# Patient Record
Sex: MALE | Race: WHITE | Employment: OTHER | URBAN - METROPOLITAN AREA
[De-identification: names, ages, dates, MRNs, and addresses within clinical notes are randomized per-mention and may not be internally consistent; named-entity substitution may affect disease eponyms.]

---

## 2017-02-27 ENCOUNTER — HOSPITAL ENCOUNTER (OUTPATIENT)
Dept: LAB | Age: 77
Discharge: HOME | End: 2017-02-27
Payer: MEDICARE

## 2017-02-27 ENCOUNTER — APPOINTMENT (OUTPATIENT)
Dept: CT IMAGING | Age: 77
End: 2017-02-27
Attending: EMERGENCY MEDICINE
Payer: MEDICARE

## 2017-02-27 ENCOUNTER — HOSPITAL ENCOUNTER (EMERGENCY)
Age: 77
Discharge: HOME | End: 2017-02-27
Attending: EMERGENCY MEDICINE
Payer: MEDICARE

## 2017-02-27 VITALS
SYSTOLIC BLOOD PRESSURE: 154 MMHG | BODY MASS INDEX: 25.71 KG/M2 | HEART RATE: 62 BPM | DIASTOLIC BLOOD PRESSURE: 101 MMHG | TEMPERATURE: 98 F | HEIGHT: 66 IN | RESPIRATION RATE: 18 BRPM | OXYGEN SATURATION: 100 % | WEIGHT: 160 LBS

## 2017-02-27 DIAGNOSIS — R07.9 CHEST PAIN, UNSPECIFIED TYPE: Primary | ICD-10-CM

## 2017-02-27 LAB
ALBUMIN SERPL BCP-MCNC: 3.5 G/DL (ref 3.2–4.6)
ALBUMIN/GLOB SERPL: 0.8 {RATIO} (ref 1.2–3.5)
ALP SERPL-CCNC: 64 U/L (ref 50–136)
ALT SERPL-CCNC: 19 U/L (ref 12–65)
ANION GAP BLD CALC-SCNC: 9 MMOL/L (ref 7–16)
AST SERPL W P-5'-P-CCNC: 23 U/L (ref 15–37)
BASOPHILS # BLD AUTO: 0 K/UL (ref 0–0.2)
BASOPHILS # BLD: 1 % (ref 0–2)
BILIRUB SERPL-MCNC: 0.4 MG/DL (ref 0.2–1.1)
BUN SERPL-MCNC: 18 MG/DL (ref 8–23)
CALCIUM SERPL-MCNC: 9.7 MG/DL (ref 8.3–10.4)
CHLORIDE SERPL-SCNC: 101 MMOL/L (ref 98–107)
CO2 SERPL-SCNC: 30 MMOL/L (ref 21–32)
CREAT SERPL-MCNC: 1.02 MG/DL (ref 0.8–1.5)
D DIMER PPP FEU-MCNC: 1.42 UG/ML(FEU)
DIFFERENTIAL METHOD BLD: ABNORMAL
EOSINOPHIL # BLD: 0.1 K/UL (ref 0–0.8)
EOSINOPHIL NFR BLD: 1 % (ref 0.5–7.8)
ERYTHROCYTE [DISTWIDTH] IN BLOOD BY AUTOMATED COUNT: 13.9 % (ref 11.9–14.6)
GLOBULIN SER CALC-MCNC: 4.5 G/DL (ref 2.3–3.5)
GLUCOSE SERPL-MCNC: 97 MG/DL (ref 65–100)
HCT VFR BLD AUTO: 42.1 % (ref 41.1–50.3)
HGB BLD-MCNC: 13.7 G/DL (ref 13.6–17.2)
IMM GRANULOCYTES # BLD: 0 K/UL (ref 0–0.5)
IMM GRANULOCYTES NFR BLD AUTO: 0.1 % (ref 0–5)
LYMPHOCYTES # BLD AUTO: 16 % (ref 13–44)
LYMPHOCYTES # BLD: 1.1 K/UL (ref 0.5–4.6)
MCH RBC QN AUTO: 29.1 PG (ref 26.1–32.9)
MCHC RBC AUTO-ENTMCNC: 32.5 G/DL (ref 31.4–35)
MCV RBC AUTO: 89.4 FL (ref 79.6–97.8)
MONOCYTES # BLD: 0.6 K/UL (ref 0.1–1.3)
MONOCYTES NFR BLD AUTO: 8 % (ref 4–12)
NEUTS SEG # BLD: 5.1 K/UL (ref 1.7–8.2)
NEUTS SEG NFR BLD AUTO: 74 % (ref 43–78)
PLATELET # BLD AUTO: 348 K/UL (ref 150–450)
PMV BLD AUTO: 9.5 FL (ref 10.8–14.1)
POTASSIUM SERPL-SCNC: 4.9 MMOL/L (ref 3.5–5.1)
PROT SERPL-MCNC: 8 G/DL (ref 6.3–8.2)
RBC # BLD AUTO: 4.71 M/UL (ref 4.23–5.67)
SODIUM SERPL-SCNC: 140 MMOL/L (ref 136–145)
TROPONIN I BLD-MCNC: 0.01 NG/ML (ref 0–0.08)
TROPONIN I SERPL-MCNC: <0.04 NG/ML (ref 0.02–0.05)
WBC # BLD AUTO: 6.9 K/UL (ref 4.3–11.1)

## 2017-02-27 PROCEDURE — 71260 CT THORAX DX C+: CPT

## 2017-02-27 PROCEDURE — 74011000258 HC RX REV CODE- 258: Performed by: EMERGENCY MEDICINE

## 2017-02-27 PROCEDURE — 84484 ASSAY OF TROPONIN QUANT: CPT

## 2017-02-27 PROCEDURE — 80053 COMPREHEN METABOLIC PANEL: CPT | Performed by: EMERGENCY MEDICINE

## 2017-02-27 PROCEDURE — 85025 COMPLETE CBC W/AUTO DIFF WBC: CPT | Performed by: EMERGENCY MEDICINE

## 2017-02-27 PROCEDURE — 99284 EMERGENCY DEPT VISIT MOD MDM: CPT | Performed by: EMERGENCY MEDICINE

## 2017-02-27 PROCEDURE — 74011636320 HC RX REV CODE- 636/320: Performed by: EMERGENCY MEDICINE

## 2017-02-27 PROCEDURE — 93005 ELECTROCARDIOGRAM TRACING: CPT | Performed by: EMERGENCY MEDICINE

## 2017-02-27 RX ORDER — SODIUM CHLORIDE 0.9 % (FLUSH) 0.9 %
10 SYRINGE (ML) INJECTION
Status: COMPLETED | OUTPATIENT
Start: 2017-02-27 | End: 2017-02-27

## 2017-02-27 RX ADMIN — IOVERSOL 100 ML: 741 INJECTION INTRA-ARTERIAL; INTRAVENOUS at 18:32

## 2017-02-27 RX ADMIN — Medication 10 ML: at 18:31

## 2017-02-27 RX ADMIN — SODIUM CHLORIDE 100 ML: 900 INJECTION, SOLUTION INTRAVENOUS at 18:31

## 2017-02-27 NOTE — ED PROVIDER NOTES
HPI Comments: Drove back from [place] recently,  Has sarcoidosis, but no cad,  Has had some chest heaviness while in [place]. Once back here he had some fleeting sharp chest pain and some increasing dyspnea which he attributed to a URI. Went to pmd who ordered a d-dimer which ws positive, so he was sent here for ct chest.    Patient is a 68 y.o. male presenting with abnormal lab results. The history is provided by the patient. Abnormal Lab Results   This is a new problem. The problem occurs daily. The problem has not changed since onset. Associated symptoms include chest pain and shortness of breath. Pertinent negatives include no abdominal pain and no headaches. Nothing aggravates the symptoms. Nothing relieves the symptoms. He has tried nothing for the symptoms. Past Medical History:   Diagnosis Date    Agent orange exposure 1960s    Hyperlipidemia 3/13/2006    Sarcoidosis ([facility] [address] 2001    Lung Biopsy was done in 2001;  Liver Biopsy--Negative for sarcidosis 5/06       Past Surgical History:   Procedure Laterality Date    CHEST SURGERY PROCEDURE UNLISTED  2001    Lung bx for sarcoidosis    HX HERNIA REPAIR Left 2004    Left inguinal hernia repair    HX HERNIA REPAIR Right 1990's    Right inguinal hernia    HX KNEE REPLACEMENT Left 2016    HX LAP CHOLECYSTECTOMY  2006    HX ORTHOPAEDIC Right 2014    ankle replacement @ [facility]    HX OTHER SURGICAL  2006    Liver biopsy--negative for sarcoidosis    HX TONSILLECTOMY  as a child         Family History:   Problem Relation Age of Onset    Diabetes Mother     Hypertension Mother     Cancer Mother      Liver Cancer    Elevated Lipids Mother     Hypertension Father     Stroke Father     Cancer Sister     Headache Neg Hx        Social History     Social History    Marital status:      Spouse name: N/A    Number of children: N/A    Years of education: N/A     Occupational History    [employer]      Social History Main Topics    Smoking status: Former Smoker     Packs/day: 1.50     Years: 5.00     Types: Cigarettes     Quit date: 1/1/1968    Smokeless tobacco: Never Used    Alcohol use 0.0 oz/week     0 Standard drinks or equivalent per week      Comment: socially    Drug use: No    Sexual activity: Not on file     Other Topics Concern    Not on file     Social History Narrative    He is originally from [place], but retired to [place]. He is retired from [employer]. He has worked in [employer] and has travelled extensively to [facility], [place], [facility], [place] [facility], and [place]  and lives with wife. ALLERGIES: Review of patient's allergies indicates no known allergies. Review of Systems   Constitutional: Negative for chills and fever. HENT: Negative for rhinorrhea and sore throat. Eyes: Negative for discharge and redness. Respiratory: Positive for cough and shortness of breath. Cardiovascular: Positive for chest pain. Negative for palpitations and leg swelling. Gastrointestinal: Negative for abdominal pain, diarrhea, nausea and vomiting. Musculoskeletal: Negative for arthralgias and back pain. Skin: Negative for rash. Neurological: Negative for dizziness and headaches. All other systems reviewed and are negative. Vitals:    02/27/17 1658 02/27/17 1715   BP: (!) 184/106 (!) 170/91   Pulse: 68 (!) 59   Resp: 16 20   Temp: 98.2 °F (36.8 °C)    SpO2: 93% 98%   Weight: 72.6 kg (160 lb)    Height: 5' 6\" (1.676 m)             Physical Exam   Constitutional: He is oriented to person, place, and time. He appears well-developed and well-nourished. No distress. HENT:   Head: Normocephalic and atraumatic. Eyes: Conjunctivae and EOM are normal. Pupils are equal, round, and reactive to light. Right eye exhibits no discharge. Left eye exhibits no discharge. No scleral icterus. Neck: Normal range of motion. Neck supple. Cardiovascular: Normal rate, regular rhythm and normal heart sounds.   Exam reveals no gallop. No murmur heard. Pulmonary/Chest: Effort normal and breath sounds normal. No respiratory distress. He has no wheezes. He has no rales. He exhibits no tenderness. Abdominal: Soft. Bowel sounds are normal. There is no tenderness. There is no guarding. Musculoskeletal: Normal range of motion. He exhibits no edema. Neurological: He is alert and oriented to person, place, and time. He exhibits normal muscle tone. cni 2-12 grossly   Skin: Skin is warm and dry. He is not diaphoretic. Psychiatric: He has a normal mood and affect. His behavior is normal.   Nursing note and vitals reviewed. MDM  Number of Diagnoses or Management Options  Chest pain, unspecified type:   Diagnosis management comments: Medical decision making note:  chest pain r/o P.E. Pt has a cardiology appointment wednesday  This concludes the \"medical decision making note\" part of this emergency department visit note.          Amount and/or Complexity of Data Reviewed  Tests in the medicine section of CPT®: ordered and reviewed (Results Include:    Recent Results (from the past 24 hour(s))  -D DIMER  Collection Time: 02/27/17  3:12 PM       Result                                            Value                         Ref Range                       D DIMER                                           1.42 (HH)                     <0.55 ug/ml(FEU)           -TROPONIN I  Collection Time: 02/27/17  3:12 PM       Result                                            Value                         Ref Range                       Troponin-I, Qt.                                   <0.04                         0.02 - 0.05 NG/ML          -CBC WITH AUTOMATED DIFF  Collection Time: 02/27/17  5:00 PM       Result                                            Value                         Ref Range                       WBC                                               6.9                           4.3 - 11.1 K/uL                 RBC 4.71                          4.23 - 5.67 M/uL                HGB                                               13.7                          13.6 - 17.2 g/dL                HCT                                               42.1                          41.1 - 50.3 %                   MCV                                               89.4                          79.6 - 97.8 FL                  MCH                                               29.1                          26.1 - 32.9 PG                  MCHC                                              32.5                          31.4 - 35.0 g/dL                RDW                                               13.9                          11.9 - 14.6 %                   PLATELET                                          348                           150 - 450 K/uL                  MPV                                               9.5 (L)                       10.8 - 14.1 FL                  DF                                                AUTOMATED                                                     NEUTROPHILS                                       74                            43 - 78 %                       LYMPHOCYTES                                       16                            13 - 44 %                       MONOCYTES                                         8                             4.0 - 12.0 %                    EOSINOPHILS                                       1                             0.5 - 7.8 %                     BASOPHILS                                         1                             0.0 - 2.0 %                     IMMATURE GRANULOCYTES                             0.1                           0.0 - 5.0 %                     ABS. NEUTROPHILS                                  5.1                           1.7 - 8.2 K/UL                  ABS.  LYMPHOCYTES                                  1.1 0.5 - 4.6 K/UL                  ABS. MONOCYTES                                    0.6                           0.1 - 1.3 K/UL                  ABS. EOSINOPHILS                                  0.1                           0.0 - 0.8 K/UL                  ABS. BASOPHILS                                    0.0                           0.0 - 0.2 K/UL                  ABS. IMM.  GRANS.                                  0.0                           0.0 - 0.5 K/UL             -METABOLIC PANEL, COMPREHENSIVE  Collection Time: 02/27/17  5:00 PM       Result                                            Value                         Ref Range                       Sodium                                            140                           136 - 145 mmol/L                Potassium                                         4.9                           3.5 - 5.1 mmol/L                Chloride                                          101                           98 - 107 mmol/L                 CO2                                               30                            21 - 32 mmol/L                  Anion gap                                         9                             7 - 16 mmol/L                   Glucose                                           97                            65 - 100 mg/dL                  BUN                                               18                            8 - 23 MG/DL                    Creatinine                                        1.02                          0.8 - 1.5 MG/DL                 GFR est AA                                        >60                           >60 ml/min/1.73m2               GFR est non-AA                                    >60                           >60 ml/min/1.73m2               Calcium                                           9.7                           8.3 - 10.4 MG/DL                Bilirubin, total 0.4                           0.2 - 1.1 MG/DL                 ALT (SGPT)                                        19                            12 - 65 U/L                     AST (SGOT)                                        23                            15 - 37 U/L                     Alk. phosphatase                                  64                            50 - 136 U/L                    Protein, total                                    8.0                           6.3 - 8.2 g/dL                  Albumin                                           3.5                           3.2 - 4.6 g/dL                  Globulin                                          4.5 (H)                       2.3 - 3.5 g/dL                  A-G Ratio                                         0.8 (L)                       1.2 - 3.5                  )      ED Course       Procedures

## 2017-02-27 NOTE — PROGRESS NOTES
Patient notified of these results and advised to proceed straight to the ER for evaluation of possible PE.

## 2017-02-28 ENCOUNTER — PATIENT OUTREACH (OUTPATIENT)
Dept: CASE MANAGEMENT | Age: 77
End: 2017-02-28

## 2017-02-28 LAB
ATRIAL RATE: 58 BPM
CALCULATED P AXIS, ECG09: 51 DEGREES
CALCULATED R AXIS, ECG10: -17 DEGREES
CALCULATED T AXIS, ECG11: 24 DEGREES
DIAGNOSIS, 93000: NORMAL
DIASTOLIC BP, ECG02: NORMAL MMHG
P-R INTERVAL, ECG05: 168 MS
Q-T INTERVAL, ECG07: 430 MS
QRS DURATION, ECG06: 76 MS
QTC CALCULATION (BEZET), ECG08: 422 MS
SYSTOLIC BP, ECG01: NORMAL MMHG
VENTRICULAR RATE, ECG03: 58 BPM

## 2017-02-28 NOTE — PROGRESS NOTES
Date/Time of Call:   02/28/2017 10:17AM   What was the patient seen in the ED for? Patient was seen in ED for diagnosis of: chest pain   Does the patient understand his/her diagnosis and/or treatment and what happened during the ED visit? Spoke with patient who stated understanding of treatment and diagnosis. Did the patient receive discharge instructions from the ED? Yes discharge instructions received from the ED per patient. Review any discharge instructions (see notes in [facility]). Ask patient if they understand these. Do they have any questions? Patient and Care Coordinator reviewed DC instructions. Patient stated understanding and no questions asked. Were home services ordered (nursing, PT, OT, ST, etc.)? No HH services ordered at d/c. If so, has the first visit occurred? If not, why? (Assist with coordination of services if necessary.) N/A. Was any DME ordered? No DME ordered at d/c. If so, has it been received? If not, why?  (Assist with coordination of arranging DME orders if necessary.) N/A   Complete a review of all medications (new, continued and discontinued meds per the D/C instructions and medication tab in [address] [place] [address]). Review of medications was completed. Were all new prescriptions filled? If not, why?  (Assist with obtainment of medications if necessary.) N/A   Does the patient understand the purpose and dosing instructions for all medications? (If patient has questions, provide explanation and education.) Patient stated understanding of purpose and instruction for medications. Does the patient have any problems in performing ADLs? (If patient is unable to perform ADLs - what is the limiting factor(s)? Do they have a support system that can assist? If no support system is present, discuss possible assistance that they may be able to obtain.) No per patient he is independent with all ADLs.    Does the patient have all follow-up appointments scheduled? Has transportation been arranged? [facility] Pulmonary follow-up should be within 7 days of discharge; all others should have PCP follow-up within 7 days of discharge; follow-ups with other specialists as appropriate or ordered.) Patient states f/u appointments are scheduled. All appointments are confirmed in [address] [place] [address] as well. Patient states he has transportation. Any other questions or concerns expressed by the patient? No further questions asked or needs identified at this time. Patient stated gratitude for call. JOSE Call Completed By: Carrie Becerril LPN   Care Coordinator  [facility]          This note will not be viewable in [address]

## 2017-02-28 NOTE — DISCHARGE INSTRUCTIONS
Chest Pain: Care Instructions  Your Care Instructions  There are many things that can cause chest pain. Some are not serious and will get better on their own in a few days. But some kinds of chest pain need more testing and treatment. Your doctor may have recommended a follow-up visit in the next 8 to 12 hours. If you are not getting better, you may need more tests or treatment. Even though your doctor has released you, you still need to watch for any problems. The doctor carefully checked you, but sometimes problems can develop later. If you have new symptoms or if your symptoms do not get better, get medical care right away. If you have worse or different chest pain or pressure that lasts more than 5 minutes or you passed out (lost consciousness), call 911 or seek other emergency help right away. A medical visit is only one step in your treatment. Even if you feel better, you still need to do what your doctor recommends, such as going to all suggested follow-up appointments and taking medicines exactly as directed. This will help you recover and help prevent future problems. How can you care for yourself at home? · Rest until you feel better. · Take your medicine exactly as prescribed. Call your doctor if you think you are having a problem with your medicine. · Do not drive after taking a prescription pain medicine. When should you call for help? Call 911 if:  · You passed out (lost consciousness). · You have severe difficulty breathing. · You have symptoms of a heart attack. These may include:  ¨ Chest pain or pressure, or a strange feeling in your chest.  ¨ Sweating. ¨ Shortness of breath. ¨ Nausea or vomiting. ¨ Pain, pressure, or a strange feeling in your back, neck, jaw, or upper belly or in one or both shoulders or arms. ¨ Lightheadedness or sudden weakness. ¨ A fast or irregular heartbeat.   After you call 911, the  may tell you to chew 1 adult-strength or 2 to 4 low-dose aspirin. Wait for an ambulance. Do not try to drive yourself. Call your doctor today if:  · You have any trouble breathing. · Your chest pain gets worse. · You are dizzy or lightheaded, or you feel like you may faint. · You are not getting better as expected. · You are having new or different chest pain. Where can you learn more? Go to http://hector-gal.info/. Enter A120 in the search box to learn more about \"Chest Pain: Care Instructions. \"  Current as of: May 27, 2016  Content Version: 11.1  © 5054-2758 [employer]. Care instructions adapted under license by [employer] (which disclaims liability or warranty for this information). If you have questions about a medical condition or this instruction, always ask your healthcare professional. [address] any warranty or liability for your use of this information.

## 2017-02-28 NOTE — ED NOTES
I have reviewed discharge instructions with the patient. The patient verbalized understanding. Discharge medications reviewed with patient and appropriate educational materials and side effects teaching were provided. Pt denies any further needs, questions or concerns. Pt ambulatory to the the lobby.

## 2017-03-14 PROBLEM — F51.01 PRIMARY INSOMNIA: Status: ACTIVE | Noted: 2017-03-14

## 2017-03-14 PROBLEM — J45.909 LATE-ONSET ASTHMA: Chronic | Status: ACTIVE | Noted: 2017-03-14

## 2017-09-28 ENCOUNTER — HOSPITAL ENCOUNTER (OUTPATIENT)
Dept: NON INVASIVE DIAGNOSTICS | Age: 77
Discharge: HOME | End: 2017-09-28
Attending: NURSE PRACTITIONER
Payer: MEDICARE

## 2017-09-28 DIAGNOSIS — R06.09 DYSPNEA ON EXERTION: ICD-10-CM

## 2017-09-28 DIAGNOSIS — I27.23 PULMONARY HYPERTENSION DUE TO HYPOXIA (HCC): Chronic | ICD-10-CM

## 2017-09-28 PROCEDURE — 93306 TTE W/DOPPLER COMPLETE: CPT

## 2017-11-22 ENCOUNTER — HOSPITAL ENCOUNTER (OUTPATIENT)
Dept: LAB | Age: 77
Discharge: HOME | End: 2017-11-22
Attending: NURSE PRACTITIONER
Payer: MEDICARE

## 2017-11-22 ENCOUNTER — HOSPITAL ENCOUNTER (OUTPATIENT)
Dept: CT IMAGING | Age: 77
Discharge: HOME | End: 2017-11-22
Attending: NURSE PRACTITIONER
Payer: MEDICARE

## 2017-11-22 DIAGNOSIS — I27.23 PULMONARY HYPERTENSION DUE TO HYPOXIA (HCC): Chronic | ICD-10-CM

## 2017-11-22 DIAGNOSIS — J98.4 RESTRICTIVE LUNG DISEASE: Chronic | ICD-10-CM

## 2017-11-22 LAB
ALBUMIN SERPL-MCNC: 3.3 G/DL (ref 3.2–4.6)
ALBUMIN/GLOB SERPL: 0.8 {RATIO} (ref 1.2–3.5)
ALP SERPL-CCNC: 51 U/L (ref 50–136)
ALT SERPL-CCNC: 22 U/L (ref 12–65)
AST SERPL-CCNC: 22 U/L (ref 15–37)
BILIRUB DIRECT SERPL-MCNC: <0.1 MG/DL
BILIRUB SERPL-MCNC: 0.3 MG/DL (ref 0.2–1.1)
ERYTHROCYTE [SEDIMENTATION RATE] IN BLOOD: 29 MM/HR (ref 0–20)
GLOBULIN SER CALC-MCNC: 4.4 G/DL (ref 2.3–3.5)
PROT SERPL-MCNC: 7.7 G/DL (ref 6.3–8.2)

## 2017-11-22 PROCEDURE — 80076 HEPATIC FUNCTION PANEL: CPT | Performed by: NURSE PRACTITIONER

## 2017-11-22 PROCEDURE — 71250 CT THORAX DX C-: CPT

## 2017-11-22 PROCEDURE — 36415 COLL VENOUS BLD VENIPUNCTURE: CPT | Performed by: NURSE PRACTITIONER

## 2017-11-22 PROCEDURE — 85652 RBC SED RATE AUTOMATED: CPT | Performed by: NURSE PRACTITIONER

## 2017-11-28 PROBLEM — J45.909 LATE-ONSET ASTHMA: Chronic | Status: RESOLVED | Noted: 2017-03-14 | Resolved: 2017-11-28

## 2017-11-28 PROBLEM — J44.9 COPD WITH ASTHMA (HCC): Chronic | Status: ACTIVE | Noted: 2017-11-28

## 2018-02-26 ENCOUNTER — HOSPITAL ENCOUNTER (OUTPATIENT)
Dept: CARDIAC REHAB | Age: 78
Discharge: HOME | End: 2018-02-26

## 2018-02-26 RX ORDER — AMBRISENTAN 5 MG/1
5 TABLET, FILM COATED ORAL DAILY
COMMUNITY
End: 2019-02-01

## 2018-02-26 NOTE — CARDIO/PULMONARY
2018      Dear Dr. Jarrell Leigh: Thank you for referring your patient, Sukumar Garcia (: 1940), to the Pulmonary Rehabilitation Program at [facility]. Mr. Ros Hawkins is a good candidate for the program and should see improvements with regular participation. We will be working to increase your patient's endurance and self care over the next 12 weeks. We will contact you with any issues or concerns that may arise, or you can follow your patient's progress through [address] [place] [address] at any time. We will send you a final summary report when the program is completed. Again, thank you for your referral. If we can be of further assistance, please feel free to contact the Cardiopulmonary Rehab staff at [phone].     Sincerely,    Alivia Lao, RRT, RCP  Pulmonary Rehab Specialist   [facility]    CC: File

## 2018-03-01 ENCOUNTER — HOSPITAL ENCOUNTER (OUTPATIENT)
Dept: CARDIAC REHAB | Age: 78
Discharge: HOME | End: 2018-03-01

## 2018-03-06 ENCOUNTER — HOSPITAL ENCOUNTER (OUTPATIENT)
Dept: CARDIAC REHAB | Age: 78
Discharge: HOME | End: 2018-03-06
Payer: MEDICARE

## 2018-03-06 ENCOUNTER — APPOINTMENT (OUTPATIENT)
Dept: CARDIAC REHAB | Age: 78
End: 2018-03-06

## 2018-03-06 VITALS — HEIGHT: 65 IN | WEIGHT: 139.6 LBS | BODY MASS INDEX: 23.26 KG/M2

## 2018-03-06 PROCEDURE — G0424 PULMONARY REHAB W EXER: HCPCS

## 2018-03-08 ENCOUNTER — APPOINTMENT (OUTPATIENT)
Dept: CARDIAC REHAB | Age: 78
End: 2018-03-08

## 2018-03-08 ENCOUNTER — HOSPITAL ENCOUNTER (OUTPATIENT)
Dept: CARDIAC REHAB | Age: 78
Discharge: HOME | End: 2018-03-08
Payer: MEDICARE

## 2018-03-08 PROCEDURE — G0424 PULMONARY REHAB W EXER: HCPCS

## 2018-03-13 ENCOUNTER — HOSPITAL ENCOUNTER (OUTPATIENT)
Dept: CARDIAC REHAB | Age: 78
Discharge: HOME | End: 2018-03-13
Payer: MEDICARE

## 2018-03-13 ENCOUNTER — APPOINTMENT (OUTPATIENT)
Dept: CARDIAC REHAB | Age: 78
End: 2018-03-13

## 2018-03-13 VITALS — BODY MASS INDEX: 23.4 KG/M2 | WEIGHT: 140.6 LBS

## 2018-03-13 PROCEDURE — G0424 PULMONARY REHAB W EXER: HCPCS

## 2018-03-15 ENCOUNTER — APPOINTMENT (OUTPATIENT)
Dept: CARDIAC REHAB | Age: 78
End: 2018-03-15

## 2018-03-15 ENCOUNTER — HOSPITAL ENCOUNTER (OUTPATIENT)
Dept: CARDIAC REHAB | Age: 78
Discharge: HOME | End: 2018-03-15
Payer: MEDICARE

## 2018-03-15 PROCEDURE — G0424 PULMONARY REHAB W EXER: HCPCS

## 2018-03-20 ENCOUNTER — HOSPITAL ENCOUNTER (OUTPATIENT)
Dept: CARDIAC REHAB | Age: 78
Discharge: HOME | End: 2018-03-20
Payer: MEDICARE

## 2018-03-20 ENCOUNTER — APPOINTMENT (OUTPATIENT)
Dept: CARDIAC REHAB | Age: 78
End: 2018-03-20

## 2018-03-20 VITALS — BODY MASS INDEX: 23.36 KG/M2 | WEIGHT: 140.4 LBS

## 2018-03-20 PROCEDURE — G0424 PULMONARY REHAB W EXER: HCPCS

## 2018-03-22 ENCOUNTER — APPOINTMENT (OUTPATIENT)
Dept: GENERAL RADIOLOGY | Age: 78
End: 2018-03-22
Attending: EMERGENCY MEDICINE
Payer: MEDICARE

## 2018-03-22 ENCOUNTER — HOSPITAL ENCOUNTER (EMERGENCY)
Age: 78
Discharge: HOME | End: 2018-03-22
Attending: EMERGENCY MEDICINE
Payer: MEDICARE

## 2018-03-22 ENCOUNTER — APPOINTMENT (OUTPATIENT)
Dept: CARDIAC REHAB | Age: 78
End: 2018-03-22

## 2018-03-22 ENCOUNTER — APPOINTMENT (OUTPATIENT)
Dept: CARDIAC REHAB | Age: 78
End: 2018-03-22
Payer: MEDICARE

## 2018-03-22 ENCOUNTER — APPOINTMENT (OUTPATIENT)
Dept: CT IMAGING | Age: 78
End: 2018-03-22
Attending: EMERGENCY MEDICINE
Payer: MEDICARE

## 2018-03-22 VITALS
HEART RATE: 57 BPM | BODY MASS INDEX: 23.82 KG/M2 | HEIGHT: 65 IN | WEIGHT: 143 LBS | DIASTOLIC BLOOD PRESSURE: 72 MMHG | OXYGEN SATURATION: 98 % | SYSTOLIC BLOOD PRESSURE: 113 MMHG | RESPIRATION RATE: 20 BRPM

## 2018-03-22 DIAGNOSIS — R06.09 DYSPNEA ON EXERTION: ICD-10-CM

## 2018-03-22 DIAGNOSIS — J90 PLEURAL EFFUSION: Primary | ICD-10-CM

## 2018-03-22 LAB
ALBUMIN SERPL-MCNC: 3.3 G/DL (ref 3.2–4.6)
ALBUMIN/GLOB SERPL: 0.8 {RATIO} (ref 1.2–3.5)
ALP SERPL-CCNC: 47 U/L (ref 50–136)
ALT SERPL-CCNC: 24 U/L (ref 12–65)
ANION GAP SERPL CALC-SCNC: 8 MMOL/L (ref 7–16)
ARTERIAL PATENCY WRIST A: POSITIVE
AST SERPL-CCNC: 23 U/L (ref 15–37)
ATRIAL RATE: 58 BPM
BASE EXCESS BLDA CALC-SCNC: 3.1 MMOL/L (ref 0–3)
BASOPHILS # BLD: 0 K/UL (ref 0–0.2)
BASOPHILS NFR BLD: 1 % (ref 0–2)
BDY SITE: ABNORMAL
BILIRUB SERPL-MCNC: 0.3 MG/DL (ref 0.2–1.1)
BNP SERPL-MCNC: 147 PG/ML
BUN SERPL-MCNC: 17 MG/DL (ref 8–23)
CALCIUM SERPL-MCNC: 8.9 MG/DL (ref 8.3–10.4)
CALCULATED P AXIS, ECG09: 63 DEGREES
CALCULATED R AXIS, ECG10: 6 DEGREES
CALCULATED T AXIS, ECG11: 46 DEGREES
CHLORIDE SERPL-SCNC: 103 MMOL/L (ref 98–107)
CO2 SERPL-SCNC: 28 MMOL/L (ref 21–32)
COHGB MFR BLD: 0 % (ref 0.5–1.5)
CREAT SERPL-MCNC: 0.95 MG/DL (ref 0.8–1.5)
D DIMER PPP FEU-MCNC: 0.77 UG/ML(FEU)
DIAGNOSIS, 93000: NORMAL
DIFFERENTIAL METHOD BLD: ABNORMAL
DO-HGB BLD-MCNC: 3 % (ref 0–5)
EOSINOPHIL # BLD: 0.1 K/UL (ref 0–0.8)
EOSINOPHIL NFR BLD: 3 % (ref 0.5–7.8)
ERYTHROCYTE [DISTWIDTH] IN BLOOD BY AUTOMATED COUNT: 13.7 % (ref 11.9–14.6)
GAS FLOW.O2 O2 DELIVERY SYS: 3 L/MIN
GLOBULIN SER CALC-MCNC: 4 G/DL (ref 2.3–3.5)
GLUCOSE SERPL-MCNC: 142 MG/DL (ref 65–100)
HCO3 BLDA-SCNC: 29 MMOL/L (ref 22–26)
HCT VFR BLD AUTO: 40.3 % (ref 41.1–50.3)
HGB BLD-MCNC: 13.1 G/DL (ref 13.6–17.2)
HGB BLDMV-MCNC: 13.4 GM/DL (ref 11.7–15)
IMM GRANULOCYTES # BLD: 0 K/UL (ref 0–0.5)
IMM GRANULOCYTES NFR BLD AUTO: 0 % (ref 0–5)
LYMPHOCYTES # BLD: 0.4 K/UL (ref 0.5–4.6)
LYMPHOCYTES NFR BLD: 8 % (ref 13–44)
MAGNESIUM SERPL-MCNC: 2.1 MG/DL (ref 1.8–2.4)
MCH RBC QN AUTO: 29.4 PG (ref 26.1–32.9)
MCHC RBC AUTO-ENTMCNC: 32.5 G/DL (ref 31.4–35)
MCV RBC AUTO: 90.4 FL (ref 79.6–97.8)
METHGB MFR BLD: 0.2 % (ref 0–1.5)
MONOCYTES # BLD: 0.5 K/UL (ref 0.1–1.3)
MONOCYTES NFR BLD: 12 % (ref 4–12)
NEUTS SEG # BLD: 3.1 K/UL (ref 1.7–8.2)
NEUTS SEG NFR BLD: 76 % (ref 43–78)
OXYHGB MFR BLDA: 97 % (ref 94–97)
P-R INTERVAL, ECG05: 162 MS
PCO2 BLDA: 47 MMHG (ref 35–45)
PH BLDA: 7.4 [PH] (ref 7.35–7.45)
PLATELET # BLD AUTO: 267 K/UL (ref 150–450)
PMV BLD AUTO: 9.4 FL (ref 10.8–14.1)
PO2 BLDA: 100 MMHG (ref 80–105)
POTASSIUM SERPL-SCNC: 3.8 MMOL/L (ref 3.5–5.1)
PROT SERPL-MCNC: 7.3 G/DL (ref 6.3–8.2)
Q-T INTERVAL, ECG07: 420 MS
QRS DURATION, ECG06: 82 MS
QTC CALCULATION (BEZET), ECG08: 412 MS
RBC # BLD AUTO: 4.46 M/UL (ref 4.23–5.67)
SAO2 % BLD: 97 % (ref 92–98.5)
SODIUM SERPL-SCNC: 139 MMOL/L (ref 136–145)
TROPONIN I SERPL-MCNC: <0.02 NG/ML (ref 0.02–0.05)
VENTILATION MODE VENT: ABNORMAL
VENTRICULAR RATE, ECG03: 58 BPM
WBC # BLD AUTO: 4.2 K/UL (ref 4.3–11.1)

## 2018-03-22 PROCEDURE — 80053 COMPREHEN METABOLIC PANEL: CPT | Performed by: EMERGENCY MEDICINE

## 2018-03-22 PROCEDURE — 85025 COMPLETE CBC W/AUTO DIFF WBC: CPT | Performed by: EMERGENCY MEDICINE

## 2018-03-22 PROCEDURE — 83735 ASSAY OF MAGNESIUM: CPT | Performed by: EMERGENCY MEDICINE

## 2018-03-22 PROCEDURE — 71260 CT THORAX DX C+: CPT

## 2018-03-22 PROCEDURE — 83880 ASSAY OF NATRIURETIC PEPTIDE: CPT | Performed by: EMERGENCY MEDICINE

## 2018-03-22 PROCEDURE — 84484 ASSAY OF TROPONIN QUANT: CPT | Performed by: EMERGENCY MEDICINE

## 2018-03-22 PROCEDURE — 74011636320 HC RX REV CODE- 636/320: Performed by: EMERGENCY MEDICINE

## 2018-03-22 PROCEDURE — 74011000258 HC RX REV CODE- 258: Performed by: EMERGENCY MEDICINE

## 2018-03-22 PROCEDURE — 85379 FIBRIN DEGRADATION QUANT: CPT | Performed by: EMERGENCY MEDICINE

## 2018-03-22 PROCEDURE — 93005 ELECTROCARDIOGRAM TRACING: CPT | Performed by: EMERGENCY MEDICINE

## 2018-03-22 PROCEDURE — 82803 BLOOD GASES ANY COMBINATION: CPT

## 2018-03-22 PROCEDURE — 99284 EMERGENCY DEPT VISIT MOD MDM: CPT | Performed by: EMERGENCY MEDICINE

## 2018-03-22 PROCEDURE — 71046 X-RAY EXAM CHEST 2 VIEWS: CPT

## 2018-03-22 PROCEDURE — 36600 WITHDRAWAL OF ARTERIAL BLOOD: CPT

## 2018-03-22 RX ORDER — FUROSEMIDE 40 MG/1
20 TABLET ORAL DAILY
Qty: 3 TAB | Refills: 0 | Status: SHIPPED | OUTPATIENT
Start: 2018-03-22 | End: 2018-03-25

## 2018-03-22 RX ORDER — SODIUM CHLORIDE 0.9 % (FLUSH) 0.9 %
10 SYRINGE (ML) INJECTION
Status: COMPLETED | OUTPATIENT
Start: 2018-03-22 | End: 2018-03-22

## 2018-03-22 RX ADMIN — SODIUM CHLORIDE 100 ML: 900 INJECTION, SOLUTION INTRAVENOUS at 13:31

## 2018-03-22 RX ADMIN — Medication 10 ML: at 13:31

## 2018-03-22 RX ADMIN — IOPAMIDOL 100 ML: 755 INJECTION, SOLUTION INTRAVENOUS at 13:31

## 2018-03-22 NOTE — ED PROVIDER NOTES
HPI Comments: Patient presents in referral complaining of intermittent episodes of dyspnea on exertion that is been very severe. He states  It does not happen every time he exerts himself but on the occasions that it has is felt his heart pounding and has had  Feelings of near syncope associated with the dyspnea despite increasing his oxygen by nasal cannula. The patient has a history of pulmonary artery hypertension and is on oxygen at all times as well as CPAP at night he denies any recent illnesses or unusual pain or swelling of the lower extremities he denies any history of coronary artery disease but has never had a heart catheter  Other than for evaluation of his pulmonary artery hypertension. Patient did have a \"low risk\" myocardial perfusion scan performed by [facility] cardiology one year ago. Patient is a 68 y.o. male presenting with shortness of breath. The history is provided by the patient. Shortness of Breath   This is a new problem. The problem occurs intermittently. The current episode started more than 2 days ago. The problem has been gradually worsening. Associated symptoms include chest pain. Pertinent negatives include no fever, no headaches, no coryza, no rhinorrhea, no sore throat, no swollen glands, no ear pain, no neck pain, no cough, no sputum production, no hemoptysis, no wheezing, no PND, no orthopnea, no syncope, no vomiting, no abdominal pain, no rash, no leg pain and no leg swelling. It is unknown what precipitated the problem. He has tried nothing for the symptoms. The treatment provided no relief. He has had no prior hospitalizations. He has had no prior ED visits. He has had no prior ICU admissions. Associated medical issues include chronic lung disease.         Past Medical History:   Diagnosis Date    Agent orange exposure 1960s    Arthritis     Asthma     Chronic obstructive pulmonary disease (HCC)     Chronic pain     GERD (gastroesophageal reflux disease)     Hyperlipidemia 3/13/2006    Hypertension     Sarcoidosis 2001    Lung Biopsy was done in 2001; Liver Biopsy--Negative for sarcidosis 5/06    Sleep apnea        Past Surgical History:   Procedure Laterality Date    CHEST SURGERY PROCEDURE UNLISTED  2001    Lung bx for sarcoidosis    HX HERNIA REPAIR Left 2004    Left inguinal hernia repair    HX HERNIA REPAIR Right 1990's    Right inguinal hernia    HX KNEE REPLACEMENT Left 2016    HX LAP CHOLECYSTECTOMY  2006    HX ORTHOPAEDIC Right 2014    ankle replacement @ [address] HX OTHER SURGICAL  2006    Liver biopsy--negative for sarcoidosis    HX TONSILLECTOMY  as a child         Family History:   Problem Relation Age of Onset    Diabetes Mother     Hypertension Mother     Cancer Mother      Liver Cancer    Elevated Lipids Mother     Hypertension Father     Stroke Father     Cancer Sister     Headache Neg Hx        Social History     Social History    Marital status:      Spouse name: N/A    Number of children: N/A    Years of education: N/A     Occupational History    [employer]      Social History Main Topics    Smoking status: Former Smoker     Packs/day: 1.50     Years: 5.00     Types: Cigarettes     Quit date: 1/1/1968    Smokeless tobacco: Never Used    Alcohol use 0.0 oz/week     0 Standard drinks or equivalent per week      Comment: socially    Drug use: No    Sexual activity: Not on file     Other Topics Concern    Not on file     Social History Narrative    He is originally from [place], but retired to [place]. He is retired from [employer]. He has worked in [employer] and has travelled extensively to [place], [place], [facility], [place] [facility] and [place] ([facility]).  and lives with wife. ALLERGIES: Review of patient's allergies indicates no known allergies. Review of Systems   Constitutional: Negative for fever. HENT: Negative for ear pain, rhinorrhea and sore throat.     Respiratory: Positive for shortness of breath. Negative for cough, hemoptysis, sputum production and wheezing. Cardiovascular: Positive for chest pain. Negative for orthopnea, leg swelling, syncope and PND. Gastrointestinal: Negative for abdominal pain and vomiting. Musculoskeletal: Negative for neck pain. Skin: Negative for rash. Neurological: Negative for headaches. All other systems reviewed and are negative. Vitals:    03/22/18 1059   BP: 121/77   Pulse: (!) 57   Resp: 27   SpO2: 95%   Weight: 64.9 kg (143 lb)   Height: 5' 5\" (1.651 m)            Physical Exam   Constitutional: He is oriented to person, place, and time. He appears well-developed and well-nourished. No distress. HENT:   Head: Normocephalic and atraumatic. Eyes: Conjunctivae and EOM are normal. Pupils are equal, round, and reactive to light. Neck: Normal range of motion. Neck supple. Cardiovascular: Normal rate, regular rhythm, normal heart sounds and intact distal pulses. Pulmonary/Chest: Effort normal and breath sounds normal.   Abdominal: Soft. Bowel sounds are normal.   Musculoskeletal: Normal range of motion. He exhibits no edema or deformity. Neurological: He is alert and oriented to person, place, and time. Skin: Skin is warm and dry. Psychiatric: He has a normal mood and affect. His behavior is normal.   Nursing note and vitals reviewed. MDM  Number of Diagnoses or Management Options  Diagnosis management comments: Differential diagnosis includes worsening pulmonary artery hypertension, dyspnea on exertion may represent COPD exacerbation, anginal equivalent, pulmonary embolism.        Amount and/or Complexity of Data Reviewed  Clinical lab tests: ordered and reviewed  Tests in the radiology section of CPT®: ordered and reviewed  Independent visualization of images, tracings, or specimens: yes (EKG at 11:09 AM: Sinus bradycardia with a rate of 58 and an otherwise normal EKG)    Risk of Complications, Morbidity, and/or Mortality  Presenting problems: moderate  Diagnostic procedures: moderate  Management options: moderate    Patient Progress  Patient progress: stable        ED Course       Procedures      Case was discussed with Dr. Damaso donald, the patient's pulmonologist, who states that she knows the patient well. In regards to the pleural effusion she recommended treatments with 3 days of Lasix and then we'll reevaluate in the office. No evidence of pulmonary embolism is noted on the CAT scan which is otherwise stable regards to his sarcoidosis as well. Given the negative cardiac evaluation in the past as well as the normal EKG and troponin today patient will be discharged with 20 mg Lasix for 3 days to follow up with Dr. Aguila Hayward in the office as well as to follow up with cardiology.

## 2018-03-22 NOTE — ED NOTES
I have reviewed discharge instructions with the patient. The patient verbalized understanding. Patient left ED via Discharge Method: ambulatory to Home with family. Opportunity for questions and clarification provided. Patient given 1 scripts. To continue your aftercare when you leave the hospital, you may receive an automated call from our care team to check in on how you are doing. This is a free service and part of our promise to provide the best care and service to meet your aftercare needs.  If you have questions, or wish to unsubscribe from this service please call [phone]. Thank you for Choosing our [facility] Emergency Department.

## 2018-03-22 NOTE — DISCHARGE INSTRUCTIONS
Shortness of Breath: Care Instructions  Your Care Instructions  Shortness of breath has many causes. Sometimes conditions such as anxiety can lead to shortness of breath. Some people get mild shortness of breath when they exercise. Trouble breathing also can be a symptom of a serious problem, such as asthma, lung disease, emphysema, heart problems, and pneumonia. If your shortness of breath continues, you may need tests and treatment. Watch for any changes in your breathing and other symptoms. Follow-up care is a key part of your treatment and safety. Be sure to make and go to all appointments, and call your doctor if you are having problems. It's also a good idea to know your test results and keep a list of the medicines you take. How can you care for yourself at home? · Do not smoke or allow others to smoke around you. If you need help quitting, talk to your doctor about stop-smoking programs and medicines. These can increase your chances of quitting for good. · Get plenty of rest and sleep. · Take your medicines exactly as prescribed. Call your doctor if you think you are having a problem with your medicine. · Find healthy ways to deal with stress. ¨ Exercise daily. ¨ Get plenty of sleep. ¨ Eat regularly and well. When should you call for help? Call 911 anytime you think you may need emergency care. For example, call if:  ? · You have severe shortness of breath. ? · You have symptoms of a heart attack. These may include:  ¨ Chest pain or pressure, or a strange feeling in the chest.  ¨ Sweating. ¨ Shortness of breath. ¨ Nausea or vomiting. ¨ Pain, pressure, or a strange feeling in the back, neck, jaw, or upper belly or in one or both shoulders or arms. ¨ Lightheadedness or sudden weakness. ¨ A fast or irregular heartbeat. After you call 911, the  may tell you to chew 1 adult-strength or 2 to 4 low-dose aspirin. Wait for an ambulance. Do not try to drive yourself.    ?Call your doctor now or seek immediate medical care if:  ? · Your shortness of breath gets worse or you start to wheeze. Wheezing is a high-pitched sound when you breathe. ? · You wake up at night out of breath or have to prop your head up on several pillows to breathe. ? · You are short of breath after only light activity or while at rest.   ? Watch closely for changes in your health, and be sure to contact your doctor if:  ? · You do not get better over the next 1 to 2 days. Where can you learn more? Go to http://hector-gal.info/. Enter S780 in the search box to learn more about \"Shortness of Breath: Care Instructions. \"  Current as of: May 12, 2017  Content Version: 11.4  © 3418-7032 [employer]. Care instructions adapted under license by [employer] (which disclaims liability or warranty for this information). If you have questions about a medical condition or this instruction, always ask your healthcare professional. [address] any warranty or liability for your use of this information. Learning About Pleural Effusion  What is pleural effusion? Pleural effusion (say \"PLER-behzad [id number]\") is the buildup of fluid in the space between tissues lining the lungs and chest wall. Because of the fluid buildup, the lungs may not be able to expand completely, which can make it hard to breathe. The lung, or part of it, may collapse. Pleural effusion has many causes, such as pneumonia, cancer, inflammation of the tissues around the lungs, and heart failure. Pleural effusion is usually diagnosed with an X-ray and a physical exam. The doctor listens to the air flow in your lungs. What are the symptoms? Symptoms of pleural effusion may include:  · Trouble breathing. · Shortness of breath. · Chest pain. · Fever. · A cough. Minor pleural effusion may not cause any symptoms. How is pleural effusion treated?   Doctors may need to treat the condition that is causing pleural effusion. For example, you may get medicines to treat pneumonia or congestive heart failure. Minor pleural effusion often goes away on its own without treatment. Removing fluid  Pleural effusion can be treated by removing fluid from the space between the tissues around the lungs. This is done with a needle that's put into the chest (thoracentesis). A small amount of the fluid may be sent to a lab to find out what is causing the buildup of fluid. Removing the fluid may help to relieve symptoms, such as shortness of breath and chest pain. It can help the lungs to expand more fully. In some cases, if pleural effusion doesn't get better, a catheter may be placed in the chest. This is a flexible tube that allows fluid to drain from the lungs. The catheter stays in the chest until the doctor removes it. Some people may get a treatment that removes the fluid and then puts a medicine into the chest cavity. This helps to prevent too much fluid from building up again. Follow-up care is a key part of your treatment and safety. Be sure to make and go to all appointments, and call your doctor if you are having problems. It's also a good idea to know your test results and keep a list of the medicines you take. Where can you learn more? Go to http://hector-gal.info/. Enter A920 in the search box to learn more about \"Learning About Pleural Effusion. \"  Current as of: May 12, 2017  Content Version: 11.4  © 3595-8487 [employer]. Care instructions adapted under license by [employer] (which disclaims liability or warranty for this information). If you have questions about a medical condition or this instruction, always ask your healthcare professional. [address] any warranty or liability for your use of this information.

## 2018-03-22 NOTE — ED TRIAGE NOTES
Patient states for two days exertional shortness of breath with tightness in chest. History of pulmonary artery hypertension.

## 2018-03-26 NOTE — PROGRESS NOTES
Nutrition Assessment:   Patient stated nutrition related goals: Pt reprots losing about 10-15# last year unintentionally and feels with this he has lost muscle mass. He repots his goal weight is 150#s. His start weight for our program was 139.6# and he reports last week he was up to 144#. He is very interested in the MIND, [address], and DASH diet and has been following these types of diets somewhat with his wife. Pt reports he and his wife struggle with consistency. He reports previously following [employer] meal planning program, but stopped. Appetite: Client reports he has found that he fills up quickly on meals and that his appetite is mostly poor. He reports his wife reminds him to eat, otherwise he could go most of the day without eating. Current Medical Diagnosis: sarcoidosis, pulmonary HTN, COPD, CAITLIN, restrictive lung disease, GERD   Supplements/Vitamins/Herbs: ascorbic acid, calcium, D3, multivitamin, B complex, zinc   Food allergies: lactose   Social Hx: retired, , just moved in December     Dietary recall:  Breakfast: Ensure or cereal, pt reports he forces himself to eat  Snack: none   Lunch: will skip or grilled cheese with tomatoes on whole wheat bread, grilled chicken burgers. Snack: none  Dinner: wife cooks salmon, green beans, boiled potatoes, salad, whole grain pasta dishes, fish twice/week usually with salads. Veggie soup. Pt reports when he was sick he ate mostly soups.     Snack: none   Beverages: water   Dining out: not often   Support: wife is very supportive   Barriers: poor appetite   Expected Compliance: good     Anthropometric Data:  Ht:65\" Wt: 139.6# Age:77 BMI:23.13kg/m2 underweight for age >71 IBW:136#  %IBW:102% Waist measurement:36\" % body fat: 17%     Estimated Nutritional Needs:  Calories:2200 (MSJ*1.3+500)  Protein:  63-94 (1-1.5g/kg CBW)    Nutrition Diagnosis:  Underweight related to inadequate energy intake as evidenced by poor appetite, skipping meals, feeling full quickly, BMI 23, age >71. Nutrition Intervention:  - Nutrition education:   Discussed need to gain weight. Affirmed that feeling full quickly was common with pulmonary diagnosis and discussed the use of small frequent meals to avoid discomfort and increase intake. Reviewed need to eat at least three meals/day. Provided simple examples. Encouraged pt to think of meals at small snacks so as not to become intimidated. Reviewed MIND, Mediterranean, and DASH diet. Discussed foods to incorporate and foods to limit with regards to each. Reviewed recommendations for high intake of fruits and vegetables and discussed how meeting this goal will be difficult if pt is only eating 2 meals/day. Discussed meal planning and use of emeals. Affirmed this could be a good option for him to discuss with his wife to get back to meal planning consistently and having foods available for lunches. Handouts: copd nutrition basics, Mediterranean Diet 101, MIND diet, DASH diet guidelines. Goals:  1. Consume 3 meals/day      Monitoring/Evaluation:  Follow-up Appointment: RD to follow up with pt during pulmonary rehab sessions for questions and assessment of progression towards goals.      Mack Rodriguez, MS, RD, LD  C: [phone]

## 2018-03-27 ENCOUNTER — HOSPITAL ENCOUNTER (OUTPATIENT)
Dept: CARDIAC REHAB | Age: 78
Discharge: HOME | End: 2018-03-27
Payer: MEDICARE

## 2018-03-27 ENCOUNTER — APPOINTMENT (OUTPATIENT)
Dept: CARDIAC REHAB | Age: 78
End: 2018-03-27

## 2018-03-27 VITALS — WEIGHT: 137.8 LBS | BODY MASS INDEX: 22.93 KG/M2

## 2018-03-27 PROBLEM — J44.9 COPD WITH ASTHMA (HCC): Chronic | Status: RESOLVED | Noted: 2017-11-28 | Resolved: 2018-03-27

## 2018-03-27 PROCEDURE — G0424 PULMONARY REHAB W EXER: HCPCS

## 2018-03-29 ENCOUNTER — HOSPITAL ENCOUNTER (OUTPATIENT)
Dept: CARDIAC REHAB | Age: 78
Discharge: HOME | End: 2018-03-29
Payer: MEDICARE

## 2018-03-29 ENCOUNTER — APPOINTMENT (OUTPATIENT)
Dept: CARDIAC REHAB | Age: 78
End: 2018-03-29

## 2018-03-29 PROCEDURE — G0239 OTH RESP PROC, GROUP: HCPCS

## 2018-04-03 ENCOUNTER — APPOINTMENT (OUTPATIENT)
Dept: CARDIAC REHAB | Age: 78
End: 2018-04-03

## 2018-04-03 ENCOUNTER — HOSPITAL ENCOUNTER (OUTPATIENT)
Dept: CARDIAC REHAB | Age: 78
Discharge: HOME | End: 2018-04-03
Payer: MEDICARE

## 2018-04-03 VITALS — WEIGHT: 142 LBS | BODY MASS INDEX: 24.37 KG/M2

## 2018-04-03 PROCEDURE — G0424 PULMONARY REHAB W EXER: HCPCS

## 2018-04-05 ENCOUNTER — HOSPITAL ENCOUNTER (OUTPATIENT)
Dept: CARDIAC REHAB | Age: 78
Discharge: HOME | End: 2018-04-05
Payer: MEDICARE

## 2018-04-05 ENCOUNTER — APPOINTMENT (OUTPATIENT)
Dept: CARDIAC REHAB | Age: 78
End: 2018-04-05

## 2018-04-05 PROCEDURE — G0424 PULMONARY REHAB W EXER: HCPCS

## 2018-04-10 ENCOUNTER — HOSPITAL ENCOUNTER (OUTPATIENT)
Dept: CARDIAC REHAB | Age: 78
Discharge: HOME | End: 2018-04-10
Payer: MEDICARE

## 2018-04-10 ENCOUNTER — APPOINTMENT (OUTPATIENT)
Dept: CARDIAC REHAB | Age: 78
End: 2018-04-10

## 2018-04-10 VITALS — BODY MASS INDEX: 24.85 KG/M2 | WEIGHT: 144.8 LBS

## 2018-04-10 PROCEDURE — G0424 PULMONARY REHAB W EXER: HCPCS

## 2018-04-12 ENCOUNTER — HOSPITAL ENCOUNTER (OUTPATIENT)
Dept: CARDIAC REHAB | Age: 78
Discharge: HOME | End: 2018-04-12
Payer: MEDICARE

## 2018-04-12 ENCOUNTER — APPOINTMENT (OUTPATIENT)
Dept: CARDIAC REHAB | Age: 78
End: 2018-04-12

## 2018-04-12 PROCEDURE — G0424 PULMONARY REHAB W EXER: HCPCS

## 2018-04-17 ENCOUNTER — APPOINTMENT (OUTPATIENT)
Dept: CARDIAC REHAB | Age: 78
End: 2018-04-17

## 2018-04-17 ENCOUNTER — HOSPITAL ENCOUNTER (OUTPATIENT)
Dept: CARDIAC REHAB | Age: 78
Discharge: HOME | End: 2018-04-17
Payer: MEDICARE

## 2018-04-17 VITALS — WEIGHT: 146.6 LBS | BODY MASS INDEX: 25.16 KG/M2

## 2018-04-17 PROCEDURE — G0424 PULMONARY REHAB W EXER: HCPCS

## 2018-04-19 ENCOUNTER — APPOINTMENT (OUTPATIENT)
Dept: CARDIAC REHAB | Age: 78
End: 2018-04-19

## 2018-04-19 ENCOUNTER — APPOINTMENT (OUTPATIENT)
Dept: CARDIAC REHAB | Age: 78
End: 2018-04-19
Payer: MEDICARE

## 2018-04-24 ENCOUNTER — HOSPITAL ENCOUNTER (OUTPATIENT)
Dept: CARDIAC REHAB | Age: 78
Discharge: HOME | End: 2018-04-24
Payer: MEDICARE

## 2018-04-24 ENCOUNTER — APPOINTMENT (OUTPATIENT)
Dept: CARDIAC REHAB | Age: 78
End: 2018-04-24

## 2018-04-24 VITALS — BODY MASS INDEX: 24.42 KG/M2 | WEIGHT: 146.4 LBS

## 2018-04-24 PROCEDURE — G0424 PULMONARY REHAB W EXER: HCPCS

## 2018-04-26 ENCOUNTER — HOSPITAL ENCOUNTER (OUTPATIENT)
Dept: CARDIAC REHAB | Age: 78
Discharge: HOME | End: 2018-04-26
Payer: MEDICARE

## 2018-04-26 ENCOUNTER — APPOINTMENT (OUTPATIENT)
Dept: CARDIAC REHAB | Age: 78
End: 2018-04-26

## 2018-04-26 PROCEDURE — G0424 PULMONARY REHAB W EXER: HCPCS

## 2018-04-27 ENCOUNTER — HOSPITAL ENCOUNTER (OUTPATIENT)
Dept: LAB | Age: 78
Discharge: HOME | End: 2018-04-27
Payer: MEDICARE

## 2018-04-27 DIAGNOSIS — I27.23 PULMONARY HYPERTENSION DUE TO HYPOXIA (HCC): Chronic | ICD-10-CM

## 2018-04-27 LAB
ALBUMIN SERPL-MCNC: 3.2 G/DL (ref 3.2–4.6)
ALBUMIN/GLOB SERPL: 0.9 {RATIO} (ref 1.2–3.5)
ALP SERPL-CCNC: 45 U/L (ref 50–136)
ALT SERPL-CCNC: 51 U/L (ref 12–65)
AST SERPL-CCNC: 36 U/L (ref 15–37)
BILIRUB DIRECT SERPL-MCNC: 0.1 MG/DL
BILIRUB SERPL-MCNC: 0.6 MG/DL (ref 0.2–1.1)
GLOBULIN SER CALC-MCNC: 3.6 G/DL (ref 2.3–3.5)
PROT SERPL-MCNC: 6.8 G/DL (ref 6.3–8.2)

## 2018-04-27 PROCEDURE — 36415 COLL VENOUS BLD VENIPUNCTURE: CPT | Performed by: INTERNAL MEDICINE

## 2018-04-27 PROCEDURE — 80076 HEPATIC FUNCTION PANEL: CPT | Performed by: INTERNAL MEDICINE

## 2018-05-01 ENCOUNTER — HOSPITAL ENCOUNTER (OUTPATIENT)
Dept: CARDIAC REHAB | Age: 78
Discharge: HOME | End: 2018-05-01
Payer: MEDICARE

## 2018-05-01 ENCOUNTER — APPOINTMENT (OUTPATIENT)
Dept: CARDIAC REHAB | Age: 78
End: 2018-05-01

## 2018-05-01 VITALS — BODY MASS INDEX: 24.36 KG/M2 | WEIGHT: 146 LBS

## 2018-05-01 PROCEDURE — G0424 PULMONARY REHAB W EXER: HCPCS

## 2018-05-03 ENCOUNTER — APPOINTMENT (OUTPATIENT)
Dept: CARDIAC REHAB | Age: 78
End: 2018-05-03

## 2018-05-03 ENCOUNTER — APPOINTMENT (OUTPATIENT)
Dept: CARDIAC REHAB | Age: 78
End: 2018-05-03
Payer: MEDICARE

## 2018-05-07 ENCOUNTER — HOSPITAL ENCOUNTER (OUTPATIENT)
Dept: CARDIAC REHAB | Age: 78
Discharge: HOME | End: 2018-05-07
Payer: MEDICARE

## 2018-05-07 VITALS — BODY MASS INDEX: 24.89 KG/M2 | WEIGHT: 149.2 LBS

## 2018-05-07 PROCEDURE — G0424 PULMONARY REHAB W EXER: HCPCS

## 2018-05-08 ENCOUNTER — APPOINTMENT (OUTPATIENT)
Dept: CARDIAC REHAB | Age: 78
End: 2018-05-08
Payer: MEDICARE

## 2018-05-08 ENCOUNTER — APPOINTMENT (OUTPATIENT)
Dept: CARDIAC REHAB | Age: 78
End: 2018-05-08

## 2018-05-10 ENCOUNTER — HOSPITAL ENCOUNTER (OUTPATIENT)
Dept: CARDIAC REHAB | Age: 78
Discharge: HOME | End: 2018-05-10
Payer: MEDICARE

## 2018-05-10 ENCOUNTER — APPOINTMENT (OUTPATIENT)
Dept: CARDIAC REHAB | Age: 78
End: 2018-05-10

## 2018-05-10 PROCEDURE — G0424 PULMONARY REHAB W EXER: HCPCS

## 2018-05-15 ENCOUNTER — HOSPITAL ENCOUNTER (OUTPATIENT)
Dept: CARDIAC REHAB | Age: 78
Discharge: HOME | End: 2018-05-15
Payer: MEDICARE

## 2018-05-15 ENCOUNTER — APPOINTMENT (OUTPATIENT)
Dept: CARDIAC REHAB | Age: 78
End: 2018-05-15

## 2018-05-15 VITALS — BODY MASS INDEX: 25.02 KG/M2 | WEIGHT: 150 LBS

## 2018-05-15 PROCEDURE — G0424 PULMONARY REHAB W EXER: HCPCS

## 2018-05-17 ENCOUNTER — APPOINTMENT (OUTPATIENT)
Dept: CARDIAC REHAB | Age: 78
End: 2018-05-17

## 2018-05-17 ENCOUNTER — HOSPITAL ENCOUNTER (OUTPATIENT)
Dept: CARDIAC REHAB | Age: 78
Discharge: HOME | End: 2018-05-17
Payer: MEDICARE

## 2018-05-17 PROCEDURE — G0424 PULMONARY REHAB W EXER: HCPCS

## 2018-05-22 ENCOUNTER — HOSPITAL ENCOUNTER (OUTPATIENT)
Dept: CARDIAC REHAB | Age: 78
Discharge: HOME | End: 2018-05-22
Payer: MEDICARE

## 2018-05-22 ENCOUNTER — APPOINTMENT (OUTPATIENT)
Dept: CARDIAC REHAB | Age: 78
End: 2018-05-22

## 2018-05-22 VITALS — WEIGHT: 146.6 LBS | BODY MASS INDEX: 24.46 KG/M2

## 2018-05-22 PROCEDURE — G0424 PULMONARY REHAB W EXER: HCPCS

## 2018-05-24 ENCOUNTER — HOSPITAL ENCOUNTER (OUTPATIENT)
Dept: CARDIAC REHAB | Age: 78
Discharge: HOME | End: 2018-05-24
Payer: MEDICARE

## 2018-05-24 ENCOUNTER — APPOINTMENT (OUTPATIENT)
Dept: CARDIAC REHAB | Age: 78
End: 2018-05-24

## 2018-05-24 PROCEDURE — G0424 PULMONARY REHAB W EXER: HCPCS

## 2018-05-29 ENCOUNTER — HOSPITAL ENCOUNTER (OUTPATIENT)
Dept: CARDIAC REHAB | Age: 78
Discharge: HOME | End: 2018-05-29
Payer: MEDICARE

## 2018-05-29 ENCOUNTER — APPOINTMENT (OUTPATIENT)
Dept: CARDIAC REHAB | Age: 78
End: 2018-05-29

## 2018-05-29 VITALS — WEIGHT: 150.4 LBS | BODY MASS INDEX: 25.09 KG/M2

## 2018-05-29 PROCEDURE — G0424 PULMONARY REHAB W EXER: HCPCS

## 2018-05-31 ENCOUNTER — HOSPITAL ENCOUNTER (OUTPATIENT)
Dept: CARDIAC REHAB | Age: 78
Discharge: HOME | End: 2018-05-31
Payer: MEDICARE

## 2018-05-31 ENCOUNTER — APPOINTMENT (OUTPATIENT)
Dept: CARDIAC REHAB | Age: 78
End: 2018-05-31

## 2018-05-31 PROCEDURE — G0424 PULMONARY REHAB W EXER: HCPCS

## 2018-06-05 ENCOUNTER — HOSPITAL ENCOUNTER (OUTPATIENT)
Dept: CARDIAC REHAB | Age: 78
Discharge: HOME | End: 2018-06-05
Payer: MEDICARE

## 2018-06-05 VITALS — WEIGHT: 151 LBS | BODY MASS INDEX: 25.19 KG/M2

## 2018-06-05 PROCEDURE — G0424 PULMONARY REHAB W EXER: HCPCS

## 2018-09-17 ENCOUNTER — HOSPITAL ENCOUNTER (OUTPATIENT)
Dept: LAB | Age: 78
Discharge: HOME | End: 2018-09-17
Attending: INTERNAL MEDICINE
Payer: MEDICARE

## 2018-09-17 DIAGNOSIS — E78.5 DYSLIPIDEMIA: ICD-10-CM

## 2018-09-17 LAB
ALBUMIN SERPL-MCNC: 3.2 G/DL (ref 3.2–4.6)
ALBUMIN/GLOB SERPL: 1.1 {RATIO}
ALP SERPL-CCNC: 32 U/L (ref 50–136)
ALT SERPL-CCNC: 28 U/L (ref 12–65)
ANION GAP SERPL CALC-SCNC: 7 MMOL/L
AST SERPL-CCNC: 18 U/L (ref 15–37)
BASOPHILS # BLD: 0 K/UL (ref 0–0.2)
BASOPHILS NFR BLD: 0 % (ref 0–2)
BILIRUB SERPL-MCNC: 0.7 MG/DL (ref 0.2–1.1)
BUN SERPL-MCNC: 26 MG/DL (ref 8–23)
CALCIUM SERPL-MCNC: 8.4 MG/DL (ref 8.3–10.4)
CHLORIDE SERPL-SCNC: 100 MMOL/L (ref 98–107)
CHOLEST SERPL-MCNC: 142 MG/DL
CO2 SERPL-SCNC: 30 MMOL/L (ref 21–32)
CREAT SERPL-MCNC: 1 MG/DL (ref 0.8–1.5)
DIFFERENTIAL METHOD BLD: ABNORMAL
EOSINOPHIL # BLD: 0.1 K/UL (ref 0–0.8)
EOSINOPHIL NFR BLD: 1 % (ref 0.5–7.8)
ERYTHROCYTE [DISTWIDTH] IN BLOOD BY AUTOMATED COUNT: 13.7 %
GLOBULIN SER CALC-MCNC: 2.8 G/DL
GLUCOSE SERPL-MCNC: 109 MG/DL (ref 65–100)
HCT VFR BLD AUTO: 36.7 % (ref 41.1–50.3)
HDLC SERPL-MCNC: 74 MG/DL (ref 40–60)
HDLC SERPL: 1.9 {RATIO}
HGB BLD-MCNC: 12 G/DL (ref 13.6–17.2)
IMM GRANULOCYTES # BLD: 0 K/UL (ref 0–0.5)
IMM GRANULOCYTES NFR BLD AUTO: 1 % (ref 0–5)
LDLC SERPL CALC-MCNC: 33.8 MG/DL
LIPID PROFILE,FLP: ABNORMAL
LYMPHOCYTES # BLD: 0.6 K/UL (ref 0.5–4.6)
LYMPHOCYTES NFR BLD: 9 % (ref 13–44)
MCH RBC QN AUTO: 30.8 PG (ref 26.1–32.9)
MCHC RBC AUTO-ENTMCNC: 32.7 G/DL (ref 31.4–35)
MCV RBC AUTO: 94.1 FL (ref 79.6–97.8)
MONOCYTES # BLD: 0.6 K/UL (ref 0.1–1.3)
MONOCYTES NFR BLD: 9 % (ref 4–12)
NEUTS SEG # BLD: 5.9 K/UL (ref 1.7–8.2)
NEUTS SEG NFR BLD: 81 % (ref 43–78)
NRBC # BLD: 0 K/UL (ref 0–0.2)
PLATELET # BLD AUTO: 235 K/UL (ref 150–450)
PMV BLD AUTO: 8.8 FL (ref 9.4–12.3)
POTASSIUM SERPL-SCNC: 3.4 MMOL/L (ref 3.5–5.1)
PROT SERPL-MCNC: 6 G/DL (ref 6.3–8.2)
RBC # BLD AUTO: 3.9 M/UL (ref 4.23–5.6)
SODIUM SERPL-SCNC: 137 MMOL/L (ref 136–145)
TRIGL SERPL-MCNC: 171 MG/DL (ref 35–150)
VLDLC SERPL CALC-MCNC: 34.2 MG/DL (ref 6–23)
WBC # BLD AUTO: 7.3 K/UL (ref 4.3–11.1)

## 2018-09-17 PROCEDURE — 36415 COLL VENOUS BLD VENIPUNCTURE: CPT

## 2018-09-17 PROCEDURE — 80061 LIPID PANEL: CPT

## 2018-09-17 PROCEDURE — 80053 COMPREHEN METABOLIC PANEL: CPT

## 2018-09-17 PROCEDURE — 85025 COMPLETE CBC W/AUTO DIFF WBC: CPT

## 2018-09-24 PROBLEM — M47.9 OSTEOARTHRITIS OF SPINE: Status: ACTIVE | Noted: 2018-09-24

## 2020-02-21 ENCOUNTER — HOSPITAL ENCOUNTER (OUTPATIENT)
Dept: GENERAL RADIOLOGY | Age: 80
Discharge: HOME | End: 2020-02-21
Attending: NURSE PRACTITIONER
Payer: MEDICARE

## 2020-02-21 PROCEDURE — 71046 X-RAY EXAM CHEST 2 VIEWS: CPT

## 2020-02-28 PROBLEM — H40.023 OPEN ANGLE WITH BORDERLINE FINDINGS, HIGH RISK, BILATERAL: Status: ACTIVE | Noted: 2020-02-28

## 2020-02-28 PROBLEM — I27.20 PULMONARY HYPERTENSION (HCC): Status: ACTIVE | Noted: 2018-01-10

## 2020-02-28 PROBLEM — H04.123 DRY EYES: Status: ACTIVE | Noted: 2019-10-14

## 2020-02-28 PROBLEM — J44.9 COPD WITH ASTHMA (HCC): Status: ACTIVE | Noted: 2017-11-28

## 2020-02-28 PROBLEM — H04.123 DRY EYE SYNDROME OF BILATERAL LACRIMAL GLANDS: Status: ACTIVE | Noted: 2020-02-28

## 2020-02-28 PROBLEM — H40.019 OPEN ANGLE GLAUCOMA SUSPECT WITH BORDERLINE FINDINGS AT LOW RISK: Status: ACTIVE | Noted: 2017-10-03

## 2020-02-28 PROBLEM — Z96.1 LENS REPLACED BY OTHER MEANS: Status: ACTIVE | Noted: 2020-02-28

## 2020-02-29 ENCOUNTER — APPOINTMENT (OUTPATIENT)
Dept: GENERAL RADIOLOGY | Age: 80
End: 2020-02-29
Attending: EMERGENCY MEDICINE
Payer: MEDICARE

## 2020-02-29 ENCOUNTER — HOSPITAL ENCOUNTER (EMERGENCY)
Age: 80
Discharge: HOME | End: 2020-02-29
Attending: EMERGENCY MEDICINE
Payer: MEDICARE

## 2020-02-29 VITALS
DIASTOLIC BLOOD PRESSURE: 58 MMHG | WEIGHT: 155 LBS | SYSTOLIC BLOOD PRESSURE: 154 MMHG | HEIGHT: 64 IN | BODY MASS INDEX: 26.46 KG/M2 | OXYGEN SATURATION: 97 % | HEART RATE: 61 BPM | TEMPERATURE: 98.1 F | RESPIRATION RATE: 16 BRPM

## 2020-02-29 DIAGNOSIS — I27.20 PULMONARY HYPERTENSION (HCC): ICD-10-CM

## 2020-02-29 DIAGNOSIS — I10 ESSENTIAL HYPERTENSION: Primary | ICD-10-CM

## 2020-02-29 LAB
ANION GAP SERPL CALC-SCNC: 4 MMOL/L (ref 7–16)
BASOPHILS # BLD: 0 K/UL (ref 0–0.2)
BASOPHILS NFR BLD: 0 % (ref 0–2)
BUN SERPL-MCNC: 20 MG/DL (ref 8–23)
CALCIUM SERPL-MCNC: 10.3 MG/DL (ref 8.3–10.4)
CHLORIDE SERPL-SCNC: 100 MMOL/L (ref 98–107)
CO2 SERPL-SCNC: 35 MMOL/L (ref 21–32)
CREAT SERPL-MCNC: 0.94 MG/DL (ref 0.8–1.5)
DIFFERENTIAL METHOD BLD: ABNORMAL
EOSINOPHIL # BLD: 0 K/UL (ref 0–0.8)
EOSINOPHIL NFR BLD: 0 % (ref 0.5–7.8)
ERYTHROCYTE [DISTWIDTH] IN BLOOD BY AUTOMATED COUNT: 13.4 % (ref 11.9–14.6)
GLUCOSE SERPL-MCNC: 126 MG/DL (ref 65–100)
HCT VFR BLD AUTO: 47.9 % (ref 41.1–50.3)
HGB BLD-MCNC: 15.2 G/DL (ref 13.6–17.2)
IMM GRANULOCYTES # BLD AUTO: 0.1 K/UL (ref 0–0.5)
IMM GRANULOCYTES NFR BLD AUTO: 1 % (ref 0–5)
LYMPHOCYTES # BLD: 0.3 K/UL (ref 0.5–4.6)
LYMPHOCYTES NFR BLD: 3 % (ref 13–44)
MCH RBC QN AUTO: 30.3 PG (ref 26.1–32.9)
MCHC RBC AUTO-ENTMCNC: 31.7 G/DL (ref 31.4–35)
MCV RBC AUTO: 95.6 FL (ref 79.6–97.8)
MONOCYTES # BLD: 0.4 K/UL (ref 0.1–1.3)
MONOCYTES NFR BLD: 4 % (ref 4–12)
NEUTS SEG # BLD: 9.9 K/UL (ref 1.7–8.2)
NEUTS SEG NFR BLD: 92 % (ref 43–78)
NRBC # BLD: 0 K/UL (ref 0–0.2)
PLATELET # BLD AUTO: 281 K/UL (ref 150–450)
PMV BLD AUTO: 8.8 FL (ref 9.4–12.3)
POTASSIUM SERPL-SCNC: 5.5 MMOL/L (ref 3.5–5.1)
RBC # BLD AUTO: 5.01 M/UL (ref 4.23–5.6)
SODIUM SERPL-SCNC: 139 MMOL/L (ref 136–145)
WBC # BLD AUTO: 10.7 K/UL (ref 4.3–11.1)

## 2020-02-29 PROCEDURE — 85025 COMPLETE CBC W/AUTO DIFF WBC: CPT

## 2020-02-29 PROCEDURE — 80048 BASIC METABOLIC PNL TOTAL CA: CPT

## 2020-02-29 PROCEDURE — 71046 X-RAY EXAM CHEST 2 VIEWS: CPT

## 2020-02-29 PROCEDURE — 99284 EMERGENCY DEPT VISIT MOD MDM: CPT

## 2020-02-29 NOTE — ED TRIAGE NOTES
Patient advises that he just has not been feeling well for little over a week and small amount of productive cough. Patient advises that he has been having elevated blood pressure reading at home today. Patient finished Levaquin yesterday. Patient advises that he was placed on methylprednisolone pack yesterday.  Patient on pulsating oxygen at 2.5 lpm.

## 2020-03-01 NOTE — ED PROVIDER NOTES
59-year-old  male with a history of pulmonary hypertension presents the emergency department complaining of elevated blood pressures earlier today. Patient states he was treated for a URI with Levaquin and a steroid a week ago, finishing the Levaquin 2 days ago. He still complains of cough and shortness of breath with exertion and wheezing. He has been taking over-the-counter Robitussin-DM with minimal improvement. Earlier today he was started on a Medrol Dosepak for the same. He presents this evening for persistent blood pressures in the 180/125 range. Patient denies chest pain, diaphoresis, lower extremity edema, PND orthopnea. The history is provided by the patient. Hypertension    This is a new problem. The current episode started 3 to 5 hours ago. The problem has not changed since onset. Associated symptoms include anxiety and shortness of breath (Worse over the last week). Pertinent negatives include no chest pain, no orthopnea, no palpitations, no PND, no confusion, no malaise/fatigue, no blurred vision, no headaches, no tinnitus, no neck pain, no peripheral edema, no dizziness, no nausea and no vomiting. Agents associated with hypertension include steroids and decongestants. Risk factors include male gender and hypertension. Past Medical History:   Diagnosis Date    Agent orange exposure 1960s    Arthritis     Asthma     Chronic obstructive pulmonary disease (HCC)     Chronic pain     GERD (gastroesophageal reflux disease)     Hyperlipidemia 3/13/2006    Hypertension     Sarcoidosis 2001    Lung Biopsy was done in 2001;  Liver Biopsy--Negative for sarcidosis 5/06    Sleep apnea        Past Surgical History:   Procedure Laterality Date    CHEST SURGERY PROCEDURE UNLISTED  2001    Lung bx for sarcoidosis    HX HERNIA REPAIR Left 2004    Left inguinal hernia repair    HX HERNIA REPAIR Right 1990's    Right inguinal hernia    HX KNEE REPLACEMENT Left 2016    HX LAP CHOLECYSTECTOMY  2006    HX ORTHOPAEDIC Right 2014    ankle replacement @ [address] HX OTHER SURGICAL  2006    Liver biopsy--negative for sarcoidosis    HX TONSILLECTOMY  as a child         Family History:   Problem Relation Age of Onset    Diabetes Mother     Hypertension Mother     Cancer Mother         Liver Cancer    Elevated Lipids Mother     Hypertension Father     Stroke Father     Cancer Sister     Headache Neg Hx        Social History     Socioeconomic History    Marital status:      Spouse name: Not on file    Number of children: Not on file    Years of education: Not on file    Highest education level: Not on file   Occupational History    Occupation: [employer]   Social Needs    Financial resource strain: Not on file    Food insecurity:     Worry: Not on file     Inability: Not on file    Transportation needs:     Medical: Not on file     Non-medical: Not on file   Tobacco Use    Smoking status: Former Smoker     Packs/day: 1.50     Years: 5.00     Pack years: 7.50     Types: Cigarettes     Last attempt to quit: 1968     Years since quittin.1    Smokeless tobacco: Never Used   Substance and Sexual Activity    Alcohol use:  Yes     Alcohol/week: 0.0 standard drinks     Comment: socially    Drug use: No    Sexual activity: Not on file   Lifestyle    Physical activity:     Days per week: Not on file     Minutes per session: Not on file    Stress: Not on file   Relationships    Social connections:     Talks on phone: Not on file     Gets together: Not on file     Attends Alevism service: Not on file     Active member of club or organization: Not on file     Attends meetings of clubs or organizations: Not on file     Relationship status: Not on file    Intimate partner violence:     Fear of current or ex partner: Not on file     Emotionally abused: Not on file     Physically abused: Not on file     Forced sexual activity: Not on file   Other Topics Concern  Not on file   Social History Narrative    He is originally from [place], but retired to [place]. He is retired from [employer]. He has worked in [email or web address] and has travelled extensively to [facility], [place], [facility], [place] and [facility], and [place] ([facility]).  and lives with wife. ALLERGIES: Lactose    Review of Systems   Constitutional: Negative for chills, fever and malaise/fatigue. HENT: Negative for tinnitus. Eyes: Negative for blurred vision. Respiratory: Positive for shortness of breath (Worse over the last week). Cardiovascular: Negative for chest pain, palpitations, orthopnea and PND. Gastrointestinal: Negative for nausea and vomiting. Musculoskeletal: Negative for neck pain. Neurological: Negative for dizziness and headaches. Psychiatric/Behavioral: Negative for confusion. All other systems reviewed and are negative. Vitals:    02/29/20 1849 02/29/20 1908   BP: 174/65    Pulse: 61    Resp: 16    Temp: 98.1 °F (36.7 °C)    SpO2: 97% 97%   Weight: 70.3 kg (155 lb)    Height: 5' 4\" (1.626 m)             Physical Exam  Vitals signs and nursing note reviewed. Constitutional:       General: He is not in acute distress. Appearance: He is well-developed. HENT:      Head: Normocephalic and atraumatic. Right Ear: External ear normal.      Left Ear: External ear normal.      Nose: Nose normal.      Mouth/Throat:      Mouth: Mucous membranes are moist.   Eyes:      Extraocular Movements: Extraocular movements intact. Conjunctiva/sclera: Conjunctivae normal.      Pupils: Pupils are equal, round, and reactive to light. Neck:      Musculoskeletal: Normal range of motion and neck supple. Cardiovascular:      Rate and Rhythm: Normal rate and regular rhythm. Pulses: Normal pulses. Heart sounds: Murmur present. Pulmonary:      Effort: Pulmonary effort is normal. No respiratory distress. Breath sounds: Wheezing (Mild all fields) present. No rhonchi or rales. Chest:      Chest wall: No tenderness. Abdominal:      General: Bowel sounds are normal.      Palpations: Abdomen is soft. Tenderness: There is no abdominal tenderness. There is no right CVA tenderness or left CVA tenderness. Hernia: No hernia is present. Musculoskeletal: Normal range of motion. Skin:     General: Skin is warm and dry. Capillary Refill: Capillary refill takes less than 2 seconds. Neurological:      General: No focal deficit present. Mental Status: He is alert and oriented to person, place, and time. Psychiatric:         Mood and Affect: Mood normal.          MDM  Number of Diagnoses or Management Options  Essential hypertension: new and requires workup  Pulmonary hypertension ([facility] [address]): minor     Amount and/or Complexity of Data Reviewed  Clinical lab tests: ordered and reviewed  Tests in the radiology section of CPT®: ordered and reviewed  Review and summarize past medical records: yes  Independent visualization of images, tracings, or specimens: yes    Risk of Complications, Morbidity, and/or Mortality  Presenting problems: moderate  Diagnostic procedures: moderate  Management options: moderate    Patient Progress  Patient progress: improved         Procedures    The patient was observed in the ED. patient is recently started on increased steroids as well as taking Robitussin over-the-counter for his URI symptoms. I suspect these are the reasons for his elevated blood pressure. He was instructed that if his pressure continues to rise, would recommend stopping the Medrol Dosepak. As far as over-the-counter cold medications were concerned it was recommended he only take guaifenesin and dextromethorphan, and avoid decongestants such as phenylephrine.     Results Reviewed:      Recent Results (from the past 24 hour(s))   CBC WITH AUTOMATED DIFF    Collection Time: 02/29/20  8:07 PM   Result Value Ref Range    WBC 10.7 4.3 - 11.1 K/uL    RBC 5.01 4.23 - 5.6 M/uL    HGB 15.2 13.6 - 17.2 g/dL    HCT 47.9 41.1 - 50.3 %    MCV 95.6 79.6 - 97.8 FL    MCH 30.3 26.1 - 32.9 PG    MCHC 31.7 31.4 - 35.0 g/dL    RDW 13.4 11.9 - 14.6 %    PLATELET 392 083 - 693 K/uL    MPV 8.8 (L) 9.4 - 12.3 FL    ABSOLUTE NRBC 0.00 0.0 - 0.2 K/uL    DF AUTOMATED      NEUTROPHILS 92 (H) 43 - 78 %    LYMPHOCYTES 3 (L) 13 - 44 %    MONOCYTES 4 4.0 - 12.0 %    EOSINOPHILS 0 (L) 0.5 - 7.8 %    BASOPHILS 0 0.0 - 2.0 %    IMMATURE GRANULOCYTES 1 0.0 - 5.0 %    ABS. NEUTROPHILS 9.9 (H) 1.7 - 8.2 K/UL    ABS. LYMPHOCYTES 0.3 (L) 0.5 - 4.6 K/UL    ABS. MONOCYTES 0.4 0.1 - 1.3 K/UL    ABS. EOSINOPHILS 0.0 0.0 - 0.8 K/UL    ABS. BASOPHILS 0.0 0.0 - 0.2 K/UL    ABS. IMM. GRANS. 0.1 0.0 - 0.5 K/UL   METABOLIC PANEL, BASIC    Collection Time: 02/29/20  8:07 PM   Result Value Ref Range    Sodium 139 136 - 145 mmol/L    Potassium 5.5 (H) 3.5 - 5.1 mmol/L    Chloride 100 98 - 107 mmol/L    CO2 35 (H) 21 - 32 mmol/L    Anion gap 4 (L) 7 - 16 mmol/L    Glucose 126 (H) 65 - 100 mg/dL    BUN 20 8 - 23 MG/DL    Creatinine 0.94 0.8 - 1.5 MG/DL    GFR est AA >60 >60 ml/min/1.73m2    GFR est non-AA >60 >60 ml/min/1.73m2    Calcium 10.3 8.3 - 10.4 MG/DL     XR CHEST PA LAT   Final Result   IMPRESSION: Stable chronic lung disease             I discussed the results of all labs, procedures, radiographs, and treatments with the patient and available family. Treatment plan is agreed upon and the patient is ready for discharge. All voiced understanding of the discharge plan and medication instructions or changes as appropriate. Questions about treatment in the ED were answered. All were encouraged to return should symptoms worsen or new problems develop.

## 2020-03-01 NOTE — ED NOTES
I have reviewed discharge instructions with the patient. The patient verbalized understanding. Patient left ED via Discharge Method: ambulatory to Home with spouse. Opportunity for questions and clarification provided. Patient given 0 scripts. To continue your aftercare when you leave the hospital, you may receive an automated call from our care team to check in on how you are doing. This is a free service and part of our promise to provide the best care and service to meet your aftercare needs.  If you have questions, or wish to unsubscribe from this service please call [phone]. Thank you for Choosing our [facility] Emergency Department.

## 2020-03-01 NOTE — DISCHARGE INSTRUCTIONS
Patient Education        High Blood Pressure: Care Instructions  Overview    It's normal for blood pressure to go up and down throughout the day. But if it stays up, you have high blood pressure. Another name for high blood pressure is hypertension. Despite what a lot of people think, high blood pressure usually doesn't cause headaches or make you feel dizzy or lightheaded. It usually has no symptoms. But it does increase your risk of stroke, heart attack, and other problems. You and your doctor will talk about your risks of these problems based on your blood pressure. Your doctor will give you a goal for your blood pressure. Your goal will be based on your health and your age. Lifestyle changes, such as eating healthy and being active, are always important to help lower blood pressure. You might also take medicine to reach your blood pressure goal.  Follow-up care is a key part of your treatment and safety. Be sure to make and go to all appointments, and call your doctor if you are having problems. It's also a good idea to know your test results and keep a list of the medicines you take. How can you care for yourself at home? Medical treatment  · If you stop taking your medicine, your blood pressure will go back up. You may take one or more types of medicine to lower your blood pressure. Be safe with medicines. Take your medicine exactly as prescribed. Call your doctor if you think you are having a problem with your medicine. · Talk to your doctor before you start taking aspirin every day. Aspirin can help certain people lower their risk of a heart attack or stroke. But taking aspirin isn't right for everyone, because it can cause serious bleeding. · See your doctor regularly. You may need to see the doctor more often at first or until your blood pressure comes down.   · If you are taking blood pressure medicine, talk to your doctor before you take decongestants or anti-inflammatory medicine, such as ibuprofen. Some of these medicines can raise blood pressure. · Learn how to check your blood pressure at home. Lifestyle changes  · Stay at a healthy weight. This is especially important if you put on weight around the waist. Losing even 10 pounds can help you lower your blood pressure. · If your doctor recommends it, get more exercise. Walking is a good choice. Bit by bit, increase the amount you walk every day. Try for at least 30 minutes on most days of the week. You also may want to swim, bike, or do other activities. · Avoid or limit alcohol. Talk to your doctor about whether you can drink any alcohol. · Try to limit how much sodium you eat to less than 2,300 milligrams (mg) a day. Your doctor may ask you to try to eat less than 1,500 mg a day. · Eat plenty of fruits (such as bananas and oranges), vegetables, legumes, whole grains, and low-fat dairy products. · Lower the amount of saturated fat in your diet. Saturated fat is found in animal products such as milk, cheese, and meat. Limiting these foods may help you lose weight and also lower your risk for heart disease. · Do not smoke. Smoking increases your risk for heart attack and stroke. If you need help quitting, talk to your doctor about stop-smoking programs and medicines. These can increase your chances of quitting for good. When should you call for help? Call  911 anytime you think you may need emergency care. This may mean having symptoms that suggest that your blood pressure is causing a serious heart or blood vessel problem. Your blood pressure may be over 180/120.   For example, call  911 if:    · You have symptoms of a heart attack. These may include:  ? Chest pain or pressure, or a strange feeling in the chest.  ? Sweating. ? Shortness of breath. ? Nausea or vomiting. ? Pain, pressure, or a strange feeling in the back, neck, jaw, or upper belly or in one or both shoulders or arms. ? Lightheadedness or sudden weakness.   ? A fast or irregular heartbeat.     · You have symptoms of a stroke. These may include:  ? Sudden numbness, tingling, weakness, or loss of movement in your face, arm, or leg, especially on only one side of your body. ? Sudden vision changes. ? Sudden trouble speaking. ? Sudden confusion or trouble understanding simple statements. ? Sudden problems with walking or balance. ? A sudden, severe headache that is different from past headaches.     · You have severe back or belly pain.    Do not wait until your blood pressure comes down on its own. Get help right away.   Call your doctor now or seek immediate care if:    · Your blood pressure is much higher than normal (such as 180/120 or higher), but you don't have symptoms.     · You think high blood pressure is causing symptoms, such as:  ? Severe headache.  ? Blurry vision.    Watch closely for changes in your health, and be sure to contact your doctor if:    · Your blood pressure measures higher than your doctor recommends at least 2 times. That means the top number is higher or the bottom number is higher, or both.     · You think you may be having side effects from your blood pressure medicine. Where can you learn more? Go to http://hector-gal.info/. Enter Q419 in the search box to learn more about \"High Blood Pressure: Care Instructions. \"  Current as of: April 9, 2019  Content Version: 12.2  © 0983-7796 Healthwise[employer]. Care instructions adapted under license by [employer] (which disclaims liability or warranty for this information). If you have questions about a medical condition or this instruction, always ask your healthcare professional. [place] [address] any warranty or liability for your use of this information. Patient Education        Pulmonary Hypertension: Care Instructions  Your Care Instructions  Pulmonary hypertension is high blood pressure in the arteries of your lungs.  These blood vessels carry blood from the heart to the lungs, where the blood picks up oxygen. The walls of the arteries may get thick, and the arteries may get narrow. When this happens, blood does not flow as well as it should. Pressure builds up in the arteries. Then your heart has to work harder to pump blood through your lungs. There are different types of pulmonary hypertension. They are caused by different things. Causes include other health conditions such as heart or lung problems. Sometimes it can happen without a known cause. When you have this condition, your body gets less oxygen from your blood. This causes symptoms such as shortness of breath and feeling tired, faint, or dizzy. Over time, these symptoms may change or get worse if your heart gets weaker. You may get heart failure. Heart failure means your heart doesn't pump as much blood as your body needs. Treatment can help you feel better and live longer. Your treatment options will depend on the type of pulmonary hypertension you have. It can be hard to learn that you have a problem with your lungs and heart. But there are things you can do to feel better and stay as active as you can. Follow-up care is a key part of your treatment and safety. Be sure to make and go to all appointments, and call your doctor if you are having problems. It's also a good idea to know your test results and keep a list of the medicines you take. How can you care for yourself at home? Medicine    · Be safe with medicines. Take your medicines exactly as prescribed. Call your doctor if you think you are having a problem with your medicine. You will get more details on the specific medicines your doctor prescribes.     · Your doctor may prescribe oxygen therapy. You will get more details on how to use it.     · Talk to your doctor before you take any vitamins, over-the-counter medicine, or herbal products.  Don't take ibuprofen (Advil or Motrin) and naproxen (Aleve) without talking to your doctor first.     · If you take a blood thinner, be sure to get instructions about how to take your medicine safely. Blood thinners can cause serious bleeding problems. Activity    · Be as active as you can. Talk to your doctor about making a plan before you start a new activity.     · Ask your doctor if a pulmonary rehabilitation program is right for you.     · Learn how to save your energy. Making small changes in daily activities can make a big impact on how you feel. Staying healthy    · Eat healthy foods, and try to stay at a healthy weight.     · Do not smoke. Smoking can make this condition worse. If you need help quitting, talk to your doctor about stop-smoking programs and medicines. These can increase your chances of quitting for good.     · Talk to your doctor about preventing pregnancy. You may need to take steps to avoid becoming pregnant. Pregnancy and childbirth can cause changes in the body that could be life-threatening for women who have this condition.     · Avoid colds and flu. Get a pneumococcal vaccine shot. If you have had one before, ask your doctor if you need another dose. Get a flu vaccine every year. If you must be around people with colds or flu, wash your hands often. When should you call for help? Call 911 anytime you think you may need emergency care. For example, call if:    · You have symptoms of sudden heart failure. These may include:  ? Severe trouble breathing. ? A fast or irregular heartbeat. ? Coughing up pink, foamy mucus. ? Passing out.    Call your doctor now or seek immediate medical care if:    · You have new or changed symptoms of heart failure, such as:  ? New or increased shortness of breath. ? New or worse swelling in your legs, ankles, or feet. ? Sudden weight gain, such as more than 2 to 3 pounds in a day or 5 pounds in a week. (Your doctor may suggest a different range of weight gain.)  ?  Feeling dizzy or lightheaded or like you may faint.  ? Feeling so tired or weak that you cannot do your usual activities. ? Not sleeping well. Shortness of breath wakes you at night. You need extra pillows to prop yourself up to breathe easier.    Watch closely for changes in your health, and be sure to contact your doctor if:    · You have new or worse symptoms. Where can you learn more? Go to http://hector-gal.info/. Enter F354 in the search box to learn more about \"Pulmonary Hypertension: Care Instructions. \"  Current as of: April 9, 2019  Content Version: 12.2  © 8231-9575 [employer]. Care instructions adapted under license by [employer] (which disclaims liability or warranty for this information). If you have questions about a medical condition or this instruction, always ask your healthcare professional. [address] any warranty or liability for your use of this information.

## 2020-03-09 ENCOUNTER — HOSPITAL ENCOUNTER (OUTPATIENT)
Dept: CT IMAGING | Age: 80
Discharge: HOME | End: 2020-03-09
Attending: INTERNAL MEDICINE
Payer: MEDICARE

## 2020-03-09 DIAGNOSIS — R05.9 COUGH: ICD-10-CM

## 2020-03-09 DIAGNOSIS — I27.20 PULMONARY HYPERTENSION (HCC): ICD-10-CM

## 2020-03-09 DIAGNOSIS — R09.02 HYPOXEMIA: ICD-10-CM

## 2020-03-09 PROCEDURE — 71260 CT THORAX DX C+: CPT

## 2020-03-09 PROCEDURE — 74011636320 HC RX REV CODE- 636/320: Performed by: INTERNAL MEDICINE

## 2020-03-09 PROCEDURE — 74011000258 HC RX REV CODE- 258: Performed by: INTERNAL MEDICINE

## 2020-03-09 RX ORDER — SODIUM CHLORIDE 0.9 % (FLUSH) 0.9 %
10 SYRINGE (ML) INJECTION
Status: COMPLETED | OUTPATIENT
Start: 2020-03-09 | End: 2020-03-09

## 2020-03-09 RX ADMIN — IOPAMIDOL 100 ML: 755 INJECTION, SOLUTION INTRAVENOUS at 07:33

## 2020-03-09 RX ADMIN — SODIUM CHLORIDE 100 ML: 900 INJECTION, SOLUTION INTRAVENOUS at 07:34

## 2020-03-09 RX ADMIN — Medication 10 ML: at 07:33

## 2020-10-15 ENCOUNTER — HOSPITAL ENCOUNTER (OUTPATIENT)
Dept: LAB | Age: 80
Discharge: HOME | End: 2020-10-15
Payer: MEDICARE

## 2020-10-15 DIAGNOSIS — I27.20 PULMONARY HYPERTENSION (HCC): ICD-10-CM

## 2020-10-15 LAB — BNP SERPL-MCNC: 240 PG/ML

## 2020-10-15 PROCEDURE — 83880 ASSAY OF NATRIURETIC PEPTIDE: CPT

## 2020-10-15 PROCEDURE — 36415 COLL VENOUS BLD VENIPUNCTURE: CPT

## 2020-10-15 NOTE — PROGRESS NOTES
Please call patient.   Pro BNP normal.  Please send him a copy so he can take to pulmonologist at [facility]  Thank you

## 2021-01-01 ENCOUNTER — HOSPITAL ENCOUNTER (INPATIENT)
Age: 81
LOS: 15 days | DRG: 870 | End: 2021-11-24
Attending: STUDENT IN AN ORGANIZED HEALTH CARE EDUCATION/TRAINING PROGRAM | Admitting: INTERNAL MEDICINE
Payer: MEDICARE

## 2021-01-01 ENCOUNTER — APPOINTMENT (OUTPATIENT)
Dept: GENERAL RADIOLOGY | Age: 81
DRG: 870 | End: 2021-01-01
Attending: INTERNAL MEDICINE
Payer: MEDICARE

## 2021-01-01 ENCOUNTER — APPOINTMENT (OUTPATIENT)
Dept: MRI IMAGING | Age: 81
DRG: 870 | End: 2021-01-01
Attending: INTERNAL MEDICINE
Payer: MEDICARE

## 2021-01-01 ENCOUNTER — ANESTHESIA EVENT (OUTPATIENT)
Dept: ENDOSCOPY | Age: 81
DRG: 628 | End: 2021-01-01
Payer: MEDICARE

## 2021-01-01 ENCOUNTER — APPOINTMENT (OUTPATIENT)
Dept: GENERAL RADIOLOGY | Age: 81
DRG: 870 | End: 2021-01-01
Payer: MEDICARE

## 2021-01-01 ENCOUNTER — APPOINTMENT (OUTPATIENT)
Dept: CT IMAGING | Age: 81
DRG: 628 | End: 2021-01-01
Attending: EMERGENCY MEDICINE
Payer: MEDICARE

## 2021-01-01 ENCOUNTER — APPOINTMENT (OUTPATIENT)
Dept: GENERAL RADIOLOGY | Age: 81
DRG: 628 | End: 2021-01-01
Attending: INTERNAL MEDICINE
Payer: MEDICARE

## 2021-01-01 ENCOUNTER — HOSPITAL ENCOUNTER (INPATIENT)
Age: 81
LOS: 11 days | Discharge: NURSING, REHAB OR LONG-TERM CARE | DRG: 628 | End: 2021-11-08
Attending: EMERGENCY MEDICINE | Admitting: FAMILY MEDICINE
Payer: MEDICARE

## 2021-01-01 ENCOUNTER — APPOINTMENT (OUTPATIENT)
Dept: GENERAL RADIOLOGY | Age: 81
DRG: 628 | End: 2021-01-01
Attending: HOSPITALIST
Payer: MEDICARE

## 2021-01-01 ENCOUNTER — HOSPITAL ENCOUNTER (INPATIENT)
Dept: NON INVASIVE DIAGNOSTICS | Age: 81
Discharge: HOME | DRG: 628 | End: 2021-10-28
Attending: FAMILY MEDICINE
Payer: MEDICARE

## 2021-01-01 ENCOUNTER — APPOINTMENT (OUTPATIENT)
Dept: GENERAL RADIOLOGY | Age: 81
DRG: 870 | End: 2021-01-01
Attending: STUDENT IN AN ORGANIZED HEALTH CARE EDUCATION/TRAINING PROGRAM
Payer: MEDICARE

## 2021-01-01 ENCOUNTER — ANESTHESIA (OUTPATIENT)
Dept: ENDOSCOPY | Age: 81
DRG: 628 | End: 2021-01-01
Payer: MEDICARE

## 2021-01-01 ENCOUNTER — APPOINTMENT (OUTPATIENT)
Dept: NON INVASIVE DIAGNOSTICS | Age: 81
DRG: 628 | End: 2021-01-01
Attending: FAMILY MEDICINE
Payer: MEDICARE

## 2021-01-01 ENCOUNTER — APPOINTMENT (OUTPATIENT)
Dept: ULTRASOUND IMAGING | Age: 81
DRG: 628 | End: 2021-01-01
Attending: INTERNAL MEDICINE
Payer: MEDICARE

## 2021-01-01 ENCOUNTER — HOSPITAL ENCOUNTER (OUTPATIENT)
Dept: PET IMAGING | Age: 81
Discharge: HOME | DRG: 870 | End: 2021-11-08
Payer: MEDICARE

## 2021-01-01 ENCOUNTER — APPOINTMENT (OUTPATIENT)
Dept: CT IMAGING | Age: 81
DRG: 628 | End: 2021-01-01
Attending: INTERNAL MEDICINE
Payer: MEDICARE

## 2021-01-01 ENCOUNTER — APPOINTMENT (OUTPATIENT)
Dept: GENERAL RADIOLOGY | Age: 81
DRG: 628 | End: 2021-01-01
Attending: EMERGENCY MEDICINE
Payer: MEDICARE

## 2021-01-01 ENCOUNTER — APPOINTMENT (OUTPATIENT)
Dept: MRI IMAGING | Age: 81
DRG: 628 | End: 2021-01-01
Attending: FAMILY MEDICINE
Payer: MEDICARE

## 2021-01-01 VITALS
BODY MASS INDEX: 25.63 KG/M2 | DIASTOLIC BLOOD PRESSURE: 75 MMHG | WEIGHT: 150.13 LBS | RESPIRATION RATE: 18 BRPM | OXYGEN SATURATION: 92 % | SYSTOLIC BLOOD PRESSURE: 132 MMHG | HEIGHT: 64 IN | TEMPERATURE: 97.6 F | HEART RATE: 88 BPM

## 2021-01-01 VITALS
TEMPERATURE: 98.2 F | BODY MASS INDEX: 23.23 KG/M2 | WEIGHT: 136.1 LBS | HEIGHT: 64 IN | DIASTOLIC BLOOD PRESSURE: 55 MMHG | OXYGEN SATURATION: 53 % | SYSTOLIC BLOOD PRESSURE: 91 MMHG

## 2021-01-01 DIAGNOSIS — R51.9 INTRACTABLE HEADACHE, UNSPECIFIED CHRONICITY PATTERN, UNSPECIFIED HEADACHE TYPE: ICD-10-CM

## 2021-01-01 DIAGNOSIS — G47.34 NOCTURNAL HYPOXEMIA: Chronic | ICD-10-CM

## 2021-01-01 DIAGNOSIS — C76.1 CANCER OF AXILLA (HCC): ICD-10-CM

## 2021-01-01 DIAGNOSIS — J44.9 COPD, MODERATE (HCC): Chronic | ICD-10-CM

## 2021-01-01 DIAGNOSIS — Z99.89 OSA ON CPAP: Chronic | ICD-10-CM

## 2021-01-01 DIAGNOSIS — R00.1 SYMPTOMATIC BRADYCARDIA: ICD-10-CM

## 2021-01-01 DIAGNOSIS — R13.12 OROPHARYNGEAL DYSPHAGIA: ICD-10-CM

## 2021-01-01 DIAGNOSIS — G47.33 OSA ON CPAP: Chronic | ICD-10-CM

## 2021-01-01 DIAGNOSIS — J96.01 ACUTE RESPIRATORY FAILURE WITH HYPOXIA (HCC): Primary | ICD-10-CM

## 2021-01-01 DIAGNOSIS — G93.41 ACUTE METABOLIC ENCEPHALOPATHY: ICD-10-CM

## 2021-01-01 DIAGNOSIS — R51.9 ACUTE NONINTRACTABLE HEADACHE, UNSPECIFIED HEADACHE TYPE: Primary | ICD-10-CM

## 2021-01-01 DIAGNOSIS — J69.0 ASPIRATION PNEUMONIA OF BOTH LOWER LOBES, UNSPECIFIED ASPIRATION PNEUMONIA TYPE (HCC): ICD-10-CM

## 2021-01-01 DIAGNOSIS — R00.1 BRADYCARDIA WITH 31-40 BEATS PER MINUTE: ICD-10-CM

## 2021-01-01 DIAGNOSIS — C80.1 MALIGNANT SMALL CELL CANCER (HCC): ICD-10-CM

## 2021-01-01 DIAGNOSIS — J98.4 RESTRICTIVE LUNG DISEASE: Chronic | ICD-10-CM

## 2021-01-01 DIAGNOSIS — G91.9 HYDROCEPHALUS, UNSPECIFIED TYPE (HCC): ICD-10-CM

## 2021-01-01 DIAGNOSIS — I27.20 PULMONARY HYPERTENSION (HCC): ICD-10-CM

## 2021-01-01 DIAGNOSIS — K22.4 ESOPHAGEAL DYSMOTILITY: ICD-10-CM

## 2021-01-01 DIAGNOSIS — R22.2 CHEST WALL MASS: ICD-10-CM

## 2021-01-01 DIAGNOSIS — R06.00 DYSPNEA, UNSPECIFIED TYPE: ICD-10-CM

## 2021-01-01 DIAGNOSIS — E87.1 HYPONATREMIA: ICD-10-CM

## 2021-01-01 DIAGNOSIS — R22.32 AXILLARY MASS, LEFT: ICD-10-CM

## 2021-01-01 DIAGNOSIS — J44.9 COPD WITH ASTHMA (HCC): ICD-10-CM

## 2021-01-01 DIAGNOSIS — I27.23 PULMONARY HYPERTENSION DUE TO HYPOXIA (HCC): Chronic | ICD-10-CM

## 2021-01-01 DIAGNOSIS — E78.5 DYSLIPIDEMIA: Chronic | ICD-10-CM

## 2021-01-01 DIAGNOSIS — A41.9 SEPSIS, DUE TO UNSPECIFIED ORGANISM, UNSPECIFIED WHETHER ACUTE ORGAN DYSFUNCTION PRESENT (HCC): ICD-10-CM

## 2021-01-01 DIAGNOSIS — C80.1 SMALL CELL CARCINOMA (HCC): ICD-10-CM

## 2021-01-01 DIAGNOSIS — D86.9 SARCOIDOSIS: Chronic | ICD-10-CM

## 2021-01-01 DIAGNOSIS — Z51.5 ENCOUNTER FOR PALLIATIVE CARE: ICD-10-CM

## 2021-01-01 DIAGNOSIS — R54 FRAILTY: ICD-10-CM

## 2021-01-01 DIAGNOSIS — C34.90 SMALL CELL LUNG CANCER (HCC): ICD-10-CM

## 2021-01-01 DIAGNOSIS — R11.2 NAUSEA AND VOMITING, INTRACTABILITY OF VOMITING NOT SPECIFIED, UNSPECIFIED VOMITING TYPE: ICD-10-CM

## 2021-01-01 DIAGNOSIS — R00.1 BRADYCARDIA: ICD-10-CM

## 2021-01-01 DIAGNOSIS — J96.21 ACUTE ON CHRONIC RESPIRATORY FAILURE WITH HYPOXIA (HCC): ICD-10-CM

## 2021-01-01 LAB
1,3 BETA GLUCAN SER-MCNC: 102 PG/ML
ALBUMIN SERPL-MCNC: 1.5 G/DL (ref 3.2–4.6)
ALBUMIN SERPL-MCNC: 1.8 G/DL (ref 3.2–4.6)
ALBUMIN SERPL-MCNC: 2.4 G/DL (ref 3.2–4.6)
ALBUMIN SERPL-MCNC: 2.4 G/DL (ref 3.2–4.6)
ALBUMIN SERPL-MCNC: 2.7 G/DL (ref 3.2–4.6)
ALBUMIN/GLOB SERPL: 0.3 {RATIO} (ref 1.2–3.5)
ALBUMIN/GLOB SERPL: 0.4 {RATIO} (ref 1.2–3.5)
ALBUMIN/GLOB SERPL: 0.5 {RATIO} (ref 1.2–3.5)
ALBUMIN/GLOB SERPL: 0.5 {RATIO} (ref 1.2–3.5)
ALBUMIN/GLOB SERPL: 0.8 {RATIO} (ref 1.2–3.5)
ALP SERPL-CCNC: 36 U/L (ref 50–136)
ALP SERPL-CCNC: 55 U/L (ref 50–136)
ALP SERPL-CCNC: 69 U/L (ref 50–136)
ALP SERPL-CCNC: 74 U/L (ref 50–136)
ALP SERPL-CCNC: 79 U/L (ref 50–136)
ALT SERPL-CCNC: 14 U/L (ref 12–65)
ALT SERPL-CCNC: 22 U/L (ref 12–65)
ALT SERPL-CCNC: 29 U/L (ref 12–65)
ALT SERPL-CCNC: 29 U/L (ref 12–65)
ALT SERPL-CCNC: ABNORMAL U/L (ref 12–65)
AMMONIA PLAS-SCNC: <10 UMOL/L (ref 11–32)
ANION GAP SERPL CALC-SCNC: 10 MMOL/L (ref 7–16)
ANION GAP SERPL CALC-SCNC: 12 MMOL/L (ref 7–16)
ANION GAP SERPL CALC-SCNC: 2 MMOL/L (ref 7–16)
ANION GAP SERPL CALC-SCNC: 2 MMOL/L (ref 7–16)
ANION GAP SERPL CALC-SCNC: 3 MMOL/L (ref 7–16)
ANION GAP SERPL CALC-SCNC: 4 MMOL/L (ref 7–16)
ANION GAP SERPL CALC-SCNC: 5 MMOL/L (ref 7–16)
ANION GAP SERPL CALC-SCNC: 6 MMOL/L (ref 7–16)
ANION GAP SERPL CALC-SCNC: 7 MMOL/L (ref 7–16)
ANION GAP SERPL CALC-SCNC: 8 MMOL/L (ref 7–16)
ANION GAP SERPL CALC-SCNC: 9 MMOL/L (ref 7–16)
APPEARANCE UR: ABNORMAL
APPEARANCE UR: CLEAR
ARTERIAL PATENCY WRIST A: ABNORMAL
ARTERIAL PATENCY WRIST A: NEGATIVE
ARTERIAL PATENCY WRIST A: POSITIVE
AST SERPL-CCNC: 28 U/L (ref 15–37)
AST SERPL-CCNC: 32 U/L (ref 15–37)
AST SERPL-CCNC: 39 U/L (ref 15–37)
AST SERPL-CCNC: 39 U/L (ref 15–37)
AST SERPL-CCNC: ABNORMAL U/L (ref 15–37)
ATRIAL RATE: 130 BPM
ATRIAL RATE: 43 BPM
ATRIAL RATE: 53 BPM
ATRIAL RATE: 67 BPM
ATRIAL RATE: 78 BPM
B PERT DNA SPEC QL NAA+PROBE: NOT DETECTED
BACTERIA SPEC CULT: ABNORMAL
BACTERIA SPEC CULT: NORMAL
BACTERIA URNS QL MICRO: 0 /HPF
BACTERIA URNS QL MICRO: ABNORMAL /HPF
BASE DEFICIT BLD-SCNC: 0.4 MMOL/L
BASE DEFICIT BLD-SCNC: 2.2 MMOL/L
BASE DEFICIT BLD-SCNC: 3.7 MMOL/L
BASE EXCESS BLD CALC-SCNC: 11.1 MMOL/L
BASE EXCESS BLD CALC-SCNC: 12.2 MMOL/L
BASE EXCESS BLD CALC-SCNC: 2.3 MMOL/L
BASE EXCESS BLD CALC-SCNC: 2.8 MMOL/L
BASE EXCESS BLD CALC-SCNC: 4.3 MMOL/L
BASE EXCESS BLD CALC-SCNC: 6.3 MMOL/L
BASE EXCESS BLD CALC-SCNC: 6.5 MMOL/L
BASE EXCESS BLD CALC-SCNC: 7.2 MMOL/L
BASE EXCESS BLD CALC-SCNC: 8.4 MMOL/L
BASE EXCESS BLD CALC-SCNC: 9.8 MMOL/L
BASE EXCESS BLD CALC-SCNC: 9.8 MMOL/L
BASE EXCESS BLD CALC-SCNC: 9.9 MMOL/L
BASOPHILS # BLD: 0 K/UL (ref 0–0.2)
BASOPHILS # BLD: 0.1 K/UL (ref 0–0.2)
BASOPHILS NFR BLD: 0 % (ref 0–2)
BDY SITE: ABNORMAL
BILIRUB SERPL-MCNC: 0.3 MG/DL (ref 0.2–1.1)
BILIRUB SERPL-MCNC: 0.4 MG/DL (ref 0.2–1.1)
BILIRUB SERPL-MCNC: 0.5 MG/DL (ref 0.2–1.1)
BILIRUB SERPL-MCNC: 0.9 MG/DL (ref 0.2–1.1)
BILIRUB SERPL-MCNC: 1 MG/DL (ref 0.2–1.1)
BILIRUB UR QL: ABNORMAL
BILIRUB UR QL: NEGATIVE
BNP SERPL-MCNC: 1186 PG/ML
BNP SERPL-MCNC: 2558 PG/ML
BNP SERPL-MCNC: 476 PG/ML
BORDETELLA PARAPERTUSSIS PCR, BORPAR: NOT DETECTED
BUN SERPL-MCNC: 11 MG/DL (ref 8–23)
BUN SERPL-MCNC: 16 MG/DL (ref 8–23)
BUN SERPL-MCNC: 17 MG/DL (ref 8–23)
BUN SERPL-MCNC: 18 MG/DL (ref 8–23)
BUN SERPL-MCNC: 19 MG/DL (ref 8–23)
BUN SERPL-MCNC: 19 MG/DL (ref 8–23)
BUN SERPL-MCNC: 22 MG/DL (ref 8–23)
BUN SERPL-MCNC: 26 MG/DL (ref 8–23)
BUN SERPL-MCNC: 27 MG/DL (ref 8–23)
BUN SERPL-MCNC: 27 MG/DL (ref 8–23)
BUN SERPL-MCNC: 30 MG/DL (ref 8–23)
BUN SERPL-MCNC: 31 MG/DL (ref 8–23)
BUN SERPL-MCNC: 32 MG/DL (ref 8–23)
BUN SERPL-MCNC: 33 MG/DL (ref 8–23)
BUN SERPL-MCNC: 33 MG/DL (ref 8–23)
BUN SERPL-MCNC: 35 MG/DL (ref 8–23)
BUN SERPL-MCNC: 36 MG/DL (ref 8–23)
BUN SERPL-MCNC: 36 MG/DL (ref 8–23)
BUN SERPL-MCNC: 39 MG/DL (ref 8–23)
BUN SERPL-MCNC: 39 MG/DL (ref 8–23)
BUN SERPL-MCNC: 43 MG/DL (ref 8–23)
BUN SERPL-MCNC: 43 MG/DL (ref 8–23)
BUN SERPL-MCNC: 47 MG/DL (ref 8–23)
C PNEUM DNA SPEC QL NAA+PROBE: NOT DETECTED
CALCIUM SERPL-MCNC: 10 MG/DL (ref 8.3–10.4)
CALCIUM SERPL-MCNC: 10.1 MG/DL (ref 8.3–10.4)
CALCIUM SERPL-MCNC: 10.3 MG/DL (ref 8.3–10.4)
CALCIUM SERPL-MCNC: 10.3 MG/DL (ref 8.3–10.4)
CALCIUM SERPL-MCNC: 10.4 MG/DL (ref 8.3–10.4)
CALCIUM SERPL-MCNC: 10.6 MG/DL (ref 8.3–10.4)
CALCIUM SERPL-MCNC: 10.8 MG/DL (ref 8.3–10.4)
CALCIUM SERPL-MCNC: 7.1 MG/DL (ref 8.3–10.4)
CALCIUM SERPL-MCNC: 8.8 MG/DL (ref 8.3–10.4)
CALCIUM SERPL-MCNC: 8.9 MG/DL (ref 8.3–10.4)
CALCIUM SERPL-MCNC: 9 MG/DL (ref 8.3–10.4)
CALCIUM SERPL-MCNC: 9 MG/DL (ref 8.3–10.4)
CALCIUM SERPL-MCNC: 9.2 MG/DL (ref 8.3–10.4)
CALCIUM SERPL-MCNC: 9.3 MG/DL (ref 8.3–10.4)
CALCIUM SERPL-MCNC: 9.3 MG/DL (ref 8.3–10.4)
CALCIUM SERPL-MCNC: 9.4 MG/DL (ref 8.3–10.4)
CALCIUM SERPL-MCNC: 9.5 MG/DL (ref 8.3–10.4)
CALCIUM SERPL-MCNC: 9.6 MG/DL (ref 8.3–10.4)
CALCIUM SERPL-MCNC: 9.7 MG/DL (ref 8.3–10.4)
CALCIUM SERPL-MCNC: 9.8 MG/DL (ref 8.3–10.4)
CALCIUM SERPL-MCNC: 9.9 MG/DL (ref 8.3–10.4)
CALCULATED P AXIS, ECG09: 22 DEGREES
CALCULATED P AXIS, ECG09: 32 DEGREES
CALCULATED P AXIS, ECG09: 38 DEGREES
CALCULATED P AXIS, ECG09: 49 DEGREES
CALCULATED P AXIS, ECG09: 51 DEGREES
CALCULATED R AXIS, ECG10: -12 DEGREES
CALCULATED R AXIS, ECG10: -14 DEGREES
CALCULATED R AXIS, ECG10: -18 DEGREES
CALCULATED R AXIS, ECG10: -33 DEGREES
CALCULATED R AXIS, ECG10: -6 DEGREES
CALCULATED T AXIS, ECG11: -6 DEGREES
CALCULATED T AXIS, ECG11: 0 DEGREES
CALCULATED T AXIS, ECG11: 24 DEGREES
CALCULATED T AXIS, ECG11: 57 DEGREES
CALCULATED T AXIS, ECG11: 86 DEGREES
CASTS URNS QL MICRO: 0 /LPF
CASTS URNS QL MICRO: ABNORMAL /LPF
CHLORIDE SERPL-SCNC: 101 MMOL/L (ref 98–107)
CHLORIDE SERPL-SCNC: 103 MMOL/L (ref 98–107)
CHLORIDE SERPL-SCNC: 105 MMOL/L (ref 98–107)
CHLORIDE SERPL-SCNC: 105 MMOL/L (ref 98–107)
CHLORIDE SERPL-SCNC: 108 MMOL/L (ref 98–107)
CHLORIDE SERPL-SCNC: 110 MMOL/L (ref 98–107)
CHLORIDE SERPL-SCNC: 111 MMOL/L (ref 98–107)
CHLORIDE SERPL-SCNC: 113 MMOL/L (ref 98–107)
CHLORIDE SERPL-SCNC: 114 MMOL/L (ref 98–107)
CHLORIDE SERPL-SCNC: 114 MMOL/L (ref 98–107)
CHLORIDE SERPL-SCNC: 91 MMOL/L (ref 98–107)
CHLORIDE SERPL-SCNC: 91 MMOL/L (ref 98–107)
CHLORIDE SERPL-SCNC: 92 MMOL/L (ref 98–107)
CHLORIDE SERPL-SCNC: 93 MMOL/L (ref 98–107)
CHLORIDE SERPL-SCNC: 93 MMOL/L (ref 98–107)
CHLORIDE SERPL-SCNC: 94 MMOL/L (ref 98–107)
CHLORIDE SERPL-SCNC: 94 MMOL/L (ref 98–107)
CHLORIDE SERPL-SCNC: 96 MMOL/L (ref 98–107)
CHLORIDE SERPL-SCNC: 97 MMOL/L (ref 98–107)
CHLORIDE SERPL-SCNC: 98 MMOL/L (ref 98–107)
CHLORIDE SERPL-SCNC: 99 MMOL/L (ref 98–107)
CO2 SERPL-SCNC: 24 MMOL/L (ref 21–32)
CO2 SERPL-SCNC: 25 MMOL/L (ref 21–32)
CO2 SERPL-SCNC: 25 MMOL/L (ref 21–32)
CO2 SERPL-SCNC: 26 MMOL/L (ref 21–32)
CO2 SERPL-SCNC: 28 MMOL/L (ref 21–32)
CO2 SERPL-SCNC: 29 MMOL/L (ref 21–32)
CO2 SERPL-SCNC: 30 MMOL/L (ref 21–32)
CO2 SERPL-SCNC: 30 MMOL/L (ref 21–32)
CO2 SERPL-SCNC: 31 MMOL/L (ref 21–32)
CO2 SERPL-SCNC: 31 MMOL/L (ref 21–32)
CO2 SERPL-SCNC: 32 MMOL/L (ref 21–32)
CO2 SERPL-SCNC: 33 MMOL/L (ref 21–32)
CO2 SERPL-SCNC: 34 MMOL/L (ref 21–32)
CO2 SERPL-SCNC: 35 MMOL/L (ref 21–32)
CO2 SERPL-SCNC: 35 MMOL/L (ref 21–32)
CO2 SERPL-SCNC: 36 MMOL/L (ref 21–32)
CO2 SERPL-SCNC: 37 MMOL/L (ref 21–32)
CO2 SERPL-SCNC: 38 MMOL/L (ref 21–32)
CO2 SERPL-SCNC: 38 MMOL/L (ref 21–32)
COLOR UR: YELLOW
COLOR UR: YELLOW
CORTIS BS SERPL-MCNC: 11.8 UG/DL
COVID-19 RAPID TEST, COVR: NOT DETECTED
COVID-19 RAPID TEST, COVR: NOT DETECTED
CREAT SERPL-MCNC: 0.46 MG/DL (ref 0.8–1.5)
CREAT SERPL-MCNC: 0.59 MG/DL (ref 0.8–1.5)
CREAT SERPL-MCNC: 0.59 MG/DL (ref 0.8–1.5)
CREAT SERPL-MCNC: 0.61 MG/DL (ref 0.8–1.5)
CREAT SERPL-MCNC: 0.64 MG/DL (ref 0.8–1.5)
CREAT SERPL-MCNC: 0.67 MG/DL (ref 0.8–1.5)
CREAT SERPL-MCNC: 0.67 MG/DL (ref 0.8–1.5)
CREAT SERPL-MCNC: 0.71 MG/DL (ref 0.8–1.5)
CREAT SERPL-MCNC: 0.72 MG/DL (ref 0.8–1.5)
CREAT SERPL-MCNC: 0.73 MG/DL (ref 0.8–1.5)
CREAT SERPL-MCNC: 0.74 MG/DL (ref 0.8–1.5)
CREAT SERPL-MCNC: 0.75 MG/DL (ref 0.8–1.5)
CREAT SERPL-MCNC: 0.76 MG/DL (ref 0.8–1.5)
CREAT SERPL-MCNC: 0.78 MG/DL (ref 0.8–1.5)
CREAT SERPL-MCNC: 0.81 MG/DL (ref 0.8–1.5)
CREAT SERPL-MCNC: 0.81 MG/DL (ref 0.8–1.5)
CREAT SERPL-MCNC: 0.82 MG/DL (ref 0.8–1.5)
CREAT SERPL-MCNC: 0.85 MG/DL (ref 0.8–1.5)
CREAT SERPL-MCNC: 0.92 MG/DL (ref 0.8–1.5)
CREAT SERPL-MCNC: 1.01 MG/DL (ref 0.8–1.5)
CREAT SERPL-MCNC: 1.01 MG/DL (ref 0.8–1.5)
CREAT SERPL-MCNC: 1.03 MG/DL (ref 0.8–1.5)
CREAT SERPL-MCNC: 1.16 MG/DL (ref 0.8–1.5)
CREAT SERPL-MCNC: 1.24 MG/DL (ref 0.8–1.5)
CREAT SERPL-MCNC: 1.31 MG/DL (ref 0.8–1.5)
CREAT SERPL-MCNC: 1.36 MG/DL (ref 0.8–1.5)
CRP SERPL-MCNC: 3.3 MG/DL (ref 0–0.9)
CRYSTALS URNS QL MICRO: 0 /LPF
DIAGNOSIS, 93000: NORMAL
DIFFERENTIAL METHOD BLD: ABNORMAL
ECHO AO ASC DIAM: 3.6 CM
ECHO AV AREA PEAK VELOCITY: 1.79 CM2
ECHO AV AREA VTI: 2.06 CM2
ECHO AV AREA/BSA PEAK VELOCITY: 1 CM2/M2
ECHO AV AREA/BSA VTI: 1.2 CM2/M2
ECHO AV CUSP MM: 1.3 CM
ECHO AV MEAN GRADIENT: 12 MMHG
ECHO AV PEAK GRADIENT: 30 MMHG
ECHO AV PEAK VELOCITY: 276 CM/S
ECHO AV VTI: 54.6 CM
ECHO EST RA PRESSURE: 8 MMHG
ECHO LA AREA 2C: 19.2 CM2
ECHO LA AREA 4C: 21.6 CM2
ECHO LA MAJOR AXIS: 5.87 CM
ECHO LA MINOR AXIS: 3.39 CM
ECHO LV E' LATERAL VELOCITY: 9.38 CM/S
ECHO LV E' SEPTAL VELOCITY: 9.48 CM/S
ECHO LV EDV A4C: 33.5 CM3
ECHO LV ESV A4C: 14.7 CM3
ECHO LV INTERNAL DIMENSION DIASTOLIC: 3.73 CM (ref 4.2–5.9)
ECHO LV INTERNAL DIMENSION SYSTOLIC: 2.54 CM
ECHO LV IVSD: 1.05 CM (ref 0.6–1)
ECHO LV MASS 2D: 124 G (ref 88–224)
ECHO LV MASS INDEX 2D: 71.7 G/M2 (ref 49–115)
ECHO LV POSTERIOR WALL DIASTOLIC: 1.07 CM (ref 0.6–1)
ECHO LVOT DIAM: 2 CM
ECHO LVOT PEAK GRADIENT: 10 MMHG
ECHO LVOT VTI: 35.9 CM
ECHO MV A VELOCITY: 186 CM/S
ECHO MV E DECELERATION TIME (DT): 194 MS
ECHO MV E VELOCITY: 142 CM/S
ECHO MV E/A RATIO: 0.76
ECHO MV E/E' LATERAL: 15.14
ECHO MV E/E' RATIO (AVERAGED): 15.06
ECHO MV E/E' SEPTAL: 14.98
ECHO MV MAX VELOCITY: 208 CM/S
ECHO MV MEAN GRADIENT: 4 MMHG
ECHO MV PEAK GRADIENT: 17 MMHG
ECHO MV VTI: 56.3 CM
ECHO RIGHT VENTRICULAR SYSTOLIC PRESSURE (RVSP): 54 MMHG
ECHO RV INTERNAL DIMENSION: 2.8 CM
ECHO RV TAPSE: 2.38 CM (ref 1.5–2)
ECHO TV REGURGITANT MAX VELOCITY: 339 CM/S
ECHO TV REGURGITANT PEAK GRADIENT: 46 MMHG
EOSINOPHIL # BLD: 0 K/UL (ref 0–0.8)
EOSINOPHIL # BLD: 0 K/UL (ref 0–0.8)
EOSINOPHIL # BLD: 0.1 K/UL (ref 0–0.8)
EOSINOPHIL # BLD: 0.1 K/UL (ref 0–0.8)
EOSINOPHIL NFR BLD: 0 % (ref 0.5–7.8)
EOSINOPHIL NFR BLD: 0 % (ref 0.5–7.8)
EOSINOPHIL NFR BLD: 1 % (ref 0.5–7.8)
EOSINOPHIL NFR BLD: 1 % (ref 0.5–7.8)
EPI CELLS #/AREA URNS HPF: 0 /HPF
EPI CELLS #/AREA URNS HPF: ABNORMAL /HPF
ERYTHROCYTE [DISTWIDTH] IN BLOOD BY AUTOMATED COUNT: 13.4 % (ref 11.9–14.6)
ERYTHROCYTE [DISTWIDTH] IN BLOOD BY AUTOMATED COUNT: 13.4 % (ref 11.9–14.6)
ERYTHROCYTE [DISTWIDTH] IN BLOOD BY AUTOMATED COUNT: 13.5 % (ref 11.9–14.6)
ERYTHROCYTE [DISTWIDTH] IN BLOOD BY AUTOMATED COUNT: 13.6 % (ref 11.9–14.6)
ERYTHROCYTE [DISTWIDTH] IN BLOOD BY AUTOMATED COUNT: 13.7 % (ref 11.9–14.6)
ERYTHROCYTE [DISTWIDTH] IN BLOOD BY AUTOMATED COUNT: 13.8 % (ref 11.9–14.6)
ERYTHROCYTE [DISTWIDTH] IN BLOOD BY AUTOMATED COUNT: 13.8 % (ref 11.9–14.6)
ERYTHROCYTE [DISTWIDTH] IN BLOOD BY AUTOMATED COUNT: 13.9 % (ref 11.9–14.6)
ERYTHROCYTE [DISTWIDTH] IN BLOOD BY AUTOMATED COUNT: 14 % (ref 11.9–14.6)
ERYTHROCYTE [DISTWIDTH] IN BLOOD BY AUTOMATED COUNT: 14.1 % (ref 11.9–14.6)
ERYTHROCYTE [DISTWIDTH] IN BLOOD BY AUTOMATED COUNT: 14.2 % (ref 11.9–14.6)
ERYTHROCYTE [DISTWIDTH] IN BLOOD BY AUTOMATED COUNT: 14.2 % (ref 11.9–14.6)
ERYTHROCYTE [DISTWIDTH] IN BLOOD BY AUTOMATED COUNT: 14.3 % (ref 11.9–14.6)
ERYTHROCYTE [DISTWIDTH] IN BLOOD BY AUTOMATED COUNT: 14.4 % (ref 11.9–14.6)
ERYTHROCYTE [DISTWIDTH] IN BLOOD BY AUTOMATED COUNT: 14.5 % (ref 11.9–14.6)
ERYTHROCYTE [DISTWIDTH] IN BLOOD BY AUTOMATED COUNT: 14.5 % (ref 11.9–14.6)
ERYTHROCYTE [DISTWIDTH] IN BLOOD BY AUTOMATED COUNT: 14.6 % (ref 11.9–14.6)
ERYTHROCYTE [DISTWIDTH] IN BLOOD BY AUTOMATED COUNT: 14.6 % (ref 11.9–14.6)
ERYTHROCYTE [DISTWIDTH] IN BLOOD BY AUTOMATED COUNT: 15 % (ref 11.9–14.6)
ERYTHROCYTE [SEDIMENTATION RATE] IN BLOOD: 25 MM/HR (ref 0–20)
EST. AVERAGE GLUCOSE BLD GHB EST-MCNC: 131 MG/DL
FIO2, L/MIN - FIO2P: 6
FIO2, L/MIN - FIO2P: 6
FLOW CYTOMETRY, FBTC1: NORMAL
FLUAV SUBTYP SPEC NAA+PROBE: NOT DETECTED
FLUBV RNA SPEC QL NAA+PROBE: NOT DETECTED
FOLATE SERPL-MCNC: 34.2 NG/ML (ref 3.1–17.5)
GALACTOMANNAN AG SPEC IA-ACNC: 0.05 INDEX (ref 0–0.49)
GAS FLOW.O2 O2 DELIVERY SYS: ABNORMAL L/MIN
GASTROCULT GAST QL: NEGATIVE
GLOBULIN SER CALC-MCNC: 3.5 G/DL (ref 2.3–3.5)
GLOBULIN SER CALC-MCNC: 3.6 G/DL (ref 2.3–3.5)
GLOBULIN SER CALC-MCNC: 4.5 G/DL (ref 2.3–3.5)
GLOBULIN SER CALC-MCNC: 5 G/DL (ref 2.3–3.5)
GLOBULIN SER CALC-MCNC: 6.7 G/DL (ref 2.3–3.5)
GLUCOSE BLD STRIP.AUTO-MCNC: 101 MG/DL (ref 65–100)
GLUCOSE BLD STRIP.AUTO-MCNC: 104 MG/DL (ref 65–100)
GLUCOSE BLD STRIP.AUTO-MCNC: 104 MG/DL (ref 65–100)
GLUCOSE BLD STRIP.AUTO-MCNC: 109 MG/DL (ref 65–100)
GLUCOSE BLD STRIP.AUTO-MCNC: 109 MG/DL (ref 65–100)
GLUCOSE BLD STRIP.AUTO-MCNC: 113 MG/DL (ref 65–100)
GLUCOSE BLD STRIP.AUTO-MCNC: 113 MG/DL (ref 65–100)
GLUCOSE BLD STRIP.AUTO-MCNC: 114 MG/DL (ref 65–100)
GLUCOSE BLD STRIP.AUTO-MCNC: 119 MG/DL (ref 65–100)
GLUCOSE BLD STRIP.AUTO-MCNC: 120 MG/DL (ref 65–100)
GLUCOSE BLD STRIP.AUTO-MCNC: 121 MG/DL (ref 65–100)
GLUCOSE BLD STRIP.AUTO-MCNC: 122 MG/DL (ref 65–100)
GLUCOSE BLD STRIP.AUTO-MCNC: 122 MG/DL (ref 65–100)
GLUCOSE BLD STRIP.AUTO-MCNC: 123 MG/DL (ref 65–100)
GLUCOSE BLD STRIP.AUTO-MCNC: 124 MG/DL (ref 65–100)
GLUCOSE BLD STRIP.AUTO-MCNC: 125 MG/DL (ref 65–100)
GLUCOSE BLD STRIP.AUTO-MCNC: 125 MG/DL (ref 65–100)
GLUCOSE BLD STRIP.AUTO-MCNC: 127 MG/DL (ref 65–100)
GLUCOSE BLD STRIP.AUTO-MCNC: 127 MG/DL (ref 65–100)
GLUCOSE BLD STRIP.AUTO-MCNC: 128 MG/DL (ref 65–100)
GLUCOSE BLD STRIP.AUTO-MCNC: 130 MG/DL (ref 65–100)
GLUCOSE BLD STRIP.AUTO-MCNC: 132 MG/DL (ref 65–100)
GLUCOSE BLD STRIP.AUTO-MCNC: 132 MG/DL (ref 65–100)
GLUCOSE BLD STRIP.AUTO-MCNC: 133 MG/DL (ref 65–100)
GLUCOSE BLD STRIP.AUTO-MCNC: 136 MG/DL (ref 65–100)
GLUCOSE BLD STRIP.AUTO-MCNC: 137 MG/DL (ref 65–100)
GLUCOSE BLD STRIP.AUTO-MCNC: 138 MG/DL (ref 65–100)
GLUCOSE BLD STRIP.AUTO-MCNC: 140 MG/DL (ref 65–100)
GLUCOSE BLD STRIP.AUTO-MCNC: 140 MG/DL (ref 65–100)
GLUCOSE BLD STRIP.AUTO-MCNC: 141 MG/DL (ref 65–100)
GLUCOSE BLD STRIP.AUTO-MCNC: 142 MG/DL (ref 65–100)
GLUCOSE BLD STRIP.AUTO-MCNC: 143 MG/DL (ref 65–100)
GLUCOSE BLD STRIP.AUTO-MCNC: 145 MG/DL (ref 65–100)
GLUCOSE BLD STRIP.AUTO-MCNC: 148 MG/DL (ref 65–100)
GLUCOSE BLD STRIP.AUTO-MCNC: 158 MG/DL (ref 65–100)
GLUCOSE BLD STRIP.AUTO-MCNC: 161 MG/DL (ref 65–100)
GLUCOSE BLD STRIP.AUTO-MCNC: 162 MG/DL (ref 65–100)
GLUCOSE BLD STRIP.AUTO-MCNC: 163 MG/DL (ref 65–100)
GLUCOSE BLD STRIP.AUTO-MCNC: 169 MG/DL (ref 65–100)
GLUCOSE BLD STRIP.AUTO-MCNC: 187 MG/DL (ref 65–100)
GLUCOSE BLD STRIP.AUTO-MCNC: 216 MG/DL (ref 65–100)
GLUCOSE BLD STRIP.AUTO-MCNC: 75 MG/DL (ref 65–100)
GLUCOSE SERPL-MCNC: 107 MG/DL (ref 65–100)
GLUCOSE SERPL-MCNC: 110 MG/DL (ref 65–100)
GLUCOSE SERPL-MCNC: 114 MG/DL (ref 65–100)
GLUCOSE SERPL-MCNC: 115 MG/DL (ref 65–100)
GLUCOSE SERPL-MCNC: 115 MG/DL (ref 65–100)
GLUCOSE SERPL-MCNC: 119 MG/DL (ref 65–100)
GLUCOSE SERPL-MCNC: 121 MG/DL (ref 65–100)
GLUCOSE SERPL-MCNC: 121 MG/DL (ref 65–100)
GLUCOSE SERPL-MCNC: 122 MG/DL (ref 65–100)
GLUCOSE SERPL-MCNC: 123 MG/DL (ref 65–100)
GLUCOSE SERPL-MCNC: 126 MG/DL (ref 65–100)
GLUCOSE SERPL-MCNC: 128 MG/DL (ref 65–100)
GLUCOSE SERPL-MCNC: 130 MG/DL (ref 65–100)
GLUCOSE SERPL-MCNC: 134 MG/DL (ref 65–100)
GLUCOSE SERPL-MCNC: 138 MG/DL (ref 65–100)
GLUCOSE SERPL-MCNC: 145 MG/DL (ref 65–100)
GLUCOSE SERPL-MCNC: 149 MG/DL (ref 65–100)
GLUCOSE SERPL-MCNC: 156 MG/DL (ref 65–100)
GLUCOSE SERPL-MCNC: 157 MG/DL (ref 65–100)
GLUCOSE SERPL-MCNC: 173 MG/DL (ref 65–100)
GLUCOSE SERPL-MCNC: 211 MG/DL (ref 65–100)
GLUCOSE SERPL-MCNC: 74 MG/DL (ref 65–100)
GLUCOSE SERPL-MCNC: 82 MG/DL (ref 65–100)
GLUCOSE SERPL-MCNC: 83 MG/DL (ref 65–100)
GLUCOSE SERPL-MCNC: 87 MG/DL (ref 65–100)
GLUCOSE SERPL-MCNC: 89 MG/DL (ref 65–100)
GLUCOSE SERPL-MCNC: 91 MG/DL (ref 65–100)
GLUCOSE SERPL-MCNC: 96 MG/DL (ref 65–100)
GLUCOSE UR STRIP.AUTO-MCNC: NEGATIVE MG/DL
GLUCOSE UR STRIP.AUTO-MCNC: NEGATIVE MG/DL
GRAM STN SPEC: ABNORMAL
HADV DNA SPEC QL NAA+PROBE: NOT DETECTED
HBA1C MFR BLD: 6.2 % (ref 4.2–6.3)
HCO3 BLD-SCNC: 22.1 MMOL/L (ref 22–26)
HCO3 BLD-SCNC: 22.5 MMOL/L (ref 22–26)
HCO3 BLD-SCNC: 23.6 MMOL/L (ref 22–26)
HCO3 BLD-SCNC: 27.3 MMOL/L (ref 22–26)
HCO3 BLD-SCNC: 28.6 MMOL/L (ref 22–26)
HCO3 BLD-SCNC: 31.3 MMOL/L (ref 22–26)
HCO3 BLD-SCNC: 32 MMOL/L (ref 22–26)
HCO3 BLD-SCNC: 32.5 MMOL/L (ref 22–26)
HCO3 BLD-SCNC: 33.7 MMOL/L (ref 22–26)
HCO3 BLD-SCNC: 33.7 MMOL/L (ref 22–26)
HCO3 BLD-SCNC: 35 MMOL/L (ref 22–26)
HCO3 BLD-SCNC: 35.2 MMOL/L (ref 22–26)
HCO3 BLD-SCNC: 35.4 MMOL/L (ref 22–26)
HCO3 BLD-SCNC: 37.1 MMOL/L (ref 22–26)
HCO3 BLD-SCNC: 42.5 MMOL/L (ref 22–26)
HCOV 229E RNA SPEC QL NAA+PROBE: NOT DETECTED
HCOV HKU1 RNA SPEC QL NAA+PROBE: NOT DETECTED
HCOV NL63 RNA SPEC QL NAA+PROBE: NOT DETECTED
HCOV OC43 RNA SPEC QL NAA+PROBE: NOT DETECTED
HCT VFR BLD AUTO: 23.7 % (ref 41.1–50.3)
HCT VFR BLD AUTO: 24.1 % (ref 41.1–50.3)
HCT VFR BLD AUTO: 24.5 % (ref 41.1–50.3)
HCT VFR BLD AUTO: 24.7 % (ref 41.1–50.3)
HCT VFR BLD AUTO: 25.3 % (ref 41.1–50.3)
HCT VFR BLD AUTO: 28.4 % (ref 41.1–50.3)
HCT VFR BLD AUTO: 28.4 % (ref 41.1–50.3)
HCT VFR BLD AUTO: 30.3 % (ref 41.1–50.3)
HCT VFR BLD AUTO: 30.4 % (ref 41.1–50.3)
HCT VFR BLD AUTO: 31.6 % (ref 41.1–50.3)
HCT VFR BLD AUTO: 33.1 % (ref 41.1–50.3)
HCT VFR BLD AUTO: 35.5 % (ref 41.1–50.3)
HCT VFR BLD AUTO: 36.7 % (ref 41.1–50.3)
HCT VFR BLD AUTO: 36.7 % (ref 41.1–50.3)
HCT VFR BLD AUTO: 38 % (ref 41.1–50.3)
HCT VFR BLD AUTO: 38 % (ref 41.1–50.3)
HCT VFR BLD AUTO: 38.7 % (ref 41.1–50.3)
HCT VFR BLD AUTO: 40.4 % (ref 41.1–50.3)
HCT VFR BLD AUTO: 40.5 % (ref 41.1–50.3)
HCT VFR BLD AUTO: 40.6 % (ref 41.1–50.3)
HCT VFR BLD AUTO: 41.3 % (ref 41.1–50.3)
HCT VFR BLD AUTO: 41.6 % (ref 41.1–50.3)
HCT VFR BLD AUTO: 43.3 % (ref 41.1–50.3)
HCT VFR BLD AUTO: 43.8 % (ref 41.1–50.3)
HCT VFR BLD AUTO: 43.8 % (ref 41.1–50.3)
HCT VFR BLD AUTO: 45 % (ref 41.1–50.3)
HCT VFR BLD AUTO: 47 % (ref 41.1–50.3)
HCT VFR BLD AUTO: 50.1 % (ref 41.1–50.3)
HGB BLD-MCNC: 10.3 G/DL (ref 13.6–17.2)
HGB BLD-MCNC: 10.7 G/DL (ref 13.6–17.2)
HGB BLD-MCNC: 11.1 G/DL (ref 13.6–17.2)
HGB BLD-MCNC: 11.2 G/DL (ref 13.6–17.2)
HGB BLD-MCNC: 11.7 G/DL (ref 13.6–17.2)
HGB BLD-MCNC: 12.1 G/DL (ref 13.6–17.2)
HGB BLD-MCNC: 12.3 G/DL (ref 13.6–17.2)
HGB BLD-MCNC: 12.5 G/DL (ref 13.6–17.2)
HGB BLD-MCNC: 12.5 G/DL (ref 13.6–17.2)
HGB BLD-MCNC: 12.9 G/DL (ref 13.6–17.2)
HGB BLD-MCNC: 13.1 G/DL (ref 13.6–17.2)
HGB BLD-MCNC: 13.1 G/DL (ref 13.6–17.2)
HGB BLD-MCNC: 13.5 G/DL (ref 13.6–17.2)
HGB BLD-MCNC: 13.6 G/DL (ref 13.6–17.2)
HGB BLD-MCNC: 13.8 G/DL (ref 13.6–17.2)
HGB BLD-MCNC: 14.3 G/DL (ref 13.6–17.2)
HGB BLD-MCNC: 14.4 G/DL (ref 13.6–17.2)
HGB BLD-MCNC: 15.2 G/DL (ref 13.6–17.2)
HGB BLD-MCNC: 16.1 G/DL (ref 13.6–17.2)
HGB BLD-MCNC: 7.6 G/DL (ref 13.6–17.2)
HGB BLD-MCNC: 7.7 G/DL (ref 13.6–17.2)
HGB BLD-MCNC: 7.7 G/DL (ref 13.6–17.2)
HGB BLD-MCNC: 7.9 G/DL (ref 13.6–17.2)
HGB BLD-MCNC: 8 G/DL (ref 13.6–17.2)
HGB BLD-MCNC: 8.9 G/DL (ref 13.6–17.2)
HGB BLD-MCNC: 9.1 G/DL (ref 13.6–17.2)
HGB BLD-MCNC: 9.8 G/DL (ref 13.6–17.2)
HGB BLD-MCNC: 9.9 G/DL (ref 13.6–17.2)
HGB UR QL STRIP: NEGATIVE
HGB UR QL STRIP: NEGATIVE
HIV 1+2 AB+HIV1 P24 AG SERPL QL IA: NONREACTIVE
HIV12 RESULT COMMENT, HHIVC: ABNORMAL
HMPV RNA SPEC QL NAA+PROBE: NOT DETECTED
HPIV1 RNA SPEC QL NAA+PROBE: NOT DETECTED
HPIV2 RNA SPEC QL NAA+PROBE: NOT DETECTED
HPIV3 RNA SPEC QL NAA+PROBE: NOT DETECTED
HPIV4 RNA SPEC QL NAA+PROBE: NOT DETECTED
IMM GRANULOCYTES # BLD AUTO: 0.1 K/UL (ref 0–0.5)
IMM GRANULOCYTES NFR BLD AUTO: 0 % (ref 0–5)
IMM GRANULOCYTES NFR BLD AUTO: 1 % (ref 0–5)
INR PPP: 1.1
INR PPP: 1.3
INSPIRATION.DURATION SETTING TIME VENT: 1 SEC
KETONES UR QL STRIP.AUTO: 40 MG/DL
KETONES UR QL STRIP.AUTO: NEGATIVE MG/DL
LACTATE SERPL-SCNC: 0.8 MMOL/L (ref 0.4–2)
LACTATE SERPL-SCNC: 1 MMOL/L (ref 0.4–2)
LACTATE SERPL-SCNC: 1.8 MMOL/L (ref 0.4–2)
LACTATE SERPL-SCNC: 2.6 MMOL/L (ref 0.4–2)
LACTATE SERPL-SCNC: 3 MMOL/L (ref 0.4–2)
LEUKOCYTE ESTERASE UR QL STRIP.AUTO: NEGATIVE
LEUKOCYTE ESTERASE UR QL STRIP.AUTO: NEGATIVE
LYMPHOCYTES # BLD: 0.2 K/UL (ref 0.5–4.6)
LYMPHOCYTES # BLD: 0.3 K/UL (ref 0.5–4.6)
LYMPHOCYTES # BLD: 0.5 K/UL (ref 0.5–4.6)
LYMPHOCYTES # BLD: 0.5 K/UL (ref 0.5–4.6)
LYMPHOCYTES NFR BLD: 1 % (ref 13–44)
LYMPHOCYTES NFR BLD: 2 % (ref 13–44)
LYMPHOCYTES NFR BLD: 3 % (ref 13–44)
LYMPHOCYTES NFR BLD: 5 % (ref 13–44)
M PNEUMO DNA SPEC QL NAA+PROBE: NOT DETECTED
MAGNESIUM SERPL-MCNC: 2 MG/DL (ref 1.8–2.4)
MAGNESIUM SERPL-MCNC: 2.1 MG/DL (ref 1.8–2.4)
MAGNESIUM SERPL-MCNC: 2.2 MG/DL (ref 1.8–2.4)
MAGNESIUM SERPL-MCNC: 2.3 MG/DL (ref 1.8–2.4)
MAGNESIUM SERPL-MCNC: 2.4 MG/DL (ref 1.8–2.4)
MAGNESIUM SERPL-MCNC: 2.6 MG/DL (ref 1.8–2.4)
MAGNESIUM SERPL-MCNC: 2.6 MG/DL (ref 1.8–2.4)
MCH RBC QN AUTO: 28.5 PG (ref 26.1–32.9)
MCH RBC QN AUTO: 28.6 PG (ref 26.1–32.9)
MCH RBC QN AUTO: 28.9 PG (ref 26.1–32.9)
MCH RBC QN AUTO: 29 PG (ref 26.1–32.9)
MCH RBC QN AUTO: 29.1 PG (ref 26.1–32.9)
MCH RBC QN AUTO: 29.2 PG (ref 26.1–32.9)
MCH RBC QN AUTO: 29.3 PG (ref 26.1–32.9)
MCH RBC QN AUTO: 29.5 PG (ref 26.1–32.9)
MCH RBC QN AUTO: 29.7 PG (ref 26.1–32.9)
MCH RBC QN AUTO: 29.7 PG (ref 26.1–32.9)
MCH RBC QN AUTO: 29.8 PG (ref 26.1–32.9)
MCH RBC QN AUTO: 30 PG (ref 26.1–32.9)
MCH RBC QN AUTO: 30.1 PG (ref 26.1–32.9)
MCH RBC QN AUTO: 30.3 PG (ref 26.1–32.9)
MCH RBC QN AUTO: 30.4 PG (ref 26.1–32.9)
MCHC RBC AUTO-ENTMCNC: 30.5 G/DL (ref 31.4–35)
MCHC RBC AUTO-ENTMCNC: 30.9 G/DL (ref 31.4–35)
MCHC RBC AUTO-ENTMCNC: 31.3 G/DL (ref 31.4–35)
MCHC RBC AUTO-ENTMCNC: 31.4 G/DL (ref 31.4–35)
MCHC RBC AUTO-ENTMCNC: 31.4 G/DL (ref 31.4–35)
MCHC RBC AUTO-ENTMCNC: 31.5 G/DL (ref 31.4–35)
MCHC RBC AUTO-ENTMCNC: 31.5 G/DL (ref 31.4–35)
MCHC RBC AUTO-ENTMCNC: 31.6 G/DL (ref 31.4–35)
MCHC RBC AUTO-ENTMCNC: 31.8 G/DL (ref 31.4–35)
MCHC RBC AUTO-ENTMCNC: 31.8 G/DL (ref 31.4–35)
MCHC RBC AUTO-ENTMCNC: 31.9 G/DL (ref 31.4–35)
MCHC RBC AUTO-ENTMCNC: 32 G/DL (ref 31.4–35)
MCHC RBC AUTO-ENTMCNC: 32.1 G/DL (ref 31.4–35)
MCHC RBC AUTO-ENTMCNC: 32.1 G/DL (ref 31.4–35)
MCHC RBC AUTO-ENTMCNC: 32.3 G/DL (ref 31.4–35)
MCHC RBC AUTO-ENTMCNC: 32.4 G/DL (ref 31.4–35)
MCHC RBC AUTO-ENTMCNC: 32.6 G/DL (ref 31.4–35)
MCHC RBC AUTO-ENTMCNC: 32.7 G/DL (ref 31.4–35)
MCV RBC AUTO: 88.6 FL (ref 79.6–97.8)
MCV RBC AUTO: 89.6 FL (ref 79.6–97.8)
MCV RBC AUTO: 89.8 FL (ref 79.6–97.8)
MCV RBC AUTO: 90 FL (ref 79.6–97.8)
MCV RBC AUTO: 90.2 FL (ref 79.6–97.8)
MCV RBC AUTO: 91.2 FL (ref 79.6–97.8)
MCV RBC AUTO: 91.2 FL (ref 79.6–97.8)
MCV RBC AUTO: 91.3 FL (ref 79.6–97.8)
MCV RBC AUTO: 91.9 FL (ref 79.6–97.8)
MCV RBC AUTO: 92.1 FL (ref 79.6–97.8)
MCV RBC AUTO: 92.2 FL (ref 79.6–97.8)
MCV RBC AUTO: 92.6 FL (ref 79.6–97.8)
MCV RBC AUTO: 92.6 FL (ref 79.6–97.8)
MCV RBC AUTO: 92.9 FL (ref 79.6–97.8)
MCV RBC AUTO: 93.1 FL (ref 79.6–97.8)
MCV RBC AUTO: 93.1 FL (ref 79.6–97.8)
MCV RBC AUTO: 93.2 FL (ref 79.6–97.8)
MCV RBC AUTO: 93.6 FL (ref 79.6–97.8)
MCV RBC AUTO: 93.6 FL (ref 79.6–97.8)
MCV RBC AUTO: 93.7 FL (ref 79.6–97.8)
MCV RBC AUTO: 93.8 FL (ref 79.6–97.8)
MCV RBC AUTO: 94.2 FL (ref 79.6–97.8)
MCV RBC AUTO: 94.6 FL (ref 79.6–97.8)
MCV RBC AUTO: 95.5 FL (ref 79.6–97.8)
MCV RBC AUTO: 95.6 FL (ref 79.6–97.8)
MCV RBC AUTO: 95.8 FL (ref 79.6–97.8)
MM INDURATION POC: 0 MM (ref 0–5)
MONOCYTES # BLD: 0.7 K/UL (ref 0.1–1.3)
MONOCYTES # BLD: 1 K/UL (ref 0.1–1.3)
MONOCYTES # BLD: 1.5 K/UL (ref 0.1–1.3)
MONOCYTES # BLD: 1.6 K/UL (ref 0.1–1.3)
MONOCYTES NFR BLD: 10 % (ref 4–12)
MONOCYTES NFR BLD: 10 % (ref 4–12)
MONOCYTES NFR BLD: 5 % (ref 4–12)
MONOCYTES NFR BLD: 8 % (ref 4–12)
MUCOUS THREADS URNS QL MICRO: 0 /LPF
NEUTS SEG # BLD: 12.9 K/UL (ref 1.7–8.2)
NEUTS SEG # BLD: 13.5 K/UL (ref 1.7–8.2)
NEUTS SEG # BLD: 17.1 K/UL (ref 1.7–8.2)
NEUTS SEG # BLD: 8.3 K/UL (ref 1.7–8.2)
NEUTS SEG NFR BLD: 83 % (ref 43–78)
NEUTS SEG NFR BLD: 87 % (ref 43–78)
NEUTS SEG NFR BLD: 89 % (ref 43–78)
NEUTS SEG NFR BLD: 92 % (ref 43–78)
NITRITE UR QL STRIP.AUTO: NEGATIVE
NITRITE UR QL STRIP.AUTO: NEGATIVE
NRBC # BLD: 0 K/UL (ref 0–0.2)
NRBC # BLD: 0.02 K/UL (ref 0–0.2)
NRBC # BLD: 0.02 K/UL (ref 0–0.2)
NRBC # BLD: 0.03 K/UL (ref 0–0.2)
NRBC # BLD: 0.04 K/UL (ref 0–0.2)
NRBC # BLD: 0.06 K/UL (ref 0–0.2)
NRBC # BLD: 0.1 K/UL (ref 0–0.2)
O2/TOTAL GAS SETTING VFR VENT: 100 %
O2/TOTAL GAS SETTING VFR VENT: 35 %
O2/TOTAL GAS SETTING VFR VENT: 35 %
O2/TOTAL GAS SETTING VFR VENT: 40 %
O2/TOTAL GAS SETTING VFR VENT: 45 %
O2/TOTAL GAS SETTING VFR VENT: 50 %
O2/TOTAL GAS SETTING VFR VENT: 55 %
O2/TOTAL GAS SETTING VFR VENT: 65 %
O2/TOTAL GAS SETTING VFR VENT: 70 %
O2/TOTAL GAS SETTING VFR VENT: 70 %
O2/TOTAL GAS SETTING VFR VENT: 80 %
OTHER OBSERVATIONS,UCOM: ABNORMAL
OTHER OBSERVATIONS,UCOM: NORMAL
P JIROVECII DNA # BAL NAA+PROBE: NEGATIVE COPIES/ML
P-R INTERVAL, ECG05: 124 MS
P-R INTERVAL, ECG05: 148 MS
P-R INTERVAL, ECG05: 150 MS
P-R INTERVAL, ECG05: 152 MS
P-R INTERVAL, ECG05: 176 MS
PAW @ MEAN EXP FLOW ON VENT: 17 CMH2O
PAW @ MEAN EXP FLOW ON VENT: 18 CMH2O
PAW @ MEAN EXP FLOW ON VENT: 19 CMH2O
PCO2 BLD: 36.2 MMHG (ref 35–45)
PCO2 BLD: 37.6 MMHG (ref 35–45)
PCO2 BLD: 41.4 MMHG (ref 35–45)
PCO2 BLD: 42.3 MMHG (ref 35–45)
PCO2 BLD: 42.5 MMHG (ref 35–45)
PCO2 BLD: 42.9 MMHG (ref 35–45)
PCO2 BLD: 45.1 MMHG (ref 35–45)
PCO2 BLD: 45.8 MMHG (ref 35–45)
PCO2 BLD: 47.7 MMHG (ref 35–45)
PCO2 BLD: 48.8 MMHG (ref 35–45)
PCO2 BLD: 50.5 MMHG (ref 35–45)
PCO2 BLD: 62.2 MMHG (ref 35–45)
PCO2 BLD: 64.5 MMHG (ref 35–45)
PCO2 BLD: 84.6 MMHG (ref 35–45)
PCO2 BLD: 87.5 MMHG (ref 35–45)
PEEP RESPIRATORY: 10 CMH2O
PEEP RESPIRATORY: 5 CMH2O
PEEP RESPIRATORY: 5 CMH2O
PEEP RESPIRATORY: 8 CMH2O
PH BLD: 7.19 [PH] (ref 7.35–7.45)
PH BLD: 7.31 [PH] (ref 7.35–7.45)
PH BLD: 7.33 [PH] (ref 7.35–7.45)
PH BLD: 7.34 [PH] (ref 7.35–7.45)
PH BLD: 7.38 [PH] (ref 7.35–7.45)
PH BLD: 7.39 [PH] (ref 7.35–7.45)
PH BLD: 7.39 [PH] (ref 7.35–7.45)
PH BLD: 7.42 [PH] (ref 7.35–7.45)
PH BLD: 7.42 [PH] (ref 7.35–7.45)
PH BLD: 7.44 [PH] (ref 7.35–7.45)
PH BLD: 7.45 [PH] (ref 7.35–7.45)
PH BLD: 7.46 [PH] (ref 7.35–7.45)
PH BLD: 7.46 [PH] (ref 7.35–7.45)
PH BLD: 7.5 [PH] (ref 7.35–7.45)
PH BLD: 7.52 [PH] (ref 7.35–7.45)
PH GAST: 1 [PH] (ref 1.5–3.5)
PH UR STRIP: 5.5 [PH] (ref 5–9)
PH UR STRIP: 6 [PH] (ref 5–9)
PHOSPHATE SERPL-MCNC: 2.2 MG/DL (ref 2.3–3.7)
PHOSPHATE SERPL-MCNC: 2.8 MG/DL (ref 2.3–3.7)
PHOSPHATE SERPL-MCNC: 2.9 MG/DL (ref 2.3–3.7)
PHOSPHATE SERPL-MCNC: 2.9 MG/DL (ref 2.3–3.7)
PHOSPHATE SERPL-MCNC: 3.2 MG/DL (ref 2.3–3.7)
PHOSPHATE SERPL-MCNC: 3.2 MG/DL (ref 2.3–3.7)
PHOSPHATE SERPL-MCNC: 3.5 MG/DL (ref 2.3–3.7)
PHOSPHATE SERPL-MCNC: 3.9 MG/DL (ref 2.3–3.7)
PIP ISTAT,IPIP: 17
PIP ISTAT,IPIP: 24
PIP ISTAT,IPIP: 37
PIP ISTAT,IPIP: 37
PIP ISTAT,IPIP: 39
PIP ISTAT,IPIP: 41
PLATELET # BLD AUTO: 205 K/UL (ref 150–450)
PLATELET # BLD AUTO: 221 K/UL (ref 150–450)
PLATELET # BLD AUTO: 233 K/UL (ref 150–450)
PLATELET # BLD AUTO: 241 K/UL (ref 150–450)
PLATELET # BLD AUTO: 253 K/UL (ref 150–450)
PLATELET # BLD AUTO: 254 K/UL (ref 150–450)
PLATELET # BLD AUTO: 261 K/UL (ref 150–450)
PLATELET # BLD AUTO: 267 K/UL (ref 150–450)
PLATELET # BLD AUTO: 278 K/UL (ref 150–450)
PLATELET # BLD AUTO: 281 K/UL (ref 150–450)
PLATELET # BLD AUTO: 285 K/UL (ref 150–450)
PLATELET # BLD AUTO: 289 K/UL (ref 150–450)
PLATELET # BLD AUTO: 297 K/UL (ref 150–450)
PLATELET # BLD AUTO: 301 K/UL (ref 150–450)
PLATELET # BLD AUTO: 302 K/UL (ref 150–450)
PLATELET # BLD AUTO: 309 K/UL (ref 150–450)
PLATELET # BLD AUTO: 311 K/UL (ref 150–450)
PLATELET # BLD AUTO: 314 K/UL (ref 150–450)
PLATELET # BLD AUTO: 320 K/UL (ref 150–450)
PLATELET # BLD AUTO: 324 K/UL (ref 150–450)
PLATELET # BLD AUTO: 325 K/UL (ref 150–450)
PLATELET # BLD AUTO: 349 K/UL (ref 150–450)
PLATELET # BLD AUTO: 365 K/UL (ref 150–450)
PLATELET # BLD AUTO: 421 K/UL (ref 150–450)
PLATELET # BLD AUTO: 464 K/UL (ref 150–450)
PLATELET # BLD AUTO: 526 K/UL (ref 150–450)
PMV BLD AUTO: 10.1 FL (ref 9.4–12.3)
PMV BLD AUTO: 10.2 FL (ref 9.4–12.3)
PMV BLD AUTO: 10.3 FL (ref 9.4–12.3)
PMV BLD AUTO: 8.6 FL (ref 9.4–12.3)
PMV BLD AUTO: 8.7 FL (ref 9.4–12.3)
PMV BLD AUTO: 8.8 FL (ref 9.4–12.3)
PMV BLD AUTO: 8.9 FL (ref 9.4–12.3)
PMV BLD AUTO: 9 FL (ref 9.4–12.3)
PMV BLD AUTO: 9 FL (ref 9.4–12.3)
PMV BLD AUTO: 9.1 FL (ref 9.4–12.3)
PMV BLD AUTO: 9.4 FL (ref 9.4–12.3)
PMV BLD AUTO: 9.5 FL (ref 9.4–12.3)
PMV BLD AUTO: 9.5 FL (ref 9.4–12.3)
PMV BLD AUTO: 9.6 FL (ref 9.4–12.3)
PMV BLD AUTO: 9.7 FL (ref 9.4–12.3)
PMV BLD AUTO: 9.9 FL (ref 9.4–12.3)
PO2 BLD: 109 MMHG (ref 75–100)
PO2 BLD: 113 MMHG (ref 75–100)
PO2 BLD: 180 MMHG (ref 75–100)
PO2 BLD: 55 MMHG (ref 75–100)
PO2 BLD: 63 MMHG (ref 75–100)
PO2 BLD: 65 MMHG (ref 75–100)
PO2 BLD: 66 MMHG (ref 75–100)
PO2 BLD: 66 MMHG (ref 75–100)
PO2 BLD: 67 MMHG (ref 75–100)
PO2 BLD: 69 MMHG (ref 75–100)
PO2 BLD: 70 MMHG (ref 75–100)
PO2 BLD: 73 MMHG (ref 75–100)
PO2 BLD: 80 MMHG (ref 75–100)
PO2 BLD: 81 MMHG (ref 75–100)
PO2 BLD: 89 MMHG (ref 75–100)
POTASSIUM SERPL-SCNC: 3 MMOL/L (ref 3.5–5.1)
POTASSIUM SERPL-SCNC: 3.2 MMOL/L (ref 3.5–5.1)
POTASSIUM SERPL-SCNC: 3.3 MMOL/L (ref 3.5–5.1)
POTASSIUM SERPL-SCNC: 3.4 MMOL/L (ref 3.5–5.1)
POTASSIUM SERPL-SCNC: 3.5 MMOL/L (ref 3.5–5.1)
POTASSIUM SERPL-SCNC: 3.5 MMOL/L (ref 3.5–5.1)
POTASSIUM SERPL-SCNC: 3.6 MMOL/L (ref 3.5–5.1)
POTASSIUM SERPL-SCNC: 3.8 MMOL/L (ref 3.5–5.1)
POTASSIUM SERPL-SCNC: 3.9 MMOL/L (ref 3.5–5.1)
POTASSIUM SERPL-SCNC: 3.9 MMOL/L (ref 3.5–5.1)
POTASSIUM SERPL-SCNC: 4 MMOL/L (ref 3.5–5.1)
POTASSIUM SERPL-SCNC: 4.1 MMOL/L (ref 3.5–5.1)
POTASSIUM SERPL-SCNC: 4.1 MMOL/L (ref 3.5–5.1)
POTASSIUM SERPL-SCNC: 4.2 MMOL/L (ref 3.5–5.1)
POTASSIUM SERPL-SCNC: 4.4 MMOL/L (ref 3.5–5.1)
POTASSIUM SERPL-SCNC: 4.7 MMOL/L (ref 3.5–5.1)
POTASSIUM SERPL-SCNC: 5 MMOL/L (ref 3.5–5.1)
POTASSIUM SERPL-SCNC: 5.5 MMOL/L (ref 3.5–5.1)
POTASSIUM SERPL-SCNC: 5.6 MMOL/L (ref 3.5–5.1)
POTASSIUM SERPL-SCNC: 5.7 MMOL/L (ref 3.5–5.1)
POTASSIUM SERPL-SCNC: >10 MMOL/L (ref 3.5–5.1)
PPD POC: NEGATIVE NEGATIVE
PRESSURE SUPPORT SETTING VENT: 12 CMH2O
PRESSURE SUPPORT SETTING VENT: 20 CMH2O
PROCALCITONIN SERPL-MCNC: 2.55 NG/ML (ref 0–0.49)
PROCALCITONIN SERPL-MCNC: <0.05 NG/ML
PROT SERPL-MCNC: 5.4 G/DL (ref 6.3–8.2)
PROT SERPL-MCNC: 6 G/DL (ref 6.3–8.2)
PROT SERPL-MCNC: 6.2 G/DL (ref 6.3–8.2)
PROT SERPL-MCNC: 7.4 G/DL (ref 6.3–8.2)
PROT SERPL-MCNC: 9.1 G/DL (ref 6.3–8.2)
PROT UR STRIP-MCNC: ABNORMAL MG/DL
PROT UR STRIP-MCNC: NEGATIVE MG/DL
PROTHROMBIN TIME: 14.1 SEC (ref 12.6–14.5)
PROTHROMBIN TIME: 16.9 SEC (ref 12.6–14.5)
Q-T INTERVAL, ECG07: 302 MS
Q-T INTERVAL, ECG07: 370 MS
Q-T INTERVAL, ECG07: 396 MS
Q-T INTERVAL, ECG07: 448 MS
Q-T INTERVAL, ECG07: 486 MS
QRS DURATION, ECG06: 70 MS
QRS DURATION, ECG06: 74 MS
QRS DURATION, ECG06: 78 MS
QRS DURATION, ECG06: 80 MS
QRS DURATION, ECG06: 82 MS
QTC CALCULATION (BEZET), ECG08: 410 MS
QTC CALCULATION (BEZET), ECG08: 418 MS
QTC CALCULATION (BEZET), ECG08: 420 MS
QTC CALCULATION (BEZET), ECG08: 421 MS
QTC CALCULATION (BEZET), ECG08: 444 MS
RBC # BLD AUTO: 2.56 M/UL (ref 4.23–5.6)
RBC # BLD AUTO: 2.57 M/UL (ref 4.23–5.6)
RBC # BLD AUTO: 2.61 M/UL (ref 4.23–5.6)
RBC # BLD AUTO: 2.64 M/UL (ref 4.23–5.6)
RBC # BLD AUTO: 2.66 M/UL (ref 4.23–5.6)
RBC # BLD AUTO: 3.03 M/UL (ref 4.23–5.6)
RBC # BLD AUTO: 3.43 M/UL (ref 4.23–5.6)
RBC # BLD AUTO: 3.51 M/UL (ref 4.23–5.6)
RBC # BLD AUTO: 3.63 M/UL (ref 4.23–5.6)
RBC # BLD AUTO: 3.81 M/UL (ref 4.23–5.6)
RBC # BLD AUTO: 3.84 M/UL (ref 4.23–5.6)
RBC # BLD AUTO: 3.92 M/UL (ref 4.23–5.6)
RBC # BLD AUTO: 3.98 M/UL (ref 4.23–5.6)
RBC # BLD AUTO: 4.06 M/UL (ref 4.23–5.6)
RBC # BLD AUTO: 4.31 M/UL (ref 4.23–5.6)
RBC # BLD AUTO: 4.36 M/UL (ref 4.23–5.6)
RBC # BLD AUTO: 4.38 M/UL (ref 4.23–5.6)
RBC # BLD AUTO: 4.42 M/UL (ref 4.23–5.6)
RBC # BLD AUTO: 4.46 M/UL (ref 4.23–5.6)
RBC # BLD AUTO: 4.47 M/UL (ref 4.23–5.6)
RBC # BLD AUTO: 4.65 M/UL (ref 4.23–5.6)
RBC # BLD AUTO: 4.75 M/UL (ref 4.23–5.6)
RBC # BLD AUTO: 4.89 M/UL (ref 4.23–5.6)
RBC # BLD AUTO: 4.99 M/UL (ref 4.23–5.6)
RBC # BLD AUTO: 5.05 M/UL (ref 4.23–5.6)
RBC # BLD AUTO: 5.49 M/UL (ref 4.23–5.6)
RBC #/AREA URNS HPF: ABNORMAL /HPF
RBC #/AREA URNS HPF: NORMAL /HPF
RPR SER QL: NONREACTIVE
RSV RNA SPEC QL NAA+PROBE: NOT DETECTED
RV+EV RNA SPEC QL NAA+PROBE: DETECTED
SAO2 % BLD: 87.7 % (ref 95–98)
SAO2 % BLD: 89.1 % (ref 95–98)
SAO2 % BLD: 91.6 % (ref 95–98)
SAO2 % BLD: 92.4 % (ref 95–98)
SAO2 % BLD: 93.3 % (ref 95–98)
SAO2 % BLD: 93.8 % (ref 95–98)
SAO2 % BLD: 94 % (ref 95–98)
SAO2 % BLD: 94.6 % (ref 95–98)
SAO2 % BLD: 95.4 % (ref 95–98)
SAO2 % BLD: 96.1 % (ref 95–98)
SAO2 % BLD: 96.2 % (ref 95–98)
SAO2 % BLD: 96.7 % (ref 95–98)
SAO2 % BLD: 97.9 % (ref 95–98)
SAO2 % BLD: 98.1 % (ref 95–98)
SAO2 % BLD: 99.2 % (ref 95–98)
SARS-COV-2 PCR, COVPCR: NOT DETECTED
SARS-COV-2, COV2: NORMAL
SARS-COV-2, COV2: NOT DETECTED
SARS-COV-2, COV2: NOT DETECTED
SERVICE CMNT-IMP: 8
SERVICE CMNT-IMP: ABNORMAL
SERVICE CMNT-IMP: NORMAL
SODIUM SERPL-SCNC: 128 MMOL/L (ref 138–145)
SODIUM SERPL-SCNC: 129 MMOL/L (ref 136–145)
SODIUM SERPL-SCNC: 129 MMOL/L (ref 138–145)
SODIUM SERPL-SCNC: 130 MMOL/L (ref 138–145)
SODIUM SERPL-SCNC: 131 MMOL/L (ref 138–145)
SODIUM SERPL-SCNC: 131 MMOL/L (ref 138–145)
SODIUM SERPL-SCNC: 132 MMOL/L (ref 138–145)
SODIUM SERPL-SCNC: 132 MMOL/L (ref 138–145)
SODIUM SERPL-SCNC: 133 MMOL/L (ref 136–145)
SODIUM SERPL-SCNC: 133 MMOL/L (ref 138–145)
SODIUM SERPL-SCNC: 136 MMOL/L (ref 138–145)
SODIUM SERPL-SCNC: 136 MMOL/L (ref 138–145)
SODIUM SERPL-SCNC: 137 MMOL/L (ref 136–145)
SODIUM SERPL-SCNC: 138 MMOL/L (ref 138–145)
SODIUM SERPL-SCNC: 139 MMOL/L (ref 136–145)
SODIUM SERPL-SCNC: 139 MMOL/L (ref 138–145)
SODIUM SERPL-SCNC: 140 MMOL/L (ref 138–145)
SODIUM SERPL-SCNC: 142 MMOL/L (ref 136–145)
SODIUM SERPL-SCNC: 142 MMOL/L (ref 138–145)
SODIUM SERPL-SCNC: 143 MMOL/L (ref 136–145)
SODIUM SERPL-SCNC: 144 MMOL/L (ref 138–145)
SODIUM SERPL-SCNC: 149 MMOL/L (ref 136–145)
SODIUM SERPL-SCNC: 152 MMOL/L (ref 138–145)
SODIUM SERPL-SCNC: 153 MMOL/L (ref 136–145)
SODIUM SERPL-SCNC: 157 MMOL/L (ref 136–145)
SOURCE, COVRS: NORMAL
SOURCE, COVRS: NORMAL
SOURCE, PJPB1: NORMAL
SP GR UR REFRACTOMETRY: 1.02 (ref 1–1.02)
SP GR UR REFRACTOMETRY: 1.03 (ref 1–1.02)
SPECIMEN SOURCE, FCOV2M: NORMAL
SPECIMEN SOURCE, FCOV2M: NORMAL
SPECIMEN SOURCE: NORMAL
SPECIMEN SOURCE: NORMAL
SPECIMEN TYPE: ABNORMAL
T4 FREE SERPL-MCNC: 1.1 NG/DL (ref 0.78–1.46)
TEST ORDERED:: NORMAL
TROPONIN-HIGH SENSITIVITY: 29.4 PG/ML (ref 0–14)
TROPONIN-HIGH SENSITIVITY: 42.2 PG/ML (ref 0–14)
TROPONIN-HIGH SENSITIVITY: 52.9 PG/ML (ref 0–14)
TROPONIN-HIGH SENSITIVITY: 67.6 PG/ML (ref 0–14)
TROPONIN-HIGH SENSITIVITY: 9.5 PG/ML (ref 0–14)
TSH SERPL DL<=0.005 MIU/L-ACNC: 0.58 UIU/ML (ref 0.36–3.74)
UROBILINOGEN UR QL STRIP.AUTO: 0.2 EU/DL (ref 0.2–1)
UROBILINOGEN UR QL STRIP.AUTO: 1 EU/DL (ref 0.2–1)
VANCOMYCIN SERPL-MCNC: 5.8 UG/ML
VENTILATION MODE VENT: ABNORMAL
VENTRICULAR RATE, ECG03: 130 BPM
VENTRICULAR RATE, ECG03: 43 BPM
VENTRICULAR RATE, ECG03: 53 BPM
VENTRICULAR RATE, ECG03: 67 BPM
VENTRICULAR RATE, ECG03: 78 BPM
VIT B1 BLD-SCNC: 224.3 NMOL/L (ref 66.5–200)
VIT B12 SERPL-MCNC: 861 PG/ML (ref 193–986)
VT SETTING VENT: 350 ML
VT SETTING VENT: 366 ML
VT SETTING VENT: 400 ML
VT SETTING VENT: 425 ML
VT SETTING VENT: 450 ML
WBC # BLD AUTO: 10 K/UL (ref 4.3–11.1)
WBC # BLD AUTO: 10.4 K/UL (ref 4.3–11.1)
WBC # BLD AUTO: 11.6 K/UL (ref 4.3–11.1)
WBC # BLD AUTO: 11.7 K/UL (ref 4.3–11.1)
WBC # BLD AUTO: 12.7 K/UL (ref 4.3–11.1)
WBC # BLD AUTO: 12.9 K/UL (ref 4.3–11.1)
WBC # BLD AUTO: 14 K/UL (ref 4.3–11.1)
WBC # BLD AUTO: 14.3 K/UL (ref 4.3–11.1)
WBC # BLD AUTO: 15.3 K/UL (ref 4.3–11.1)
WBC # BLD AUTO: 15.3 K/UL (ref 4.3–11.1)
WBC # BLD AUTO: 15.5 K/UL (ref 4.3–11.1)
WBC # BLD AUTO: 15.5 K/UL (ref 4.3–11.1)
WBC # BLD AUTO: 15.9 K/UL (ref 4.3–11.1)
WBC # BLD AUTO: 18.6 K/UL (ref 4.3–11.1)
WBC # BLD AUTO: 18.7 K/UL (ref 4.3–11.1)
WBC # BLD AUTO: 19 K/UL (ref 4.3–11.1)
WBC # BLD AUTO: 19.2 K/UL (ref 4.3–11.1)
WBC # BLD AUTO: 19.5 K/UL (ref 4.3–11.1)
WBC # BLD AUTO: 21.3 K/UL (ref 4.3–11.1)
WBC # BLD AUTO: 21.7 K/UL (ref 4.3–11.1)
WBC # BLD AUTO: 23.1 K/UL (ref 4.3–11.1)
WBC # BLD AUTO: 23.1 K/UL (ref 4.3–11.1)
WBC # BLD AUTO: 23.4 K/UL (ref 4.3–11.1)
WBC # BLD AUTO: 24.7 K/UL (ref 4.3–11.1)
WBC # BLD AUTO: 8 K/UL (ref 4.3–11.1)
WBC # BLD AUTO: 8.1 K/UL (ref 4.3–11.1)
WBC URNS QL MICRO: ABNORMAL /HPF
WBC URNS QL MICRO: NORMAL /HPF

## 2021-01-01 PROCEDURE — 82803 BLOOD GASES ANY COMBINATION: CPT

## 2021-01-01 PROCEDURE — 80048 BASIC METABOLIC PNL TOTAL CA: CPT

## 2021-01-01 PROCEDURE — 88305 TISSUE EXAM BY PATHOLOGIST: CPT

## 2021-01-01 PROCEDURE — 77030018798 HC PMP KT ENTRL FED COVD -A

## 2021-01-01 PROCEDURE — 94660 CPAP INITIATION&MGMT: CPT

## 2021-01-01 PROCEDURE — 94640 AIRWAY INHALATION TREATMENT: CPT

## 2021-01-01 PROCEDURE — 97530 THERAPEUTIC ACTIVITIES: CPT

## 2021-01-01 PROCEDURE — 74011250636 HC RX REV CODE- 250/636

## 2021-01-01 PROCEDURE — 82962 GLUCOSE BLOOD TEST: CPT

## 2021-01-01 PROCEDURE — 74011250636 HC RX REV CODE- 250/636: Performed by: INTERNAL MEDICINE

## 2021-01-01 PROCEDURE — 94668 MNPJ CHEST WALL SBSQ: CPT

## 2021-01-01 PROCEDURE — 65660000000 HC RM CCU STEPDOWN

## 2021-01-01 PROCEDURE — 83735 ASSAY OF MAGNESIUM: CPT

## 2021-01-01 PROCEDURE — 74011250637 HC RX REV CODE- 250/637: Performed by: FAMILY MEDICINE

## 2021-01-01 PROCEDURE — 36415 COLL VENOUS BLD VENIPUNCTURE: CPT

## 2021-01-01 PROCEDURE — 74011250637 HC RX REV CODE- 250/637: Performed by: INTERNAL MEDICINE

## 2021-01-01 PROCEDURE — 36600 WITHDRAWAL OF ARTERIAL BLOOD: CPT

## 2021-01-01 PROCEDURE — 93005 ELECTROCARDIOGRAM TRACING: CPT | Performed by: INTERNAL MEDICINE

## 2021-01-01 PROCEDURE — 74011636637 HC RX REV CODE- 636/637: Performed by: NURSE PRACTITIONER

## 2021-01-01 PROCEDURE — 99291 CRITICAL CARE FIRST HOUR: CPT | Performed by: INTERNAL MEDICINE

## 2021-01-01 PROCEDURE — 74011636637 HC RX REV CODE- 636/637: Performed by: INTERNAL MEDICINE

## 2021-01-01 PROCEDURE — 99233 SBSQ HOSP IP/OBS HIGH 50: CPT | Performed by: INTERNAL MEDICINE

## 2021-01-01 PROCEDURE — 2709999900 HC NON-CHARGEABLE SUPPLY

## 2021-01-01 PROCEDURE — 85027 COMPLETE CBC AUTOMATED: CPT

## 2021-01-01 PROCEDURE — 74011250637 HC RX REV CODE- 250/637: Performed by: NURSE PRACTITIONER

## 2021-01-01 PROCEDURE — 82533 TOTAL CORTISOL: CPT

## 2021-01-01 PROCEDURE — 74011000250 HC RX REV CODE- 250: Performed by: INTERNAL MEDICINE

## 2021-01-01 PROCEDURE — U0005 INFEC AGEN DETEC AMPLI PROBE: HCPCS

## 2021-01-01 PROCEDURE — 84484 ASSAY OF TROPONIN QUANT: CPT

## 2021-01-01 PROCEDURE — 76060000031 HC ANESTHESIA FIRST 0.5 HR: Performed by: INTERNAL MEDICINE

## 2021-01-01 PROCEDURE — 65610000001 HC ROOM ICU GENERAL

## 2021-01-01 PROCEDURE — 99285 EMERGENCY DEPT VISIT HI MDM: CPT

## 2021-01-01 PROCEDURE — 74011250636 HC RX REV CODE- 250/636: Performed by: STUDENT IN AN ORGANIZED HEALTH CARE EDUCATION/TRAINING PROGRAM

## 2021-01-01 PROCEDURE — 80053 COMPREHEN METABOLIC PANEL: CPT

## 2021-01-01 PROCEDURE — 74011250636 HC RX REV CODE- 250/636: Performed by: HOSPITALIST

## 2021-01-01 PROCEDURE — 74011250636 HC RX REV CODE- 250/636: Performed by: NURSE PRACTITIONER

## 2021-01-01 PROCEDURE — 93306 TTE W/DOPPLER COMPLETE: CPT

## 2021-01-01 PROCEDURE — APPSS180 APP SPLIT SHARED TIME > 60 MINUTES: Performed by: NURSE PRACTITIONER

## 2021-01-01 PROCEDURE — 74011250637 HC RX REV CODE- 250/637: Performed by: HOSPITALIST

## 2021-01-01 PROCEDURE — 74011250636 HC RX REV CODE- 250/636: Performed by: ANESTHESIOLOGY

## 2021-01-01 PROCEDURE — 74011000250 HC RX REV CODE- 250

## 2021-01-01 PROCEDURE — 94760 N-INVAS EAR/PLS OXIMETRY 1: CPT

## 2021-01-01 PROCEDURE — 74018 RADEX ABDOMEN 1 VIEW: CPT

## 2021-01-01 PROCEDURE — 97166 OT EVAL MOD COMPLEX 45 MIN: CPT

## 2021-01-01 PROCEDURE — 77030041174 HC STOOL COL SYS DIGNSHLD BARD -C

## 2021-01-01 PROCEDURE — 99233 SBSQ HOSP IP/OBS HIGH 50: CPT | Performed by: NURSE PRACTITIONER

## 2021-01-01 PROCEDURE — 71045 X-RAY EXAM CHEST 1 VIEW: CPT

## 2021-01-01 PROCEDURE — 86140 C-REACTIVE PROTEIN: CPT

## 2021-01-01 PROCEDURE — 99232 SBSQ HOSP IP/OBS MODERATE 35: CPT | Performed by: INTERNAL MEDICINE

## 2021-01-01 PROCEDURE — C9113 INJ PANTOPRAZOLE SODIUM, VIA: HCPCS | Performed by: INTERNAL MEDICINE

## 2021-01-01 PROCEDURE — 74011000258 HC RX REV CODE- 258

## 2021-01-01 PROCEDURE — C9113 INJ PANTOPRAZOLE SODIUM, VIA: HCPCS | Performed by: FAMILY MEDICINE

## 2021-01-01 PROCEDURE — 74011000258 HC RX REV CODE- 258: Performed by: EMERGENCY MEDICINE

## 2021-01-01 PROCEDURE — 77030040393 HC DRSG OPTIFOAM GENT MDII -B

## 2021-01-01 PROCEDURE — 77010033678 HC OXYGEN DAILY

## 2021-01-01 PROCEDURE — 94664 DEMO&/EVAL PT USE INHALER: CPT

## 2021-01-01 PROCEDURE — 84100 ASSAY OF PHOSPHORUS: CPT

## 2021-01-01 PROCEDURE — 96375 TX/PRO/DX INJ NEW DRUG ADDON: CPT

## 2021-01-01 PROCEDURE — 84145 PROCALCITONIN (PCT): CPT

## 2021-01-01 PROCEDURE — 97162 PT EVAL MOD COMPLEX 30 MIN: CPT

## 2021-01-01 PROCEDURE — 81015 MICROSCOPIC EXAM OF URINE: CPT

## 2021-01-01 PROCEDURE — 70551 MRI BRAIN STEM W/O DYE: CPT

## 2021-01-01 PROCEDURE — 85025 COMPLETE CBC W/AUTO DIFF WBC: CPT

## 2021-01-01 PROCEDURE — 77030013140 HC MSK NEB VYRM -A

## 2021-01-01 PROCEDURE — 87305 ASPERGILLUS AG IA: CPT

## 2021-01-01 PROCEDURE — 74011000258 HC RX REV CODE- 258: Performed by: INTERNAL MEDICINE

## 2021-01-01 PROCEDURE — 74011250637 HC RX REV CODE- 250/637: Performed by: PSYCHIATRY & NEUROLOGY

## 2021-01-01 PROCEDURE — 86592 SYPHILIS TEST NON-TREP QUAL: CPT

## 2021-01-01 PROCEDURE — 78815 PET IMAGE W/CT SKULL-THIGH: CPT

## 2021-01-01 PROCEDURE — 96374 THER/PROPH/DIAG INJ IV PUSH: CPT

## 2021-01-01 PROCEDURE — 85652 RBC SED RATE AUTOMATED: CPT

## 2021-01-01 PROCEDURE — 87040 BLOOD CULTURE FOR BACTERIA: CPT

## 2021-01-01 PROCEDURE — 84439 ASSAY OF FREE THYROXINE: CPT

## 2021-01-01 PROCEDURE — 94002 VENT MGMT INPAT INIT DAY: CPT

## 2021-01-01 PROCEDURE — 83880 ASSAY OF NATRIURETIC PEPTIDE: CPT

## 2021-01-01 PROCEDURE — 77030013794 HC KT TRNSDUC BLD EDWD -B

## 2021-01-01 PROCEDURE — 74011250636 HC RX REV CODE- 250/636: Performed by: FAMILY MEDICINE

## 2021-01-01 PROCEDURE — 74011000636 HC RX REV CODE- 636: Performed by: EMERGENCY MEDICINE

## 2021-01-01 PROCEDURE — 87449 NOS EACH ORGANISM AG IA: CPT

## 2021-01-01 PROCEDURE — 74174 CTA ABD&PLVS W/CONTRAST: CPT

## 2021-01-01 PROCEDURE — 77030008771 HC TU NG SALEM SUMP -A

## 2021-01-01 PROCEDURE — 92610 EVALUATE SWALLOWING FUNCTION: CPT

## 2021-01-01 PROCEDURE — 77010033711 HC HIGH FLOW OXYGEN

## 2021-01-01 PROCEDURE — 94003 VENT MGMT INPAT SUBQ DAY: CPT

## 2021-01-01 PROCEDURE — 83036 HEMOGLOBIN GLYCOSYLATED A1C: CPT

## 2021-01-01 PROCEDURE — 88184 FLOWCYTOMETRY/ TC 1 MARKER: CPT

## 2021-01-01 PROCEDURE — 07B63ZX EXCISION OF LEFT AXILLARY LYMPHATIC, PERCUTANEOUS APPROACH, DIAGNOSTIC: ICD-10-PCS | Performed by: RADIOLOGY

## 2021-01-01 PROCEDURE — C1726 CATH, BAL DIL, NON-VASCULAR: HCPCS | Performed by: INTERNAL MEDICINE

## 2021-01-01 PROCEDURE — 5A1955Z RESPIRATORY VENTILATION, GREATER THAN 96 CONSECUTIVE HOURS: ICD-10-PCS

## 2021-01-01 PROCEDURE — 76040000025: Performed by: INTERNAL MEDICINE

## 2021-01-01 PROCEDURE — 99223 1ST HOSP IP/OBS HIGH 75: CPT | Performed by: INTERNAL MEDICINE

## 2021-01-01 PROCEDURE — 94762 N-INVAS EAR/PLS OXIMTRY CONT: CPT

## 2021-01-01 PROCEDURE — 74011250637 HC RX REV CODE- 250/637: Performed by: STUDENT IN AN ORGANIZED HEALTH CARE EDUCATION/TRAINING PROGRAM

## 2021-01-01 PROCEDURE — C1751 CATH, INF, PER/CENT/MIDLINE: HCPCS

## 2021-01-01 PROCEDURE — 97112 NEUROMUSCULAR REEDUCATION: CPT

## 2021-01-01 PROCEDURE — 85610 PROTHROMBIN TIME: CPT

## 2021-01-01 PROCEDURE — 82607 VITAMIN B-12: CPT

## 2021-01-01 PROCEDURE — P9047 ALBUMIN (HUMAN), 25%, 50ML: HCPCS | Performed by: INTERNAL MEDICINE

## 2021-01-01 PROCEDURE — 85018 HEMOGLOBIN: CPT

## 2021-01-01 PROCEDURE — 87070 CULTURE OTHR SPECIMN AEROBIC: CPT

## 2021-01-01 PROCEDURE — 93005 ELECTROCARDIOGRAM TRACING: CPT | Performed by: EMERGENCY MEDICINE

## 2021-01-01 PROCEDURE — 74011000258 HC RX REV CODE- 258: Performed by: NURSE PRACTITIONER

## 2021-01-01 PROCEDURE — 97165 OT EVAL LOW COMPLEX 30 MIN: CPT

## 2021-01-01 PROCEDURE — 74011250636 HC RX REV CODE- 250/636: Performed by: PHYSICIAN ASSISTANT

## 2021-01-01 PROCEDURE — 97535 SELF CARE MNGMENT TRAINING: CPT

## 2021-01-01 PROCEDURE — 36592 COLLECT BLOOD FROM PICC: CPT

## 2021-01-01 PROCEDURE — 74011636637 HC RX REV CODE- 636/637: Performed by: PHYSICIAN ASSISTANT

## 2021-01-01 PROCEDURE — 74011000258 HC RX REV CODE- 258: Performed by: STUDENT IN AN ORGANIZED HEALTH CARE EDUCATION/TRAINING PROGRAM

## 2021-01-01 PROCEDURE — 97164 PT RE-EVAL EST PLAN CARE: CPT

## 2021-01-01 PROCEDURE — 97168 OT RE-EVAL EST PLAN CARE: CPT

## 2021-01-01 PROCEDURE — 03HY32Z INSERTION OF MONITORING DEVICE INTO UPPER ARTERY, PERCUTANEOUS APPROACH: ICD-10-PCS

## 2021-01-01 PROCEDURE — 97163 PT EVAL HIGH COMPLEX 45 MIN: CPT

## 2021-01-01 PROCEDURE — 74011000250 HC RX REV CODE- 250: Performed by: STUDENT IN AN ORGANIZED HEALTH CARE EDUCATION/TRAINING PROGRAM

## 2021-01-01 PROCEDURE — 77030027138 HC INCENT SPIROMETER -A

## 2021-01-01 PROCEDURE — 2709999900 HC NON-CHARGEABLE SUPPLY: Performed by: INTERNAL MEDICINE

## 2021-01-01 PROCEDURE — 74011000250 HC RX REV CODE- 250: Performed by: FAMILY MEDICINE

## 2021-01-01 PROCEDURE — 92526 ORAL FUNCTION THERAPY: CPT

## 2021-01-01 PROCEDURE — 36620 INSERTION CATHETER ARTERY: CPT

## 2021-01-01 PROCEDURE — 74011000250 HC RX REV CODE- 250: Performed by: PHYSICIAN ASSISTANT

## 2021-01-01 PROCEDURE — APPSS45 APP SPLIT SHARED TIME 31-45 MINUTES: Performed by: NURSE PRACTITIONER

## 2021-01-01 PROCEDURE — 77030040361 HC SLV COMPR DVT MDII -B

## 2021-01-01 PROCEDURE — 74011000250 HC RX REV CODE- 250: Performed by: NURSE ANESTHETIST, CERTIFIED REGISTERED

## 2021-01-01 PROCEDURE — 81003 URINALYSIS AUTO W/O SCOPE: CPT

## 2021-01-01 PROCEDURE — 82746 ASSAY OF FOLIC ACID SERUM: CPT

## 2021-01-01 PROCEDURE — 87798 DETECT AGENT NOS DNA AMP: CPT

## 2021-01-01 PROCEDURE — 87106 FUNGI IDENTIFICATION YEAST: CPT

## 2021-01-01 PROCEDURE — 87186 SC STD MICRODIL/AGAR DIL: CPT

## 2021-01-01 PROCEDURE — 83605 ASSAY OF LACTIC ACID: CPT

## 2021-01-01 PROCEDURE — 36556 INSERT NON-TUNNEL CV CATH: CPT

## 2021-01-01 PROCEDURE — 70450 CT HEAD/BRAIN W/O DYE: CPT

## 2021-01-01 PROCEDURE — 74011000250 HC RX REV CODE- 250: Performed by: EMERGENCY MEDICINE

## 2021-01-01 PROCEDURE — 74011250636 HC RX REV CODE- 250/636: Performed by: NURSE ANESTHETIST, CERTIFIED REGISTERED

## 2021-01-01 PROCEDURE — 87389 HIV-1 AG W/HIV-1&-2 AB AG IA: CPT

## 2021-01-01 PROCEDURE — 84425 ASSAY OF VITAMIN B-1: CPT

## 2021-01-01 PROCEDURE — 88185 FLOWCYTOMETRY/TC ADD-ON: CPT

## 2021-01-01 PROCEDURE — 84443 ASSAY THYROID STIM HORMONE: CPT

## 2021-01-01 PROCEDURE — 77030041533 HC NSL TU RETAIN SYS BRDL PRO AMR -C

## 2021-01-01 PROCEDURE — 77030041175 HC BAG DIGNSHLD BARD -A

## 2021-01-01 PROCEDURE — 02HV33Z INSERTION OF INFUSION DEVICE INTO SUPERIOR VENA CAVA, PERCUTANEOUS APPROACH: ICD-10-PCS

## 2021-01-01 PROCEDURE — 70498 CT ANGIOGRAPHY NECK: CPT

## 2021-01-01 PROCEDURE — 0D758ZZ DILATION OF ESOPHAGUS, VIA NATURAL OR ARTIFICIAL OPENING ENDOSCOPIC: ICD-10-PCS | Performed by: INTERNAL MEDICINE

## 2021-01-01 PROCEDURE — 77030036720 HC NDL CORE BIOP MISSION DISP BARD -B

## 2021-01-01 PROCEDURE — 5A09557 ASSISTANCE WITH RESPIRATORY VENTILATION, GREATER THAN 96 CONSECUTIVE HOURS, CONTINUOUS POSITIVE AIRWAY PRESSURE: ICD-10-PCS

## 2021-01-01 PROCEDURE — 87641 MR-STAPH DNA AMP PROBE: CPT

## 2021-01-01 PROCEDURE — 86580 TB INTRADERMAL TEST: CPT | Performed by: FAMILY MEDICINE

## 2021-01-01 PROCEDURE — 82140 ASSAY OF AMMONIA: CPT

## 2021-01-01 PROCEDURE — 82271 OCCULT BLOOD OTHER SOURCES: CPT

## 2021-01-01 PROCEDURE — 88342 IMHCHEM/IMCYTCHM 1ST ANTB: CPT

## 2021-01-01 PROCEDURE — 77030040829 HC CATH EXT URINE MDII -B

## 2021-01-01 PROCEDURE — 87635 SARS-COV-2 COVID-19 AMP PRB: CPT

## 2021-01-01 PROCEDURE — 86580 TB INTRADERMAL TEST: CPT | Performed by: INTERNAL MEDICINE

## 2021-01-01 PROCEDURE — 84132 ASSAY OF SERUM POTASSIUM: CPT

## 2021-01-01 PROCEDURE — 74011250636 HC RX REV CODE- 250/636: Performed by: EMERGENCY MEDICINE

## 2021-01-01 PROCEDURE — 77030040704 HC NSL TU RETAIN SYS BRDL PRO AMR -B

## 2021-01-01 PROCEDURE — 87077 CULTURE AEROBIC IDENTIFY: CPT

## 2021-01-01 PROCEDURE — 94667 MNPJ CHEST WALL 1ST: CPT

## 2021-01-01 PROCEDURE — 93005 ELECTROCARDIOGRAM TRACING: CPT | Performed by: STUDENT IN AN ORGANIZED HEALTH CARE EDUCATION/TRAINING PROGRAM

## 2021-01-01 PROCEDURE — P9047 ALBUMIN (HUMAN), 25%, 50ML: HCPCS | Performed by: PHYSICIAN ASSISTANT

## 2021-01-01 PROCEDURE — 80202 ASSAY OF VANCOMYCIN: CPT

## 2021-01-01 PROCEDURE — 0202U NFCT DS 22 TRGT SARS-COV-2: CPT

## 2021-01-01 PROCEDURE — 0BH17EZ INSERTION OF ENDOTRACHEAL AIRWAY INTO TRACHEA, VIA NATURAL OR ARTIFICIAL OPENING: ICD-10-PCS

## 2021-01-01 PROCEDURE — 76450000000

## 2021-01-01 PROCEDURE — 96365 THER/PROPH/DIAG IV INF INIT: CPT

## 2021-01-01 PROCEDURE — 99232 SBSQ HOSP IP/OBS MODERATE 35: CPT | Performed by: NURSE PRACTITIONER

## 2021-01-01 PROCEDURE — 76942 ECHO GUIDE FOR BIOPSY: CPT

## 2021-01-01 PROCEDURE — 96372 THER/PROPH/DIAG INJ SC/IM: CPT

## 2021-01-01 PROCEDURE — 74011000250 HC RX REV CODE- 250: Performed by: HOSPITALIST

## 2021-01-01 PROCEDURE — 77030041247 HC PROTECTOR HEEL HEELMEDIX MDII -B

## 2021-01-01 PROCEDURE — 99222 1ST HOSP IP/OBS MODERATE 55: CPT | Performed by: INTERNAL MEDICINE

## 2021-01-01 PROCEDURE — 77030004950 HC CATH ENTRL NG COVD -A

## 2021-01-01 PROCEDURE — 74220 X-RAY XM ESOPHAGUS 1CNTRST: CPT

## 2021-01-01 PROCEDURE — 77030005402 HC CATH RAD ART LN KT TELE -B

## 2021-01-01 PROCEDURE — 74011636637 HC RX REV CODE- 636/637: Performed by: STUDENT IN AN ORGANIZED HEALTH CARE EDUCATION/TRAINING PROGRAM

## 2021-01-01 RX ORDER — LORAZEPAM 2 MG/ML
1 INJECTION INTRAMUSCULAR
Status: COMPLETED | OUTPATIENT
Start: 2021-01-01 | End: 2021-01-01

## 2021-01-01 RX ORDER — QUETIAPINE FUMARATE 25 MG/1
25 TABLET, FILM COATED ORAL
Status: DISCONTINUED | OUTPATIENT
Start: 2021-01-01 | End: 2021-01-01

## 2021-01-01 RX ORDER — SUCCINYLCHOLINE CHLORIDE 20 MG/ML
150 INJECTION INTRAMUSCULAR; INTRAVENOUS
Status: DISCONTINUED | OUTPATIENT
Start: 2021-01-01 | End: 2021-01-01 | Stop reason: SDUPTHER

## 2021-01-01 RX ORDER — HALOPERIDOL 5 MG/ML
2 INJECTION INTRAMUSCULAR ONCE
Status: COMPLETED | OUTPATIENT
Start: 2021-01-01 | End: 2021-01-01

## 2021-01-01 RX ORDER — FINASTERIDE 5 MG/1
5 TABLET, FILM COATED ORAL
Status: DISCONTINUED | OUTPATIENT
Start: 2021-01-01 | End: 2021-01-01 | Stop reason: HOSPADM

## 2021-01-01 RX ORDER — METOCLOPRAMIDE HYDROCHLORIDE 5 MG/ML
5 INJECTION INTRAMUSCULAR; INTRAVENOUS EVERY 8 HOURS
Status: DISCONTINUED | OUTPATIENT
Start: 2021-01-01 | End: 2021-01-01

## 2021-01-01 RX ORDER — DOPAMINE HYDROCHLORIDE 320 MG/100ML
INJECTION, SOLUTION INTRAVENOUS
Status: COMPLETED
Start: 2021-01-01 | End: 2021-01-01

## 2021-01-01 RX ORDER — TAMSULOSIN HYDROCHLORIDE 0.4 MG/1
0.4 CAPSULE ORAL DAILY
Status: DISCONTINUED | OUTPATIENT
Start: 2021-01-01 | End: 2021-01-01 | Stop reason: HOSPADM

## 2021-01-01 RX ORDER — ALBUTEROL SULFATE 90 UG/1
2 AEROSOL, METERED RESPIRATORY (INHALATION)
Status: DISCONTINUED | OUTPATIENT
Start: 2021-01-01 | End: 2021-01-01 | Stop reason: HOSPADM

## 2021-01-01 RX ORDER — PANTOPRAZOLE SODIUM 40 MG/1
40 TABLET, DELAYED RELEASE ORAL
Status: DISCONTINUED | OUTPATIENT
Start: 2021-01-01 | End: 2021-01-01

## 2021-01-01 RX ORDER — MORPHINE SULFATE 4 MG/ML
4 INJECTION INTRAVENOUS ONCE
Status: COMPLETED | OUTPATIENT
Start: 2021-01-01 | End: 2021-01-01

## 2021-01-01 RX ORDER — SUCCINYLCHOLINE CHLORIDE 20 MG/ML INJECTION SOLUTION
150 SOLUTION
Status: COMPLETED | OUTPATIENT
Start: 2021-01-01 | End: 2021-01-01

## 2021-01-01 RX ORDER — ONDANSETRON 2 MG/ML
INJECTION INTRAMUSCULAR; INTRAVENOUS
Status: ACTIVE
Start: 2021-01-01 | End: 2021-01-01

## 2021-01-01 RX ORDER — ACETAMINOPHEN 325 MG/1
650 TABLET ORAL
Status: DISCONTINUED | OUTPATIENT
Start: 2021-01-01 | End: 2021-01-01

## 2021-01-01 RX ORDER — CHOLECALCIFEROL (VITAMIN D3) 125 MCG
10 CAPSULE ORAL
COMMUNITY

## 2021-01-01 RX ORDER — ACETAMINOPHEN 650 MG/1
650 SUPPOSITORY RECTAL
Status: DISCONTINUED | OUTPATIENT
Start: 2021-01-01 | End: 2021-01-01

## 2021-01-01 RX ORDER — NOREPINEPHRINE BITARTRATE/D5W 4MG/250ML
.5-3 PLASTIC BAG, INJECTION (ML) INTRAVENOUS
Status: DISCONTINUED | OUTPATIENT
Start: 2021-01-01 | End: 2021-01-01

## 2021-01-01 RX ORDER — IPRATROPIUM BROMIDE AND ALBUTEROL SULFATE 2.5; .5 MG/3ML; MG/3ML
3 SOLUTION RESPIRATORY (INHALATION)
Status: COMPLETED | OUTPATIENT
Start: 2021-01-01 | End: 2021-01-01

## 2021-01-01 RX ORDER — DOCUSATE SODIUM 50 MG/5ML
100 LIQUID ORAL 2 TIMES DAILY
Status: DISCONTINUED | OUTPATIENT
Start: 2021-01-01 | End: 2021-01-01

## 2021-01-01 RX ORDER — SCOLOPAMINE TRANSDERMAL SYSTEM 1 MG/1
1 PATCH, EXTENDED RELEASE TRANSDERMAL
Status: DISCONTINUED | OUTPATIENT
Start: 2021-01-01 | End: 2021-01-01 | Stop reason: HOSPADM

## 2021-01-01 RX ORDER — PREDNISONE 10 MG/1
10 TABLET ORAL
Status: DISCONTINUED | OUTPATIENT
Start: 2021-01-01 | End: 2021-01-01

## 2021-01-01 RX ORDER — ONDANSETRON 2 MG/ML
4 INJECTION INTRAMUSCULAR; INTRAVENOUS ONCE
Status: COMPLETED | OUTPATIENT
Start: 2021-01-01 | End: 2021-01-01

## 2021-01-01 RX ORDER — POLYETHYLENE GLYCOL 3350 17 G/17G
17 POWDER, FOR SOLUTION ORAL DAILY
Status: DISCONTINUED | OUTPATIENT
Start: 2021-01-01 | End: 2021-01-01

## 2021-01-01 RX ORDER — DEXTROSE MONOHYDRATE 50 MG/ML
150 INJECTION, SOLUTION INTRAVENOUS CONTINUOUS
Status: DISCONTINUED | OUTPATIENT
Start: 2021-01-01 | End: 2021-01-01

## 2021-01-01 RX ORDER — NOREPINEPHRINE BITARTRATE/D5W 4MG/250ML
PLASTIC BAG, INJECTION (ML) INTRAVENOUS
Status: COMPLETED
Start: 2021-01-01 | End: 2021-01-01

## 2021-01-01 RX ORDER — ROSUVASTATIN CALCIUM 20 MG/1
20 TABLET, COATED ORAL
Status: DISCONTINUED | OUTPATIENT
Start: 2021-01-01 | End: 2021-01-01 | Stop reason: HOSPADM

## 2021-01-01 RX ORDER — INSULIN LISPRO 100 [IU]/ML
INJECTION, SOLUTION INTRAVENOUS; SUBCUTANEOUS EVERY 6 HOURS
Status: DISCONTINUED | OUTPATIENT
Start: 2021-01-01 | End: 2021-01-01

## 2021-01-01 RX ORDER — NOREPINEPHRINE BITARTRATE/D5W 4MG/250ML
.5-3 PLASTIC BAG, INJECTION (ML) INTRAVENOUS
Status: DISCONTINUED | OUTPATIENT
Start: 2021-01-01 | End: 2021-01-01 | Stop reason: HOSPADM

## 2021-01-01 RX ORDER — TEMAZEPAM 15 MG/1
15 CAPSULE ORAL
Status: DISCONTINUED | OUTPATIENT
Start: 2021-01-01 | End: 2021-01-01

## 2021-01-01 RX ORDER — QUETIAPINE FUMARATE 100 MG/1
100 TABLET, FILM COATED ORAL
Status: COMPLETED | OUTPATIENT
Start: 2021-01-01 | End: 2021-01-01

## 2021-01-01 RX ORDER — LORAZEPAM 2 MG/ML
INJECTION INTRAMUSCULAR
Status: ACTIVE
Start: 2021-01-01 | End: 2021-01-01

## 2021-01-01 RX ORDER — VANCOMYCIN/0.9 % SOD CHLORIDE 1.5G/250ML
1500 PLASTIC BAG, INJECTION (ML) INTRAVENOUS
Status: COMPLETED | OUTPATIENT
Start: 2021-01-01 | End: 2021-01-01

## 2021-01-01 RX ORDER — LORAZEPAM 2 MG/ML
2 INJECTION INTRAMUSCULAR ONCE
Status: COMPLETED | OUTPATIENT
Start: 2021-01-01 | End: 2021-01-01

## 2021-01-01 RX ORDER — FENTANYL CITRATE-0.9 % NACL/PF 25 MCG/ML
0-200 PLASTIC BAG, INJECTION (ML) INJECTION
Status: DISCONTINUED | OUTPATIENT
Start: 2021-01-01 | End: 2021-01-01

## 2021-01-01 RX ORDER — GUAIFENESIN 100 MG/5ML
81 LIQUID (ML) ORAL DAILY
Status: DISCONTINUED | OUTPATIENT
Start: 2021-01-01 | End: 2021-01-01 | Stop reason: HOSPADM

## 2021-01-01 RX ORDER — INSULIN LISPRO 100 [IU]/ML
INJECTION, SOLUTION INTRAVENOUS; SUBCUTANEOUS
Status: DISCONTINUED | OUTPATIENT
Start: 2021-01-01 | End: 2021-01-01

## 2021-01-01 RX ORDER — PANTOPRAZOLE SODIUM 40 MG/1
40 TABLET, DELAYED RELEASE ORAL
Status: DISCONTINUED | OUTPATIENT
Start: 2021-01-01 | End: 2021-01-01 | Stop reason: HOSPADM

## 2021-01-01 RX ORDER — FLUTICASONE PROPIONATE 110 UG/1
1 AEROSOL, METERED RESPIRATORY (INHALATION) DAILY
Status: DISCONTINUED | OUTPATIENT
Start: 2021-01-01 | End: 2021-01-01 | Stop reason: HOSPADM

## 2021-01-01 RX ORDER — SODIUM CHLORIDE, SODIUM LACTATE, POTASSIUM CHLORIDE, CALCIUM CHLORIDE 600; 310; 30; 20 MG/100ML; MG/100ML; MG/100ML; MG/100ML
125 INJECTION, SOLUTION INTRAVENOUS CONTINUOUS
Status: DISCONTINUED | OUTPATIENT
Start: 2021-01-01 | End: 2021-01-01

## 2021-01-01 RX ORDER — CHOLECALCIFEROL (VITAMIN D3) 125 MCG
5 CAPSULE ORAL
Status: DISCONTINUED | OUTPATIENT
Start: 2021-01-01 | End: 2021-01-01 | Stop reason: HOSPADM

## 2021-01-01 RX ORDER — ASPIRIN 81 MG/1
81 TABLET ORAL DAILY
Status: DISCONTINUED | OUTPATIENT
Start: 2021-01-01 | End: 2021-01-01

## 2021-01-01 RX ORDER — HYDROCODONE BITARTRATE AND ACETAMINOPHEN 7.5; 325 MG/1; MG/1
1 TABLET ORAL
Status: DISCONTINUED | OUTPATIENT
Start: 2021-01-01 | End: 2021-01-01 | Stop reason: HOSPADM

## 2021-01-01 RX ORDER — ETOMIDATE 2 MG/ML
INJECTION INTRAVENOUS
Status: COMPLETED
Start: 2021-01-01 | End: 2021-01-01

## 2021-01-01 RX ORDER — DOCUSATE SODIUM 100 MG/1
100 CAPSULE, LIQUID FILLED ORAL 2 TIMES DAILY
Status: DISCONTINUED | OUTPATIENT
Start: 2021-01-01 | End: 2021-01-01

## 2021-01-01 RX ORDER — HYDROMORPHONE HYDROCHLORIDE 1 MG/ML
0.5 INJECTION, SOLUTION INTRAMUSCULAR; INTRAVENOUS; SUBCUTANEOUS
Status: DISCONTINUED | OUTPATIENT
Start: 2021-01-01 | End: 2021-01-01 | Stop reason: SDUPTHER

## 2021-01-01 RX ORDER — GLYCOPYRROLATE 0.2 MG/ML
0.1 INJECTION INTRAMUSCULAR; INTRAVENOUS
Status: DISCONTINUED | OUTPATIENT
Start: 2021-01-01 | End: 2021-01-01 | Stop reason: HOSPADM

## 2021-01-01 RX ORDER — METOPROLOL SUCCINATE 25 MG/1
25 TABLET, EXTENDED RELEASE ORAL DAILY
Status: DISCONTINUED | OUTPATIENT
Start: 2021-01-01 | End: 2021-01-01

## 2021-01-01 RX ORDER — ONDANSETRON 2 MG/ML
4 INJECTION INTRAMUSCULAR; INTRAVENOUS
Status: COMPLETED | OUTPATIENT
Start: 2021-01-01 | End: 2021-01-01

## 2021-01-01 RX ORDER — CHLORHEXIDINE GLUCONATE 1.2 MG/ML
15 RINSE ORAL EVERY 12 HOURS
Status: DISCONTINUED | OUTPATIENT
Start: 2021-01-01 | End: 2021-01-01 | Stop reason: HOSPADM

## 2021-01-01 RX ORDER — NAPROXEN 250 MG/1
250 TABLET ORAL
Qty: 21 TABLET | Refills: 0 | Status: SHIPPED
Start: 2021-01-01 | End: 2021-01-01

## 2021-01-01 RX ORDER — AMLODIPINE BESYLATE 5 MG/1
5 TABLET ORAL DAILY
Status: DISCONTINUED | OUTPATIENT
Start: 2021-01-01 | End: 2021-01-01

## 2021-01-01 RX ORDER — CHOLECALCIFEROL (VITAMIN D3) 125 MCG
5 CAPSULE ORAL DAILY
Status: DISCONTINUED | OUTPATIENT
Start: 2021-01-01 | End: 2021-01-01

## 2021-01-01 RX ORDER — PREDNISONE 20 MG/1
40 TABLET ORAL
Status: DISCONTINUED | OUTPATIENT
Start: 2021-01-01 | End: 2021-01-01

## 2021-01-01 RX ORDER — SODIUM CHLORIDE, SODIUM LACTATE, POTASSIUM CHLORIDE, CALCIUM CHLORIDE 600; 310; 30; 20 MG/100ML; MG/100ML; MG/100ML; MG/100ML
100 INJECTION, SOLUTION INTRAVENOUS CONTINUOUS
Status: DISCONTINUED | OUTPATIENT
Start: 2021-01-01 | End: 2021-01-01 | Stop reason: HOSPADM

## 2021-01-01 RX ORDER — ROCURONIUM BROMIDE 10 MG/ML
INJECTION, SOLUTION INTRAVENOUS
Status: DISCONTINUED
Start: 2021-01-01 | End: 2021-01-01

## 2021-01-01 RX ORDER — ROSUVASTATIN CALCIUM 10 MG/1
20 TABLET, COATED ORAL
Status: DISCONTINUED | OUTPATIENT
Start: 2021-01-01 | End: 2021-01-01 | Stop reason: HOSPADM

## 2021-01-01 RX ORDER — PROPOFOL 10 MG/ML
0-50 VIAL (ML) INTRAVENOUS
Status: DISCONTINUED | OUTPATIENT
Start: 2021-01-01 | End: 2021-01-01

## 2021-01-01 RX ORDER — INSULIN LISPRO 100 [IU]/ML
INJECTION, SOLUTION INTRAVENOUS; SUBCUTANEOUS EVERY 6 HOURS
Status: DISCONTINUED | OUTPATIENT
Start: 2021-01-01 | End: 2021-01-01 | Stop reason: HOSPADM

## 2021-01-01 RX ORDER — PROPOFOL 10 MG/ML
20 INJECTION, EMULSION INTRAVENOUS
Status: COMPLETED | OUTPATIENT
Start: 2021-01-01 | End: 2021-01-01

## 2021-01-01 RX ORDER — NALOXONE HYDROCHLORIDE 0.4 MG/ML
0.4 INJECTION, SOLUTION INTRAMUSCULAR; INTRAVENOUS; SUBCUTANEOUS AS NEEDED
Status: DISCONTINUED | OUTPATIENT
Start: 2021-01-01 | End: 2021-01-01 | Stop reason: HOSPADM

## 2021-01-01 RX ORDER — POTASSIUM CHLORIDE 14.9 MG/ML
20 INJECTION INTRAVENOUS
Status: COMPLETED | OUTPATIENT
Start: 2021-01-01 | End: 2021-01-01

## 2021-01-01 RX ORDER — HYDRALAZINE HYDROCHLORIDE 20 MG/ML
20 INJECTION INTRAMUSCULAR; INTRAVENOUS
Status: DISCONTINUED | OUTPATIENT
Start: 2021-01-01 | End: 2021-01-01 | Stop reason: HOSPADM

## 2021-01-01 RX ORDER — AMLODIPINE BESYLATE 5 MG/1
5 TABLET ORAL DAILY
Status: DISCONTINUED | OUTPATIENT
Start: 2021-01-01 | End: 2021-01-01 | Stop reason: HOSPADM

## 2021-01-01 RX ORDER — DOPAMINE HYDROCHLORIDE 320 MG/100ML
0-20 INJECTION, SOLUTION INTRAVENOUS
Status: DISCONTINUED | OUTPATIENT
Start: 2021-01-01 | End: 2021-01-01

## 2021-01-01 RX ORDER — HALOPERIDOL 5 MG/ML
4 INJECTION INTRAMUSCULAR
Status: COMPLETED | OUTPATIENT
Start: 2021-01-01 | End: 2021-01-01

## 2021-01-01 RX ORDER — MORPHINE SULFATE 4 MG/ML
INJECTION INTRAVENOUS
Status: DISCONTINUED
Start: 2021-01-01 | End: 2021-01-01 | Stop reason: HOSPADM

## 2021-01-01 RX ORDER — SODIUM CHLORIDE 0.9 % (FLUSH) 0.9 %
10 SYRINGE (ML) INJECTION
Status: COMPLETED | OUTPATIENT
Start: 2021-01-01 | End: 2021-01-01

## 2021-01-01 RX ORDER — SODIUM CHLORIDE 0.9 % (FLUSH) 0.9 %
5-10 SYRINGE (ML) INJECTION EVERY 8 HOURS
Status: DISCONTINUED | OUTPATIENT
Start: 2021-01-01 | End: 2021-01-01 | Stop reason: HOSPADM

## 2021-01-01 RX ORDER — SODIUM CHLORIDE 0.9 % (FLUSH) 0.9 %
5-40 SYRINGE (ML) INJECTION AS NEEDED
Status: DISCONTINUED | OUTPATIENT
Start: 2021-01-01 | End: 2021-01-01 | Stop reason: HOSPADM

## 2021-01-01 RX ORDER — DEXTROSE 50 % IN WATER (D50W) INTRAVENOUS SYRINGE
25-50 AS NEEDED
Status: DISCONTINUED | OUTPATIENT
Start: 2021-01-01 | End: 2021-01-01 | Stop reason: HOSPADM

## 2021-01-01 RX ORDER — ROCURONIUM BROMIDE 10 MG/ML
INJECTION, SOLUTION INTRAVENOUS
Status: COMPLETED
Start: 2021-01-01 | End: 2021-01-01

## 2021-01-01 RX ORDER — CHOLECALCIFEROL (VITAMIN D3) 125 MCG
5 CAPSULE ORAL
Status: DISCONTINUED | OUTPATIENT
Start: 2021-01-01 | End: 2021-01-01

## 2021-01-01 RX ORDER — ZIPRASIDONE MESYLATE 20 MG/ML
INJECTION, POWDER, LYOPHILIZED, FOR SOLUTION INTRAMUSCULAR
Status: DISCONTINUED
Start: 2021-01-01 | End: 2021-01-01

## 2021-01-01 RX ORDER — ALBUMIN HUMAN 250 G/1000ML
12.5 SOLUTION INTRAVENOUS EVERY 6 HOURS
Status: COMPLETED | OUTPATIENT
Start: 2021-01-01 | End: 2021-01-01

## 2021-01-01 RX ORDER — POTASSIUM CHLORIDE 20 MEQ/1
20 TABLET, EXTENDED RELEASE ORAL DAILY
Status: DISCONTINUED | OUTPATIENT
Start: 2021-01-01 | End: 2021-01-01 | Stop reason: HOSPADM

## 2021-01-01 RX ORDER — ENOXAPARIN SODIUM 100 MG/ML
40 INJECTION SUBCUTANEOUS EVERY 24 HOURS
Status: DISCONTINUED | OUTPATIENT
Start: 2021-01-01 | End: 2021-01-01 | Stop reason: HOSPADM

## 2021-01-01 RX ORDER — TADALAFIL 20 MG/1
40 TABLET ORAL DAILY
Status: DISCONTINUED | OUTPATIENT
Start: 2021-01-01 | End: 2021-01-01 | Stop reason: HOSPADM

## 2021-01-01 RX ORDER — METOCLOPRAMIDE HYDROCHLORIDE 5 MG/ML
10 INJECTION INTRAMUSCULAR; INTRAVENOUS EVERY 8 HOURS
Status: DISCONTINUED | OUTPATIENT
Start: 2021-01-01 | End: 2021-01-01 | Stop reason: HOSPADM

## 2021-01-01 RX ORDER — ONDANSETRON 2 MG/ML
4 INJECTION INTRAMUSCULAR; INTRAVENOUS
Status: DISCONTINUED | OUTPATIENT
Start: 2021-01-01 | End: 2021-01-01 | Stop reason: HOSPADM

## 2021-01-01 RX ORDER — HEPARIN SODIUM 5000 [USP'U]/ML
5000 INJECTION, SOLUTION INTRAVENOUS; SUBCUTANEOUS EVERY 8 HOURS
Status: DISCONTINUED | OUTPATIENT
Start: 2021-01-01 | End: 2021-01-01

## 2021-01-01 RX ORDER — PREDNISONE 10 MG/1
10 TABLET ORAL
Status: DISCONTINUED | OUTPATIENT
Start: 2021-01-01 | End: 2021-01-01 | Stop reason: HOSPADM

## 2021-01-01 RX ORDER — AMLODIPINE BESYLATE 10 MG/1
10 TABLET ORAL DAILY
Status: DISCONTINUED | OUTPATIENT
Start: 2021-01-01 | End: 2021-01-01 | Stop reason: HOSPADM

## 2021-01-01 RX ORDER — HALOPERIDOL 5 MG/ML
2 INJECTION INTRAMUSCULAR ONCE
Status: DISCONTINUED | OUTPATIENT
Start: 2021-01-01 | End: 2021-01-01

## 2021-01-01 RX ORDER — FUROSEMIDE 20 MG/1
20 TABLET ORAL DAILY
Status: DISCONTINUED | OUTPATIENT
Start: 2021-01-01 | End: 2021-01-01

## 2021-01-01 RX ORDER — ATROPINE SULFATE 0.1 MG/ML
INJECTION INTRAVENOUS
Status: ACTIVE
Start: 2021-01-01 | End: 2021-01-01

## 2021-01-01 RX ORDER — SULFAMETHOXAZOLE AND TRIMETHOPRIM 200; 40 MG/5ML; MG/5ML
40 SUSPENSION ORAL EVERY 8 HOURS
Status: DISCONTINUED | OUTPATIENT
Start: 2021-01-01 | End: 2021-01-01 | Stop reason: HOSPADM

## 2021-01-01 RX ORDER — DEXTROSE 40 %
15 GEL (GRAM) ORAL AS NEEDED
Status: DISCONTINUED | OUTPATIENT
Start: 2021-01-01 | End: 2021-01-01 | Stop reason: HOSPADM

## 2021-01-01 RX ORDER — AMLODIPINE BESYLATE 5 MG/1
5 TABLET ORAL DAILY
Qty: 30 TABLET | Refills: 0 | Status: SHIPPED
Start: 2021-01-01 | End: 2021-12-08

## 2021-01-01 RX ORDER — ONDANSETRON 4 MG/1
4 TABLET, ORALLY DISINTEGRATING ORAL
Status: DISCONTINUED | OUTPATIENT
Start: 2021-01-01 | End: 2021-01-01 | Stop reason: HOSPADM

## 2021-01-01 RX ORDER — FLUDEOXYGLUCOSE F18 300 MCI/ML
10 INJECTION INTRAVENOUS ONCE
Status: COMPLETED | OUTPATIENT
Start: 2021-01-01 | End: 2021-01-01

## 2021-01-01 RX ORDER — MORPHINE SULFATE 2 MG/ML
2 INJECTION, SOLUTION INTRAMUSCULAR; INTRAVENOUS
Status: DISCONTINUED | OUTPATIENT
Start: 2021-01-01 | End: 2021-01-01 | Stop reason: HOSPADM

## 2021-01-01 RX ORDER — BUTALBITAL, ACETAMINOPHEN AND CAFFEINE 50; 325; 40 MG/1; MG/1; MG/1
1 TABLET ORAL
Status: DISCONTINUED | OUTPATIENT
Start: 2021-01-01 | End: 2021-01-01

## 2021-01-01 RX ORDER — LIDOCAINE HYDROCHLORIDE 20 MG/ML
INJECTION, SOLUTION EPIDURAL; INFILTRATION; INTRACAUDAL; PERINEURAL AS NEEDED
Status: DISCONTINUED | OUTPATIENT
Start: 2021-01-01 | End: 2021-01-01 | Stop reason: HOSPADM

## 2021-01-01 RX ORDER — POLYETHYLENE GLYCOL 3350 17 G/17G
17 POWDER, FOR SOLUTION ORAL DAILY PRN
Status: DISCONTINUED | OUTPATIENT
Start: 2021-01-01 | End: 2021-01-01 | Stop reason: HOSPADM

## 2021-01-01 RX ORDER — SODIUM CHLORIDE, SODIUM LACTATE, POTASSIUM CHLORIDE, CALCIUM CHLORIDE 600; 310; 30; 20 MG/100ML; MG/100ML; MG/100ML; MG/100ML
100 INJECTION, SOLUTION INTRAVENOUS CONTINUOUS
Status: CANCELLED | OUTPATIENT
Start: 2021-01-01

## 2021-01-01 RX ORDER — ETOMIDATE 2 MG/ML
20 INJECTION INTRAVENOUS ONCE
Status: COMPLETED | OUTPATIENT
Start: 2021-01-01 | End: 2021-01-01

## 2021-01-01 RX ORDER — LANOLIN ALCOHOL/MO/W.PET/CERES
3 CREAM (GRAM) TOPICAL
Status: DISCONTINUED | OUTPATIENT
Start: 2021-01-01 | End: 2021-01-01 | Stop reason: SDUPTHER

## 2021-01-01 RX ORDER — GUAIFENESIN 100 MG/5ML
200 SOLUTION ORAL 3 TIMES DAILY
Status: DISCONTINUED | OUTPATIENT
Start: 2021-01-01 | End: 2021-01-01 | Stop reason: HOSPADM

## 2021-01-01 RX ORDER — NAPROXEN 250 MG/1
250 TABLET ORAL
Status: DISCONTINUED | OUTPATIENT
Start: 2021-01-01 | End: 2021-01-01 | Stop reason: HOSPADM

## 2021-01-01 RX ORDER — LORAZEPAM 2 MG/ML
0.5 INJECTION INTRAMUSCULAR
Status: DISCONTINUED | OUTPATIENT
Start: 2021-01-01 | End: 2021-01-01 | Stop reason: HOSPADM

## 2021-01-01 RX ORDER — ROCURONIUM BROMIDE 10 MG/ML
80 INJECTION, SOLUTION INTRAVENOUS
Status: COMPLETED | OUTPATIENT
Start: 2021-01-01 | End: 2021-01-01

## 2021-01-01 RX ORDER — DOCUSATE SODIUM 50 MG/5ML
100 LIQUID ORAL 2 TIMES DAILY
Status: DISCONTINUED | OUTPATIENT
Start: 2021-01-01 | End: 2021-01-01 | Stop reason: HOSPADM

## 2021-01-01 RX ORDER — HYDROCODONE BITARTRATE AND ACETAMINOPHEN 5; 325 MG/1; MG/1
1 TABLET ORAL
Status: DISCONTINUED | OUTPATIENT
Start: 2021-01-01 | End: 2021-01-01

## 2021-01-01 RX ORDER — DIPHENHYDRAMINE HYDROCHLORIDE 50 MG/ML
25 INJECTION, SOLUTION INTRAMUSCULAR; INTRAVENOUS
Status: COMPLETED | OUTPATIENT
Start: 2021-01-01 | End: 2021-01-01

## 2021-01-01 RX ORDER — MORPHINE SULFATE 2 MG/ML
1 INJECTION, SOLUTION INTRAMUSCULAR; INTRAVENOUS ONCE
Status: COMPLETED | OUTPATIENT
Start: 2021-01-01 | End: 2021-01-01

## 2021-01-01 RX ORDER — KETOROLAC TROMETHAMINE 15 MG/ML
15 INJECTION, SOLUTION INTRAMUSCULAR; INTRAVENOUS
Status: DISPENSED | OUTPATIENT
Start: 2021-01-01 | End: 2021-01-01

## 2021-01-01 RX ORDER — LIDOCAINE HYDROCHLORIDE 20 MG/ML
20-300 INJECTION, SOLUTION INFILTRATION; PERINEURAL
Status: DISCONTINUED | OUTPATIENT
Start: 2021-01-01 | End: 2021-01-01

## 2021-01-01 RX ORDER — CEFAZOLIN SODIUM/WATER 2 G/20 ML
2 SYRINGE (ML) INTRAVENOUS EVERY 8 HOURS
Status: COMPLETED | OUTPATIENT
Start: 2021-01-01 | End: 2021-01-01

## 2021-01-01 RX ORDER — ALBUTEROL SULFATE 90 UG/1
2 AEROSOL, METERED RESPIRATORY (INHALATION)
Status: DISCONTINUED | OUTPATIENT
Start: 2021-01-01 | End: 2021-01-01 | Stop reason: ALTCHOICE

## 2021-01-01 RX ORDER — DEXTROSE 50 % IN WATER (D50W) INTRAVENOUS SYRINGE
25 ONCE
Status: COMPLETED | OUTPATIENT
Start: 2021-01-01 | End: 2021-01-01

## 2021-01-01 RX ORDER — FUROSEMIDE 10 MG/ML
40 INJECTION INTRAMUSCULAR; INTRAVENOUS 2 TIMES DAILY
Status: COMPLETED | OUTPATIENT
Start: 2021-01-01 | End: 2021-01-01

## 2021-01-01 RX ORDER — HYDROCHLOROTHIAZIDE 25 MG/1
12.5 TABLET ORAL DAILY
Status: DISCONTINUED | OUTPATIENT
Start: 2021-01-01 | End: 2021-01-01

## 2021-01-01 RX ORDER — PROPOFOL 10 MG/ML
INJECTION, EMULSION INTRAVENOUS AS NEEDED
Status: DISCONTINUED | OUTPATIENT
Start: 2021-01-01 | End: 2021-01-01 | Stop reason: HOSPADM

## 2021-01-01 RX ORDER — SODIUM CHLORIDE 0.9 % (FLUSH) 0.9 %
5-40 SYRINGE (ML) INJECTION EVERY 8 HOURS
Status: DISCONTINUED | OUTPATIENT
Start: 2021-01-01 | End: 2021-01-01 | Stop reason: HOSPADM

## 2021-01-01 RX ORDER — SODIUM CHLORIDE 0.9 % (FLUSH) 0.9 %
5-10 SYRINGE (ML) INJECTION AS NEEDED
Status: DISCONTINUED | OUTPATIENT
Start: 2021-01-01 | End: 2021-01-01

## 2021-01-01 RX ORDER — CALCIUM GLUCONATE 20 MG/ML
1 INJECTION, SOLUTION INTRAVENOUS ONCE
Status: COMPLETED | OUTPATIENT
Start: 2021-01-01 | End: 2021-01-01

## 2021-01-01 RX ORDER — LORAZEPAM 2 MG/ML
1 INJECTION INTRAMUSCULAR ONCE
Status: COMPLETED | OUTPATIENT
Start: 2021-01-01 | End: 2021-01-01

## 2021-01-01 RX ORDER — SUCCINYLCHOLINE CHLORIDE 20 MG/ML INJECTION SOLUTION
SOLUTION
Status: COMPLETED
Start: 2021-01-01 | End: 2021-01-01

## 2021-01-01 RX ORDER — KETOROLAC TROMETHAMINE 15 MG/ML
15 INJECTION, SOLUTION INTRAMUSCULAR; INTRAVENOUS
Status: COMPLETED | OUTPATIENT
Start: 2021-01-01 | End: 2021-01-01

## 2021-01-01 RX ORDER — DEXTROSE MONOHYDRATE AND SODIUM CHLORIDE 5; .9 G/100ML; G/100ML
125 INJECTION, SOLUTION INTRAVENOUS CONTINUOUS
Status: DISCONTINUED | OUTPATIENT
Start: 2021-01-01 | End: 2021-01-01

## 2021-01-01 RX ORDER — FENTANYL CITRATE 50 UG/ML
50 INJECTION, SOLUTION INTRAMUSCULAR; INTRAVENOUS
Status: DISCONTINUED | OUTPATIENT
Start: 2021-01-01 | End: 2021-01-01 | Stop reason: SDUPTHER

## 2021-01-01 RX ADMIN — Medication 10 ML: at 13:37

## 2021-01-01 RX ADMIN — FINASTERIDE 5 MG: 5 TABLET, FILM COATED ORAL at 22:55

## 2021-01-01 RX ADMIN — TADALAFIL 40 MG: 20 TABLET ORAL at 10:14

## 2021-01-01 RX ADMIN — INSULIN LISPRO 2 UNITS: 100 INJECTION, SOLUTION INTRAVENOUS; SUBCUTANEOUS at 23:31

## 2021-01-01 RX ADMIN — HYDROCODONE BITARTRATE AND ACETAMINOPHEN 1 TABLET: 5; 325 TABLET ORAL at 19:35

## 2021-01-01 RX ADMIN — SULFAMETHOXAZOLE AND TRIMETHOPRIM 40 ML: 200; 40 SUSPENSION ORAL at 22:38

## 2021-01-01 RX ADMIN — Medication 5 MG: at 22:09

## 2021-01-01 RX ADMIN — Medication 10 ML: at 06:06

## 2021-01-01 RX ADMIN — Medication 5 MG: at 21:02

## 2021-01-01 RX ADMIN — DOCUSATE SODIUM 100 MG: 50 LIQUID ORAL at 17:48

## 2021-01-01 RX ADMIN — Medication 10 ML: at 13:24

## 2021-01-01 RX ADMIN — CEFAZOLIN SODIUM 2 G: 100 INJECTION, POWDER, LYOPHILIZED, FOR SOLUTION INTRAVENOUS at 14:42

## 2021-01-01 RX ADMIN — Medication 10 ML: at 05:39

## 2021-01-01 RX ADMIN — MORPHINE SULFATE 1 MG: 2 INJECTION, SOLUTION INTRAMUSCULAR; INTRAVENOUS at 20:42

## 2021-01-01 RX ADMIN — GUAIFENESIN 200 MG: 200 SOLUTION ORAL at 22:22

## 2021-01-01 RX ADMIN — ONDANSETRON 4 MG: 2 INJECTION INTRAMUSCULAR; INTRAVENOUS at 21:03

## 2021-01-01 RX ADMIN — SULFAMETHOXAZOLE AND TRIMETHOPRIM 40 ML: 200; 40 SUSPENSION ORAL at 13:58

## 2021-01-01 RX ADMIN — PANTOPRAZOLE SODIUM 40 MG: 40 TABLET, DELAYED RELEASE ORAL at 05:43

## 2021-01-01 RX ADMIN — METHYLPREDNISOLONE SODIUM SUCCINATE 20 MG: 40 INJECTION, POWDER, FOR SOLUTION INTRAMUSCULAR; INTRAVENOUS at 21:12

## 2021-01-01 RX ADMIN — CHLORHEXIDINE GLUCONATE 15 ML: 1.2 RINSE ORAL at 09:48

## 2021-01-01 RX ADMIN — SODIUM CHLORIDE 10 MG: 9 INJECTION INTRAMUSCULAR; INTRAVENOUS; SUBCUTANEOUS at 13:02

## 2021-01-01 RX ADMIN — DOCUSATE SODIUM 100 MG: 50 LIQUID ORAL at 08:46

## 2021-01-01 RX ADMIN — ACETAMINOPHEN 650 MG: 325 TABLET ORAL at 19:13

## 2021-01-01 RX ADMIN — BUTALBITAL, ACETAMINOPHEN, AND CAFFEINE 1 TABLET: 50; 325; 40 TABLET ORAL at 17:11

## 2021-01-01 RX ADMIN — Medication 10 ML: at 06:11

## 2021-01-01 RX ADMIN — PREDNISONE 10 MG: 10 TABLET ORAL at 11:23

## 2021-01-01 RX ADMIN — SODIUM CHLORIDE 40 MG: 9 INJECTION INTRAMUSCULAR; INTRAVENOUS; SUBCUTANEOUS at 08:30

## 2021-01-01 RX ADMIN — BUTALBITAL, ACETAMINOPHEN, AND CAFFEINE 1 TABLET: 50; 325; 40 TABLET ORAL at 00:47

## 2021-01-01 RX ADMIN — SODIUM CHLORIDE 100 MG: 9 INJECTION, SOLUTION INTRAVENOUS at 16:09

## 2021-01-01 RX ADMIN — SODIUM CHLORIDE 40 MG: 9 INJECTION INTRAMUSCULAR; INTRAVENOUS; SUBCUTANEOUS at 08:49

## 2021-01-01 RX ADMIN — AMPICILLIN SODIUM AND SULBACTAM SODIUM 3 G: 2; 1 INJECTION, POWDER, FOR SOLUTION INTRAMUSCULAR; INTRAVENOUS at 23:27

## 2021-01-01 RX ADMIN — SODIUM CHLORIDE 40 MG: 9 INJECTION INTRAMUSCULAR; INTRAVENOUS; SUBCUTANEOUS at 08:21

## 2021-01-01 RX ADMIN — ALBUTEROL SULFATE 2 PUFF: 90 AEROSOL, METERED RESPIRATORY (INHALATION) at 02:00

## 2021-01-01 RX ADMIN — TAMSULOSIN HYDROCHLORIDE 0.4 MG: 0.4 CAPSULE ORAL at 08:00

## 2021-01-01 RX ADMIN — PROPOFOL 30 MCG/KG/MIN: 10 INJECTION, EMULSION INTRAVENOUS at 01:24

## 2021-01-01 RX ADMIN — CHLORHEXIDINE GLUCONATE 15 ML: 1.2 RINSE ORAL at 08:58

## 2021-01-01 RX ADMIN — Medication 10 ML: at 13:55

## 2021-01-01 RX ADMIN — ROSUVASTATIN 20 MG: 10 TABLET, FILM COATED ORAL at 21:52

## 2021-01-01 RX ADMIN — PREDNISONE 40 MG: 20 TABLET ORAL at 08:21

## 2021-01-01 RX ADMIN — CHLORHEXIDINE GLUCONATE 15 ML: 1.2 RINSE ORAL at 20:25

## 2021-01-01 RX ADMIN — FINASTERIDE 5 MG: 5 TABLET, FILM COATED ORAL at 20:58

## 2021-01-01 RX ADMIN — BUTALBITAL, ACETAMINOPHEN, AND CAFFEINE 1 TABLET: 50; 325; 40 TABLET ORAL at 14:04

## 2021-01-01 RX ADMIN — BUTALBITAL, ACETAMINOPHEN, AND CAFFEINE 1 TABLET: 50; 325; 40 TABLET ORAL at 20:37

## 2021-01-01 RX ADMIN — ASPIRIN 81 MG: 81 TABLET, CHEWABLE ORAL at 08:57

## 2021-01-01 RX ADMIN — TEMAZEPAM 15 MG: 15 CAPSULE ORAL at 02:20

## 2021-01-01 RX ADMIN — PREDNISONE 10 MG: 10 TABLET ORAL at 08:04

## 2021-01-01 RX ADMIN — AMLODIPINE BESYLATE 5 MG: 5 TABLET ORAL at 17:00

## 2021-01-01 RX ADMIN — VASOPRESSIN 0.1 UNITS/MIN: 20 INJECTION INTRAVENOUS at 23:40

## 2021-01-01 RX ADMIN — Medication 10 ML: at 22:49

## 2021-01-01 RX ADMIN — DEXTROSE MONOHYDRATE 150 ML/HR: 5 INJECTION, SOLUTION INTRAVENOUS at 06:16

## 2021-01-01 RX ADMIN — ASPIRIN 81 MG: 81 TABLET, CHEWABLE ORAL at 11:23

## 2021-01-01 RX ADMIN — LORAZEPAM 0.5 MG: 2 INJECTION INTRAMUSCULAR; INTRAVENOUS at 21:16

## 2021-01-01 RX ADMIN — HYDROMORPHONE HYDROCHLORIDE 0.5 MG: 1 INJECTION, SOLUTION INTRAMUSCULAR; INTRAVENOUS; SUBCUTANEOUS at 23:21

## 2021-01-01 RX ADMIN — METHYLPREDNISOLONE SODIUM SUCCINATE 20 MG: 40 INJECTION, POWDER, FOR SOLUTION INTRAMUSCULAR; INTRAVENOUS at 02:19

## 2021-01-01 RX ADMIN — Medication 10 ML: at 22:00

## 2021-01-01 RX ADMIN — METHYLPREDNISOLONE SODIUM SUCCINATE 20 MG: 40 INJECTION, POWDER, FOR SOLUTION INTRAMUSCULAR; INTRAVENOUS at 21:47

## 2021-01-01 RX ADMIN — DEXTROSE MONOHYDRATE AND SODIUM CHLORIDE 125 ML/HR: 5; .9 INJECTION, SOLUTION INTRAVENOUS at 08:49

## 2021-01-01 RX ADMIN — Medication 10 ML: at 05:59

## 2021-01-01 RX ADMIN — Medication 10 ML: at 11:50

## 2021-01-01 RX ADMIN — PANTOPRAZOLE SODIUM 40 MG: 40 TABLET, DELAYED RELEASE ORAL at 17:51

## 2021-01-01 RX ADMIN — ACETAMINOPHEN 650 MG: 325 TABLET ORAL at 08:32

## 2021-01-01 RX ADMIN — Medication 3 MG: at 21:45

## 2021-01-01 RX ADMIN — Medication 10 ML: at 21:26

## 2021-01-01 RX ADMIN — Medication 10 ML: at 15:42

## 2021-01-01 RX ADMIN — WATER 20 MG: 1 INJECTION INTRAMUSCULAR; INTRAVENOUS; SUBCUTANEOUS at 14:00

## 2021-01-01 RX ADMIN — CHLORHEXIDINE GLUCONATE 15 ML: 1.2 RINSE ORAL at 08:18

## 2021-01-01 RX ADMIN — DEXTROSE MONOHYDRATE 4 MCG/MIN: 50 INJECTION, SOLUTION INTRAVENOUS at 23:25

## 2021-01-01 RX ADMIN — PANTOPRAZOLE SODIUM 40 MG: 40 TABLET, DELAYED RELEASE ORAL at 06:50

## 2021-01-01 RX ADMIN — Medication 10 ML: at 21:46

## 2021-01-01 RX ADMIN — Medication 10 ML: at 05:19

## 2021-01-01 RX ADMIN — ALBUTEROL SULFATE 2 PUFF: 90 AEROSOL, METERED RESPIRATORY (INHALATION) at 13:59

## 2021-01-01 RX ADMIN — Medication 10 ML: at 16:52

## 2021-01-01 RX ADMIN — ALBUTEROL SULFATE 2 PUFF: 90 AEROSOL, METERED RESPIRATORY (INHALATION) at 20:55

## 2021-01-01 RX ADMIN — ENOXAPARIN SODIUM 40 MG: 100 INJECTION SUBCUTANEOUS at 14:04

## 2021-01-01 RX ADMIN — Medication 150 MG: at 22:42

## 2021-01-01 RX ADMIN — POLYETHYLENE GLYCOL 3350 17 G: 17 POWDER, FOR SOLUTION ORAL at 09:49

## 2021-01-01 RX ADMIN — ROSUVASTATIN CALCIUM 20 MG: 20 TABLET, COATED ORAL at 21:16

## 2021-01-01 RX ADMIN — PREDNISONE 40 MG: 20 TABLET ORAL at 08:31

## 2021-01-01 RX ADMIN — FINASTERIDE 5 MG: 5 TABLET, FILM COATED ORAL at 22:10

## 2021-01-01 RX ADMIN — TADALAFIL 40 MG: 20 TABLET ORAL at 15:30

## 2021-01-01 RX ADMIN — Medication 5 ML: at 22:00

## 2021-01-01 RX ADMIN — LIDOCAINE HYDROCHLORIDE 100 MG: 20 INJECTION, SOLUTION INFILTRATION; PERINEURAL at 10:32

## 2021-01-01 RX ADMIN — FINASTERIDE 5 MG: 5 TABLET, FILM COATED ORAL at 21:47

## 2021-01-01 RX ADMIN — TAMSULOSIN HYDROCHLORIDE 0.4 MG: 0.4 CAPSULE ORAL at 08:47

## 2021-01-01 RX ADMIN — DOCUSATE SODIUM 100 MG: 50 LIQUID ORAL at 17:33

## 2021-01-01 RX ADMIN — POLYETHYLENE GLYCOL 3350 17 G: 17 POWDER, FOR SOLUTION ORAL at 08:47

## 2021-01-01 RX ADMIN — Medication 10 ML: at 23:05

## 2021-01-01 RX ADMIN — PROPOFOL 30 MCG/KG/MIN: 10 INJECTION, EMULSION INTRAVENOUS at 12:29

## 2021-01-01 RX ADMIN — AMLODIPINE BESYLATE 10 MG: 10 TABLET ORAL at 08:01

## 2021-01-01 RX ADMIN — Medication 10 ML: at 21:17

## 2021-01-01 RX ADMIN — POLYETHYLENE GLYCOL 3350 17 G: 17 POWDER, FOR SOLUTION ORAL at 08:49

## 2021-01-01 RX ADMIN — FINASTERIDE 5 MG: 5 TABLET, FILM COATED ORAL at 21:00

## 2021-01-01 RX ADMIN — ALBUTEROL SULFATE 2 PUFF: 90 AEROSOL, METERED RESPIRATORY (INHALATION) at 16:00

## 2021-01-01 RX ADMIN — ALBUTEROL SULFATE 2 PUFF: 90 AEROSOL, METERED RESPIRATORY (INHALATION) at 09:39

## 2021-01-01 RX ADMIN — PANTOPRAZOLE SODIUM 40 MG: 40 TABLET, DELAYED RELEASE ORAL at 16:26

## 2021-01-01 RX ADMIN — ENOXAPARIN SODIUM 40 MG: 100 INJECTION SUBCUTANEOUS at 08:50

## 2021-01-01 RX ADMIN — METHYLPREDNISOLONE SODIUM SUCCINATE 20 MG: 40 INJECTION, POWDER, FOR SOLUTION INTRAMUSCULAR; INTRAVENOUS at 09:24

## 2021-01-01 RX ADMIN — GUAIFENESIN 200 MG: 200 SOLUTION ORAL at 16:08

## 2021-01-01 RX ADMIN — Medication 10 ML: at 23:46

## 2021-01-01 RX ADMIN — METOCLOPRAMIDE HYDROCHLORIDE 5 MG: 5 INJECTION INTRAMUSCULAR; INTRAVENOUS at 06:10

## 2021-01-01 RX ADMIN — SODIUM CHLORIDE, SODIUM LACTATE, POTASSIUM CHLORIDE, AND CALCIUM CHLORIDE 125 ML/HR: 600; 310; 30; 20 INJECTION, SOLUTION INTRAVENOUS at 15:21

## 2021-01-01 RX ADMIN — Medication 10 ML: at 21:47

## 2021-01-01 RX ADMIN — TAMSULOSIN HYDROCHLORIDE 0.4 MG: 0.4 CAPSULE ORAL at 08:04

## 2021-01-01 RX ADMIN — SODIUM CHLORIDE 40 MG: 9 INJECTION INTRAMUSCULAR; INTRAVENOUS; SUBCUTANEOUS at 09:03

## 2021-01-01 RX ADMIN — Medication 10 ML: at 12:43

## 2021-01-01 RX ADMIN — TADALAFIL 40 MG: 20 TABLET ORAL at 08:59

## 2021-01-01 RX ADMIN — FLUTICASONE PROPIONATE 1 PUFF: 110 AEROSOL, METERED RESPIRATORY (INHALATION) at 11:09

## 2021-01-01 RX ADMIN — HEPARIN SODIUM 5000 UNITS: 5000 INJECTION INTRAVENOUS; SUBCUTANEOUS at 05:50

## 2021-01-01 RX ADMIN — PREDNISONE 10 MG: 10 TABLET ORAL at 15:30

## 2021-01-01 RX ADMIN — CEFAZOLIN SODIUM 2 G: 100 INJECTION, POWDER, LYOPHILIZED, FOR SOLUTION INTRAVENOUS at 21:28

## 2021-01-01 RX ADMIN — AMLODIPINE BESYLATE 5 MG: 5 TABLET ORAL at 08:47

## 2021-01-01 RX ADMIN — HYDROMORPHONE HYDROCHLORIDE 0.5 MG: 1 INJECTION, SOLUTION INTRAMUSCULAR; INTRAVENOUS; SUBCUTANEOUS at 04:14

## 2021-01-01 RX ADMIN — ALBUTEROL SULFATE 2 PUFF: 90 AEROSOL, METERED RESPIRATORY (INHALATION) at 08:01

## 2021-01-01 RX ADMIN — DOCUSATE SODIUM 100 MG: 50 LIQUID ORAL at 09:49

## 2021-01-01 RX ADMIN — PROPOFOL 40 MG: 10 INJECTION, EMULSION INTRAVENOUS at 09:20

## 2021-01-01 RX ADMIN — Medication 5 MG: at 22:00

## 2021-01-01 RX ADMIN — PREDNISONE 40 MG: 20 TABLET ORAL at 09:04

## 2021-01-01 RX ADMIN — ENOXAPARIN SODIUM 40 MG: 100 INJECTION SUBCUTANEOUS at 09:49

## 2021-01-01 RX ADMIN — AMPICILLIN SODIUM AND SULBACTAM SODIUM 3 G: 2; 1 INJECTION, POWDER, FOR SOLUTION INTRAMUSCULAR; INTRAVENOUS at 05:07

## 2021-01-01 RX ADMIN — Medication 10 ML: at 21:29

## 2021-01-01 RX ADMIN — TAMSULOSIN HYDROCHLORIDE 0.4 MG: 0.4 CAPSULE ORAL at 09:28

## 2021-01-01 RX ADMIN — GUAIFENESIN 200 MG: 200 SOLUTION ORAL at 15:05

## 2021-01-01 RX ADMIN — AMPICILLIN SODIUM AND SULBACTAM SODIUM 3 G: 2; 1 INJECTION, POWDER, FOR SOLUTION INTRAMUSCULAR; INTRAVENOUS at 05:40

## 2021-01-01 RX ADMIN — SULFAMETHOXAZOLE AND TRIMETHOPRIM 332.48 MG: 80; 16 INJECTION, SOLUTION, CONCENTRATE INTRAVENOUS at 06:02

## 2021-01-01 RX ADMIN — Medication 10 ML: at 12:28

## 2021-01-01 RX ADMIN — AMPICILLIN SODIUM AND SULBACTAM SODIUM 3 G: 2; 1 INJECTION, POWDER, FOR SOLUTION INTRAMUSCULAR; INTRAVENOUS at 11:57

## 2021-01-01 RX ADMIN — Medication 10 ML: at 05:38

## 2021-01-01 RX ADMIN — Medication 10 ML: at 22:08

## 2021-01-01 RX ADMIN — SULFAMETHOXAZOLE AND TRIMETHOPRIM 40 ML: 200; 40 SUSPENSION ORAL at 06:45

## 2021-01-01 RX ADMIN — POTASSIUM CHLORIDE 20 MEQ: 20 TABLET, EXTENDED RELEASE ORAL at 09:04

## 2021-01-01 RX ADMIN — TADALAFIL 40 MG: 20 TABLET ORAL at 09:04

## 2021-01-01 RX ADMIN — POTASSIUM CHLORIDE 20 MEQ: 20 TABLET, EXTENDED RELEASE ORAL at 08:53

## 2021-01-01 RX ADMIN — DEXTROSE MONOHYDRATE AND SODIUM CHLORIDE 125 ML/HR: 5; .9 INJECTION, SOLUTION INTRAVENOUS at 01:05

## 2021-01-01 RX ADMIN — Medication 10 ML: at 06:51

## 2021-01-01 RX ADMIN — HYDROCODONE BITARTRATE AND ACETAMINOPHEN 1 TABLET: 5; 325 TABLET ORAL at 11:49

## 2021-01-01 RX ADMIN — SODIUM CHLORIDE 1000 ML: 900 INJECTION, SOLUTION INTRAVENOUS at 22:09

## 2021-01-01 RX ADMIN — ASPIRIN 81 MG: 81 TABLET, CHEWABLE ORAL at 08:49

## 2021-01-01 RX ADMIN — DEXTROSE MONOHYDRATE 100 ML/HR: 5 INJECTION, SOLUTION INTRAVENOUS at 07:13

## 2021-01-01 RX ADMIN — BUTALBITAL, ACETAMINOPHEN, AND CAFFEINE 1 TABLET: 50; 325; 40 TABLET ORAL at 17:01

## 2021-01-01 RX ADMIN — PROPOFOL 25 MCG/KG/MIN: 10 INJECTION, EMULSION INTRAVENOUS at 21:01

## 2021-01-01 RX ADMIN — FINASTERIDE 5 MG: 5 TABLET, FILM COATED ORAL at 22:39

## 2021-01-01 RX ADMIN — INSULIN LISPRO 2 UNITS: 100 INJECTION, SOLUTION INTRAVENOUS; SUBCUTANEOUS at 12:21

## 2021-01-01 RX ADMIN — Medication 10 ML: at 21:13

## 2021-01-01 RX ADMIN — POTASSIUM CHLORIDE 20 MEQ: 14.9 INJECTION, SOLUTION INTRAVENOUS at 08:53

## 2021-01-01 RX ADMIN — Medication 10 ML: at 22:39

## 2021-01-01 RX ADMIN — METHYLPREDNISOLONE SODIUM SUCCINATE 20 MG: 40 INJECTION, POWDER, FOR SOLUTION INTRAMUSCULAR; INTRAVENOUS at 21:25

## 2021-01-01 RX ADMIN — ACETAMINOPHEN 650 MG: 325 SUSPENSION ORAL at 20:36

## 2021-01-01 RX ADMIN — Medication 10 ML: at 13:46

## 2021-01-01 RX ADMIN — Medication 10 ML: at 22:28

## 2021-01-01 RX ADMIN — Medication 10 ML: at 13:18

## 2021-01-01 RX ADMIN — ENOXAPARIN SODIUM 40 MG: 100 INJECTION SUBCUTANEOUS at 08:49

## 2021-01-01 RX ADMIN — CHLORHEXIDINE GLUCONATE 15 ML: 1.2 RINSE ORAL at 21:05

## 2021-01-01 RX ADMIN — ALBUTEROL SULFATE 2 PUFF: 90 AEROSOL, METERED RESPIRATORY (INHALATION) at 00:31

## 2021-01-01 RX ADMIN — Medication 10 ML: at 15:33

## 2021-01-01 RX ADMIN — AMLODIPINE BESYLATE 5 MG: 5 TABLET ORAL at 09:28

## 2021-01-01 RX ADMIN — Medication 10 ML: at 06:21

## 2021-01-01 RX ADMIN — HYDROMORPHONE HYDROCHLORIDE 0.5 MG: 1 INJECTION, SOLUTION INTRAMUSCULAR; INTRAVENOUS; SUBCUTANEOUS at 23:27

## 2021-01-01 RX ADMIN — DEXTROSE MONOHYDRATE 5 MCG/MIN: 50 INJECTION, SOLUTION INTRAVENOUS at 21:58

## 2021-01-01 RX ADMIN — TADALAFIL 40 MG: 20 TABLET ORAL at 08:58

## 2021-01-01 RX ADMIN — METHYLPREDNISOLONE SODIUM SUCCINATE 20 MG: 40 INJECTION, POWDER, FOR SOLUTION INTRAMUSCULAR; INTRAVENOUS at 08:49

## 2021-01-01 RX ADMIN — POTASSIUM CHLORIDE 20 MEQ: 14.9 INJECTION, SOLUTION INTRAVENOUS at 10:54

## 2021-01-01 RX ADMIN — BUTALBITAL, ACETAMINOPHEN, AND CAFFEINE 1 TABLET: 50; 325; 40 TABLET ORAL at 04:42

## 2021-01-01 RX ADMIN — ALBUTEROL SULFATE 2 PUFF: 90 AEROSOL, METERED RESPIRATORY (INHALATION) at 21:18

## 2021-01-01 RX ADMIN — Medication 10 ML: at 05:36

## 2021-01-01 RX ADMIN — Medication 10 ML: at 14:28

## 2021-01-01 RX ADMIN — HEPARIN SODIUM 5000 UNITS: 5000 INJECTION INTRAVENOUS; SUBCUTANEOUS at 02:18

## 2021-01-01 RX ADMIN — Medication 10 ML: at 22:15

## 2021-01-01 RX ADMIN — METHYLPREDNISOLONE SODIUM SUCCINATE 40 MG: 40 INJECTION, POWDER, FOR SOLUTION INTRAMUSCULAR; INTRAVENOUS at 17:59

## 2021-01-01 RX ADMIN — SODIUM CHLORIDE 100 MG: 9 INJECTION, SOLUTION INTRAVENOUS at 14:58

## 2021-01-01 RX ADMIN — SODIUM ZIRCONIUM CYCLOSILICATE 10 G: 10 POWDER, FOR SUSPENSION ORAL at 08:58

## 2021-01-01 RX ADMIN — LORAZEPAM 0.5 MG: 2 INJECTION INTRAMUSCULAR; INTRAVENOUS at 23:39

## 2021-01-01 RX ADMIN — POLYETHYLENE GLYCOL 3350 17 G: 17 POWDER, FOR SOLUTION ORAL at 15:31

## 2021-01-01 RX ADMIN — CHLORHEXIDINE GLUCONATE 15 ML: 1.2 RINSE ORAL at 21:19

## 2021-01-01 RX ADMIN — ONDANSETRON 4 MG: 2 INJECTION INTRAMUSCULAR; INTRAVENOUS at 12:20

## 2021-01-01 RX ADMIN — Medication 10 ML: at 14:39

## 2021-01-01 RX ADMIN — ALBUTEROL SULFATE 2 PUFF: 90 AEROSOL, METERED RESPIRATORY (INHALATION) at 19:32

## 2021-01-01 RX ADMIN — DEXTROSE MONOHYDRATE 8 MCG/MIN: 50 INJECTION, SOLUTION INTRAVENOUS at 12:29

## 2021-01-01 RX ADMIN — DOCUSATE SODIUM 100 MG: 50 LIQUID ORAL at 18:26

## 2021-01-01 RX ADMIN — ENOXAPARIN SODIUM 40 MG: 100 INJECTION SUBCUTANEOUS at 08:18

## 2021-01-01 RX ADMIN — CHLORHEXIDINE GLUCONATE 15 ML: 1.2 RINSE ORAL at 09:03

## 2021-01-01 RX ADMIN — FENTANYL CITRATE 50 MCG/HR: 0.05 INJECTION, SOLUTION INTRAMUSCULAR; INTRAVENOUS at 23:15

## 2021-01-01 RX ADMIN — DOCUSATE SODIUM 100 MG: 50 LIQUID ORAL at 08:49

## 2021-01-01 RX ADMIN — METHYLPREDNISOLONE SODIUM SUCCINATE 20 MG: 40 INJECTION, POWDER, FOR SOLUTION INTRAMUSCULAR; INTRAVENOUS at 18:33

## 2021-01-01 RX ADMIN — SODIUM CHLORIDE 1000 ML: 900 INJECTION, SOLUTION INTRAVENOUS at 21:04

## 2021-01-01 RX ADMIN — Medication 10 ML: at 14:25

## 2021-01-01 RX ADMIN — AMPICILLIN SODIUM AND SULBACTAM SODIUM 3 G: 2; 1 INJECTION, POWDER, FOR SOLUTION INTRAMUSCULAR; INTRAVENOUS at 17:04

## 2021-01-01 RX ADMIN — LORAZEPAM 1 MG: 2 INJECTION INTRAMUSCULAR; INTRAVENOUS at 13:43

## 2021-01-01 RX ADMIN — ALBUMIN (HUMAN) 12.5 G: 0.25 INJECTION, SOLUTION INTRAVENOUS at 23:31

## 2021-01-01 RX ADMIN — AMPICILLIN SODIUM AND SULBACTAM SODIUM 3 G: 2; 1 INJECTION, POWDER, FOR SOLUTION INTRAMUSCULAR; INTRAVENOUS at 11:26

## 2021-01-01 RX ADMIN — POTASSIUM BICARBONATE 40 MEQ: 391 TABLET, EFFERVESCENT ORAL at 17:09

## 2021-01-01 RX ADMIN — Medication 10 ML: at 21:03

## 2021-01-01 RX ADMIN — TADALAFIL 40 MG: 20 TABLET ORAL at 08:21

## 2021-01-01 RX ADMIN — LORAZEPAM 1 MG: 2 INJECTION INTRAMUSCULAR; INTRAVENOUS at 13:54

## 2021-01-01 RX ADMIN — ALBUTEROL SULFATE 2 PUFF: 90 AEROSOL, METERED RESPIRATORY (INHALATION) at 02:35

## 2021-01-01 RX ADMIN — AMLODIPINE BESYLATE 5 MG: 5 TABLET ORAL at 09:04

## 2021-01-01 RX ADMIN — HYDROCODONE BITARTRATE AND ACETAMINOPHEN 1 TABLET: 7.5; 325 TABLET ORAL at 08:23

## 2021-01-01 RX ADMIN — SULFAMETHOXAZOLE AND TRIMETHOPRIM 332.48 MG: 80; 16 INJECTION, SOLUTION, CONCENTRATE INTRAVENOUS at 22:11

## 2021-01-01 RX ADMIN — DOCUSATE SODIUM 100 MG: 50 LIQUID ORAL at 11:23

## 2021-01-01 RX ADMIN — TADALAFIL 40 MG: 20 TABLET ORAL at 08:47

## 2021-01-01 RX ADMIN — METHYLPREDNISOLONE SODIUM SUCCINATE 20 MG: 40 INJECTION, POWDER, FOR SOLUTION INTRAMUSCULAR; INTRAVENOUS at 20:34

## 2021-01-01 RX ADMIN — HEPARIN SODIUM 5000 UNITS: 5000 INJECTION INTRAVENOUS; SUBCUTANEOUS at 14:36

## 2021-01-01 RX ADMIN — ACETAMINOPHEN 650 MG: 325 SUSPENSION ORAL at 16:09

## 2021-01-01 RX ADMIN — LORAZEPAM 2 MG: 2 INJECTION INTRAMUSCULAR; INTRAVENOUS at 14:00

## 2021-01-01 RX ADMIN — PANTOPRAZOLE SODIUM 40 MG: 40 TABLET, DELAYED RELEASE ORAL at 08:00

## 2021-01-01 RX ADMIN — Medication 10 ML: at 15:31

## 2021-01-01 RX ADMIN — AMLODIPINE BESYLATE 5 MG: 5 TABLET ORAL at 08:23

## 2021-01-01 RX ADMIN — SODIUM CHLORIDE 500 ML: 900 INJECTION, SOLUTION INTRAVENOUS at 15:00

## 2021-01-01 RX ADMIN — SODIUM CHLORIDE 40 MG: 9 INJECTION INTRAMUSCULAR; INTRAVENOUS; SUBCUTANEOUS at 08:55

## 2021-01-01 RX ADMIN — Medication 10 ML: at 05:40

## 2021-01-01 RX ADMIN — ACETAMINOPHEN 650 MG: 325 SUSPENSION ORAL at 21:12

## 2021-01-01 RX ADMIN — ROSUVASTATIN CALCIUM 20 MG: 20 TABLET, COATED ORAL at 21:05

## 2021-01-01 RX ADMIN — FINASTERIDE 5 MG: 5 TABLET, FILM COATED ORAL at 21:46

## 2021-01-01 RX ADMIN — ALBUTEROL SULFATE 2 PUFF: 90 AEROSOL, METERED RESPIRATORY (INHALATION) at 14:05

## 2021-01-01 RX ADMIN — ONDANSETRON 4 MG: 2 INJECTION INTRAMUSCULAR; INTRAVENOUS at 09:44

## 2021-01-01 RX ADMIN — METOCLOPRAMIDE HYDROCHLORIDE 5 MG: 5 INJECTION INTRAMUSCULAR; INTRAVENOUS at 13:57

## 2021-01-01 RX ADMIN — AMPICILLIN SODIUM AND SULBACTAM SODIUM 3 G: 2; 1 INJECTION, POWDER, FOR SOLUTION INTRAMUSCULAR; INTRAVENOUS at 19:55

## 2021-01-01 RX ADMIN — ENOXAPARIN SODIUM 40 MG: 100 INJECTION SUBCUTANEOUS at 09:47

## 2021-01-01 RX ADMIN — POLYETHYLENE GLYCOL 3350 17 G: 17 POWDER, FOR SOLUTION ORAL at 08:58

## 2021-01-01 RX ADMIN — Medication 10 ML: at 14:45

## 2021-01-01 RX ADMIN — BUTALBITAL, ACETAMINOPHEN, AND CAFFEINE 1 TABLET: 50; 325; 40 TABLET ORAL at 08:23

## 2021-01-01 RX ADMIN — AMLODIPINE BESYLATE 5 MG: 5 TABLET ORAL at 11:23

## 2021-01-01 RX ADMIN — CEFAZOLIN SODIUM 2 G: 100 INJECTION, POWDER, LYOPHILIZED, FOR SOLUTION INTRAVENOUS at 06:06

## 2021-01-01 RX ADMIN — Medication 40 ML: at 05:21

## 2021-01-01 RX ADMIN — DOCUSATE SODIUM 100 MG: 50 LIQUID ORAL at 17:04

## 2021-01-01 RX ADMIN — DEXTROSE MONOHYDRATE 150 ML/HR: 5 INJECTION, SOLUTION INTRAVENOUS at 14:55

## 2021-01-01 RX ADMIN — SODIUM CHLORIDE, SODIUM LACTATE, POTASSIUM CHLORIDE, AND CALCIUM CHLORIDE 100 ML/HR: 600; 310; 30; 20 INJECTION, SOLUTION INTRAVENOUS at 09:04

## 2021-01-01 RX ADMIN — Medication 10 ML: at 05:50

## 2021-01-01 RX ADMIN — MORPHINE SULFATE 2 MG: 2 INJECTION, SOLUTION INTRAMUSCULAR; INTRAVENOUS at 12:19

## 2021-01-01 RX ADMIN — Medication 10 ML: at 21:57

## 2021-01-01 RX ADMIN — SULFAMETHOXAZOLE AND TRIMETHOPRIM 40 ML: 200; 40 SUSPENSION ORAL at 14:06

## 2021-01-01 RX ADMIN — METHYLPREDNISOLONE SODIUM SUCCINATE 20 MG: 40 INJECTION, POWDER, FOR SOLUTION INTRAMUSCULAR; INTRAVENOUS at 08:55

## 2021-01-01 RX ADMIN — PREDNISONE 10 MG: 10 TABLET ORAL at 08:53

## 2021-01-01 RX ADMIN — TIOTROPIUM BROMIDE AND OLODATEROL 2 PUFF: 3.124; 2.736 SPRAY, METERED RESPIRATORY (INHALATION) at 08:19

## 2021-01-01 RX ADMIN — TIOTROPIUM BROMIDE AND OLODATEROL 2 PUFF: 3.124; 2.736 SPRAY, METERED RESPIRATORY (INHALATION) at 07:33

## 2021-01-01 RX ADMIN — AMLODIPINE BESYLATE 5 MG: 5 TABLET ORAL at 09:47

## 2021-01-01 RX ADMIN — ETOMIDATE 20 MG: 2 INJECTION, SOLUTION INTRAVENOUS at 22:42

## 2021-01-01 RX ADMIN — TAMSULOSIN HYDROCHLORIDE 0.4 MG: 0.4 CAPSULE ORAL at 08:18

## 2021-01-01 RX ADMIN — METHYLPREDNISOLONE SODIUM SUCCINATE 20 MG: 40 INJECTION, POWDER, FOR SOLUTION INTRAMUSCULAR; INTRAVENOUS at 08:44

## 2021-01-01 RX ADMIN — AMPICILLIN SODIUM AND SULBACTAM SODIUM 3 G: 2; 1 INJECTION, POWDER, FOR SOLUTION INTRAMUSCULAR; INTRAVENOUS at 05:36

## 2021-01-01 RX ADMIN — VANCOMYCIN HYDROCHLORIDE 1500 MG: 10 INJECTION, POWDER, LYOPHILIZED, FOR SOLUTION INTRAVENOUS at 22:40

## 2021-01-01 RX ADMIN — ASPIRIN 81 MG: 81 TABLET, CHEWABLE ORAL at 08:41

## 2021-01-01 RX ADMIN — AMLODIPINE BESYLATE 5 MG: 5 TABLET ORAL at 09:49

## 2021-01-01 RX ADMIN — SODIUM CHLORIDE 500 ML: 900 INJECTION, SOLUTION INTRAVENOUS at 16:26

## 2021-01-01 RX ADMIN — HYDROCODONE BITARTRATE AND ACETAMINOPHEN 1 TABLET: 5; 325 TABLET ORAL at 22:10

## 2021-01-01 RX ADMIN — POLYETHYLENE GLYCOL 3350 17 G: 17 POWDER, FOR SOLUTION ORAL at 11:23

## 2021-01-01 RX ADMIN — AMLODIPINE BESYLATE 5 MG: 5 TABLET ORAL at 08:04

## 2021-01-01 RX ADMIN — FENTANYL CITRATE 50 MCG/HR: 50 INJECTION, SOLUTION INTRAMUSCULAR; INTRAVENOUS at 22:52

## 2021-01-01 RX ADMIN — DIPHENHYDRAMINE HYDROCHLORIDE 25 MG: 50 INJECTION, SOLUTION INTRAMUSCULAR; INTRAVENOUS at 13:01

## 2021-01-01 RX ADMIN — Medication 5 MG: at 22:22

## 2021-01-01 RX ADMIN — METHYLPREDNISOLONE SODIUM SUCCINATE 20 MG: 40 INJECTION, POWDER, FOR SOLUTION INTRAMUSCULAR; INTRAVENOUS at 21:19

## 2021-01-01 RX ADMIN — Medication 5 ML: at 05:33

## 2021-01-01 RX ADMIN — KETOROLAC TROMETHAMINE 15 MG: 15 INJECTION, SOLUTION INTRAMUSCULAR; INTRAVENOUS at 19:45

## 2021-01-01 RX ADMIN — METOCLOPRAMIDE HYDROCHLORIDE 5 MG: 5 INJECTION INTRAMUSCULAR; INTRAVENOUS at 08:41

## 2021-01-01 RX ADMIN — Medication 3 MG: at 22:39

## 2021-01-01 RX ADMIN — FENTANYL CITRATE 50 MCG: 50 INJECTION, SOLUTION INTRAMUSCULAR; INTRAVENOUS at 22:11

## 2021-01-01 RX ADMIN — DOCUSATE SODIUM 100 MG: 50 LIQUID ORAL at 08:47

## 2021-01-01 RX ADMIN — INSULIN LISPRO 2 UNITS: 100 INJECTION, SOLUTION INTRAVENOUS; SUBCUTANEOUS at 23:30

## 2021-01-01 RX ADMIN — ASPIRIN 81 MG: 81 TABLET, CHEWABLE ORAL at 08:17

## 2021-01-01 RX ADMIN — TADALAFIL 40 MG: 20 TABLET ORAL at 08:16

## 2021-01-01 RX ADMIN — SULFAMETHOXAZOLE AND TRIMETHOPRIM 40 ML: 200; 40 SUSPENSION ORAL at 13:54

## 2021-01-01 RX ADMIN — Medication 10 ML: at 21:19

## 2021-01-01 RX ADMIN — Medication 5 MG: at 20:37

## 2021-01-01 RX ADMIN — Medication 10 ML: at 05:46

## 2021-01-01 RX ADMIN — GUAIFENESIN 200 MG: 200 SOLUTION ORAL at 08:58

## 2021-01-01 RX ADMIN — ALBUTEROL SULFATE 2 PUFF: 90 AEROSOL, METERED RESPIRATORY (INHALATION) at 14:41

## 2021-01-01 RX ADMIN — Medication 10 ML: at 05:24

## 2021-01-01 RX ADMIN — SULFAMETHOXAZOLE AND TRIMETHOPRIM 332.48 MG: 80; 16 INJECTION, SOLUTION, CONCENTRATE INTRAVENOUS at 15:32

## 2021-01-01 RX ADMIN — AMPICILLIN SODIUM AND SULBACTAM SODIUM 3 G: 2; 1 INJECTION, POWDER, FOR SOLUTION INTRAMUSCULAR; INTRAVENOUS at 00:34

## 2021-01-01 RX ADMIN — ENOXAPARIN SODIUM 40 MG: 100 INJECTION SUBCUTANEOUS at 08:53

## 2021-01-01 RX ADMIN — ONDANSETRON 4 MG: 2 INJECTION INTRAMUSCULAR; INTRAVENOUS at 12:34

## 2021-01-01 RX ADMIN — HYDROCODONE BITARTRATE AND ACETAMINOPHEN 1 TABLET: 5; 325 TABLET ORAL at 23:56

## 2021-01-01 RX ADMIN — GUAIFENESIN 200 MG: 200 SOLUTION ORAL at 08:17

## 2021-01-01 RX ADMIN — ENOXAPARIN SODIUM 40 MG: 100 INJECTION SUBCUTANEOUS at 08:41

## 2021-01-01 RX ADMIN — FLUTICASONE PROPIONATE 1 PUFF: 110 AEROSOL, METERED RESPIRATORY (INHALATION) at 08:30

## 2021-01-01 RX ADMIN — HYDROMORPHONE HYDROCHLORIDE 0.5 MG: 1 INJECTION, SOLUTION INTRAMUSCULAR; INTRAVENOUS; SUBCUTANEOUS at 19:48

## 2021-01-01 RX ADMIN — DEXTROSE MONOHYDRATE 10 MCG/MIN: 50 INJECTION, SOLUTION INTRAVENOUS at 04:37

## 2021-01-01 RX ADMIN — HYDROCODONE BITARTRATE AND ACETAMINOPHEN 1 TABLET: 7.5; 325 TABLET ORAL at 01:56

## 2021-01-01 RX ADMIN — ROSUVASTATIN CALCIUM 20 MG: 20 TABLET, COATED ORAL at 23:05

## 2021-01-01 RX ADMIN — Medication 5 ML: at 21:21

## 2021-01-01 RX ADMIN — ROCURONIUM BROMIDE 80 MG: 50 INJECTION, SOLUTION INTRAVENOUS at 19:37

## 2021-01-01 RX ADMIN — TIOTROPIUM BROMIDE AND OLODATEROL 2 PUFF: 3.124; 2.736 SPRAY, METERED RESPIRATORY (INHALATION) at 09:32

## 2021-01-01 RX ADMIN — LIDOCAINE HYDROCHLORIDE 40 MG: 20 INJECTION, SOLUTION EPIDURAL; INFILTRATION; INTRACAUDAL; PERINEURAL at 09:16

## 2021-01-01 RX ADMIN — ALBUTEROL SULFATE 2 PUFF: 90 AEROSOL, METERED RESPIRATORY (INHALATION) at 08:18

## 2021-01-01 RX ADMIN — Medication 10 ML: at 05:58

## 2021-01-01 RX ADMIN — TADALAFIL 40 MG: 20 TABLET ORAL at 09:48

## 2021-01-01 RX ADMIN — NAPROXEN 250 MG: 250 TABLET ORAL at 01:18

## 2021-01-01 RX ADMIN — ASPIRIN 81 MG: 81 TABLET, CHEWABLE ORAL at 09:03

## 2021-01-01 RX ADMIN — ACETAMINOPHEN 650 MG: 325 TABLET ORAL at 23:22

## 2021-01-01 RX ADMIN — AMPICILLIN SODIUM AND SULBACTAM SODIUM 3 G: 2; 1 INJECTION, POWDER, FOR SOLUTION INTRAMUSCULAR; INTRAVENOUS at 17:33

## 2021-01-01 RX ADMIN — ALBUTEROL SULFATE 2 PUFF: 90 AEROSOL, METERED RESPIRATORY (INHALATION) at 20:00

## 2021-01-01 RX ADMIN — SODIUM CHLORIDE 100 ML: 900 INJECTION, SOLUTION INTRAVENOUS at 12:43

## 2021-01-01 RX ADMIN — Medication 10 ML: at 16:04

## 2021-01-01 RX ADMIN — AMPICILLIN SODIUM AND SULBACTAM SODIUM 3 G: 2; 1 INJECTION, POWDER, FOR SOLUTION INTRAMUSCULAR; INTRAVENOUS at 12:42

## 2021-01-01 RX ADMIN — ENOXAPARIN SODIUM 40 MG: 100 INJECTION SUBCUTANEOUS at 11:23

## 2021-01-01 RX ADMIN — HYDROMORPHONE HYDROCHLORIDE 0.5 MG: 1 INJECTION, SOLUTION INTRAMUSCULAR; INTRAVENOUS; SUBCUTANEOUS at 11:37

## 2021-01-01 RX ADMIN — KETOROLAC TROMETHAMINE 15 MG: 15 INJECTION, SOLUTION INTRAMUSCULAR; INTRAVENOUS at 14:59

## 2021-01-01 RX ADMIN — METHYLPREDNISOLONE SODIUM SUCCINATE 20 MG: 40 INJECTION, POWDER, FOR SOLUTION INTRAMUSCULAR; INTRAVENOUS at 08:22

## 2021-01-01 RX ADMIN — METHYLPREDNISOLONE SODIUM SUCCINATE 20 MG: 40 INJECTION, POWDER, FOR SOLUTION INTRAMUSCULAR; INTRAVENOUS at 08:16

## 2021-01-01 RX ADMIN — AMPICILLIN SODIUM AND SULBACTAM SODIUM 3 G: 2; 1 INJECTION, POWDER, FOR SOLUTION INTRAMUSCULAR; INTRAVENOUS at 23:52

## 2021-01-01 RX ADMIN — ALBUMIN (HUMAN) 12.5 G: 0.25 INJECTION, SOLUTION INTRAVENOUS at 18:26

## 2021-01-01 RX ADMIN — TADALAFIL 40 MG: 20 TABLET ORAL at 09:08

## 2021-01-01 RX ADMIN — METHYLPREDNISOLONE SODIUM SUCCINATE 20 MG: 40 INJECTION, POWDER, FOR SOLUTION INTRAMUSCULAR; INTRAVENOUS at 08:50

## 2021-01-01 RX ADMIN — IPRATROPIUM BROMIDE AND ALBUTEROL SULFATE 3 ML: .5; 3 SOLUTION RESPIRATORY (INHALATION) at 18:41

## 2021-01-01 RX ADMIN — QUETIAPINE FUMARATE 100 MG: 100 TABLET ORAL at 02:05

## 2021-01-01 RX ADMIN — PANTOPRAZOLE SODIUM 40 MG: 40 TABLET, DELAYED RELEASE ORAL at 09:07

## 2021-01-01 RX ADMIN — ASPIRIN 81 MG: 81 TABLET, CHEWABLE ORAL at 08:44

## 2021-01-01 RX ADMIN — ENOXAPARIN SODIUM 40 MG: 100 INJECTION SUBCUTANEOUS at 14:20

## 2021-01-01 RX ADMIN — BUTALBITAL, ACETAMINOPHEN, AND CAFFEINE 1 TABLET: 50; 325; 40 TABLET ORAL at 12:13

## 2021-01-01 RX ADMIN — ALBUTEROL SULFATE 2 PUFF: 90 AEROSOL, METERED RESPIRATORY (INHALATION) at 01:23

## 2021-01-01 RX ADMIN — ENOXAPARIN SODIUM 40 MG: 100 INJECTION SUBCUTANEOUS at 08:56

## 2021-01-01 RX ADMIN — TADALAFIL 40 MG: 20 TABLET ORAL at 08:45

## 2021-01-01 RX ADMIN — Medication 10 ML: at 13:45

## 2021-01-01 RX ADMIN — PROPOFOL 25 MCG/KG/MIN: 10 INJECTION, EMULSION INTRAVENOUS at 22:52

## 2021-01-01 RX ADMIN — PROPOFOL 30 MCG/KG/MIN: 10 INJECTION, EMULSION INTRAVENOUS at 21:35

## 2021-01-01 RX ADMIN — MORPHINE SULFATE 2 MG: 2 INJECTION, SOLUTION INTRAMUSCULAR; INTRAVENOUS at 12:54

## 2021-01-01 RX ADMIN — SULFAMETHOXAZOLE AND TRIMETHOPRIM 40 ML: 200; 40 SUSPENSION ORAL at 05:38

## 2021-01-01 RX ADMIN — FINASTERIDE 5 MG: 5 TABLET, FILM COATED ORAL at 21:52

## 2021-01-01 RX ADMIN — Medication 5 MG: at 21:53

## 2021-01-01 RX ADMIN — AMPICILLIN SODIUM AND SULBACTAM SODIUM 3 G: 2; 1 INJECTION, POWDER, FOR SOLUTION INTRAMUSCULAR; INTRAVENOUS at 11:24

## 2021-01-01 RX ADMIN — CHLORHEXIDINE GLUCONATE 15 ML: 1.2 RINSE ORAL at 08:44

## 2021-01-01 RX ADMIN — POTASSIUM CHLORIDE 20 MEQ: 20 TABLET, EXTENDED RELEASE ORAL at 08:04

## 2021-01-01 RX ADMIN — DOCUSATE SODIUM 100 MG: 50 LIQUID ORAL at 08:57

## 2021-01-01 RX ADMIN — HYDROCODONE BITARTRATE AND ACETAMINOPHEN 1 TABLET: 5; 325 TABLET ORAL at 14:50

## 2021-01-01 RX ADMIN — ROSUVASTATIN 20 MG: 10 TABLET, FILM COATED ORAL at 22:09

## 2021-01-01 RX ADMIN — PANTOPRAZOLE SODIUM 40 MG: 40 TABLET, DELAYED RELEASE ORAL at 17:14

## 2021-01-01 RX ADMIN — AMPICILLIN SODIUM AND SULBACTAM SODIUM 3 G: 2; 1 INJECTION, POWDER, FOR SOLUTION INTRAMUSCULAR; INTRAVENOUS at 12:54

## 2021-01-01 RX ADMIN — Medication 4 MCG/MIN: at 22:49

## 2021-01-01 RX ADMIN — ETOMIDATE: 2 INJECTION INTRAVENOUS at 19:37

## 2021-01-01 RX ADMIN — TAMSULOSIN HYDROCHLORIDE 0.4 MG: 0.4 CAPSULE ORAL at 08:23

## 2021-01-01 RX ADMIN — POLYETHYLENE GLYCOL 3350 17 G: 17 POWDER, FOR SOLUTION ORAL at 05:46

## 2021-01-01 RX ADMIN — Medication 10 ML: at 13:19

## 2021-01-01 RX ADMIN — ROSUVASTATIN 20 MG: 10 TABLET, FILM COATED ORAL at 22:00

## 2021-01-01 RX ADMIN — GUAIFENESIN 200 MG: 200 SOLUTION ORAL at 22:38

## 2021-01-01 RX ADMIN — CHLORHEXIDINE GLUCONATE 15 ML: 1.2 RINSE ORAL at 21:46

## 2021-01-01 RX ADMIN — FLUTICASONE PROPIONATE 1 PUFF: 110 AEROSOL, METERED RESPIRATORY (INHALATION) at 07:33

## 2021-01-01 RX ADMIN — CHLORHEXIDINE GLUCONATE 15 ML: 1.2 RINSE ORAL at 21:26

## 2021-01-01 RX ADMIN — POTASSIUM CHLORIDE 20 MEQ: 20 TABLET, EXTENDED RELEASE ORAL at 08:23

## 2021-01-01 RX ADMIN — LORAZEPAM 0.5 MG: 2 INJECTION INTRAMUSCULAR; INTRAVENOUS at 19:55

## 2021-01-01 RX ADMIN — PREDNISONE 10 MG: 10 TABLET ORAL at 08:47

## 2021-01-01 RX ADMIN — POTASSIUM CHLORIDE 20 MEQ: 20 TABLET, EXTENDED RELEASE ORAL at 09:28

## 2021-01-01 RX ADMIN — HEPARIN SODIUM 5000 UNITS: 5000 INJECTION INTRAVENOUS; SUBCUTANEOUS at 05:46

## 2021-01-01 RX ADMIN — MORPHINE SULFATE 2 MG: 2 INJECTION, SOLUTION INTRAMUSCULAR; INTRAVENOUS at 08:41

## 2021-01-01 RX ADMIN — Medication 10 ML: at 21:20

## 2021-01-01 RX ADMIN — TAMSULOSIN HYDROCHLORIDE 0.4 MG: 0.4 CAPSULE ORAL at 08:21

## 2021-01-01 RX ADMIN — ASPIRIN 81 MG: 81 TABLET, CHEWABLE ORAL at 09:49

## 2021-01-01 RX ADMIN — Medication 10 ML: at 13:20

## 2021-01-01 RX ADMIN — BARIUM SULFATE 50 ML: 0.6 SUSPENSION ORAL at 08:40

## 2021-01-01 RX ADMIN — TAMSULOSIN HYDROCHLORIDE 0.4 MG: 0.4 CAPSULE ORAL at 09:04

## 2021-01-01 RX ADMIN — SODIUM CHLORIDE 40 MG: 9 INJECTION INTRAMUSCULAR; INTRAVENOUS; SUBCUTANEOUS at 08:57

## 2021-01-01 RX ADMIN — POTASSIUM CHLORIDE 20 MEQ: 20 TABLET, EXTENDED RELEASE ORAL at 08:00

## 2021-01-01 RX ADMIN — PREDNISONE 40 MG: 20 TABLET ORAL at 08:18

## 2021-01-01 RX ADMIN — SULFAMETHOXAZOLE AND TRIMETHOPRIM 40 ML: 200; 40 SUSPENSION ORAL at 05:24

## 2021-01-01 RX ADMIN — NOREPINEPHRINE BITARTRATE 4 MCG/MIN: 1 INJECTION INTRAVENOUS at 17:41

## 2021-01-01 RX ADMIN — SUCCINYLCHOLINE CHLORIDE 20 MG/ML INJECTION SOLUTION 150 MG: SOLUTION at 22:42

## 2021-01-01 RX ADMIN — Medication 5 MG: at 22:14

## 2021-01-01 RX ADMIN — Medication 10 ML: at 04:45

## 2021-01-01 RX ADMIN — SODIUM CHLORIDE 40 MG: 9 INJECTION INTRAMUSCULAR; INTRAVENOUS; SUBCUTANEOUS at 09:24

## 2021-01-01 RX ADMIN — PREDNISONE 10 MG: 10 TABLET ORAL at 09:28

## 2021-01-01 RX ADMIN — METHYLPREDNISOLONE SODIUM SUCCINATE 40 MG: 40 INJECTION, POWDER, FOR SOLUTION INTRAMUSCULAR; INTRAVENOUS at 09:48

## 2021-01-01 RX ADMIN — AMPICILLIN SODIUM AND SULBACTAM SODIUM 3 G: 2; 1 INJECTION, POWDER, FOR SOLUTION INTRAMUSCULAR; INTRAVENOUS at 06:11

## 2021-01-01 RX ADMIN — ETOMIDATE: 2 INJECTION, SOLUTION INTRAVENOUS at 19:37

## 2021-01-01 RX ADMIN — CHLORHEXIDINE GLUCONATE 15 ML: 1.2 RINSE ORAL at 21:12

## 2021-01-01 RX ADMIN — AMLODIPINE BESYLATE 5 MG: 5 TABLET ORAL at 08:53

## 2021-01-01 RX ADMIN — FLUTICASONE PROPIONATE 1 PUFF: 110 AEROSOL, METERED RESPIRATORY (INHALATION) at 08:19

## 2021-01-01 RX ADMIN — ALBUTEROL SULFATE 2 PUFF: 90 AEROSOL, METERED RESPIRATORY (INHALATION) at 08:48

## 2021-01-01 RX ADMIN — ASPIRIN 81 MG: 81 TABLET, CHEWABLE ORAL at 08:18

## 2021-01-01 RX ADMIN — METHYLPREDNISOLONE SODIUM SUCCINATE 40 MG: 40 INJECTION, POWDER, FOR SOLUTION INTRAMUSCULAR; INTRAVENOUS at 03:05

## 2021-01-01 RX ADMIN — CHLORHEXIDINE GLUCONATE 15 ML: 1.2 RINSE ORAL at 00:37

## 2021-01-01 RX ADMIN — ALBUMIN (HUMAN) 12.5 G: 0.25 INJECTION, SOLUTION INTRAVENOUS at 05:39

## 2021-01-01 RX ADMIN — SULFAMETHOXAZOLE AND TRIMETHOPRIM 40 ML: 200; 40 SUSPENSION ORAL at 21:20

## 2021-01-01 RX ADMIN — METHYLPREDNISOLONE SODIUM SUCCINATE 20 MG: 40 INJECTION, POWDER, FOR SOLUTION INTRAMUSCULAR; INTRAVENOUS at 09:02

## 2021-01-01 RX ADMIN — FLUTICASONE PROPIONATE 1 PUFF: 110 AEROSOL, METERED RESPIRATORY (INHALATION) at 08:00

## 2021-01-01 RX ADMIN — Medication 10 ML: at 14:07

## 2021-01-01 RX ADMIN — ROSUVASTATIN 20 MG: 10 TABLET, FILM COATED ORAL at 20:37

## 2021-01-01 RX ADMIN — TADALAFIL 40 MG: 20 TABLET ORAL at 08:49

## 2021-01-01 RX ADMIN — FLUTICASONE PROPIONATE 1 PUFF: 110 AEROSOL, METERED RESPIRATORY (INHALATION) at 08:01

## 2021-01-01 RX ADMIN — HALOPERIDOL LACTATE 4 MG: 5 INJECTION, SOLUTION INTRAMUSCULAR at 04:52

## 2021-01-01 RX ADMIN — ENOXAPARIN SODIUM 40 MG: 100 INJECTION SUBCUTANEOUS at 08:43

## 2021-01-01 RX ADMIN — ROSUVASTATIN 20 MG: 10 TABLET, FILM COATED ORAL at 22:39

## 2021-01-01 RX ADMIN — ALBUTEROL SULFATE 2 PUFF: 90 AEROSOL, METERED RESPIRATORY (INHALATION) at 21:40

## 2021-01-01 RX ADMIN — SODIUM CHLORIDE 100 MG: 9 INJECTION, SOLUTION INTRAVENOUS at 14:01

## 2021-01-01 RX ADMIN — TIOTROPIUM BROMIDE AND OLODATEROL 2 PUFF: 3.124; 2.736 SPRAY, METERED RESPIRATORY (INHALATION) at 08:30

## 2021-01-01 RX ADMIN — CEFAZOLIN SODIUM 2 G: 100 INJECTION, POWDER, LYOPHILIZED, FOR SOLUTION INTRAVENOUS at 21:58

## 2021-01-01 RX ADMIN — FUROSEMIDE 20 MG: 20 TABLET ORAL at 09:04

## 2021-01-01 RX ADMIN — METHYLPREDNISOLONE SODIUM SUCCINATE 20 MG: 40 INJECTION, POWDER, FOR SOLUTION INTRAMUSCULAR; INTRAVENOUS at 02:18

## 2021-01-01 RX ADMIN — ROSUVASTATIN 20 MG: 10 TABLET, FILM COATED ORAL at 22:14

## 2021-01-01 RX ADMIN — Medication 40 ML: at 23:00

## 2021-01-01 RX ADMIN — POLYETHYLENE GLYCOL 3350 17 G: 17 POWDER, FOR SOLUTION ORAL at 08:56

## 2021-01-01 RX ADMIN — Medication 10 ML: at 14:44

## 2021-01-01 RX ADMIN — ALBUTEROL SULFATE 2 PUFF: 90 AEROSOL, METERED RESPIRATORY (INHALATION) at 08:02

## 2021-01-01 RX ADMIN — INSULIN HUMAN 10 UNITS: 100 INJECTION, SOLUTION PARENTERAL at 21:21

## 2021-01-01 RX ADMIN — ASPIRIN 81 MG: 81 TABLET, CHEWABLE ORAL at 08:46

## 2021-01-01 RX ADMIN — TIOTROPIUM BROMIDE AND OLODATEROL 2 PUFF: 3.124; 2.736 SPRAY, METERED RESPIRATORY (INHALATION) at 07:27

## 2021-01-01 RX ADMIN — DEXTROSE MONOHYDRATE 150 ML/HR: 5 INJECTION, SOLUTION INTRAVENOUS at 21:59

## 2021-01-01 RX ADMIN — Medication 10 ML: at 21:04

## 2021-01-01 RX ADMIN — HYDROMORPHONE HYDROCHLORIDE 0.5 MG: 1 INJECTION, SOLUTION INTRAMUSCULAR; INTRAVENOUS; SUBCUTANEOUS at 17:33

## 2021-01-01 RX ADMIN — DEXTROSE MONOHYDRATE 100 ML/HR: 5 INJECTION, SOLUTION INTRAVENOUS at 10:13

## 2021-01-01 RX ADMIN — FUROSEMIDE 20 MG: 20 TABLET ORAL at 17:33

## 2021-01-01 RX ADMIN — ONDANSETRON 4 MG: 4 TABLET, ORALLY DISINTEGRATING ORAL at 17:51

## 2021-01-01 RX ADMIN — CHLORHEXIDINE GLUCONATE 15 ML: 1.2 RINSE ORAL at 20:34

## 2021-01-01 RX ADMIN — Medication 10 ML: at 14:47

## 2021-01-01 RX ADMIN — HEPARIN SODIUM 5000 UNITS: 5000 INJECTION INTRAVENOUS; SUBCUTANEOUS at 18:38

## 2021-01-01 RX ADMIN — Medication 10 ML: at 06:24

## 2021-01-01 RX ADMIN — FINASTERIDE 5 MG: 5 TABLET, FILM COATED ORAL at 20:37

## 2021-01-01 RX ADMIN — HALOPERIDOL LACTATE 2 MG: 5 INJECTION, SOLUTION INTRAMUSCULAR at 23:04

## 2021-01-01 RX ADMIN — FLUTICASONE PROPIONATE 1 PUFF: 110 AEROSOL, METERED RESPIRATORY (INHALATION) at 08:48

## 2021-01-01 RX ADMIN — PREDNISONE 10 MG: 10 TABLET ORAL at 08:23

## 2021-01-01 RX ADMIN — DIPHENHYDRAMINE HYDROCHLORIDE 25 MG: 50 INJECTION, SOLUTION INTRAMUSCULAR; INTRAVENOUS at 13:31

## 2021-01-01 RX ADMIN — Medication 10 ML: at 14:27

## 2021-01-01 RX ADMIN — FUROSEMIDE 40 MG: 10 INJECTION, SOLUTION INTRAMUSCULAR; INTRAVENOUS at 17:39

## 2021-01-01 RX ADMIN — PANTOPRAZOLE SODIUM 40 MG: 40 TABLET, DELAYED RELEASE ORAL at 05:50

## 2021-01-01 RX ADMIN — PROPOFOL 25 MCG/KG/MIN: 10 INJECTION, EMULSION INTRAVENOUS at 09:08

## 2021-01-01 RX ADMIN — SODIUM CHLORIDE 40 MG: 9 INJECTION INTRAMUSCULAR; INTRAVENOUS; SUBCUTANEOUS at 08:16

## 2021-01-01 RX ADMIN — SODIUM ZIRCONIUM CYCLOSILICATE 10 G: 10 POWDER, FOR SUSPENSION ORAL at 08:40

## 2021-01-01 RX ADMIN — Medication 10 ML: at 22:10

## 2021-01-01 RX ADMIN — Medication 4 MCG/MIN: at 17:41

## 2021-01-01 RX ADMIN — CEFAZOLIN SODIUM 2 G: 100 INJECTION, POWDER, LYOPHILIZED, FOR SOLUTION INTRAVENOUS at 13:41

## 2021-01-01 RX ADMIN — ALBUTEROL SULFATE 2 PUFF: 90 AEROSOL, METERED RESPIRATORY (INHALATION) at 07:24

## 2021-01-01 RX ADMIN — TUBERCULIN PURIFIED PROTEIN DERIVATIVE 5 UNITS: 5 INJECTION, SOLUTION INTRADERMAL at 11:26

## 2021-01-01 RX ADMIN — AMPICILLIN SODIUM AND SULBACTAM SODIUM 3 G: 2; 1 INJECTION, POWDER, FOR SOLUTION INTRAMUSCULAR; INTRAVENOUS at 23:12

## 2021-01-01 RX ADMIN — Medication 10 ML: at 20:47

## 2021-01-01 RX ADMIN — ENOXAPARIN SODIUM 40 MG: 100 INJECTION SUBCUTANEOUS at 09:04

## 2021-01-01 RX ADMIN — POTASSIUM BICARBONATE 40 MEQ: 391 TABLET, EFFERVESCENT ORAL at 09:49

## 2021-01-01 RX ADMIN — PANTOPRAZOLE SODIUM 40 MG: 40 TABLET, DELAYED RELEASE ORAL at 06:39

## 2021-01-01 RX ADMIN — Medication 10 ML: at 22:44

## 2021-01-01 RX ADMIN — ALBUTEROL SULFATE 2 PUFF: 90 AEROSOL, METERED RESPIRATORY (INHALATION) at 08:28

## 2021-01-01 RX ADMIN — SODIUM CHLORIDE 40 MG: 9 INJECTION INTRAMUSCULAR; INTRAVENOUS; SUBCUTANEOUS at 08:43

## 2021-01-01 RX ADMIN — ASPIRIN 81 MG: 81 TABLET, CHEWABLE ORAL at 15:31

## 2021-01-01 RX ADMIN — METHYLPREDNISOLONE SODIUM SUCCINATE 40 MG: 40 INJECTION, POWDER, FOR SOLUTION INTRAMUSCULAR; INTRAVENOUS at 09:47

## 2021-01-01 RX ADMIN — MORPHINE SULFATE 2 MG: 2 INJECTION, SOLUTION INTRAMUSCULAR; INTRAVENOUS at 19:33

## 2021-01-01 RX ADMIN — BUTALBITAL, ACETAMINOPHEN, AND CAFFEINE 1 TABLET: 50; 325; 40 TABLET ORAL at 02:28

## 2021-01-01 RX ADMIN — ENOXAPARIN SODIUM 40 MG: 100 INJECTION SUBCUTANEOUS at 08:47

## 2021-01-01 RX ADMIN — DEXTROSE MONOHYDRATE 25 G: 25 INJECTION, SOLUTION INTRAVENOUS at 21:19

## 2021-01-01 RX ADMIN — FENTANYL CITRATE 50 MCG/HR: 0.05 INJECTION, SOLUTION INTRAMUSCULAR; INTRAVENOUS at 02:57

## 2021-01-01 RX ADMIN — MORPHINE SULFATE 4 MG: 4 INJECTION INTRAVENOUS at 00:12

## 2021-01-01 RX ADMIN — Medication 10 ML: at 05:37

## 2021-01-01 RX ADMIN — ACETAMINOPHEN 650 MG: 325 TABLET ORAL at 15:30

## 2021-01-01 RX ADMIN — DOPAMINE HYDROCHLORIDE 5 MCG/KG/MIN: 320 INJECTION, SOLUTION INTRAVENOUS at 12:18

## 2021-01-01 RX ADMIN — Medication 10 ML: at 13:58

## 2021-01-01 RX ADMIN — Medication 5 MG: at 21:25

## 2021-01-01 RX ADMIN — POTASSIUM CHLORIDE 20 MEQ: 14.9 INJECTION, SOLUTION INTRAVENOUS at 08:42

## 2021-01-01 RX ADMIN — TUBERCULIN PURIFIED PROTEIN DERIVATIVE 5 UNITS: 5 INJECTION, SOLUTION INTRADERMAL at 22:31

## 2021-01-01 RX ADMIN — TAMSULOSIN HYDROCHLORIDE 0.4 MG: 0.4 CAPSULE ORAL at 08:53

## 2021-01-01 RX ADMIN — DOPAMINE HYDROCHLORIDE IN DEXTROSE 5 MCG/KG/MIN: 3.2 INJECTION, SOLUTION INTRAVENOUS at 12:18

## 2021-01-01 RX ADMIN — HYDROMORPHONE HYDROCHLORIDE 0.5 MG: 1 INJECTION, SOLUTION INTRAMUSCULAR; INTRAVENOUS; SUBCUTANEOUS at 15:42

## 2021-01-01 RX ADMIN — AMPICILLIN SODIUM AND SULBACTAM SODIUM 3 G: 2; 1 INJECTION, POWDER, FOR SOLUTION INTRAMUSCULAR; INTRAVENOUS at 17:39

## 2021-01-01 RX ADMIN — ASPIRIN 81 MG: 81 TABLET, CHEWABLE ORAL at 09:47

## 2021-01-01 RX ADMIN — ALBUTEROL SULFATE 2 PUFF: 90 AEROSOL, METERED RESPIRATORY (INHALATION) at 13:27

## 2021-01-01 RX ADMIN — ONDANSETRON 4 MG: 4 TABLET, ORALLY DISINTEGRATING ORAL at 09:06

## 2021-01-01 RX ADMIN — METHYLPREDNISOLONE SODIUM SUCCINATE 20 MG: 40 INJECTION, POWDER, FOR SOLUTION INTRAMUSCULAR; INTRAVENOUS at 21:23

## 2021-01-01 RX ADMIN — FINASTERIDE 5 MG: 5 TABLET, FILM COATED ORAL at 22:14

## 2021-01-01 RX ADMIN — ROSUVASTATIN 20 MG: 10 TABLET, FILM COATED ORAL at 22:55

## 2021-01-01 RX ADMIN — LORAZEPAM 1 MG: 2 INJECTION INTRAMUSCULAR; INTRAVENOUS at 16:21

## 2021-01-01 RX ADMIN — FLUDEOXYGLUCOSE F18 10.8 MILLICURIE: 300 INJECTION INTRAVENOUS at 14:00

## 2021-01-01 RX ADMIN — AMLODIPINE BESYLATE 5 MG: 5 TABLET ORAL at 08:18

## 2021-01-01 RX ADMIN — AMPICILLIN SODIUM AND SULBACTAM SODIUM 3 G: 2; 1 INJECTION, POWDER, FOR SOLUTION INTRAMUSCULAR; INTRAVENOUS at 17:09

## 2021-01-01 RX ADMIN — ALBUMIN (HUMAN) 12.5 G: 0.25 INJECTION, SOLUTION INTRAVENOUS at 17:59

## 2021-01-01 RX ADMIN — SODIUM CHLORIDE 40 MG: 9 INJECTION INTRAMUSCULAR; INTRAVENOUS; SUBCUTANEOUS at 08:18

## 2021-01-01 RX ADMIN — HYDROCODONE BITARTRATE AND ACETAMINOPHEN 1 TABLET: 7.5; 325 TABLET ORAL at 20:58

## 2021-01-01 RX ADMIN — METHYLPREDNISOLONE SODIUM SUCCINATE 40 MG: 40 INJECTION, POWDER, FOR SOLUTION INTRAMUSCULAR; INTRAVENOUS at 17:09

## 2021-01-01 RX ADMIN — ALBUMIN (HUMAN) 12.5 G: 0.25 INJECTION, SOLUTION INTRAVENOUS at 11:48

## 2021-01-01 RX ADMIN — ASPIRIN 81 MG: 81 TABLET, CHEWABLE ORAL at 08:47

## 2021-01-01 RX ADMIN — PROPOFOL 20 MG: 10 INJECTION, EMULSION INTRAVENOUS at 21:39

## 2021-01-01 RX ADMIN — SODIUM CHLORIDE 200 MG: 9 INJECTION, SOLUTION INTRAVENOUS at 16:12

## 2021-01-01 RX ADMIN — AMPICILLIN SODIUM AND SULBACTAM SODIUM 3 G: 2; 1 INJECTION, POWDER, FOR SOLUTION INTRAMUSCULAR; INTRAVENOUS at 23:02

## 2021-01-01 RX ADMIN — HALOPERIDOL LACTATE 2 MG: 5 INJECTION, SOLUTION INTRAMUSCULAR at 20:47

## 2021-01-01 RX ADMIN — METOCLOPRAMIDE HYDROCHLORIDE 5 MG: 5 INJECTION INTRAMUSCULAR; INTRAVENOUS at 21:46

## 2021-01-01 RX ADMIN — METOCLOPRAMIDE HYDROCHLORIDE 5 MG: 5 INJECTION INTRAMUSCULAR; INTRAVENOUS at 21:28

## 2021-01-01 RX ADMIN — METHYLPREDNISOLONE SODIUM SUCCINATE 40 MG: 40 INJECTION, POWDER, FOR SOLUTION INTRAMUSCULAR; INTRAVENOUS at 02:22

## 2021-01-01 RX ADMIN — TIOTROPIUM BROMIDE AND OLODATEROL 2 PUFF: 3.124; 2.736 SPRAY, METERED RESPIRATORY (INHALATION) at 08:01

## 2021-01-01 RX ADMIN — Medication 10 ML: at 14:46

## 2021-01-01 RX ADMIN — BUTALBITAL, ACETAMINOPHEN, AND CAFFEINE 1 TABLET: 50; 325; 40 TABLET ORAL at 01:44

## 2021-01-01 RX ADMIN — PROPOFOL 30 MCG/KG/MIN: 10 INJECTION, EMULSION INTRAVENOUS at 04:37

## 2021-01-01 RX ADMIN — DOCUSATE SODIUM 100 MG: 50 LIQUID ORAL at 17:09

## 2021-01-01 RX ADMIN — ENOXAPARIN SODIUM 40 MG: 100 INJECTION SUBCUTANEOUS at 08:57

## 2021-01-01 RX ADMIN — AMLODIPINE BESYLATE 5 MG: 5 TABLET ORAL at 08:00

## 2021-01-01 RX ADMIN — TADALAFIL 40 MG: 20 TABLET ORAL at 11:24

## 2021-01-01 RX ADMIN — METOCLOPRAMIDE HYDROCHLORIDE 5 MG: 5 INJECTION INTRAMUSCULAR; INTRAVENOUS at 14:27

## 2021-01-01 RX ADMIN — Medication 10 ML: at 14:06

## 2021-01-01 RX ADMIN — ENOXAPARIN SODIUM 40 MG: 100 INJECTION SUBCUTANEOUS at 13:50

## 2021-01-01 RX ADMIN — DEXTROSE MONOHYDRATE 100 ML/HR: 5 INJECTION, SOLUTION INTRAVENOUS at 21:31

## 2021-01-01 RX ADMIN — CHLORHEXIDINE GLUCONATE 15 ML: 1.2 RINSE ORAL at 08:41

## 2021-01-01 RX ADMIN — BUTALBITAL, ACETAMINOPHEN, AND CAFFEINE 1 TABLET: 50; 325; 40 TABLET ORAL at 06:03

## 2021-01-01 RX ADMIN — Medication 10 ML: at 13:56

## 2021-01-01 RX ADMIN — CHLORHEXIDINE GLUCONATE 15 ML: 1.2 RINSE ORAL at 08:19

## 2021-01-01 RX ADMIN — AMLODIPINE BESYLATE 5 MG: 5 TABLET ORAL at 08:46

## 2021-01-01 RX ADMIN — SULFAMETHOXAZOLE AND TRIMETHOPRIM 40 ML: 200; 40 SUSPENSION ORAL at 22:07

## 2021-01-01 RX ADMIN — IOPAMIDOL 100 ML: 755 INJECTION, SOLUTION INTRAVENOUS at 12:43

## 2021-01-01 RX ADMIN — ROCURONIUM BROMIDE 80 MG: 10 INJECTION, SOLUTION INTRAVENOUS at 19:37

## 2021-01-01 RX ADMIN — SODIUM PHOSPHATE, MONOBASIC, MONOHYDRATE: 276; 142 INJECTION, SOLUTION INTRAVENOUS at 11:52

## 2021-01-01 RX ADMIN — METOCLOPRAMIDE HYDROCHLORIDE 10 MG: 5 INJECTION INTRAMUSCULAR; INTRAVENOUS at 15:43

## 2021-01-01 RX ADMIN — Medication 1 EACH: at 16:24

## 2021-01-01 RX ADMIN — ALBUTEROL SULFATE 2 PUFF: 90 AEROSOL, METERED RESPIRATORY (INHALATION) at 04:00

## 2021-01-01 RX ADMIN — SODIUM CHLORIDE 100 MG: 9 INJECTION, SOLUTION INTRAVENOUS at 15:24

## 2021-01-01 RX ADMIN — ENOXAPARIN SODIUM 40 MG: 100 INJECTION SUBCUTANEOUS at 08:16

## 2021-01-01 RX ADMIN — PROPOFOL 50 MG: 10 INJECTION, EMULSION INTRAVENOUS at 09:16

## 2021-01-01 RX ADMIN — Medication 10 ML: at 14:32

## 2021-01-01 RX ADMIN — Medication 10 ML: at 13:54

## 2021-01-01 RX ADMIN — NAPROXEN 250 MG: 250 TABLET ORAL at 16:37

## 2021-01-01 RX ADMIN — METHYLPREDNISOLONE SODIUM SUCCINATE 20 MG: 40 INJECTION, POWDER, FOR SOLUTION INTRAMUSCULAR; INTRAVENOUS at 02:44

## 2021-01-01 RX ADMIN — HYDROCODONE BITARTRATE AND ACETAMINOPHEN 1 TABLET: 7.5; 325 TABLET ORAL at 12:18

## 2021-01-01 RX ADMIN — METHYLPREDNISOLONE SODIUM SUCCINATE 20 MG: 40 INJECTION, POWDER, FOR SOLUTION INTRAMUSCULAR; INTRAVENOUS at 09:03

## 2021-01-01 RX ADMIN — ALBUMIN (HUMAN) 12.5 G: 0.25 INJECTION, SOLUTION INTRAVENOUS at 12:11

## 2021-01-01 RX ADMIN — POTASSIUM CHLORIDE 20 MEQ: 14.9 INJECTION, SOLUTION INTRAVENOUS at 06:06

## 2021-01-01 RX ADMIN — Medication 10 ML: at 15:32

## 2021-01-01 RX ADMIN — PREDNISONE 10 MG: 10 TABLET ORAL at 08:00

## 2021-01-01 RX ADMIN — KETOROLAC TROMETHAMINE 15 MG: 15 INJECTION, SOLUTION INTRAMUSCULAR; INTRAVENOUS at 20:29

## 2021-01-01 RX ADMIN — METHYLPREDNISOLONE SODIUM SUCCINATE 20 MG: 40 INJECTION, POWDER, FOR SOLUTION INTRAMUSCULAR; INTRAVENOUS at 18:02

## 2021-01-01 RX ADMIN — HYDROCODONE BITARTRATE AND ACETAMINOPHEN 1 TABLET: 7.5; 325 TABLET ORAL at 21:53

## 2021-01-01 RX ADMIN — FUROSEMIDE 40 MG: 10 INJECTION, SOLUTION INTRAMUSCULAR; INTRAVENOUS at 12:54

## 2021-01-01 RX ADMIN — PANTOPRAZOLE SODIUM 40 MG: 40 TABLET, DELAYED RELEASE ORAL at 17:01

## 2021-01-01 RX ADMIN — SODIUM CHLORIDE 12.5 MG: 9 INJECTION INTRAMUSCULAR; INTRAVENOUS; SUBCUTANEOUS at 13:02

## 2021-01-01 RX ADMIN — CHLORHEXIDINE GLUCONATE 15 ML: 1.2 RINSE ORAL at 21:28

## 2021-01-01 RX ADMIN — ASPIRIN 81 MG: 81 TABLET, CHEWABLE ORAL at 08:58

## 2021-01-01 RX ADMIN — Medication 10 ML: at 06:43

## 2021-01-01 RX ADMIN — HEPARIN SODIUM 5000 UNITS: 5000 INJECTION INTRAVENOUS; SUBCUTANEOUS at 14:28

## 2021-01-01 RX ADMIN — SULFAMETHOXAZOLE AND TRIMETHOPRIM 40 ML: 200; 40 SUSPENSION ORAL at 13:56

## 2021-01-01 RX ADMIN — CHLORHEXIDINE GLUCONATE 15 ML: 1.2 RINSE ORAL at 08:50

## 2021-01-01 RX ADMIN — GLYCOPYRROLATE 0.1 MG: 0.2 INJECTION, SOLUTION INTRAMUSCULAR; INTRAVENOUS at 09:03

## 2021-01-01 RX ADMIN — FLUTICASONE PROPIONATE 1 PUFF: 110 AEROSOL, METERED RESPIRATORY (INHALATION) at 07:26

## 2021-01-01 RX ADMIN — CHLORHEXIDINE GLUCONATE 15 ML: 1.2 RINSE ORAL at 21:23

## 2021-01-01 RX ADMIN — Medication 10 ML: at 21:31

## 2021-01-01 RX ADMIN — SODIUM CHLORIDE 12.5 MG: 9 INJECTION INTRAMUSCULAR; INTRAVENOUS; SUBCUTANEOUS at 23:24

## 2021-01-01 RX ADMIN — Medication 10 ML: at 06:25

## 2021-01-01 RX ADMIN — DEXTROSE MONOHYDRATE 4 MCG/MIN: 50 INJECTION, SOLUTION INTRAVENOUS at 22:49

## 2021-01-01 RX ADMIN — MORPHINE SULFATE 2 MG: 2 INJECTION, SOLUTION INTRAMUSCULAR; INTRAVENOUS at 08:49

## 2021-01-01 RX ADMIN — AMPICILLIN SODIUM AND SULBACTAM SODIUM 3 G: 2; 1 INJECTION, POWDER, FOR SOLUTION INTRAMUSCULAR; INTRAVENOUS at 23:26

## 2021-01-01 RX ADMIN — INSULIN LISPRO 2 UNITS: 100 INJECTION, SOLUTION INTRAVENOUS; SUBCUTANEOUS at 06:52

## 2021-01-01 RX ADMIN — TADALAFIL 40 MG: 20 TABLET ORAL at 08:57

## 2021-01-01 RX ADMIN — METHYLPREDNISOLONE SODIUM SUCCINATE 20 MG: 40 INJECTION, POWDER, FOR SOLUTION INTRAMUSCULAR; INTRAVENOUS at 21:05

## 2021-01-01 RX ADMIN — SULFAMETHOXAZOLE AND TRIMETHOPRIM 40 ML: 200; 40 SUSPENSION ORAL at 22:23

## 2021-01-01 RX ADMIN — CALCIUM GLUCONATE 1000 MG: 20 INJECTION, SOLUTION INTRAVENOUS at 21:15

## 2021-01-01 RX ADMIN — AMPICILLIN SODIUM AND SULBACTAM SODIUM 3 G: 2; 1 INJECTION, POWDER, FOR SOLUTION INTRAMUSCULAR; INTRAVENOUS at 05:24

## 2021-01-01 RX ADMIN — METOCLOPRAMIDE HYDROCHLORIDE 5 MG: 5 INJECTION INTRAMUSCULAR; INTRAVENOUS at 05:40

## 2021-01-01 RX ADMIN — HEPARIN SODIUM 5000 UNITS: 5000 INJECTION INTRAVENOUS; SUBCUTANEOUS at 21:47

## 2021-01-01 RX ADMIN — AMPICILLIN SODIUM AND SULBACTAM SODIUM 3 G: 2; 1 INJECTION, POWDER, FOR SOLUTION INTRAMUSCULAR; INTRAVENOUS at 05:59

## 2021-01-01 RX ADMIN — PANTOPRAZOLE SODIUM 40 MG: 40 TABLET, DELAYED RELEASE ORAL at 04:45

## 2021-01-01 RX ADMIN — FENTANYL CITRATE 50 MCG/HR: 50 INJECTION, SOLUTION INTRAMUSCULAR; INTRAVENOUS at 16:35

## 2021-01-01 RX ADMIN — ALBUTEROL SULFATE 2 PUFF: 90 AEROSOL, METERED RESPIRATORY (INHALATION) at 01:24

## 2021-01-01 RX ADMIN — PREDNISONE 10 MG: 10 TABLET ORAL at 08:46

## 2021-01-01 RX ADMIN — LORAZEPAM 0.5 MG: 2 INJECTION INTRAMUSCULAR; INTRAVENOUS at 12:28

## 2021-01-01 RX ADMIN — Medication 5 MG: at 22:39

## 2021-01-01 RX ADMIN — METOCLOPRAMIDE HYDROCHLORIDE 10 MG: 5 INJECTION INTRAMUSCULAR; INTRAVENOUS at 21:12

## 2021-01-01 RX ADMIN — Medication 10 ML: at 04:52

## 2021-01-01 RX ADMIN — GLYCOPYRROLATE 0.1 MG: 0.2 INJECTION, SOLUTION INTRAMUSCULAR; INTRAVENOUS at 08:47

## 2021-01-01 RX ADMIN — HEPARIN SODIUM 5000 UNITS: 5000 INJECTION INTRAVENOUS; SUBCUTANEOUS at 21:45

## 2021-01-01 RX ADMIN — ALBUTEROL SULFATE 2 PUFF: 90 AEROSOL, METERED RESPIRATORY (INHALATION) at 14:30

## 2021-01-01 RX ADMIN — DEXTROSE MONOHYDRATE AND SODIUM CHLORIDE 125 ML/HR: 5; .9 INJECTION, SOLUTION INTRAVENOUS at 01:31

## 2021-01-01 RX ADMIN — HYDROCODONE BITARTRATE AND ACETAMINOPHEN 1 TABLET: 7.5; 325 TABLET ORAL at 18:23

## 2021-01-01 RX ADMIN — CEFAZOLIN SODIUM 2 G: 100 INJECTION, POWDER, LYOPHILIZED, FOR SOLUTION INTRAVENOUS at 06:20

## 2021-01-01 RX ADMIN — POTASSIUM CHLORIDE 20 MEQ: 20 TABLET, EXTENDED RELEASE ORAL at 08:47

## 2021-01-01 RX ADMIN — ROSUVASTATIN 20 MG: 10 TABLET, FILM COATED ORAL at 21:02

## 2021-01-01 RX ADMIN — Medication 10 ML: at 05:25

## 2021-01-01 RX ADMIN — NAPROXEN 250 MG: 250 TABLET ORAL at 12:35

## 2021-07-26 PROBLEM — R13.10 DYSPHAGIA: Status: ACTIVE | Noted: 2021-01-01

## 2021-10-28 PROBLEM — R73.9 HYPERGLYCEMIA, DRUG-INDUCED: Status: ACTIVE | Noted: 2021-01-01

## 2021-10-28 PROBLEM — R22.32 AXILLARY MASS, LEFT: Status: ACTIVE | Noted: 2021-01-01

## 2021-10-28 PROBLEM — R11.2 NAUSEA & VOMITING: Status: ACTIVE | Noted: 2021-01-01

## 2021-10-28 PROBLEM — E87.8 ELECTROLYTE OR FLUID DISORDER: Status: ACTIVE | Noted: 2021-01-01

## 2021-10-28 PROBLEM — G93.41 ACUTE METABOLIC ENCEPHALOPATHY: Status: ACTIVE | Noted: 2021-01-01

## 2021-10-28 PROBLEM — R00.1 BRADYCARDIA WITH 31-40 BEATS PER MINUTE: Status: ACTIVE | Noted: 2021-01-01

## 2021-10-28 PROBLEM — T50.905A HYPERGLYCEMIA, DRUG-INDUCED: Status: ACTIVE | Noted: 2021-01-01

## 2021-10-28 PROBLEM — E87.1 HYPONATREMIA: Status: ACTIVE | Noted: 2021-01-01

## 2021-10-28 NOTE — ED TRIAGE NOTES
Pt arrives per EMS from home. Family called for complaints of a fall at home. Complains of \"extreme headache\", nausea and vomiting x 2 days. EMS foun HR 30 and bp 66/30. EMS started pt on pacing. Given 1mg IV versed. Pacer stopped on arrival. HR remians 40's and bp 130's at this time.  at bedside and ordered the pacer stopped.

## 2021-10-28 NOTE — H&P
Hospitalist History and Physical   Admit Date:  10/28/2021 12:07 PM   Name:  Ramona Schwartz   Age:  80 y.o. Sex:  male  :  1940   MRN:  [id number]   Room:  [place] [address]    Presenting Complaint: Slow Heart Rate    Reason(s) for Admission: Acute metabolic encephalopathy [O27.33]     History of Present Illness:   Ramona Schwartz is a 80 y.o. male with medical history of sarcoid, restrictive lung disease, hypertension who presented with headache, altered mental status. On initial presentation to the emergency department he was complaining of a headache and able to speak in short sentences. At the time of my interview he was sedated and not following command. History provided by wife. For approximately last 2 weeks been feeling ill with progressive nausea and vomiting. He reported intermittent nonsevere abdominal pain. His wife reports no fevers, chills or rigors. She said that he did not complain of vision changes, chest pain, changes in urinary output, changes in bowel movements (some loose stools), peripheral edema. In the emergency department CT head chest abdomen pelvis did not demonstrate acute process, however did show chronic cerebral atrophy left-sided maxillary mass and chronic parenchymal lung disease. He was admitted 10/28 for further evaluation and management. Review of Systems:  10 systems reviewed and negative except as noted in HPI. Assessment & Plan:   * Acute metabolic encephalopathy  Initially speaking in short sentences, sedated in ED for agitation. Admission CTA head neck with no acute process, atrophy; CT CAP with axillary masses, parenchymal lung disease, CAD. Agitated, refractory to benzodiazepines, ziprasidone;  -MRI head  -ziprasidone prn agitation  -blood cultures  -PT, OT, SLP, PPD  -low threshold for neurology consult    Axillary mass, left  Admission CT CAP with multiple axillary masses.   -consult IR for biopsy    Bradycardia with 31-40 beats per minute  Brief episode of bradycardia responded to dopamine, fluids > resolved. -echocardiogram  -cardiac monitoring  -consider Cardiology consult with recurrence    Nausea & vomiting  -promethazine prn  -monitor electrolytes closely    Electrolyte or fluid disorder  10/28: subacute hyponatremia, LR, recheck    Hyperglycemia, drug-induced  Chronic steroid use  -sliding scale insulin  -fingerstick blood glucose    Body mass index (BMI) of 25.0 to 25.9 in adult  Represents 5 kg weight loss in last 12 months. Current weight 67 kg, BMI 25.5; From Cardiology annual visit 10/2020:  Wt 72 kg (158 lb 12.8 oz) BMI 27.26 kg/m² BSA 1.8 m²  -nutrition consult    Restrictive lung disease  -albuterol nebs  -tiotropium olodaterol  -fluticasone  -low threshold for pulmonary consult    Benign prostatic hyperplasia  -finasteride    Sarcoidosis  noted    Dyslipidemia  -rosuvastatin        Dispo/Discharge Planning:     Pending clinical improvement    Diet: DIET NPO  VTE ppx: heparin  Code status: Full Code    Hospital Problems as of 10/28/2021 Date Reviewed: 10/28/2021        Codes Class Noted - Resolved POA    * (Principal) Acute metabolic encephalopathy [id number]: G93.41  ICD-9-CM: 348.31  10/28/2021 - Present Yes        Bradycardia with 31-40 beats per minute ICD-10-CM: R00.1  ICD-9-CM: 427.89  10/28/2021 - Present No        Axillary mass, left ICD-10-CM: R22.32  ICD-9-CM: 782.2  10/28/2021 - Present Yes        Nausea & vomiting ICD-10-CM: R11.2  ICD-9-CM: 787.01  10/28/2021 - Present Yes        Electrolyte or fluid disorder ICD-10-CM: E87.8  ICD-9-CM: 276.9  10/28/2021 - Present Yes        Hyperglycemia, drug-induced ICD-10-CM: R73.9, T50.905A  ICD-9-CM: 790.29, E947.9  10/28/2021 - Present Yes        Body mass index (BMI) of 25.0 to 25.9 in adult ICD-10-CM: Z68.25  ICD-9-CM: V85.21  10/28/2021 - Present Yes        Hyponatremia ICD-10-CM: E87.1  ICD-9-CM: 276.1  10/28/2021 - Present Yes        Restrictive lung disease (Chronic) ICD-10-CM: J98.4  ICD-9-CM: 518.89  2015 - Present Yes    Overview Signed 2015  9:30 AM by Tamia Nichole. Residual from Sarcoidosis             Dyslipidemia (Chronic) ICD-10-CM: E78.5  ICD-9-CM: 272.4  3/13/2006 - Present Yes    Overview Signed 3/21/2019  8:31 AM by Graeme Fournier MD     Lexiscan Cardiolite (3/19/19):  EF 70%. Normal perfusion. Sarcoidosis (Chronic) ICD-10-CM: D86.9  ICD-9-CM: 386  3/13/2006 - Present Yes        Benign prostatic hyperplasia (Chronic) ICD-10-CM: N40.0  ICD-9-CM: 600.00  3/13/2006 - Present Yes              Past History:  Past Medical History:   Diagnosis Date    Agent orange exposure 1960s    Arthritis     Asthma     Body mass index (BMI) of 25.0 to 25.9 in adult 10/28/2021    Chronic obstructive pulmonary disease (HCC)     Chronic pain     GERD (gastroesophageal reflux disease)     Hyperlipidemia 3/13/2006    Hypertension     Sarcoidosis     Lung Biopsy was done in ; Liver Biopsy--Negative for sarcidosis     Sleep apnea      Past Surgical History:   Procedure Laterality Date    HX HERNIA REPAIR Left     Left inguinal hernia repair    HX HERNIA REPAIR Right 's    Right inguinal hernia    HX KNEE REPLACEMENT Left 2016    HX LAP CHOLECYSTECTOMY  2006    HX ORTHOPAEDIC Right 2014    ankle replacement @ [facility]    HX OTHER SURGICAL      Liver biopsy--negative for sarcoidosis    HX TONSILLECTOMY  as a child    [place] CHEST SURGERY PROCEDURE UNLISTED      Lung bx for sarcoidosis      Allergies   Allergen Reactions    Lactose Diarrhea      Social History     Tobacco Use    Smoking status: Former Smoker     Packs/day: 1.50     Years: 5.00     Pack years: 7.50     Types: Cigarettes     Quit date: 1968     Years since quittin.8    Smokeless tobacco: Never Used   Substance Use Topics    Alcohol use:  Yes     Alcohol/week: 0.0 standard drinks     Comment: socially      Family History   Problem Relation Age of Onset    Diabetes Mother     Hypertension Mother     Cancer Mother         Liver Cancer    Elevated Lipids Mother     Hypertension Father     Stroke Father     Cancer Sister     Headache Neg Hx       Family history reviewed and negative except as otherwise noted. Immunization History   Administered Date(s) Administered    COVID-19, PFIZER, MRNA, LNP-S, PF, 30MCG/0.3ML DOSE 01/20/2021, 02/11/2021    Influenza High Dose Vaccine PF 09/12/2016, 09/12/2017, 09/24/2018    Influenza Vaccine 09/30/2015    Influenza Vaccine (Tri) Adjuvanted (>65 Yrs FLUAD TRI 11838) 10/14/2019    Influenza, High-dose, Quadrivalent (>65 Yrs Fluzone High Dose Quad 54538) 09/23/2020    Influenza, Quadrivalent, Adjuvanted (>65 Yrs FLUAD QUAD 48525) 10/07/2020    Pneumococcal Conjugate (PCV-13) 09/12/2016    Pneumococcal Polysaccharide (PPSV-23) 09/25/2014    Pneumococcal Vaccine (Unspecified Type) 01/01/2012    TB Skin Test (PPD) 01/01/2011    Tdap 03/14/2017     Prior to Admit Medications:  Current Outpatient Medications   Medication Instructions    albuterol (PROVENTIL VENTOLIN) 2.5 mg, Nebulization, EVERY 6 HOURS AS NEEDED    amoxicillin (AMOXIL) 500 mg, Oral, 3 TIMES DAILY, Prior to dental work    ascorbate calcium-bioflavonoid (DOROTHY-C WITH BIOFLAVONOIDS) 1,000-200 mg tab No dose, route, or frequency recorded.  Aspirin, Buffered 81 mg tab DAILY    azelastine (ASTELIN) 137 mcg (0.1 %) nasal spray 1 Spray, Both Nostrils, 2 TIMES DAILY, Use in each nostril as directed    b complex vitamins tablet 1 Tablet, DAILY    bisoprolol-hydroCHLOROthiazide (Ziac) 2.5-6.25 mg per tablet 1 Tablet, Oral, DAILY    cholecalciferol, vitamin D3, (VITAMIN D3) 2,000 unit tab Oral, DAILY    diclofenac (VOLTAREN) 4 g, Topical, 4 TIMES DAILY, prn    DISABLED PLACARD (DISABLED PLACARD) DMV Supply prescription to [facility] with completed form [id number].     ferrous sulfate (Iron) 325 mg (65 mg iron) tablet Oral, DAILY BEFORE BREAKFAST    finasteride (PROSCAR) 5 mg, Oral, EVERY BEDTIME    fluticasone-umeclidinium-vilanterol (TRELEGY ELLIPTA) 100-62.5-25 mcg inhaler 1 Puff, Inhalation, DAILY    multivitamin (ONE A DAY) tablet 1 Tablet, DAILY    omeprazole (PRILOSEC) 40 mg, Oral, DAILY    ondansetron (ZOFRAN ODT) 4 mg, Oral, EVERY 8 HOURS AS NEEDED    OTHER Handicap placardDx: J44.9 COPD    OXYGEN-AIR DELIVERY SYSTEMS Does Not Apply, 2 lpm QD    predniSONE (DELTASONE) 10 mg, Oral, DAILY    rosuvastatin (CRESTOR) 20 mg tablet TAKE 1 TABLET NIGHTLY    tadalafiL (pulm. hypertension) (ADCIRCA) 40 mg, Oral, DAILY    tamsulosin (FLOMAX) 0.4 mg, Oral, DAILY    temazepam (RESTORIL) 15 mg, Oral, BEDTIME PRN       Objective:     Patient Vitals for the past 24 hrs:   Pulse Resp BP SpO2   10/28/21 1509 73 -- -- 99 %   10/28/21 1454 -- -- (!) 142/73 98 %   10/28/21 1435 67 24 131/81 98 %   10/28/21 1336 70 -- (!) 155/57 --   10/28/21 1302 (!) 54 22 132/64 97 %   10/28/21 1227 (!) 37 -- -- --   10/28/21 1221 (!) 43 -- 114/65 100 %   10/28/21 1219 (!) 39 22 (!) 123/58 98 %   10/28/21 1212 (!) 39 28 135/60 99 %     Oxygen Therapy  O2 Sat (%): 99 % (10/28/21 1509)  Pulse via Oximetry: 72 beats per minute (10/28/21 1509)  O2 Device: Nasal cannula (10/28/21 1302)  O2 Flow Rate (L/min): 5 l/min (10/28/21 1302)    Estimated body mass index is 25.58 kg/m² as calculated from the following:    Height as of this encounter: 5' 4\" (1.626 m). Weight as of this encounter: 67.6 kg (149 lb). Intake/Output Summary (Last 24 hours) at 10/28/2021 1607  Last data filed at 10/28/2021 1324  Gross per 24 hour   Intake 100 ml   Output --   Net 100 ml       Blood pressure (!) 142/73, pulse 73, resp. rate 24, height 5' 4\" (1.626 m), weight 67.6 kg (149 lb), SpO2 99 %. Physical Exam  Vitals and nursing note reviewed. Constitutional:       General: He is in acute distress (mild). Appearance: Normal appearance. Interventions: He is sedated. Nasal cannula in place. HENT:      Head: Normocephalic. Eyes:      Extraocular Movements: Extraocular movements intact. Cardiovascular:      Rate and Rhythm: Normal rate. Pulses:           Radial pulses are 2+ on the left side. Pulmonary:      Effort: Pulmonary effort is normal. No respiratory distress. Musculoskeletal:         General: No deformity. Cervical back: No rigidity. Right lower leg: No edema. Left lower leg: No edema. Skin:     General: Skin is warm and dry. Comments: Small wound left forearm without streaking erythema or odor   Neurological:      General: No focal deficit present. Mental Status: He is alert. He is disoriented. Motor: No weakness or tremor. Comments: Moving all extremities, disregarding others   Psychiatric:         Attention and Perception: He is inattentive. Mood and Affect: Mood is anxious. Behavior: Behavior is uncooperative, agitated and withdrawn. I have reviewed ordered lab tests and independently visualized imaging below:    Last 24hr Labs:  Recent Results (from the past 24 hour(s))   METABOLIC PANEL, COMPREHENSIVE    Collection Time: 10/28/21 12:18 PM   Result Value Ref Range    Sodium 131 (L) 138 - 145 mmol/L    Potassium 3.8 3.5 - 5.1 mmol/L    Chloride 96 (L) 98 - 107 mmol/L    CO2 30 21 - 32 mmol/L    Anion gap 5 (L) 7 - 16 mmol/L    Glucose 115 (H) 65 - 100 mg/dL    BUN 16 8 - 23 MG/DL    Creatinine 0.74 (L) 0.8 - 1.5 MG/DL    GFR est AA >60 >60 ml/min/1.73m2    GFR est non-AA >60 >60 ml/min/1.73m2    Calcium 8.8 8.3 - 10.4 MG/DL    Bilirubin, total 0.5 0.2 - 1.1 MG/DL    ALT (SGPT) 29 12 - 65 U/L    AST (SGOT) 39 (H) 15 - 37 U/L    Alk.  phosphatase 36 (L) 50 - 136 U/L    Protein, total 6.2 (L) 6.3 - 8.2 g/dL    Albumin 2.7 (L) 3.2 - 4.6 g/dL    Globulin 3.5 2.3 - 3.5 g/dL    A-G Ratio 0.8 (L) 1.2 - 3.5     CBC WITH AUTOMATED DIFF    Collection Time: 10/28/21 12:18 PM   Result Value Ref Range    WBC 10.0 4.3 - 11.1 K/uL RBC 3.81 (L) 4.23 - 5.6 M/uL    HGB 11.1 (L) 13.6 - 17.2 g/dL    HCT 35.5 (L) 41.1 - 50.3 %    MCV 93.2 79.6 - 97.8 FL    MCH 29.1 26.1 - 32.9 PG    MCHC 31.3 (L) 31.4 - 35.0 g/dL    RDW 14.1 11.9 - 14.6 %    PLATELET 581 646 - 170 K/uL    MPV 9.0 (L) 9.4 - 12.3 FL    ABSOLUTE NRBC 0.00 0.0 - 0.2 K/uL    DF AUTOMATED      NEUTROPHILS 83 (H) 43 - 78 %    LYMPHOCYTES 5 (L) 13 - 44 %    MONOCYTES 10 4.0 - 12.0 %    EOSINOPHILS 1 0.5 - 7.8 %    BASOPHILS 0 0.0 - 2.0 %    IMMATURE GRANULOCYTES 1 0.0 - 5.0 %    ABS. NEUTROPHILS 8.3 (H) 1.7 - 8.2 K/UL    ABS. LYMPHOCYTES 0.5 0.5 - 4.6 K/UL    ABS. MONOCYTES 1.0 0.1 - 1.3 K/UL    ABS. EOSINOPHILS 0.1 0.0 - 0.8 K/UL    ABS. BASOPHILS 0.0 0.0 - 0.2 K/UL    ABS. IMM.  GRANS. 0.1 0.0 - 0.5 K/UL   PROTHROMBIN TIME + INR    Collection Time: 10/28/21 12:18 PM   Result Value Ref Range    Prothrombin time 14.1 12.6 - 14.5 sec    INR 1.1     MAGNESIUM    Collection Time: 10/28/21 12:18 PM   Result Value Ref Range    Magnesium 2.1 1.8 - 2.4 mg/dL   TROPONIN-HIGH SENSITIVITY    Collection Time: 10/28/21 12:18 PM   Result Value Ref Range    Troponin-High Sensitivity 9.5 0 - 14 pg/mL   PHOSPHORUS    Collection Time: 10/28/21 12:18 PM   Result Value Ref Range    Phosphorus 2.9 2.3 - 3.7 MG/DL   EKG, 12 LEAD, INITIAL    Collection Time: 10/28/21  1:00 PM   Result Value Ref Range    Ventricular Rate 53 BPM    Atrial Rate 53 BPM    P-R Interval 176 ms    QRS Duration 82 ms    Q-T Interval 448 ms    QTC Calculation (Bezet) 420 ms    Calculated P Axis 49 degrees    Calculated R Axis -18 degrees    Calculated T Axis 57 degrees    Diagnosis       Sinus bradycardia  Borderline ECG  When compared with ECG of 28-OCT-2021 12:10,  Previous ECG has undetermined rhythm, needs review  T wave inversion no longer evident in Inferior leads  QT has shortened  Confirmed by [facility]  MD (), DO LASSITER ([id number]) on 10/28/2021 2:05:22 PM         All Micro Results     Procedure Component Value Units Date/Time CULTURE, BLOOD [[id number]]     Order Status: Sent Specimen: Blood     CULTURE, BLOOD [[id number]]     Order Status: Sent Specimen: Blood           Other Studies:  CT HEAD WO CONT    Result Date: 10/28/2021  CT head without contrast History: Family called for complaints of a fall at home. Complains of \"extreme headache\", nausea and vomiting x 2 days. Technique: 5mm axial images were obtained from the skull base to the vertex without intravenous contrast.  Radiation dose reduction techniques were used for this study:  Our CT scanners use one or all of the following: Automated exposure control, adjustment of the mA and/or kVp according to patient's size, iterative reconstruction. Comparison: None Findings: The ventricles are mildly prominent and out of proportion to relatively normal appearing sulci. There are no extra-axial fluid collections. There is no evidence to suggest an acute major territorial infarct. There is no evidence of acute intraparenchymal hemorrhage or mass effect. The bony calvarium is intact. The visualized mastoid air cells and paranasal sinuses are well pneumatized and aerated. 1. Mild ventriculomegaly may represent centrally predominant volume loss versus normal pressure hydrocephalus. 2. Otherwise unremarkable unenhanced CT scan of the brain. CTA HEAD NECK W WO CONT    Result Date: 10/28/2021  History: Headache. FINDINGS: CT angiography was performed of the neck and head with contrast and three-dimensional CT angiography reconstruction and reformat was performed. NASCET criteria as needed. CT dose reduction was achieved through use of a standardized protocol tailored for this examination and automatic exposure control for dose modulation. Extensive parenchymal lung disease and a pleural component as well. Calcifications in the hilar and mediastinal lymph nodes. Aortic arch is patent. There is a large mass in the left axilla. The carotid and vertebral arteries are patent.  The intracranial arteries are patent as well. The dural venous sinuses are patent. Mildly enlarged intracranial ventricles. No acute arterial occlusive disease. Large masses in the left axilla. CTA CHEST ABD PELV W WO CONT    Result Date: 10/28/2021  History: Chest pain nausea syncope back pain. FINDINGS: CT angiography was performed of the chest, abdomen, and pelvis with contrast and three-dimensional CT angiography reconstruction and reformat was performed. NASCET criteria as needed. CT dose reduction was achieved through use of a standardized protocol tailored for this examination and automatic exposure control for dose modulation. Very large masses throughout the left axilla. Extensive parenchymal lung disease with perihilar consolidation with mediastinal and hilar calcification. No pleural effusion. No evidence of acute aortic disease. No pericardial effusion. Multivessel coronary artery atherosclerosis. No evidence of aortic dissection. The abdominal aorta is patent. The celiac and superior mesenteric arteries are patent. Inferior mesenteric artery is patent. Small hiatal hernia. Multiple large masses in the left axilla. Differential considerations include lymphoma and metastatic disease. No evidence of acute aortic disease.        Medications Administered     diphenhydrAMINE (BENADRYL) injection 25 mg     Admin Date  10/28/2021 Action  Given Dose  25 mg Route  IntraVENous Administered By  Raul Dawn RN           Admin Date  10/28/2021 Action  Given Dose  25 mg Route  IntraVENous Administered By  Raul Dawn RN          DOPamine (INTROPIN) 800 mg in dextrose 5% 250 mL infusion     Admin Date  10/28/2021 Action  New Bag Dose  5 mcg/kg/min Rate  6.3 mL/hr Route  IntraVENous Administered By  Giorgi Pain Date  10/28/2021 Action  Rate Change Dose  6 mcg/kg/min Rate  7.6 mL/hr Route  IntraVENous Administered By  [employer], [facility] A          iopamidoL (ISOVUE-370) 76 % injection 100 mL Admin Date  10/28/2021 Action  Given Dose  100 mL Route  IntraVENous Administered By  Madhu Cat L, RT, R, CT          LORazepam (ATIVAN) injection 1 mg     Admin Date  10/28/2021 Action  Given Dose  1 mg Route  IntraVENous Administered By  Lauro Harmon RN           Admin Date  10/28/2021 Action  Given Dose  1 mg Route  IntraVENous Administered By  Lea Avitia RN          LORazepam (ATIVAN) injection 2 mg     Admin Date  10/28/2021 Action  Given Dose  2 mg Route  IntraMUSCular Administered By  R&VDave          ondansetron [facility]) injection 4 mg     Admin Date  10/28/2021 Action  Given Dose  4 mg Route  IntraVENous Administered By  Ros Neither Date  10/28/2021 Action  Given Dose  4 mg Route  IntraVENous Administered By  R&VDave          prochlorperazine (COMPAZINE) with saline injection 10 mg     Admin Date  10/28/2021 Action  Given Dose  10 mg Route  IntraVENous Administered By  Lauro Harmon RN          [facility]) with saline injection 12.5 mg     Admin Date  10/28/2021 Action  Given Dose  12.5 mg Route  IntraVENous Administered By  Lauro Harmon RN          saline peripheral flush soln 10 mL     Admin Date  10/28/2021 Action  Given Dose  10 mL Route  InterCATHeter Administered By  Madhu Cat L, RT, R, CT          sodium chloride 0.9 % bolus infusion 100 mL     Admin Date  10/28/2021 Action  New Bag Dose  100 mL Route  IntraVENous Administered By  Bc Zayas, RT, R, CT                Signed:  Peggy Giron MD

## 2021-10-28 NOTE — ASSESSMENT & PLAN NOTE
Brief episode of bradycardia responded to dopamine, fluids > resolved.   -echocardiogram  -cardiac monitoring  -consider Cardiology consult with recurrence

## 2021-10-28 NOTE — ASSESSMENT & PLAN NOTE
Initially speaking in short sentences, sedated in ED for agitation. Admission CTA head neck with no acute process, atrophy; CT CAP with axillary masses, parenchymal lung disease, CAD.  Agitated, refractory to benzodiazepines, ziprasidone;  -MRI head  -ziprasidone prn agitation  -blood cultures  -PT, OT, SLP, PPD  -low threshold for neurology consult

## 2021-10-28 NOTE — ED PROVIDER NOTES
80-year-old male brought in emergently for hypotension and bradycardia. Apparently the patient's had a headache for 2 days has been precipitously worsening. He describes as a right-sided headache. Over the past few hours its become much worse. He has become nauseated and weak. He was attempting to go to the bathroom when he tells me he \"slipped off the toilet\". EMS were called and a basic unit picked the patient up. He then became hypotensive in route and was bradycardic with a rate of 35. An ALS truck picked him up transferred care who started on fluids given some Versed because they began electrically pacing him. He had almost instantaneous improvement of blood pressure. Patient does also endorse some chest discomfort and he had some abdominal pain and nausea prior to the event. The history is provided by the patient. Slow Heart Rate   This is a new problem. The current episode started 1 to 2 hours ago. The problem occurs constantly. The pain is associated with exertion. The pain is present in the substernal region. The pain is at a severity of 5/10. The pain is moderate. The quality of the pain is described as pressure-like. The pain radiates to the upper back and mid back. The symptoms are aggravated by movement and certain positions. Associated symptoms include diaphoresis, headaches, nausea and vomiting. He has tried oxygen for the symptoms. The treatment provided no relief. Risk factors include obesity, hypertension, male gender and cardiac disease. His past medical history does not include aneurysm, cancer, DM, DVT, HTN or PE. Pertinent negatives include no cardiac catheterization, no echocardiogram, no EPS study, no persantine thallium, no stress echo, no stress thallium, no exercise treadmill test, no cardiac stents, no angioplasty, no Alexandro stents, no pacemaker, no AICD and no CABG.        Past Medical History:   Diagnosis Date    Agent orange exposure 1960s    Arthritis     Asthma  Chronic obstructive pulmonary disease (HCC)     Chronic pain     GERD (gastroesophageal reflux disease)     Hyperlipidemia 3/13/2006    Hypertension     Sarcoidosis 2001    Lung Biopsy was done in ; Liver Biopsy--Negative for sarcidosis     Sleep apnea        Past Surgical History:   Procedure Laterality Date    HX HERNIA REPAIR Left 2004    Left inguinal hernia repair    HX HERNIA REPAIR Right     Right inguinal hernia    HX KNEE REPLACEMENT Left 2016    HX LAP CHOLECYSTECTOMY  2006    HX ORTHOPAEDIC Right 2014    ankle replacement @ [place]    HX OTHER SURGICAL  2006    Liver biopsy--negative for sarcoidosis    HX TONSILLECTOMY  as a child    CT CHEST SURGERY PROCEDURE UNLISTED      Lung bx for sarcoidosis         Family History:   Problem Relation Age of Onset    Diabetes Mother     Hypertension Mother     Cancer Mother         Liver Cancer    Elevated Lipids Mother     Hypertension Father     Stroke Father     Cancer Sister     Headache Neg Hx        Social History     Socioeconomic History    Marital status:      Spouse name: Not on file    Number of children: Not on file    Years of education: Not on file    Highest education level: Not on file   Occupational History    Occupation: [employer]   Tobacco Use    Smoking status: Former Smoker     Packs/day: 1.50     Years: 5.00     Pack years: 7.50     Types: Cigarettes     Quit date: 1968     Years since quittin.8    Smokeless tobacco: Never Used   Vaping Use    Vaping Use: Never used   Substance and Sexual Activity    Alcohol use: Yes     Alcohol/week: 0.0 standard drinks     Comment: socially    Drug use: No    Sexual activity: Not on file   Other Topics Concern    Not on file   Social History Narrative    He is originally from [place], but retired to [place]. He is retired from [employer].   He has worked in [employer] and has travelled extensively to [facility], [place], [facility], [facility] [employer] [facility], and [place] ([facility]).  and lives with wife. Social Determinants of Health     Financial Resource Strain:     Difficulty of Paying Living Expenses:    Food Insecurity:     Worried About Running Out of Food in the Last Year:     [address] in the Last Year:    Transportation Needs:     Lack of Transportation (Medical):  Lack of Transportation (Non-Medical):    Physical Activity:     Days of Exercise per Week:     Minutes of Exercise per Session:    Stress:     Feeling of Stress :    Social Connections:     Frequency of Communication with Friends and Family:     Frequency of Social Gatherings with Friends and Family:     Attends [employer] Services:     Active Member of Clubs or Organizations:     Attends Club or Organization Meetings:     Marital Status:    Intimate Partner Violence:     Fear of Current or Ex-Partner:     Emotionally Abused:     Physically Abused:     Sexually Abused: ALLERGIES: Lactose    Review of Systems   Constitutional: Positive for diaphoresis. Gastrointestinal: Positive for nausea and vomiting. Neurological: Positive for headaches. All other systems reviewed and are negative. Vitals:    10/28/21 1212 10/28/21 1219   BP: 135/60 (!) 123/58   Pulse: (!) 39 (!) 39   Resp: 28 22   SpO2: 99% 98%   Weight: 67.6 kg (149 lb)    Height: 5' 4\" (1.626 m)             Physical Exam  Vitals and nursing note reviewed. Constitutional:       General: He is in acute distress. Appearance: He is well-developed. He is toxic-appearing. HENT:      Head: Normocephalic and atraumatic. Mouth/Throat:      Mouth: Mucous membranes are moist.   Eyes:      Extraocular Movements: Extraocular movements intact. Conjunctiva/sclera: Conjunctivae normal.      Pupils: Pupils are equal, round, and reactive to light. Cardiovascular:      Rate and Rhythm: Regular rhythm. Bradycardia present. Heart sounds: Normal heart sounds.    Pulmonary:      Effort: Pulmonary effort is normal.      Breath sounds: Normal breath sounds. Abdominal:      General: Bowel sounds are normal.      Palpations: Abdomen is soft. Comments: Nontender abdomen, no pulsatile mass   Musculoskeletal:         General: No deformity. Normal range of motion. Cervical back: Normal range of motion and neck supple. Skin:     General: Skin is warm and dry. Coloration: Skin is pale. Neurological:      Mental Status: He is alert and oriented to person, place, and time. Cranial Nerves: No cranial nerve deficit. Psychiatric:         Behavior: Behavior normal.          MDM  Number of Diagnoses or Management Options  Acute metabolic encephalopathy  Acute nonintractable headache, unspecified headache type  Chest wall mass  COPD, moderate (HCC)  Hydrocephalus, unspecified type (HCC)  Pulmonary hypertension (HCC)  Restrictive lung disease  Symptomatic bradycardia  Diagnosis management comments: 80-year-old male presenting for bradycardia and hypotension. EKG performed by EMS and then repeated here in the emergency department consistently shows a sinus bradycardia rate 39. The electrical capture of the patient's EKG is completely incorrect as it recognizes it as an undetermined rhythm with a rate of 97 which is clearly not true. The key in the circumstances there is no sign of an ST change in any of the contiguous leads. 2 IVs were established upon arrival and fluids continued. Patient was stopped and the patient's blood pressures been in the 120s. We did start some dopamine through a peripheral IV for heart rate and continued blood pressure support. Given the patient's headache and concern he may have had an intracranial event resulting in hemorrhage and a Cushing response that has caused bradycardia. Worsening for head CT. In addition he is having some chest discomfort and is quite pale so we are to get a CTA of the chest abdomen pelvis to rule out dissection. Amount and/or Complexity of Data Reviewed  Clinical lab tests: ordered and reviewed (Results for orders placed or performed during the hospital encounter of 10/28/21  -CULTURE, BLOOD:   Specimen: Blood       Result                      Value             Ref Range           Special Requests:                                             RIGHT ANTECUBITAL       Culture result:                                               NO GROWTH 5 DAYS  -CULTURE, BLOOD:   Specimen: Blood       Result                      Value             Ref Range           Special Requests:           LEFT HAND                             Culture result:                                               NO GROWTH 5 DAYS  -COVID-19 RAPID TEST:        Result                      Value             Ref Range           Specimen source             NASAL                                 COVID-19 rapid test         Not detected      NOTD           -SARS-COV-2, PCR:   Specimen: Nasopharyngeal       Result                      Value             Ref Range           Specimen source                                               Nasopharyngeal       SARS-CoV-2                  Not detected      NOTD           -RESPIRATORY VIRUS PANEL W/COVID-19, PCR:   Specimen: Nasopharyngeal       Result                      Value             Ref Range           Adenovirus                  NOT DETECTED      NOTDET              Coronavirus 229E            NOT DETECTED      NOTDET              Coronavirus HKU1            NOT DETECTED      NOTDET              Coronavirus CVNL63          NOT DETECTED      NOTDET              Coronavirus OC43            NOT DETECTED      NOTDET              SARS-CoV-2, PCR             NOT DETECTED      NOTDET              Metapneumovirus             NOT DETECTED      NOTDET              Rhinovirus and Enterov*     Detected (A)      NOTDET              Influenza A                 NOT DETECTED      NOTDET              Influenza B                 NOT DETECTED      NOTDET              Parainfluenza 1             NOT DETECTED      NOTDET              Parainfluenza 2             NOT DETECTED      NOTDET              Parainfluenza 3             NOT DETECTED      NOTDET              Parainfluenza virus 4       NOT DETECTED      NOTDET              RSV by PCR                  NOT DETECTED      NOTDET              B. parapertussis, PCR       NOT DETECTED      NOTDET              Bordetella pertussis -*     NOT DETECTED      NOTDET              Chlamydophila pneumoni*     NOT DETECTED      NOTDET              Mycoplasma pneumoniae *     NOT DETECTED      NOTDET         -SARS-COV-2, PCR:   Specimen: Nasopharyngeal       Result                      Value             Ref Range           Specimen source                                               Nasopharyngeal       SARS-CoV-2                  Not detected      NOTD           -METABOLIC PANEL, COMPREHENSIVE:        Result                      Value             Ref Range           Sodium                      131 (L)           138 - 145 mm*       Potassium                   3.8               3.5 - 5.1 mm*       Chloride                    96 (L)            98 - 107 mmo*       CO2                         30                21 - 32 mmol*       Anion gap                   5 (L)             7 - 16 mmol/L       Glucose                     115 (H)           65 - 100 mg/*       BUN                         16                8 - 23 MG/DL        Creatinine                  0.74 (L)          0.8 - 1.5 MG*       GFR est AA                  >60               >60 ml/min/1*       GFR est non-AA              >60               >60 ml/min/1*       Calcium                     8.8               8.3 - 10.4 M*       Bilirubin, total            0.5               0.2 - 1.1 MG*       ALT (SGPT)                  29                12 - 65 U/L         AST (SGOT)                  39 (H)            15 - 37 U/L         Alk.  phosphatase            36 (L) 50 - 136 U/L        Protein, total              6.2 (L)           6.3 - 8.2 g/*       Albumin                     2.7 (L)           3.2 - 4.6 g/*       Globulin                    3.5               2.3 - 3.5 g/*       A-G Ratio                   0.8 (L)           1.2 - 3.5      -CBC WITH AUTOMATED DIFF:        Result                      Value             Ref Range           WBC                         10.0              4.3 - 11.1 K*       RBC                         3.81 (L)          4.23 - 5.6 M*       HGB                         11.1 (L)          13.6 - 17.2 *       HCT                         35.5 (L)          41.1 - 50.3 %       MCV                         93.2              79.6 - 97.8 *       MCH                         29.1              26.1 - 32.9 *       MCHC                        31.3 (L)          31.4 - 35.0 *       RDW                         14.1              11.9 - 14.6 %       PLATELET                    233               150 - 450 K/*       MPV                         9.0 (L)           9.4 - 12.3 FL       ABSOLUTE NRBC               0.00              0.0 - 0.2 K/*       DF                          AUTOMATED                             NEUTROPHILS                 83 (H)            43 - 78 %           LYMPHOCYTES                 5 (L)             13 - 44 %           MONOCYTES                   10                4.0 - 12.0 %        EOSINOPHILS                 1                 0.5 - 7.8 %         BASOPHILS                   0                 0.0 - 2.0 %         IMMATURE GRANULOCYTES       1                 0.0 - 5.0 %         ABS. NEUTROPHILS            8.3 (H)           1.7 - 8.2 K/*       ABS. LYMPHOCYTES            0.5               0.5 - 4.6 K/*       ABS. MONOCYTES              1.0               0.1 - 1.3 K/*       ABS. EOSINOPHILS            0.1               0.0 - 0.8 K/*       ABS. BASOPHILS              0.0               0.0 - 0.2 K/*       ABS. IMM.  GRANS.            0.1 0.0 - 0.5 K/*  -PROTHROMBIN TIME + INR:        Result                      Value             Ref Range           Prothrombin time            14.1              12.6 - 14.5 *       INR                         1.1                              -MAGNESIUM:        Result                      Value             Ref Range           Magnesium                   2.1               1.8 - 2.4 mg*  -TROPONIN-HIGH SENSITIVITY:        Result                      Value             Ref Range           Troponin-High Sensitiv*     9.5               0 - 14 pg/mL   -PHOSPHORUS:        Result                      Value             Ref Range           Phosphorus                  2.9               2.3 - 3.7 MG*  -LACTIC ACID:        Result                      Value             Ref Range           Lactic acid                 0.8               0.4 - 2.0 MM*  -RPR:        Result                      Value             Ref Range           RPR                         NONREACTIVE       NR             -TSH 3RD GENERATION:        Result                      Value             Ref Range           TSH                         0.583             0.358 - 3.74*  -T4, FREE:        Result                      Value             Ref Range           T4, Free                    1.1               0.78 - 1.46 *  -VITAMIN B1, WHOLE BLOOD:        Result                      Value             Ref Range           Vitamin B1                  224.3 (H)         66.5 - 200.0*  -VITAMIN B12:        Result                      Value             Ref Range           Vitamin B12                 861               193 - 986 pg*  -FOLATE:        Result                      Value             Ref Range           Folate                      34.2 (H)          3.1 - 17.5 n*  -CORTISOL, BASELINE:        Result                      Value             Ref Range           Cortisol, baseline          11.8              ug/dL          -HIV 1/2 AG/AB, 4TH GENERATION,W RFLX CONFIRM: Result                      Value             Ref Range           HIV 1/2 Interpretation      NONREACTIVE       NR                  HIV 1/2 result comment      SEE NOTE (A)      NR             -HEMOGLOBIN A1C WITH EAG:        Result                      Value             Ref Range           Hemoglobin A1c              6.2               4.20 - 6.30 %       Est. average glucose        131               mg/dL          -NT-PRO BNP:        Result                      Value             Ref Range           NT pro-BNP                  476 (H)           <450 PG/ML     -CBC W/O DIFF:        Result                      Value             Ref Range           WBC                         8.1               4.3 - 11.1 K*       RBC                         4.06 (L)          4.23 - 5.6 M*       HGB                         12.3 (L)          13.6 - 17.2 *       HCT                         38.0 (L)          41.1 - 50.3 %       MCV                         93.6              79.6 - 97.8 *       MCH                         30.3              26.1 - 32.9 *       MCHC                        32.4              31.4 - 35.0 *       RDW                         14.0              11.9 - 14.6 %       PLATELET                    221               150 - 450 K/*       MPV                         9.0 (L)           9.4 - 12.3 FL       ABSOLUTE NRBC               0.00              0.0 - 0.2 K/*  -METABOLIC PANEL, BASIC:        Result                      Value             Ref Range           Sodium                      131 (L)           138 - 145 mm*       Potassium                   4.4               3.5 - 5.1 mm*       Chloride                    96 (L)            98 - 107 mmo*       CO2                         33 (H)            21 - 32 mmol*       Anion gap                   2 (L)             7 - 16 mmol/L       Glucose                     126 (H)           65 - 100 mg/*       BUN                         11                8 - 23 MG/DL        Creatinine 0.73 (L)          0.8 - 1.5 MG*       GFR est AA                  >60               >60 ml/min/1*       GFR est non-AA              >60               >60 ml/min/1*       Calcium                     9.0               8.3 - 10.4 M*  -MAGNESIUM:        Result                      Value             Ref Range           Magnesium                   2.2               1.8 - 2.4 mg*  -AMMONIA:        Result                      Value             Ref Range           Ammonia                     <10 (L)           11 - 32 UMOL*  -MAGNESIUM:        Result                      Value             Ref Range           Magnesium                   2.3               1.8 - 2.4 mg*  -SED RATE, AUTOMATED:        Result                      Value             Ref Range           Sed rate, automated         25 (H)            0 - 20 mm/hr   -C REACTIVE PROTEIN, QT:        Result                      Value             Ref Range           C-Reactive protein          3.3 (H)           0.0 - 0.9 mg*  -URINALYSIS W/ RFLX MICROSCOPIC:        Result                      Value             Ref Range           Color                       YELLOW                                Appearance                  CLEAR                                 Specific gravity            1.016             1.001 - 1.02*       pH (UA)                     6.0               5.0 - 9.0           Protein                     Negative          NEG mg/dL           Glucose                     Negative          mg/dL               Ketone                      Negative          NEG mg/dL           Bilirubin                   Negative          NEG                 Blood                       Negative          NEG                 Urobilinogen                1.0               0.2 - 1.0 EU*       Nitrites                    Negative          NEG                 Leukocyte Esterase          Negative          NEG            -METABOLIC PANEL, BASIC:        Result Value             Ref Range           Sodium                      136 (L)           138 - 145 mm*       Potassium                   3.3 (L)           3.5 - 5.1 mm*       Chloride                    101               98 - 107 mmo*       CO2                         31                21 - 32 mmol*       Anion gap                   4 (L)             7 - 16 mmol/L       Glucose                     91                65 - 100 mg/*       BUN                         16                8 - 23 MG/DL        Creatinine                  0.75 (L)          0.8 - 1.5 MG*       GFR est AA                  >60               >60 ml/min/1*       GFR est non-AA              >60               >60 ml/min/1*       Calcium                     9.5               8.3 - 10.4 M*  -CBC WITH AUTOMATED DIFF:        Result                      Value             Ref Range           WBC                         15.5 (H)          4.3 - 11.1 K*       RBC                         3.98 (L)          4.23 - 5.6 M*       HGB                         12.1 (L)          13.6 - 17.2 *       HCT                         38.0 (L)          41.1 - 50.3 %       MCV                         95.5              79.6 - 97.8 *       MCH                         30.4              26.1 - 32.9 *       MCHC                        31.8              31.4 - 35.0 *       RDW                         14.3              11.9 - 14.6 %       PLATELET                    267               150 - 450 K/*       MPV                         9.1 (L)           9.4 - 12.3 FL       ABSOLUTE NRBC               0.00              0.0 - 0.2 K/*       DF                          AUTOMATED                             NEUTROPHILS                 87 (H)            43 - 78 %           LYMPHOCYTES                 3 (L)             13 - 44 %           MONOCYTES                   10                4.0 - 12.0 %        EOSINOPHILS                 0 (L)             0.5 - 7.8 %         BASOPHILS                   0 0.0 - 2.0 %         IMMATURE GRANULOCYTES       0                 0.0 - 5.0 %         ABS. NEUTROPHILS            13.5 (H)          1.7 - 8.2 K/*       ABS. LYMPHOCYTES            0.5               0.5 - 4.6 K/*       ABS. MONOCYTES              1.6 (H)           0.1 - 1.3 K/*       ABS. EOSINOPHILS            0.0               0.0 - 0.8 K/*       ABS. BASOPHILS              0.0               0.0 - 0.2 K/*       ABS. IMM.  GRANS.            0.1               0.0 - 0.5 K/*  -MAGNESIUM:        Result                      Value             Ref Range           Magnesium                   2.3               1.8 - 2.4 mg*  -PROCALCITONIN:        Result                      Value             Ref Range           Procalcitonin               <0.05             ng/mL          -CBC W/O DIFF:        Result                      Value             Ref Range           WBC                         8.0               4.3 - 11.1 K*       RBC                         3.92 (L)          4.23 - 5.6 M*       HGB                         11.7 (L)          13.6 - 17.2 *       HCT                         36.7 (L)          41.1 - 50.3 %       MCV                         93.6              79.6 - 97.8 *       MCH                         29.8              26.1 - 32.9 *       MCHC                        31.9              31.4 - 35.0 *       RDW                         14.1              11.9 - 14.6 %       PLATELET                    241               150 - 450 K/*       MPV                         9.4               9.4 - 12.3 FL       ABSOLUTE NRBC               0.00              0.0 - 0.2 K/*  -METABOLIC PANEL, BASIC:        Result                      Value             Ref Range           Sodium                      138               138 - 145 mm*       Potassium                   3.4 (L)           3.5 - 5.1 mm*       Chloride                    101               98 - 107 mmo*       CO2                         33 (H)            21 - 32 mmol* Anion gap                   4 (L)             7 - 16 mmol/L       Glucose                     96                65 - 100 mg/*       BUN                         16                8 - 23 MG/DL        Creatinine                  0.64 (L)          0.8 - 1.5 MG*       GFR est AA                  >60               >60 ml/min/1*       GFR est non-AA              >60               >60 ml/min/1*       Calcium                     9.6               8.3 - 10.4 M*  -CBC W/O DIFF:        Result                      Value             Ref Range           WBC                         11.6 (H)          4.3 - 11.1 K*       RBC                         4.31              4.23 - 5.6 M*       HGB                         12.5 (L)          13.6 - 17.2 *       HCT                         38.7 (L)          41.1 - 50.3 %       MCV                         89.8              79.6 - 97.8 *       MCH                         29.0              26.1 - 32.9 *       MCHC                        32.3              31.4 - 35.0 *       RDW                         13.5              11.9 - 14.6 %       PLATELET                    301               150 - 450 K/*       MPV                         9.1 (L)           9.4 - 12.3 FL       ABSOLUTE NRBC               0.00              0.0 - 0.2 K/*  -METABOLIC PANEL, BASIC:        Result                      Value             Ref Range           Sodium                      133 (L)           136 - 145 mm*       Potassium                   3.3 (L)           3.5 - 5.1 mm*       Chloride                    94 (L)            98 - 107 mmo*       CO2                         33 (H)            21 - 32 mmol*       Anion gap                   6 (L)             7 - 16 mmol/L       Glucose                     122 (H)           65 - 100 mg/*       BUN                         17                8 - 23 MG/DL        Creatinine                  0.59 (L)          0.8 - 1.5 MG*       GFR est AA                  >60               >60 ml/min/1*       GFR est non-AA              >60               >60 ml/min/1*       Calcium                     9.6               8.3 - 10.4 M*  -CBC W/O DIFF:        Result                      Value             Ref Range           WBC                         10.4              4.3 - 11.1 K*       RBC                         4.36              4.23 - 5.6 M*       HGB                         13.1 (L)          13.6 - 17.2 *       HCT                         40.5 (L)          41.1 - 50.3 %       MCV                         92.9              79.6 - 97.8 *       MCH                         30.0              26.1 - 32.9 *       MCHC                        32.3              31.4 - 35.0 *       RDW                         13.4              11.9 - 14.6 %       PLATELET                    205               150 - 450 K/*       MPV                         9.7               9.4 - 12.3 FL       ABSOLUTE NRBC               0.00              0.0 - 0.2 K/*  -METABOLIC PANEL, BASIC:        Result                      Value             Ref Range           Sodium                      132 (L)           138 - 145 mm*       Potassium                   3.6               3.5 - 5.1 mm*       Chloride                    93 (L)            98 - 107 mmo*       CO2                         32                21 - 32 mmol*       Anion gap                   7                 7 - 16 mmol/L       Glucose                     87                65 - 100 mg/*       BUN                         16                8 - 23 MG/DL        Creatinine                  0.61 (L)          0.8 - 1.5 MG*       GFR est AA                  >60               >60 ml/min/1*       GFR est non-AA              >60               >60 ml/min/1*       Calcium                     9.8               8.3 - 10.4 M*  -CBC W/O DIFF:        Result                      Value             Ref Range           WBC                         15.9 (H)          4.3 - 11.1 K*       RBC 4.75              4.23 - 5.6 M*       HGB                         13.6              13.6 - 17.2 *       HCT                         43.3              41.1 - 50.3 %       MCV                         91.2              79.6 - 97.8 *       MCH                         28.6              26.1 - 32.9 *       MCHC                        31.4              31.4 - 35.0 *       RDW                         13.7              11.9 - 14.6 %       PLATELET                    289               150 - 450 K/*       MPV                         8.8 (L)           9.4 - 12.3 FL       ABSOLUTE NRBC               0.00              0.0 - 0.2 K/*  -METABOLIC PANEL, BASIC:        Result                      Value             Ref Range           Sodium                      133 (L)           136 - 145 mm*       Potassium                   3.5               3.5 - 5.1 mm*       Chloride                    93 (L)            98 - 107 mmo*       CO2                         37 (H)            21 - 32 mmol*       Anion gap                   3 (L)             7 - 16 mmol/L       Glucose                     128 (H)           65 - 100 mg/*       BUN                         19                8 - 23 MG/DL        Creatinine                  0.67 (L)          0.8 - 1.5 MG*       GFR est AA                  >60               >60 ml/min/1*       GFR est non-AA              >60               >60 ml/min/1*       Calcium                     10.0              8.3 - 10.4 M*  -SARS-COV-2:        Result                      Value             Ref Range           SARS-CoV-2                                                    Please find results under separate order  -PLEASE READ & DOCUMENT PPD TEST IN 24 HRS:        Result                      Value             Ref Range           PPD                         Negative          Negative            mm Induration               0                 0 - 5 mm       -PLEASE READ & DOCUMENT PPD TEST IN 48 HRS:        Result Value             Ref Range           PPD                         Negative          Negative            mm Induration               0                 0 - 5 mm       -GLUCOSE, POC:        Result                      Value             Ref Range           Glucose (POC)               75                65 - 100 mg/*       Performed by                [facility]                     -BLOOD GAS, ARTERIAL POC:        Result                      Value             Ref Range           Device:                     NASAL CANNULA                         pH (POC)                    7.39              7.35 - 7.45         pCO2 (POC)                  47.7 (H)          35 - 45 MMHG        pO2 (POC)                   109 (H)           75 - 100 MMHG       HCO3 (POC)                  28.6 (H)          22 - 26 MMOL*       sO2 (POC)                   98.1 (H)          95 - 98 %           Base excess (POC)           2.8               mmol/L              Allens test (POC)           Positive                              Site                        RIGHT RADIAL                          Specimen type (POC)         ARTERIAL                              Performed by                                                  [facility]       FIO2, L/min                 6                                -GLUCOSE, POC:        Result                      Value             Ref Range           Glucose (POC)               113 (H)           65 - 100 mg/*       Performed by                [facility]                     -GLUCOSE, POC:        Result                      Value             Ref Range           Glucose (POC)               122 (H)           65 - 100 mg/*       Performed by                                                  [facility]  -GLUCOSE, POC:        Result                      Value             Ref Range           Glucose (POC)               113 (H)           65 - 100 mg/*       Performed by [facility]  -GLUCOSE, POC:        Result                      Value             Ref Range           Glucose (POC)               136 (H)           65 - 100 mg/*       Performed by                                                  [facility]  -GLUCOSE, POC:        Result                      Value             Ref Range           Glucose (POC)               140 (H)           65 - 100 mg/*       Performed by                                                  [facility]  -GLUCOSE, POC:        Result                      Value             Ref Range           Glucose (POC)               148 (H)           65 - 100 mg/*       Performed by                                                  Mary Tolentino  -GLUCOSE, POC:        Result                      Value             Ref Range           Glucose (POC)               162 (H)           65 - 100 mg/*       Performed by                                                  [facility]  -GLUCOSE, POC:        Result                      Value             Ref Range           Glucose (POC)               187 (H)           65 - 100 mg/*       Performed by                                                  [facility]  -EKG, 12 LEAD, INITIAL:        Result                      Value             Ref Range           Ventricular Rate            53                BPM                 Atrial Rate                 53                BPM                 P-R Interval                176               ms                  QRS Duration                82                ms                  Q-T Interval                448               ms                  QTC Calculation (Bezet)     420               ms                  Calculated P Axis           49                degrees             Calculated R Axis           -18               degrees             Calculated T Axis           57                degrees             Diagnosis Sinus bradycardia   Borderline ECG   When compared with ECG of 28-OCT-2021 12:10,   Previous ECG has undetermined rhythm, needs review   T wave inversion no longer evident in Inferior leads   QT has shortened   Confirmed by Daria Narvaez MD (), DO LASSITER ([id number]) on 10/28/2021 2:05:22 PM     -EKG, 12 LEAD, INITIAL:        Result                      Value             Ref Range           Ventricular Rate            43                BPM                 Atrial Rate                 43                BPM                 P-R Interval                152               ms                  QRS Duration                78                ms                  Q-T Interval                486               ms                  QTC Calculation (Bezet)     410               ms                  Calculated P Axis           22                degrees             Calculated R Axis           -12               degrees             Calculated T Axis           24                degrees             Diagnosis                                                     Marked sinus bradycardia   Abnormal ECG   No previous ECGs available   Confirmed by Daria Narvaez MD (), DO LASSITER ([id number]) on 10/29/2021 1:41:23 PM     -ECHO ADULT COMPLETE:        Result                      Value             Ref Range           Left Atrium Minor Axis      3.39              cm                  Left Atrium Major Axis      5.87              cm                  LA Area 2C                  19.20             cm2                 LA Area 4C                  21.60             cm2                 LV ED Vol A4C               33.50             cm3                 LV ES Vol A4C               14.70             cm3                 IVSd                        1. 05 (A)          0.60 - 1.00 *       LVIDd                       3.73 (A)          4.20 - 5.90 *       LVIDs                       2.54              cm                  LVOT d                      2.00              cm LVOT VTI                    35.90             cm                  LVOT Peak Gradient          10.00             mmHg                LVPWd                       1. 07 (A)          0.60 - 1.00 *       LV E' Lateral Velocity      9.38              cm/s                LV E' Septal Velocity       9.48              cm/s                AV Cusp                     1.30              cm                  Aortic Valve Systolic *     76.31             mmHg                AoV VTI                     54.60             cm                  Aortic Valve Systolic *     842.50            cm/s                AoV PG                      30.00             mmHg                Aortic Valve Area by C*     2.06              cm2                 Aortic Valve Area by C*     1.79              cm2                 AO ASC D                    3.60              cm                  Mitral Valve E Wave De*     194.00            ms                  MV A Royer                    186.00            cm/s                MV E Royer                    142.00            cm/s                MV Mean Gradient            4.00              mmHg                Mitral Valve Annulus V*     56.30             cm                  Mitral Valve Max Veloc*     208.00            cm/s                MV Peak Gradient            17.00             mmHg                RVIDd                       2.80              cm                  Tapse                       2.38 (A)          1.50 - 2.00 *       TR Max Velocity             339.00            cm/s                Triscuspid Valve Regur*     46.00             mmHg                MV E/A                      0.76                                  LV Mass AL                  124.0             88 - 224 g          LV Mass AL Index            71.7              49 - 115 g/m2       E/E' lateral                15.14                                 E/E' septal                 14.98                                 RVSP 54.0              mmHg                E/E' ratio (averaged)       15.06                                 Est. RA Pressure            8.0               mmHg                DONN/BSA Pk Royer              1.0               cm2/m2              DONN/BSA VTI                 1.2               cm2/m2         )  Tests in the radiology section of CPT®: ordered and reviewed (XR CHEST SNGL V    Result Date: 10/29/2021   Portable view of the chest COMPARISON: Yesterday. Clinical history respiratory distress. FINDINGS: Bilateral ill-defined perihilar densities. There are bilateral interstitial densities. There is small left pleural effusion. There is no pneumothorax. Heart is enlarged. Surrounding bones are intact. 1. Bilateral ill-defined perihilar densities and interstitial densities, appearing similar dating back to March 2020 CT. Findings most likely pulmonary manifestation of sarcoidosis, given the provided history of sarcoidosis. 2. New focal density noted in the lower segment of the left upper lobe, not seen on the March 9, 2020 CT. This may be secondary to underlying sarcoid or possibly representing a focus of pneumonia. XR BA SWALLOW ESOPHOGRAM    Result Date: 11/4/2021  Barium swallow INDICATION:  Food gets stuck in throat. Vomiting while lying flat. Fluoro time: 1.1 minutes Spot Films:  8 Study is limited due to patient's difficulty lying flat and standing. Therefore study was performed with thin barium liquid through a straw with patient in semirecumbent position on the fluoroscopic table. FINDINGS: Esophagus is mildly dilated without obvious stricture or obstruction. Severe esophageal dysmotility noted throughout the intrathoracic esophagus with poor primary and secondary peristaltic waves and tertiary contractions noted throughout the mid to distal intrathoracic esophagus. There is a small sliding-type hiatal hernia. Gastroesophageal reflux difficult to evaluate as patient had difficulty lying flat.  With patient on right lateral decubitus view contrast was noted in the distal esophagus suggesting gastroesophageal reflux. No constricting or obstructing lesions are evident. Limited imaging upper abdomen demonstrates no evidence of gastric outlet obstruction. Mildly dilated esophagus without constricting or obstructing mass. Severe esophageal dysmotility throughout the intrathoracic esophagus with probable gastroesophageal reflux. Small sliding-type hiatal hernia is present. MRI BRAIN WO CONT    Result Date: 10/28/2021  MRI BRAIN WITHOUT CONTRAST. INDICATION:  Altered mental status. Extreme headache, nausea and vomiting. COMPARISON:  None. Study performed within 24 hours of admission. TECHNIQUE:  Sagittal T1, axial T1, T2, FLAIR, gradient echo, diffusion with ADC map and coronal FLAIR. FINDINGS: -Diffusion images: No any areas of restricted diffusion to suggest acute infarction.  -No midline shift, mass or mass effect. -Gradient echo images: are unremarkable. -FLAIR sequence images: Leukoaraiosis. -No evidence of acute hemorrhage.  -The lateral ventricles are: normal size.  -The pituitary and parasellar structures: unremarkable on the sagittal T1 images.  -Posterior fossa structures are unremarkable. -The basal ganglia: appear symmetric.  -Orbits: are grossly normal. -Paranasal sinuses: are clear. -Other: None. No acute abnormality. Mild degenerative change. CT HEAD WO CONT    Result Date: 11/3/2021  HEAD CT WITHOUT CONTRAST  11/3/2021 HISTORY:   Fall with head trauma  Fall with head trauma TECHNIQUE: Noncontrast axial images were obtained through the brain. All CT scans at this facility used dose modulation, interactive reconstruction and/or weight based dosing when appropriate to reduce radiation dose to as low as reasonably achievable.  COMPARISON: Brain MRI October 28, 2021 FINDINGS: There is no acute intracranial hemorrhage, significant mass effect or CT evidence of acute large artery territorial infarction. Please note that a hyperacute infarct or small vessel infarct may not be apparent on initial CT imaging. Cerebral volume loss is present with ventriculomegaly and bilateral hippocampal atrophy. There is no hydrocephalus , intra-axial mass or abnormal extra-axial fluid collection. There are no displaced skull fractures. The mastoid air cells and paranasal sinuses are clear where imaged. Cerebral volume loss. Hippocampal atrophy. No acute intracranial hemorrhage. CT HEAD WO CONT    Result Date: 10/28/2021  CT head without contrast History: Family called for complaints of a fall at home. Complains of \"extreme headache\", nausea and vomiting x 2 days. Technique: 5mm axial images were obtained from the skull base to the vertex without intravenous contrast.  Radiation dose reduction techniques were used for this study:  Our CT scanners use one or all of the following: Automated exposure control, adjustment of the mA and/or kVp according to patient's size, iterative reconstruction. Comparison: None Findings: The ventricles are mildly prominent and out of proportion to relatively normal appearing sulci. There are no extra-axial fluid collections. There is no evidence to suggest an acute major territorial infarct. There is no evidence of acute intraparenchymal hemorrhage or mass effect. The bony calvarium is intact. The visualized mastoid air cells and paranasal sinuses are well pneumatized and aerated. 1. Mild ventriculomegaly may represent centrally predominant volume loss versus normal pressure hydrocephalus. 2. Otherwise unremarkable unenhanced CT scan of the brain. CTA HEAD NECK W WO CONT    Result Date: 10/28/2021  History: Headache. FINDINGS: CT angiography was performed of the neck and head with contrast and three-dimensional CT angiography reconstruction and reformat was performed. NASCET criteria as needed.  CT dose reduction was achieved through use of a standardized protocol tailored for this examination and automatic exposure control for dose modulation. Extensive parenchymal lung disease and a pleural component as well. Calcifications in the hilar and mediastinal lymph nodes. Aortic arch is patent. There is a large mass in the left axilla. The carotid and vertebral arteries are patent. The intracranial arteries are patent as well. The dural venous sinuses are patent. Mildly enlarged intracranial ventricles. No acute arterial occlusive disease. Large masses in the left axilla. CTA CHEST ABD PELV W WO CONT    Result Date: 10/28/2021  History: Chest pain nausea syncope back pain. FINDINGS: CT angiography was performed of the chest, abdomen, and pelvis with contrast and three-dimensional CT angiography reconstruction and reformat was performed. NASCET criteria as needed. CT dose reduction was achieved through use of a standardized protocol tailored for this examination and automatic exposure control for dose modulation. Very large masses throughout the left axilla. Extensive parenchymal lung disease with perihilar consolidation with mediastinal and hilar calcification. No pleural effusion. No evidence of acute aortic disease. No pericardial effusion. Multivessel coronary artery atherosclerosis. No evidence of aortic dissection. The abdominal aorta is patent. The celiac and superior mesenteric arteries are patent. Inferior mesenteric artery is patent. Small hiatal hernia. Multiple large masses in the left axilla. Differential considerations include lymphoma and metastatic disease. No evidence of acute aortic disease. US GUIDE BX LYMPH NOD SUPER    Result Date: 11/1/2021  Title: Ultrasound guided axillary S biopsy. History: 51-year-old male with multiple large masses in the left axilla. Biopsy requested. :  Lissy Horner PA-C Supervising Physician: Katelin Hamm M.D.  Consent: Informed written and oral consent was obtained from the patient after explanation of benefits and risks (including, but not limited to: Infection, Hemorrhage, Nerve injury) of procedure. Procedure: Maximal sterile barrier technique was used. Following routine prep and drape of the left axilla, a local field block with 1% lidocaine was achieved. Ultrasound evaluation of the left axilla was performed. Using real-time ultrasound guidance, 5 18-gauge, 2 cm core biopsies were obtained and placed in RPMI and formalin. Following the biopsy, ultrasound examination demonstrates no evidence of hematoma. A dressing was applied. Complications: None. Medications: None. Findings: 5.4 cm left axillary mass. Technically successful core biopsy of a left axillary mass. Specimen: Sent to pathology. Plan: The patient was returned to his hospital room in stable condition. ECHO ADULT COMPLETE    Result Date: 10/29/2021  · TV: Mild tricuspid valve regurgitation is present. Right Ventricular Arterial Pressure (RVSP) is 54 mmHg. · Image quality for this study was technically difficult. · Echo study was technically difficulty. · LV: Estimated LVEF is 55 - 60%. Normal cavity size, systolic function (ejection fraction normal) and diastolic function. Mild concentric hypertrophy. Wall motion: normal. · LA: Moderately dilated left atrium. · RA: Mildly dilated right atrium. · AV: Mild aortic valve regurgitation is present. · MV: Mild mitral valve regurgitation is present. · PV: Mild pulmonic valve regurgitation is present.      )  Tests in the medicine section of CPT®: ordered and reviewed  Discuss the patient with other providers: yes  Independent visualization of images, tracings, or specimens: yes    Risk of Complications, Morbidity, and/or Mortality  Presenting problems: high  Diagnostic procedures: high  Management options: high  General comments: I personally reviewed the patient's vital signs, laboratory tests, and/or radiological findings.   I discussed these findings with the patient and their significance. I answered all questions and explained that given these findings there is significant concern for increased morbidity and/or mortality without immediate intervention. As a result, I recommended admission to the hospital, consulted the appropriate service, and transitioned care to that service in improved condition      Patient Progress  Patient progress: improved    ED Course as of 11/04/21 2124   Thu Oct 28, 2021   1223 Established 2 IV's, started dopamine, sending patient for head ct and cta chest, abd, pelvis  [JS]   1256 Traveled with patient to radiology to watch his imaging was performed. I saw no large hemorrhage or subarachnoid bleed. Patient had no dissection or aneurysmal hemorrhage. [JS]   6618 Repeat EKG shows a sinus bradycardia rate 53, left axis deviation, OH is 176, QRS is 82, QTc is 420 with no acute ischemic change [JS]   1334 CTs mentioned a large mass in the left axilla. Went back to bedside and was able to palpate this. There is a large mobile nonfluctuant, nontender firm mass in the left axilla [JS]   1343 Patient's dopamine has now been stopped and he remains in a normal sinus rhythm in the 70s. His blood pressures are solid. At this point I have no clear source of the patient's symptoms. Head CT shows mildly enlarged ventricles so there could be some increased intracranial pressure. This left-sided chest mass may be causing some sort of vagus stimulation. Patient is now quite agitated likely secondary to the Compazine.  [JS]      ED Course User Index  [JS] Malik Yeung MD       Critical Care  Performed by: Malik Yeung MD  Authorized by: Malik Yeung MD     Critical care provider statement:     Critical care time (minutes):  65    Critical care was necessary to treat or prevent imminent or life-threatening deterioration of the following conditions:  CNS failure or compromise    Critical care was time spent personally by me on the following activities: Blood draw for specimens, ordering and performing treatments and interventions, development of treatment plan with patient or surrogate, ordering and review of laboratory studies, discussions with consultants, ordering and review of radiographic studies, discussions with primary provider, pulse oximetry, evaluation of patient's response to treatment, re-evaluation of patient's condition, examination of patient, review of old charts, transcutaneous pacing, interpretation of cardiac output measurements and obtaining history from patient or surrogate    I assumed direction of critical care for this patient from another provider in my specialty: no

## 2021-10-28 NOTE — ASSESSMENT & PLAN NOTE
Represents 5 kg weight loss in last 12 months. Current weight 67 kg, BMI 25.5; From Cardiology annual visit 10/2020:  Wt 72 kg (158 lb 12.8 oz) BMI 27.26 kg/m² BSA 1.8 m²  -nutrition consult

## 2021-10-28 NOTE — ACP (ADVANCE CARE PLANNING)
[facility]  At the heart of better care     Advance Care Planning   Admit Date:  10/28/2021 12:07 PM   Name:  Ninfa Vaz   Age:  80 y.o. Sex:  male  :  1940   MRN:  [id number]   Room:  [place] [address]    Ninfa Vaz is able to make his own decisions: No    POA/surrogate decision maker if patient is altered:  Morris Light, wife    Other members present in the meeting:   wife    Patient / surrogate decision-maker directed:  Code Status: FULL CODE -full aggressive medical and surgical interventions, including intubations, resuscitations, pressors, artificial tube feeding    Other ACP topics discussed, if applicable: We have an extensive discussion of the meeting a full code. We discussed other CODE STATUS and circumstances in which treatment plan. We discussed that is early in the clinical course and I cannot feel any of these decisions are imminent. She expresses understanding and appreciates taking the time to discuss potential prognosis. She understands that at this point the picture is very unclear. All questions were answered. Time spent: 24 minutes in direct discussion (face to face and/or over phone).     Signed:  Aishwarya Giordano MD

## 2021-10-28 NOTE — PROGRESS NOTES
TRANSFER - IN REPORT:    Verbal report received from [id number] [facility] on [address] Tenorio  being received from ER for routine progression of care      Report consisted of patients Situation, Background, Assessment and   Recommendations(SBAR). Information from the following report(s) SBAR, MAR and Recent Results was reviewed with the receiving nurse. Overdue medication on [facility] discussed with ER nurse. Opportunity for questions and clarification was provided.

## 2021-10-29 NOTE — CONSULTS
Consult    Patient: Jagruti Sanchez MRN: [id number]  SSN: [id number]    YOB: 1940  Age: 80 y.o. Sex: male        Assessment:     1. Encephalopathy, mild, no evidence of ischemic changes on MRI    2 mild delirium with \"sundowning\"  Hospital Problems  Date Reviewed: 10/28/2021        Codes Class Noted POA    Axillary mass, left ICD-10-CM: R22.32  ICD-9-CM: 782.2  10/28/2021 Yes        Nausea & vomiting ICD-10-CM: R11.2  ICD-9-CM: 787.01  10/28/2021 Yes        Body mass index (BMI) of 25.0 to 25.9 in adult ICD-10-CM: Z68.25  ICD-9-CM: V85.21  10/28/2021 Yes        * (Principal) Acute metabolic encephalopathy [facility]94-PF: G93.41  ICD-9-CM: 348.31  10/28/2021 Yes        Bradycardia with 31-40 beats per minute ICD-10-CM: R00.1  ICD-9-CM: 427.89  10/28/2021 No        Hyponatremia ICD-10-CM: E87.1  ICD-9-CM: 276.1  10/28/2021 Yes        Electrolyte or fluid disorder ICD-10-CM: E87.8  ICD-9-CM: 276.9  10/28/2021 Yes        Hyperglycemia, drug-induced ICD-10-CM: R73.9, T50.905A  ICD-9-CM: 790.29, E947.9  10/28/2021 Yes        Restrictive lung disease (Chronic) ICD-10-CM: J98.4  ICD-9-CM: 518.89  4/1/2015 Yes    Overview Signed 4/1/2015  9:30 AM by Yayo Weiss. Residual from Sarcoidosis             Dyslipidemia (Chronic) ICD-10-CM: E78.5  ICD-9-CM: 272.4  3/13/2006 Yes    Overview Signed 3/21/2019  8:31 AM by MD Isaías Amayaiscan Cardiolite (3/19/19):  EF 70%. Normal perfusion. Sarcoidosis (Chronic) ICD-10-CM: D86.9  ICD-9-CM: 189  3/13/2006 Yes        Benign prostatic hyperplasia (Chronic) ICD-10-CM: N40.0  ICD-9-CM: 600.00  3/13/2006 Yes              Plan:     Management of Delirium     Non-pharmacological intervention  · Reorient the patient throughout the day  · Window open and lights on during the day. Lights off, television off, noises down during the night.  If able, decrease nursing checks at night  · Therapies as often as possible  · Avoid restraints to the best of your ability   · Avoid sensory deprivation by using glasses and hearing aids, if applicable       Pharmacological intervention  · Replete electrolyte abnormalities and correct metabolic abnormalities  · Limit benzodiazepines, antihistamines, narcotics, anticholinergics. Preference towards Precedex for sedation over fentanyl and benzodiazepines/GABAa agonists. · For dangerous behavior/aggression to self or others can consider Zyprexa or Seroquel if benefits outweigh risk  · For persistent insomnia can use melatonin four hours prior to bedtime or Seroquel 25 mg at bedtime      Subjective:      Rosette Titus is a 80 y.o. male who is being seen for altered mental status. .  Patient originally presented on [phone] with complaints of headache and nausea. Patient found to have periods of severe bradycardia and hypotension in the emergency department. Past Medical History:   Diagnosis Date    Agent orange exposure 1960s    Arthritis     Asthma     Body mass index (BMI) of 25.0 to 25.9 in adult 10/28/2021    Chronic obstructive pulmonary disease (HCC)     Chronic pain     GERD (gastroesophageal reflux disease)     Hyperlipidemia 3/13/2006    Hypertension     Sarcoidosis 2001    Lung Biopsy was done in 2001;  Liver Biopsy--Negative for sarcidosis 5/06    Sleep apnea      Past Surgical History:   Procedure Laterality Date    HX HERNIA REPAIR Left 2004    Left inguinal hernia repair    HX HERNIA REPAIR Right 1990's    Right inguinal hernia    HX KNEE REPLACEMENT Left 2016    HX LAP CHOLECYSTECTOMY  2006    HX ORTHOPAEDIC Right 2014    ankle replacement @ [facility]    HX OTHER SURGICAL  2006    Liver biopsy--negative for sarcoidosis    HX TONSILLECTOMY  as a child    SC CHEST SURGERY PROCEDURE UNLISTED  2001    Lung bx for sarcoidosis      Family History   Problem Relation Age of Onset    Diabetes Mother     Hypertension Mother     Cancer Mother         Liver Cancer    Elevated Lipids Mother     Hypertension Father     Stroke Father     Cancer Sister     Headache Neg Hx      Social History     Tobacco Use    Smoking status: Former Smoker     Packs/day: 1.50     Years: 5.00     Pack years: 7.50     Types: Cigarettes     Quit date: 1968     Years since quittin.8    Smokeless tobacco: Never Used   Substance Use Topics    Alcohol use:  Yes     Alcohol/week: 0.0 standard drinks     Comment: socially      Current Facility-Administered Medications   Medication Dose Route Frequency Provider Last Rate Last Admin    dextrose 5% and 0.9% NaCl infusion  125 mL/hr IntraVENous CONTINUOUS Mervat Drake  mL/hr at 10/29/21 0105 125 mL/hr at 10/29/21 0105    methylPREDNISolone (PF) (SOLU-MEDROL) injection 20 mg  20 mg IntraVENous Q12H Haylie Mesa MD   20 mg at 10/29/21 0855    lip protectant (BLISTEX) ointment 1 Each  1 Each Topical PRN Nimo Monk MD        finasteride (PROSCAR) tablet 5 mg  5 mg Oral QHS Jerry Franco MD        [Held by provider] rosuvastatin (CRESTOR) tablet 20 mg  20 mg Oral QHS Nimo Monk MD        pantoprazole (PROTONIX) 40 mg in 0.9% sodium chloride 10 mL injection  40 mg IntraVENous DAILY Nimo Monk MD   40 mg at 10/29/21 0855    sodium chloride (NS) flush 5-40 mL  5-40 mL IntraVENous Q8H Jerry Franco MD   10 mL at 10/29/21 0452    sodium chloride (NS) flush 5-40 mL  5-40 mL IntraVENous PRN Nimo Monk MD        acetaminophen (TYLENOL) tablet 650 mg  650 mg Oral Q6H PRN Nimo Monk MD        Or    acetaminophen (TYLENOL) suppository 650 mg  650 mg Rectal Q6H PRN Jerry Franco MD        polyethylene glycol (MIRALAX) packet 17 g  17 g Oral DAILY PRN Nimo Monk MD        ondansetron (ZOFRAN ODT) tablet 4 mg  4 mg Oral Q8H PRN Jerry Franco MD        Or    ondansetron (ZOFRAN) injection 4 mg  4 mg IntraVENous Q6H PRN Jerry Franco MD        insulin lispro (HUMALOG) injection SubCUTAneous Q6H Nelda Franco MD        dextrose 40% (GLUTOSE) oral gel 1 Tube  15 g Oral PRN Nedla Franco MD        glucagon (GLUCAGEN) injection 1 mg  1 mg IntraMUSCular PRN Nelda Franco MD        dextrose (D50W) injection syrg 12.5-25 g  25-50 mL IntraVENous PRN Nelda Franco MD        tiotropium-olodateroL (STIOLTO RESPIMAT) 2.5-2.5 mcg/actuation inhaler 2 Puff  2 Puff Inhalation DAILY Nelda Franco MD        fluticasone (FLOVENT HFA) 110 mcg inhaler  1 Puff Inhalation DAILY Nelda Franco MD        tuberculin injection 5 Units  5 Units IntraDERMal Feliz Lopez MD   5 Units at 10/28/21 2231    [Held by provider] metoprolol succinate (TOPROL-XL) XL tablet 25 mg  25 mg Oral DAILY Nelda Franco MD        [Held by provider] hydroCHLOROthiazide (HYDRODIURIL) tablet 12.5 mg  12.5 mg Oral DAILY Nelda Franco MD        heparin (porcine) injection 5,000 Units  5,000 Units SubCUTAneous Q8H Anatoly Askew MD   5,000 Units at 10/29/21 0218    albuterol (PROVENTIL HFA, VENTOLIN HFA, PROAIR HFA) inhaler 2 Puff  2 Puff Inhalation Q4H PRN Ethan Frederick MD   2 Puff at 10/29/21 0031        Allergies   Allergen Reactions    Lactose Diarrhea       Review of Systems: unable to give ROS due to medical condition      Objective:     Vitals:    10/29/21 0735 10/29/21 0802 10/29/21 1020 10/29/21 1135   BP: 136/66 (!) 158/81 (!) 142/73 (!) 153/79   Pulse: (!) 58 (!) 54  (!) 55   Resp: 30 30  20   Temp:  98.3 °F (36.8 °C)  98.2 °F (36.8 °C)   SpO2: 98% 97%  100%   Weight:   149 lb (67.6 kg)    Height:   5' 4\" (1.626 m)         Physical Exam:      General: well nourished, appears stated age    Eyes: no proptosis or exophthalmos; conjunctivae clear, sclerae non-icteric    Chest: clear to auscultation    Cardiac: normal S1 S2; no murmurs gallop or rubs    Neurological:    MSE: Lies with eyes closed. Does not follow commands but will respond appropriately to his wife. Highly variable responses. Oriented x2.  CN 2: Unable to assess   CN 3,4,6: Pupils symmetrical in size, reactive to light directly and consensually; no ptosis; full versions and ductions  CN 5: facial sensation intact to light touch and pin prick. Corneal reflex . .. CN 7: Symmetrical facial tone and movements  CN 8 responds to spoken voice  CN 9,10; palate symmetrical gag intact  CN 11: head turn and shoulder shrug intact  CN 12: tongue midline without atrophy or fasiculations    Motor:  Moves all extremities to stimulation. Squeezes hands symmetrically bilaterally    Cerebellar:  Unable to assess    Sensory  Grossly intact    Reflexes    Symmetrical and normally active at 2+ in UE and LE  Plantar response flexor bilaterally    Recent Results (from the past 24 hour(s))   METABOLIC PANEL, COMPREHENSIVE    Collection Time: 10/28/21 12:18 PM   Result Value Ref Range    Sodium 131 (L) 138 - 145 mmol/L    Potassium 3.8 3.5 - 5.1 mmol/L    Chloride 96 (L) 98 - 107 mmol/L    CO2 30 21 - 32 mmol/L    Anion gap 5 (L) 7 - 16 mmol/L    Glucose 115 (H) 65 - 100 mg/dL    BUN 16 8 - 23 MG/DL    Creatinine 0.74 (L) 0.8 - 1.5 MG/DL    GFR est AA >60 >60 ml/min/1.73m2    GFR est non-AA >60 >60 ml/min/1.73m2    Calcium 8.8 8.3 - 10.4 MG/DL    Bilirubin, total 0.5 0.2 - 1.1 MG/DL    ALT (SGPT) 29 12 - 65 U/L    AST (SGOT) 39 (H) 15 - 37 U/L    Alk.  phosphatase 36 (L) 50 - 136 U/L    Protein, total 6.2 (L) 6.3 - 8.2 g/dL    Albumin 2.7 (L) 3.2 - 4.6 g/dL    Globulin 3.5 2.3 - 3.5 g/dL    A-G Ratio 0.8 (L) 1.2 - 3.5     CBC WITH AUTOMATED DIFF    Collection Time: 10/28/21 12:18 PM   Result Value Ref Range    WBC 10.0 4.3 - 11.1 K/uL    RBC 3.81 (L) 4.23 - 5.6 M/uL    HGB 11.1 (L) 13.6 - 17.2 g/dL    HCT 35.5 (L) 41.1 - 50.3 %    MCV 93.2 79.6 - 97.8 FL    MCH 29.1 26.1 - 32.9 PG    MCHC 31.3 (L) 31.4 - 35.0 g/dL    RDW 14.1 11.9 - 14.6 %    PLATELET 862 748 - 226 K/uL    MPV 9.0 (L) 9.4 - 12.3 FL    ABSOLUTE NRBC 0.00 0.0 - 0.2 K/uL DF AUTOMATED      NEUTROPHILS 83 (H) 43 - 78 %    LYMPHOCYTES 5 (L) 13 - 44 %    MONOCYTES 10 4.0 - 12.0 %    EOSINOPHILS 1 0.5 - 7.8 %    BASOPHILS 0 0.0 - 2.0 %    IMMATURE GRANULOCYTES 1 0.0 - 5.0 %    ABS. NEUTROPHILS 8.3 (H) 1.7 - 8.2 K/UL    ABS. LYMPHOCYTES 0.5 0.5 - 4.6 K/UL    ABS. MONOCYTES 1.0 0.1 - 1.3 K/UL    ABS. EOSINOPHILS 0.1 0.0 - 0.8 K/UL    ABS. BASOPHILS 0.0 0.0 - 0.2 K/UL    ABS. IMM.  GRANS. 0.1 0.0 - 0.5 K/UL   PROTHROMBIN TIME + INR    Collection Time: 10/28/21 12:18 PM   Result Value Ref Range    Prothrombin time 14.1 12.6 - 14.5 sec    INR 1.1     MAGNESIUM    Collection Time: 10/28/21 12:18 PM   Result Value Ref Range    Magnesium 2.1 1.8 - 2.4 mg/dL   TROPONIN-HIGH SENSITIVITY    Collection Time: 10/28/21 12:18 PM   Result Value Ref Range    Troponin-High Sensitivity 9.5 0 - 14 pg/mL   PHOSPHORUS    Collection Time: 10/28/21 12:18 PM   Result Value Ref Range    Phosphorus 2.9 2.3 - 3.7 MG/DL   HEMOGLOBIN A1C WITH EAG    Collection Time: 10/28/21 12:18 PM   Result Value Ref Range    Hemoglobin A1c 6.2 4.2 - 6.3 %    Est. average glucose 131 mg/dL   EKG, 12 LEAD, INITIAL    Collection Time: 10/28/21  1:00 PM   Result Value Ref Range    Ventricular Rate 53 BPM    Atrial Rate 53 BPM    P-R Interval 176 ms    QRS Duration 82 ms    Q-T Interval 448 ms    QTC Calculation (Bezet) 420 ms    Calculated P Axis 49 degrees    Calculated R Axis -18 degrees    Calculated T Axis 57 degrees    Diagnosis       Sinus bradycardia  Borderline ECG  When compared with ECG of 28-OCT-2021 12:10,  Previous ECG has undetermined rhythm, needs review  T wave inversion no longer evident in Inferior leads  QT has shortened  Confirmed by Nestor Mcgill MD (), DO LASSITER ([id number]) on 10/28/2021 2:05:22 PM     RPR    Collection Time: 10/28/21  4:09 PM   Result Value Ref Range    RPR NONREACTIVE NR     TSH 3RD GENERATION    Collection Time: 10/28/21  4:09 PM   Result Value Ref Range    TSH 0.583 0.358 - 3.740 uIU/mL   T4, FREE    Collection Time: 10/28/21  4:09 PM   Result Value Ref Range    T4, Free 1.1 0.78 - 1.46 NG/DL   VITAMIN B12    Collection Time: 10/28/21  4:09 PM   Result Value Ref Range    Vitamin B12 861 193 - 986 pg/mL   FOLATE    Collection Time: 10/28/21  4:09 PM   Result Value Ref Range    Folate 34.2 (H) 3.1 - 17.5 ng/mL   CORTISOL, BASELINE    Collection Time: 10/28/21  4:09 PM   Result Value Ref Range    Cortisol, baseline 11.8 ug/dL   HIV 1/2 AG/AB, 4TH GENERATION,W RFLX CONFIRM    Collection Time: 10/28/21  4:09 PM   Result Value Ref Range    HIV 1/2 Interpretation NONREACTIVE NR      HIV 1/2 result comment SEE NOTE (A) NR     NT-PRO BNP    Collection Time: 10/28/21  4:09 PM   Result Value Ref Range    NT pro- (H) <450 PG/ML   LACTIC ACID    Collection Time: 10/28/21  4:15 PM   Result Value Ref Range    Lactic acid 0.8 0.4 - 2.0 MMOL/L   CULTURE, BLOOD    Collection Time: 10/28/21  8:21 PM    Specimen: Blood   Result Value Ref Range    Special Requests: RIGHT ANTECUBITAL      Culture result: NO GROWTH AFTER 10 HOURS     CULTURE, BLOOD    Collection Time: 10/28/21  8:21 PM    Specimen: Blood   Result Value Ref Range    Special Requests: LEFT HAND      Culture result: NO GROWTH AFTER 10 HOURS     COVID-19 RAPID TEST    Collection Time: 10/28/21 11:59 PM   Result Value Ref Range    Specimen source NASAL      COVID-19 rapid test Not detected NOTD     SARS-COV-2, PCR    Collection Time: 10/28/21 11:59 PM    Specimen: Nasopharyngeal   Result Value Ref Range    Specimen source Nasopharyngeal      SARS-CoV-2 Not detected NOTD     GLUCOSE, POC    Collection Time: 10/29/21 12:09 AM   Result Value Ref Range    Glucose (POC) 75 65 - 100 mg/dL    Performed by [facility]    BLOOD GAS, ARTERIAL POC    Collection Time: 10/29/21  2:12 AM   Result Value Ref Range    Device: NASAL CANNULA      pH (POC) 7.39 7.35 - 7.45      pCO2 (POC) 47.7 (H) 35 - 45 MMHG    pO2 (POC) 109 (H) 75 - 100 MMHG    HCO3 (POC) 28.6 (H) 22 - 26 MMOL/L    sO2 (POC) 98.1 (H) 95 - 98 %    Base excess (POC) 2.8 mmol/L    Allens test (POC) Positive      Site RIGHT RADIAL      Specimen type (POC) ARTERIAL      Performed by [facility]     FIO2, L/min 6     RESPIRATORY VIRUS PANEL W/COVID-19, PCR    Collection Time: 10/29/21  2:20 AM    Specimen: Nasopharyngeal   Result Value Ref Range    Adenovirus NOT DETECTED NOTDET      Coronavirus 229E NOT DETECTED NOTDET      Coronavirus HKU1 NOT DETECTED NOTDET      Coronavirus CVNL63 NOT DETECTED NOTDET      Coronavirus OC43 NOT DETECTED NOTDET      SARS-CoV-2, PCR NOT DETECTED NOTDET      Metapneumovirus NOT DETECTED NOTDET      Rhinovirus and Enterovirus Detected (A) NOTDET      Influenza A NOT DETECTED NOTDET      Influenza B NOT DETECTED NOTDET      Parainfluenza 1 NOT DETECTED NOTDET      Parainfluenza 2 NOT DETECTED NOTDET      Parainfluenza 3 NOT DETECTED NOTDET      Parainfluenza virus 4 NOT DETECTED NOTDET      RSV by PCR NOT DETECTED NOTDET      B. parapertussis, PCR NOT DETECTED NOTDET      Bordetella pertussis - PCR NOT DETECTED NOTDET      Chlamydophila pneumoniae DNA, QL, PCR NOT DETECTED NOTDET      Mycoplasma pneumoniae DNA, QL, PCR NOT DETECTED NOTDET     GLUCOSE, POC    Collection Time: 10/29/21  5:14 AM   Result Value Ref Range    Glucose (POC) 113 (H) 65 - 100 mg/dL    Performed by [facility]    ECHO ADULT COMPLETE    Collection Time: 10/29/21  9:15 AM   Result Value Ref Range    Left Atrium Minor Axis 3.39 cm    Left Atrium Major Axis 5.87 cm    LA Area 2C 19.20 cm2    LA Area 4C 21.60 cm2    LV ED Vol A4C 33.50 cm3    LV ES Vol A4C 14.70 cm3    IVSd 1.05 (A) 0.60 - 1.00 cm    LVIDd 3.73 (A) 4.20 - 5.90 cm    LVIDs 2.54 cm    LVOT d 2.00 cm    LVOT VTI 35.90 cm    LVOT Peak Gradient 10.00 mmHg    LVPWd 1.07 (A) 0.60 - 1.00 cm    LV E' Lateral Velocity 9.38 cm/s    LV E' Septal Velocity 9.48 cm/s    AV Cusp 1.30 cm    Aortic Valve Systolic Mean Gradient 59.22 mmHg    AoV VTI 54.60 cm Aortic Valve Systolic Peak Velocity 636.96 cm/s    AoV PG 30.00 mmHg    Aortic Valve Area by Continuity of VTI 2.06 cm2    Aortic Valve Area by Continuity of Peak Velocity 1.79 cm2    AO ASC D 3.60 cm    Mitral Valve E Wave Deceleration Time 194.00 ms    MV A Royer 186.00 cm/s    MV E Royer 142.00 cm/s    MV Mean Gradient 4.00 mmHg    Mitral Valve Annulus Velocity Time Integral 56.30 cm    Mitral Valve Max Velocity 208.00 cm/s    MV Peak Gradient 17.00 mmHg    RVIDd 2.80 cm    Tapse 2.38 (A) 1.50 - 2.00 cm    TR Max Velocity 339.00 cm/s    Triscuspid Valve Regurgitation Peak Gradient 46.00 mmHg    MV E/A 0.76     LV Mass .0 88.0 - 224.0 g    LV Mass AL Index 71.7 49.0 - 115.0 g/m2    E/E' lateral 15.14     E/E' septal 14.98     RVSP 54.0 mmHg    E/E' ratio (averaged) 15.06     Est. RA Pressure 8.0 mmHg    DONN/BSA Pk Royer 1.0 cm2/m2    DONN/BSA VTI 1.2 cm2/m2   GLUCOSE, POC    Collection Time: 10/29/21 10:59 AM   Result Value Ref Range    Glucose (POC) 122 (H) 65 - 100 mg/dL    Performed by [facility]    EKG, 12 LEAD, INITIAL    Collection Time: 10/29/21 11:19 AM   Result Value Ref Range    Ventricular Rate 43 BPM    Atrial Rate 43 BPM    P-R Interval 152 ms    QRS Duration 78 ms    Q-T Interval 486 ms    QTC Calculation (Bezet) 410 ms    Calculated P Axis 22 degrees    Calculated R Axis -12 degrees    Calculated T Axis 24 degrees    Diagnosis       Marked sinus bradycardia  Abnormal ECG  No previous ECGs available         Lab Results   Component Value Date/Time    Cholesterol, total 136 07/19/2021 09:40 AM    HDL Cholesterol 48 07/19/2021 09:40 AM    LDL, calculated 67 07/19/2021 09:40 AM    LDL, calculated 63 07/06/2020 09:41 AM    VLDL, calculated 21 07/19/2021 09:40 AM    VLDL, calculated 35 07/06/2020 09:41 AM    Triglyceride 114 07/19/2021 09:40 AM    CHOL/HDL Ratio 1.9 09/17/2018 10:01 AM        Lab Results   Component Value Date/Time    Hemoglobin A1c 6.2 10/28/2021 12:18 PM        CT Results (most recent):  Results from [place] encounter on 10/28/21    CTA HEAD NECK W WO CONT    Narrative  History: Headache. FINDINGS:    CT angiography was performed of the neck and head with contrast and  three-dimensional CT angiography reconstruction and reformat was performed. NASCET criteria as needed. CT dose reduction was achieved through use of a  standardized protocol tailored for this examination and automatic exposure  control for dose modulation. Extensive parenchymal lung disease and a pleural component as well. Calcifications in the hilar and mediastinal lymph nodes. Aortic arch is patent. There is a large mass in the left axilla. The carotid and vertebral arteries are  patent. The intracranial arteries are patent as well. The dural venous sinuses  are patent. Mildly enlarged intracranial ventricles. Impression  No acute arterial occlusive disease. Large masses in the left axilla. Results for orders placed or performed during the hospital encounter of 10/28/21   EKG, 12 LEAD, INITIAL   Result Value Ref Range    Ventricular Rate 43 BPM    Atrial Rate 43 BPM    P-R Interval 152 ms    QRS Duration 78 ms    Q-T Interval 486 ms    QTC Calculation (Bezet) 410 ms    Calculated P Axis 22 degrees    Calculated R Axis -12 degrees    Calculated T Axis 24 degrees    Diagnosis       Marked sinus bradycardia  Abnormal ECG  No previous ECGs available          MRI Results (most recent):  Results from Hospital Encounter encounter on 10/28/21    MRI BRAIN WO CONT    Narrative  MRI BRAIN WITHOUT CONTRAST. INDICATION:  Altered mental status. Extreme headache, nausea and vomiting. COMPARISON:  None. Study performed within 24 hours of admission. TECHNIQUE:  Sagittal T1, axial T1, T2, FLAIR, gradient echo, diffusion with ADC  map and coronal FLAIR.     FINDINGS:  -Diffusion images: No any areas of restricted diffusion to suggest acute  infarction.  -No midline shift, mass or mass effect. -Gradient echo images: are unremarkable. -FLAIR sequence images: Leukoaraiosis.  -No evidence of acute hemorrhage.  -The lateral ventricles are: normal size.  -The pituitary and parasellar structures: unremarkable on the sagittal T1  images.  -Posterior fossa structures are unremarkable. -The basal ganglia: appear symmetric.  -Orbits: are grossly normal.  -Paranasal sinuses: are clear.  -Other: None. Impression  No acute abnormality. Mild degenerative change. US Results (most recent):  No results found for this or any previous visit. Most recent CTA  Results from [place] encounter on 10/28/21    CTA HEAD NECK W WO CONT    Narrative  History: Headache. FINDINGS:    CT angiography was performed of the neck and head with contrast and  three-dimensional CT angiography reconstruction and reformat was performed. NASCET criteria as needed. CT dose reduction was achieved through use of a  standardized protocol tailored for this examination and automatic exposure  control for dose modulation. Extensive parenchymal lung disease and a pleural component as well. Calcifications in the hilar and mediastinal lymph nodes. Aortic arch is patent. There is a large mass in the left axilla. The carotid and vertebral arteries are  patent. The intracranial arteries are patent as well. The dural venous sinuses  are patent. Mildly enlarged intracranial ventricles. Impression  No acute arterial occlusive disease. Large masses in the left axilla. Most recent MRI  Results from [place] encounter on 10/28/21    MRI BRAIN WO CONT    Narrative  MRI BRAIN WITHOUT CONTRAST. INDICATION:  Altered mental status. Extreme headache, nausea and vomiting. COMPARISON:  None. Study performed within 24 hours of admission. TECHNIQUE:  Sagittal T1, axial T1, T2, FLAIR, gradient echo, diffusion with ADC  map and coronal FLAIR.     FINDINGS:  -Diffusion images: No any areas of restricted diffusion to suggest acute  infarction.  -No midline shift, mass or mass effect. -Gradient echo images: are unremarkable. -FLAIR sequence images: Leukoaraiosis.  -No evidence of acute hemorrhage.  -The lateral ventricles are: normal size.  -The pituitary and parasellar structures: unremarkable on the sagittal T1  images.  -Posterior fossa structures are unremarkable. -The basal ganglia: appear symmetric.  -Orbits: are grossly normal.  -Paranasal sinuses: are clear.  -Other: None. Impression  No acute abnormality. Mild degenerative change.           Signed By: Carlos Tello MD     October 29, 2021

## 2021-10-29 NOTE — PROGRESS NOTES
ACUTE OT GOALS:  (Developed with and agreed upon by patient and/or caregiver.)  1. Pt will toilet with SBA   2. Pt will complete functional mobility for ADLs with SBA using AD as needed  3. Pt will complete lower body dressing with SBA using AE as needed  4. Pt will complete grooming and hygiene at sink with SBA  5. Pt will demonstrate independence with HEP to promote increased BUE strength and functional use for ADLs  6. Pt will tolerate 23 minutes functional activity with min or fewer rest breaks to promote increased endurance for ADLs  7. Pt will independently demonstrate/ verbalize 2+ energy conservation techniques to promote increased endurance for ADLs      Timeframe: 7 days      OCCUPATIONAL THERAPY ASSESSMENT: Initial Assessment and Daily Note OT Treatment Day # 1    Janeth Vasquez is a 80 y.o. male   PRIMARY DIAGNOSIS: Acute metabolic encephalopathy  Acute metabolic encephalopathy [K64.72]       Reason for Referral:  Encephalopathy, AMS  ICD-10: Treatment Diagnosis: Generalized Muscle Weakness (M62.81)  [place] / [place] / Product Type: Medicare /   ASSESSMENT:     REHAB RECOMMENDATIONS:   Recommendation to date pending progress:  Setting:   Short-term Rehab  Equipment:    None     PRIOR LEVEL OF FUNCTION:  (Prior to Hospitalization)  INITIAL/CURRENT LEVEL OF FUNCTION:  (Based on today's evaluation)   Bathing:   Independent  Dressing:   Independent  Feeding/Grooming:   Independent  Toileting:   Independent  Functional Mobility:   Independent Bathing:   Moderate Assistance  Dressing:   Moderate Assistance  Feeding/Grooming:   Minimal Assistance  Toileting:   Moderate Assistance  Functional Mobility:   Minimal Assistance x 2     ASSESSMENT:  Mr. Salas Henley presented generally weak with deficits in cognition, strength, endurance, mobility, and balance impacting ADLs.  Pt lethargic with impaired attention and decreased command following and required min-mod cues to successfully complete functional tasks. Pt required mod A for LB dressing, min-mod A for grooming and hygiene, min X2 for functional mobility using RW. Pt required extra time to complete all tasks. HR < 43 BPM with transfer to chair, quickly increased to 56 BPM, RN notified. Pt is below his functional baseline and would benefit from skilled OT services to address deficits. SUBJECTIVE:   Mr. Ted stewart, [employer]. \"    SOCIAL HISTORY/LIVING ENVIRONMENT: Lives w/ wife, independent at baseline, no AD. 2L home O2.  WIS w/ shower chair and grab bars, elevated toilet, 2L home w/ ramp and elevator  Home Environment: Private residence  One/Two Story Residence: Two story  Living Alone: No  Support Systems: Spouse/Significant Other    OBJECTIVE:     PAIN: VITAL SIGNS: LINES/DRAINS:   Pre Treatment: Pain Screen  Pain Scale 1: Numeric (0 - 10)  Pain Intensity 1: 0  Post Treatment: 0   IV  O2 Device: Nasal cannula     GROSS EVALUATION:  BUE Within Functional Limits Abnormal/ Functional Abnormal/ Non-Functional (see comments) Not Tested Comments:   AROM [] [x] [] []    PROM [] [] [] []    Strength [] [x] [] [] 3/5 R shoulder, otherwise grossly 4- to 4/5   Balance [] [x] [] []    Posture [] [] [] []    Sensation [] [] [] []    Coordination [] [x] [] []    Tone [] [] [] []    Edema [] [] [] []    Activity Tolerance [] [x] [] []     [] [] [] []      COGNITION/  PERCEPTION: Intact Impaired   (see comments) Comments:   Orientation [x] []    Vision [] [] Reading glasses   Hearing [] []    Judgment/ Insight [] []    Attention [] [x]    Memory [] [x]    Command Following [] [x]    Emotional Regulation [] []     [] []      ACTIVITIES OF DAILY LIVING: I Mod I S SBA CGA Min Mod Max Total NT Comments   BASIC ADLs:              Bathing/ Showering [] [] [] [] [] [] [x] [] [] []    Toileting [] [] [] [] [] [] [] [] [] []    Dressing [] [] [] [] [] [] [x] [] [] []    Feeding [] [] [] [] [] [] [] [] [] []    Grooming [] [] [] [] [] [x] [x] [] [] []    Personal Device Care [] [] [] [] [] [] [] [] [] []    Functional Mobility [] [] [] [] [] [x] [] [] [] [] x2 RW   I=Independent, Mod I=Modified Independent, S=Supervision, SBA=Standby Assistance, CGA=Contact Guard Assistance,   Min=Minimal Assistance, Mod=Moderate Assistance, Max=Maximal Assistance, Total=Total Assistance, NT=Not Tested    MOBILITY: I Mod I S SBA CGA Min Mod Max Total  NT x2 Comments:   Supine to sit [] [] [] [] [] [x] [] [] [] [] [x]    Sit to supine [] [] [] [] [] [] [] [] [] [] []    Sit to stand [] [] [] [] [] [x] [] [] [] [] [x]    Bed to chair [] [] [] [] [] [x] [] [] [] [] [x]    I=Independent, Mod I=Modified Independent, S=Supervision, SBA=Standby Assistance, CGA=Contact Guard Assistance,   Min=Minimal Assistance, Mod=Moderate Assistance, Max=Maximal Assistance, Total=Total Assistance, NT=Not [facility] 6 Clicks   Daily Activity Inpatient Short Form        How much help from another person does the patient currently need. .. Total A Lot A Little None   1. Putting on and taking off regular lower body clothing? [] 1   [x] 2   [] 3   [] 4   2. Bathing (including washing, rinsing, drying)? [] 1   [x] 2   [] 3   [] 4   3. Toileting, which includes using toilet, bedpan or urinal?   [] 1   [x] 2   [] 3   [] 4   4. Putting on and taking off regular upper body clothing? [] 1   [x] 2   [] 3   [] 4   5. Taking care of personal grooming such as brushing teeth? [] 1   [x] 2   [] 3   [] 4   6. Eating meals? [] 1   [] 2   [x] 3   [] 4   © 2007, Trustees of [address] [place] [address] [address] [address], under license to [employer]. All rights reserved     Score:  Initial: 13 Most Recent: X (Date: -- )   Interpretation of Tool:  Represents activities that are increasingly more difficult (i.e. Bed mobility, Transfers, Gait).     PLAN:   FREQUENCY/DURATION: OT Plan of Care: 3 times/week for duration of hospital stay or until stated goals are met, whichever comes first.    PROBLEM LIST:   (Skilled intervention is medically necessary to address:)  1. Decreased ADL/Functional Activities  2. Decreased Activity Tolerance  3. Decreased Balance  4. Decreased Cognition  5. Decreased Coordination  6. Decreased Strength  7. Decreased Transfer Abilities   INTERVENTIONS PLANNED:   (Benefits and precautions of occupational therapy have been discussed with the patient.)  1. Self Care Training  2. Therapeutic Activity  3. Therapeutic Exercise/HEP  4. Neuromuscular Re-education  5. Education     TREATMENT:     EVALUATION: Moderate Complexity : (Untimed Charge)    TREATMENT:   ($$ Self Care/Home Management: 23-37 mins    )  Co-Treatment PT/OT necessary due to patient's decreased overall endurance/tolerance levels, as well as need for high level skilled assistance to complete functional transfers/mobility and functional tasks  Self Care (23 Minutes): Self care including Lower Body Dressing and Grooming to increase independence and decrease level of assistance required.     TREATMENT GRID:  N/A    AFTER TREATMENT POSITION/PRECAUTIONS:  Chair, Needs within reach, RN notified and Visitors at bedside    INTERDISCIPLINARY COLLABORATION:  RN/PCT and PT/PTA    TOTAL TREATMENT DURATION:  OT Patient Time In/Time Out  Time In: 1323  Time Out: Addi 31, OT

## 2021-10-29 NOTE — PROGRESS NOTES
Patient on remote telemetry and have called to notify RN that patient hr is dropping to lowest 38 bpm. Went in to assess patient and heart rate bounces back up to low 50s. Bradycardic anywhere from 38bpm-59 bpm. MD aware at this time. Patient able to state name and date of birth this morning. Otherwise very drowsy and confused.

## 2021-10-29 NOTE — PROGRESS NOTES
IR called to ask if patient stable to come down. Patient bradycardic today. Awaited cardiology to see patient. Cardiology ok with patient going down to IR as they are hooked up to a monitor. IR notified of this however they are unable to fit to schedule today.

## 2021-10-29 NOTE — PROGRESS NOTES
CM met with patient and patient's spouse at bedside, to complete assessment. Demographic information verified by the patient's spouse Phyllis Lang [phone]. The patient lives with his spouse in two story home with 2 steps at the front entrance and a ramp at the back entrance. Per spouse, prior to admission, the patient is independent with completing ADL's and drives. DME: Oxygen    Patient requires o2 continuous. Patient requires 2 1/2 Liters NC. O2 is provided by [facility]. Per spouse, patient has inogen o2 and home concentrator. Patient also have oxygen tanks, when needed. Patient receives long term medications from [facility] via mail. Patient also receives some medications from [facility]. No concerns voiced with obtaining medications. Discharge planning; PT/OT has been consulted. Spouse confirmed a history of [facility] services. Patient has no history of REHAB. Disposition: TBD (Thearpy eval pending)    CM continues to follow. No needs voiced at this time. Care Management Interventions  PCP Verified by CM: Yes  Mode of Transport at Discharge:  Other (see comment) (TBD: based upon need. )  Transition of Care Consult (CM Consult): Discharge Planning  Physical Therapy Consult: Yes  Occupational Therapy Consult: Yes  Speech Therapy Consult: Yes  Support Systems: Spouse/Significant Other  Confirm Follow Up Transport: Family (Patient is able to drive. )  [facility]  Discharge Location  Discharge Placement: Unable to determine at this time

## 2021-10-29 NOTE — PROGRESS NOTES
ACUTE PHYSICAL THERAPY GOALS:  (Developed with and agreed upon by patient and/or caregiver. )    LTG:  (1.)Mr. Sanchez will move from supine to sit and sit to supine , scoot up and down and roll side to side in flat bed without siderails with  STAND BY ASSIST within 7 day(s). (2.)Mr. Sanchez will perform all functional transfers with  STAND BY ASSIST using the least restrictive/no device within 7 day(s). (3.)Mr. Sanchez will ambulate with  CONTACT GUARD ASSIST for 50+ feet with normal vital sign response with the least restrictive/no device within 7 day(s). PHYSICAL THERAPY ASSESSMENT: Initial Assessment and Daily Note PT Treatment Day # 1      Janeth Vasquez is a 80 y.o. male   PRIMARY DIAGNOSIS: Acute metabolic encephalopathy  Acute metabolic encephalopathy [U61.62]       Reason for Referral:     ICD-10: Treatment Diagnosis: Other abnormalities of gait and mobility (R26.89)  [place] / Product Type: Medicare /     ASSESSMENT:     REHAB RECOMMENDATIONS:   Recommendation to date pending progress:  Setting:   Short-term Rehab   pending progress and status at discharge  Equipment:    To Be Determined     PRIOR LEVEL OF FUNCTION:  (Prior to Hospitalization) INITIAL/CURRENT LEVEL OF FUNCTION:  (Most Recently Demonstrated)   Bed Mobility:   Independent  Sit to Stand:   Independent  Transfers:   Independent  Gait/Mobility:   Independent Bed Mobility:   Minimal Assistance x 2  Sit to Stand:   Minimal Assistance x 2  Transfers:   Minimal Assistance x 2  Gait/Mobility:   Minimal Assistance x 2     ASSESSMENT:  Mr. Salas Henley lives with his wife, he is independent in home and community and very active per his wife. Today he is lethargic, keeps eyes closed most of the time. Answers questions but not talkative. He does follow commands with max cueing. Patient has declined in functional mobility.   HR decreased to 43 at one point during treatment, but after a minute began increasing. Mr. Maria Guadalupe Garcia is functioning well below baseline and is therefore appropriate for skilled PT to maximize rehab potential.      .     SUBJECTIVE:   Mr. Maria Guadalupe Garcia states, \"yes\"    SOCIAL HISTORY/LIVING ENVIRONMENT: Mr. Maria Guadalupe Garcia lives with his wife, he is independent in home and community and very active per his wife.   Home Environment: Private residence  One/Two Story Residence: Two story  Living Alone: No  Support Systems: Spouse/Significant Other  OBJECTIVE:     PAIN: VITAL SIGNS: LINES/DRAINS:   Pre Treatment: Pain Screen  Pain Scale 1: Numeric (0 - 10)  Pain Intensity 1: 0  Post Treatment: 0   IV and Purewick  O2 Device: Nasal cannula     GROSS EVALUATION:  B LE's Within Functional Limits Abnormal/ Functional Abnormal/ Non-Functional (see comments) Not Tested Comments:   AROM [x] [] [] []    PROM [] [] [] []    Strength [x] [] [] []    Balance [] [] [x] [] Dynamic sitting poor   Posture [] [x] [] []    Sensation [] [] [] []    Coordination [] [] [] []    Tone [] [] [] []    Edema [] [] [] []    Activity Tolerance [] [] [x] []     [] [] [] []      COGNITION/  PERCEPTION: Intact Impaired   (see comments) Comments:   Orientation [] []    Vision [] []    Hearing [x] []    Command Following [] [x]    Safety Awareness [] [x]     [] []      MOBILITY: I Mod I S SBA CGA Min Mod Max Total  NT x2 Comments:   Bed Mobility    Rolling [] [] [] [] [] [] [] [] [] [] []    Supine to Sit [] [] [] [] [] [x] [] [] [] [] [x]    Scooting [] [] [] [] [] [x] [] [] [] [] [x]    Sit to Supine [] [] [] [] [] [] [] [] [] [] []    Transfers    Sit to Stand [] [] [] [] [] [x] [] [] [] [] [x]    Bed to Chair [] [] [] [] [] [x] [] [] [] [] [x]    Stand to Sit [] [] [] [] [] [x] [] [] [] [] [x]    I=Independent, Mod I=Modified Independent, S=Supervision, SBA=Standby Assistance, CGA=Contact Guard Assistance,   Min=Minimal Assistance, Mod=Moderate Assistance, Max=Maximal Assistance, Total=Total Assistance, NT=Not Tested  GAIT: I Mod I S SBA CGA Min Mod Max Total  NT x2 Comments:   Level of Assistance [] [] [] [] [] [x] [] [] [] [] [x]    Distance 2    DME Rolling Walker    Gait Quality Slow, short steps    Weightbearing Status N/A     I=Independent, Mod I=Modified Independent, S=Supervision, SBA=Standby Assistance, CGA=Contact Guard Assistance,   Min=Minimal Assistance, Mod=Moderate Assistance, Max=Maximal Assistance, Total=Total Assistance, NT=Not Tested    [place] Form       How much difficulty does the patient currently have. .. Unable A Lot A Little None   1. Turning over in bed (including adjusting bedclothes, sheets and blankets)? [] 1   [] 2   [x] 3   [] 4   2. Sitting down on and standing up from a chair with arms ( e.g., wheelchair, bedside commode, etc.)   [] 1   [] 2   [x] 3   [] 4   3. Moving from lying on back to sitting on the side of the bed? [] 1   [] 2   [x] 3   [] 4   How much help from another person does the patient currently need. .. Total A Lot A Little None   4. Moving to and from a bed to a chair (including a wheelchair)? [] 1   [] 2   [x] 3   [] 4   5. Need to walk in hospital room? [] 1   [] 2   [x] 3   [] 4   6. Climbing 3-5 steps with a railing? [] 1   [x] 2   [] 3   [] 4   © 2007, Trustees of [facility] [employer], under license to [employer]. All rights reserved     Score:  Initial: 17 Most Recent: X (Date: -- )    Interpretation of Tool:  Represents activities that are increasingly more difficult (i.e. Bed mobility, Transfers, Gait). PLAN:   FREQUENCY/DURATION: PT Plan of Care: 3 times/week for duration of hospital stay or until stated goals are met, whichever comes first.    PROBLEM LIST:   (Skilled intervention is medically necessary to address:)  1. Decreased Activity Tolerance  2. Decreased Balance  3. Decreased Cognition  4. Decreased Gait Ability  5.  Decreased Transfer Abilities   INTERVENTIONS PLANNED:   (Benefits and precautions of physical therapy have been discussed with the patient.)  1. Therapeutic Activity  2. Therapeutic Exercise/HEP  3. Neuromuscular Re-education  4. Gait Training  5. Manual Therapy  6. Education     TREATMENT:     EVALUATION: Moderate Complexity : (Untimed Charge)    TREATMENT:   ($$ Therapeutic Activity: 23-37 mins    )  Co-Treatment PT/OT necessary due to patient's decreased overall endurance/tolerance levels, as well as need for high level skilled assistance to complete functional transfers/mobility and functional tasks  Therapeutic Activity (23 Minutes): Therapeutic activity included Supine to Sit, Transfer Training, Sitting balance  and Standing balance to improve functional Mobility, Strength and Activity tolerance.     TREATMENT GRID:  N/A    AFTER TREATMENT POSITION/PRECAUTIONS:  Chair, Needs within reach, RN notified, Visitors at bedside and instructed wife to ask nursing to get patient back to bed when she leaves    INTERDISCIPLINARY COLLABORATION:  RN/PCT, PT/PTA and OT/ACUÑA    TOTAL TREATMENT DURATION:  PT Patient Time In/Time Out  Time In: 1323  Time Out: 3550 Mirna Wagner, PT, DPT

## 2021-10-29 NOTE — PROGRESS NOTES
Pt experiencing increased work of breathing and aggitation, RR 30, 93% on 7LNC, /86. Pt repositioned in bed. Dr. Evelina De Oliveira notified. Pt evaluated at bedside by Dr. Charline Aguilar, ordered stat chest XR, abg, solumedrol, viral respiratory panel and continuous pulse oximetry. Charge nurse updated on pt condition. Pt more comfortable, resting in bed, family at bedside.

## 2021-10-29 NOTE — PROGRESS NOTES
Comprehensive Nutrition Assessment    Type and Reason for Visit: Initial, Consult   Unintended weight loss (hospitalist)     Nutrition Recommendations/Plan:   Meals and Snacks:  Continue current diet. NPO. Advance as medically appropriate.  If pt unable to advance diet within 5-7 pursue nutrition support. EN recommended route of nutrition if consistent with goals of care. Malnutrition Assessment:  Malnutrition Status: At risk for malnutrition (specify) (NPO, Poor PO intake pta, N/V )    Nutrition Assessment:   Nutrition History: Nutrition history provided by pt's wife a bedside. Pt normally eats 3 small meals a day and will also have a protein shake per wifes request. Pt's wife reported poor PO intake at home since he was experiencing severe N/V. Pt's wife stated she has not noticed any wt loss. Nutrition Background: Pt with PMH significant for sarcoid, restrictive lung disease, and HTN. Presented with altered mental status and headache. Reported N/V for 2 weeks and intermittent abdominal pain. Pt admitted for acute metabolic encephalopathy. Daily Update:  Pt seen with wife at bedside. Pt's wife provided nutrition history as above and stated pt has been vomiting everything he has been eating. She stated pt will need a soft diet since he recently received dental implants. Pt's wife was open to including an ONS and stated pt is lactose intolerant. NPO following speech evaluation. Pt's wife stated she has not noticed any wt loss and 149# sounds like a normal weight for the pt. Nutrition Related Findings:   NFPE deferred d/t pt mental status. No apparent sigs of malnutrition. Current Nutrition Therapies:  DIET NPO    Current Intake:   Average Meal Intake: NPO Average Supplement Intake: None ordered      Anthropometric Measures:  Height: 5' 4\" (162.6 cm)  Current Body Wt: 67.6 kg (149 lb 0.5 oz) (10/29 ), Weight source: Bed scale  BMI: 25.6, Overweight (BMI 25.0-29. 9)     Ideal Body Weight (lbs) (Calculated): 130 lbs (59 kg), 114.6 %  Usual Body Wt:  (150-160# per pt's wife ),            Weight Loss Metrics 10/29/2021 10/28/2021 9/27/2021 7/26/2021   Today's Wt 149 lb  149 lb 9.6 oz 148 lb   BMI  25.58 kg/m2 25.68 kg/m2 25.4 kg/m2     Weight Loss Metrics 7/8/2021 1/25/2021 10/15/2020   Today's Wt 150 lb 159 lb 158 lb 12.8 oz   BMI 25.75 kg/m2 27.29 kg/m2 27.26 kg/m2     Per weights listed in EMR, 9#, 5% weight loss in 1 year. No significant weight loss. Edema: No data recorded     Estimated Daily Nutrient Needs:  Energy (kcal/day): 9166-1549 (Kcal/kg (20-25), Weight Used: Current (67.6 kg))  Protein (g/day): 67-85 (20% of kcal) Weight Used: (Current)  Fluid (ml/day):   (1 ml/kcal)    Nutrition Diagnosis:   · Inadequate oral intake related to cognitive or neurological impairment, altered GI function as evidenced by NPO or clear liquid status due to medical condition, poor intake prior to admission, nausea, vomiting (SLP eval deferred d/t mental status)    Nutrition Interventions:   Food and/or Nutrient Delivery: Continue NPO     Coordination of Nutrition Care: Continue to monitor while inpatient  Plan of Care discussed with Patient wife and Teressa Orourke RN     Goals: Active Goal: Tolerate and advance diet by 5 days    Nutrition Monitoring and Evaluation:      Food/Nutrient Intake Outcomes: Diet advancement/tolerance       Discharge Planning:     Too soon to determine    Cinthya Nettlesetic Intern   Contact: [phone]    Disaster Mode Active

## 2021-10-29 NOTE — PROGRESS NOTES
10/28/21 2000   Dual Skin Pressure Injury Assessment   Dual Skin Pressure Injury Assessment WDL   Second Care Provider (Based on [address] [place] [address]) NADIRA Simms   Skin Integumentary   Skin Integumentary (WDL) X    Pressure  Injury Documentation No Pressure Injury Noted-Pressure Ulcer Prevention Initiated   Skin Color Appropriate for ethnicity   Skin Condition/Temp Warm   Skin Integrity Wound (add Wound LDA)  (RLE skin tear)   Wound Prevention and Protection Methods   Orientation of Wound Prevention Posterior   Location of Wound Prevention Sacrum/Coccyx   Dressing Present  No   Wound Offloading (Prevention Methods) Bed, pressure reduction mattress

## 2021-10-29 NOTE — CONSULTS
[address]    Name:  Pb Thornton  MR#:  [id number]  :  1940  ACCOUNT #:  [de-identified]  DATE OF SERVICE:  10/29/2021    REASON FOR CONSULTATION:  Bradycardia. HISTORY OF PRESENT ILLNESS:  The patient is an 43-year-old gentleman who was admitted with altered mental status after a fall. He did not have a stroke on his neuro imaging. He has chronic shortness of breath from pulmonary hypertension which is unchanged. He denies any chest pain, shortness of breath, orthopnea, PND, palpitations or syncope. His fall was not associated with the loss of consciousness. Heart rate has at times decreased into the 30s and 40s. On telemetry, heart rate is now in the mid 50s with normal sinus rhythm. The patient was on Toprol at home which is currently being held. PAST MEDICAL HISTORY:  Includes restrictive lung disease, BPH, and sarcoid as well as dyslipidemia. SOCIAL HISTORY:  He quit smoking in . FAMILY HISTORY:  Negative for premature coronary disease. REVIEW OF SYSTEMS:  GENERAL:  No fevers or chills. HEAD:  No headache. NOSE:  No rhinorrhea. RESPIRATORY:  No cough. GI:  No nausea, vomiting or diarrhea. GENITOURINARY:  No dysuria. ENDOCRINE:  No temperature intolerance. MUSCULOSKELETAL:  No myalgias or arthrosis. SKIN:  No rashes. EYES:  No visual changes. NEUROLOGIC:  See above. PHYSICAL EXAMINATION:  VITAL SIGNS:  Blood pressure 153/79, heart rate 55. GENERAL:  A well-developed, well-nourished male, in no acute distress. Alert and oriented x3, with appropriate mood and affect. HEENT:  Sclerae are clear. CARDIOVASCULAR:  S1, S2 is regular. LUNGS:  Clear. ABDOMEN:  Soft. EXTREMITIES:  No edema. SKIN:  Warm and dry. NECK:  Supple. Trachea midline. MEDICATIONS:  Include Proscar 5 mg a day, Flovent inhaler, HCTZ which is on hold as well as Toprol XL 25 mg a day, and rosuvastatin 20 mg a day.   Other current medications include Stiolto, Protonix 40 mg a day, and Solu-Medrol 20 mg, SOLUMEDROL 20 twice a day IV. LABORATORY DATA:  White count of 10, hemoglobin is 11.1, platelets are 074. Potassium is 3.8, creatinine is 0.74. Troponin 9.5. ASSESSMENT:  An 51-year-old gentleman with some bradycardia in the setting of neurologic disturbance. He has improved. Currently, his heart rate has improved. I agree with holding the Toprol and we will follow his heart rate with you. Thank you for allowing us the opportunity to participate in the care of this patient.       MD COLLIN [id number]  D:  10/29/2021 12:44  T:  10/29/2021 13:54  JOB #:  [id number]

## 2021-10-29 NOTE — PROGRESS NOTES
SPEECH PATHOLOGY NOTE:    Speech therapy consult received and appreciated. Attempted to see patient this AM for bedside swallow evaluation, however not alert enough to participate. Will check back at later time/date as schedule permits and patient alert.        Marquis Melton, [facility], [facility] MAXIMINO COULTER, CCC-SLP

## 2021-10-29 NOTE — PROGRESS NOTES
LTG: Patient will tolerate least restrictive diet without overt signs or symptoms of airway compromise. STG: Patient will tolerate po trials without overt signs or symptoms of airway compromise. STG: Patient will participate in modified barium swallow study as clinically indicated. SPEECH LANGUAGE PATHOLOGY: DYSPHAGIA- Initial Assessment    NAME/AGE/GENDER: Moreno Driver is a 80 y.o. male  DATE: 10/29/2021  PRIMARY DIAGNOSIS: Acute metabolic encephalopathy [K99.47]      ICD-10: Treatment Diagnosis: R13.12 Dysphagia, Oropharyngeal Phase    RECOMMENDATIONS   DIET:   NPO   Ice chips with RN for pleasure    MEDICATIONS: Crushed in applesauce     ASPIRATION PRECAUTIONS  Oral care every 3 hours     COMPENSATORY STRATEGIES/MODIFICATIONS  Ensure patient is awake/alert for meds      RECOMMENDATIONS for CONTINUED SPEECH THERAPY:   YES: Anticipate need for ongoing speech therapy during this hospitalization. ASSESSMENT   Patient presents with concerns for oropharyngeal dysphagia due to decreased level of alertness and significantly increased work of breathing and wheezing across all trials of thin liquid. Adequate oral prep, transit, and clearance across all trials. Single swallow upon laryngeal palpation. Appears to tolerate tsps of applesauce for medication administration. Due to drowsiness and altered mental status, concerns for swallow safety and aspiration risk at this time. Recommend NPO for now. Ice chips with RN for pleasure. Meds crushed in applesauce as tolerated. Will follow-up for po trials in efforts to identify oral diet    EDUCATION:  Recommendations discussed with Patient and RN  CONTINUATION OF SKILLED SERVICES/MEDICAL NECESSITY:  Patient is expected to demonstrate progress in  swallow strength, swallow timeliness, swallow function and swallow safety in order to  improve swallow safety, work toward diet advancement and decrease aspiration risk.   Patient continues to require skilled intervention due to oropharyngeal dysphagia. REHABILITATION POTENTIAL FOR STATED GOALS: Good    PLAN    FREQUENCY/DURATION: Continue to follow patient 3 times a week for duration of hospital stay to address above goals. Recommendations for next treatment session: Next treatment session will address po trials    SUBJECTIVE   Reclined in chair. Drowsy. CNA at bedside. Oxygen Device: Nasal cannula 7L  Pain: Pain Scale 1: Numeric (0 - 10)  Pain Intensity 1: 0    History of Present Injury/Illness: Mr. Katia Encarnacion  has a past medical history of Agent orange exposure (1960s), Arthritis, Asthma, Body mass index (BMI) of 25.0 to 25.9 in adult (10/28/2021), Chronic obstructive pulmonary disease ([address] Chronic pain, GERD (gastroesophageal reflux disease), Hyperlipidemia (3/13/2006), Hypertension, Sarcoidosis (2001), and Sleep apnea. He also has no past medical history of Aneurysm ([address] Arrhythmia, Autoimmune disease ([address] CAD (coronary artery disease), Cancer ([address] Chronic kidney disease, Coagulation disorder ([address] Diabetes ([address] Endocarditis, Heart failure ([address] Liver disease, Nicotine vapor product user, Non-nicotine vapor product user, Psychiatric disorder, PUD (peptic ulcer disease), Rheumatic fever, Seizures ([address] Stroke ([address] Thromboembolus ([address] or Thyroid disease. Veron Money He also  has a past surgical history that includes hx tonsillectomy (as a child); hx other surgical (2006); pr chest surgery procedure unlisted (2001); hx hernia repair (Left, 2004); hx lap cholecystectomy (2006); hx hernia repair (Right, 1990's); hx orthopaedic (Right, 2014); and hx knee replacement (Left, 2016).  PRECAUTIONS/ALLERGIES: Lactose     Problem List:  (Impairments causing functional limitations):  Oropharyngeal dysphagia     Previous Dysphagia: None reported   Diet Prior to Evaluation: NPO    Orientation:  Person  Place([place], [place])    Cognitive-Linguistic Screening:  Alertness  Drowsy, but arousable with minimal cues  Speech Production:   Fully intelligible reduced breath support with dysphonia at times in connected speech  Expressive Language:  Fluent speech and Able to effectively communicate wants and needs  Receptive Language: Answers yes/no questions appropriately and Follows commands  Cognition:   Confused due to altered mentation. Unable to provide information into PLOF/symptoms leading up to hospitalization  Prior Level of Function: Lives at home with his wife. Recommendations: Given results of screening, patient appears to be functioning below baseline with altered mental status. MRI negative for stroke. Do not anticipate cognitive-linguistic evaluation. OBJECTIVE   Oral Motor:   Labial: No impairment  Dentition: Intact  Oral Hygiene: Dry  Lingual: No impairment    Dysphagia evaluation:   Patient consumed trials of ice chips (x2), thin by tsp, thin by cup, and tsps of applesauce. Adequate oral prep, transit, and clearance across all trials. Single swallow upon palpation of larynx. Increased work of breathing and wheezing noted after the swallow across all trials of thin. Throughout session, fluctuating mentation and required moderate cues to maintain alertness. Appeared to tolerate applesauce and deemed appropriate for medication administration.      Tool Used: Dysphagia Outcome and Severity Scale (JESSICA)    Score Comments   Normal Diet  [] 7 With no strategies or extra time needed   Functional Swallow  [] 6 May have mild oral or pharyngeal delay   Mild Dysphagia  [] 5 Which may require one diet consistency restricted    Mild-Moderate Dysphagia  [] 4 With 1-2 diet consistencies restricted   Moderate Dysphagia  [] 3 With 2 or more diet consistencies restricted   Moderate-Severe Dysphagia  [x] 2 With partial PO strategies (trials with ST only)   Severe Dysphagia  [] 1 With inability to tolerate any PO safely      Score:  Initial: 2 Most Recent: X (Date 10/29/21 )   Interpretation of Tool: The Dysphagia Outcome and Severity Scale (JESSICA) is a simple, easy-to-use, 7-point scale developed to systematically rate the functional severity of dysphagia based on objective assessment and make recommendations for diet level, independence level, and type of nutrition.      INTERDISCIPLINARY COLLABORATION: RN and CNA    After treatment position/precautions:  Upright in chair  Call light within reach  RN notified    Total Treatment Duration:   Time In: 7332  Time Out: Ramya Frederick, SLP Student

## 2021-10-30 PROBLEM — R51.9 HEADACHE: Status: ACTIVE | Noted: 2021-01-01

## 2021-10-30 NOTE — PROGRESS NOTES
[facility] CARDIOLOGY PROGRESS NOTE           10/30/2021 1:01 PM    Admit Date: 10/28/2021      Subjective:   Patient alert and interactive. Able to give history with the help of his wife. Still feels poorly due to a headache. No dizziness, lightheadedness or syncope. Heart rate has improved off of beta-blocker therapy. Currently 55-65 on telemetry. Sinus rhythm with no AV block. Review of test results show left axillary mass based on CT assessment. CTA of chest:  Multiple large masses in the left axilla. Differential considerations include  lymphoma and metastatic disease.         ROS:  Cardiovascular:  As noted above    Objective:      Vitals:    10/30/21 0425 10/30/21 0733 10/30/21 0828 10/30/21 1147   BP: (!) 146/81  (!) 107/56 114/73   Pulse: 61  (!) 55 (!) 52   Resp: 18  20 24   Temp: 97.4 °F (36.3 °C)  98.2 °F (36.8 °C) 97.6 °F (36.4 °C)   SpO2: 99% 98% 95% 98%   Weight:       Height:           Physical Exam:  General-No Acute Distress  Neck- supple, no JVD  CV- regular rate and rhythm no MRG  Lung- clear bilaterally  Abd- soft, nontender, nondistended  Ext- no edema bilaterally. Skin- warm and dry      Data Review:   Recent Labs     10/30/21  0918 10/29/21  1227 10/28/21  1218 10/28/21  1218   NA  --  131*  --  131*   K  --  4.4  --  3.8   MG 2.3 2.2   < > 2.1   BUN  --  11  --  16   CREA  --  0.73*  --  0.74*   GLU  --  126*  --  115*   WBC  --  8.1  --  10.0   HGB  --  12.3*  --  11.1*   HCT  --  38.0*  --  35.5*   PLT  --  221  --  233   INR  --   --   --  1.1    < > = values in this interval not displayed. No results found for: KT AvelarOC    Assessment/Plan:     Principal Problem:    Acute metabolic encephalopathy (72/52/7623)  Appears improved. Continue supportive care. No evidence of CVA on CT scan. Active Problems:    Axillary mass, left (10/28/2021)  Concerning for malignancy. Plans for biopsy on Monday.   Low risk from a cardiovascular standpoint for this procedure         Sarcoidosis (3/13/2006)  On prednisone. Bradycardia (10/28/2021)  Resolved off of beta-blocker and with improvement in his overall condition. Suspect likely increased vagal tone with this illness. However, no strong indication for continued beta-blocker use. Would avoid this in the future. Hyponatremia (10/28/2021)  Stop diuretic therapy. Electrolyte or fluid disorder (10/28/2021)  Defer to primary team      Hyperglycemia, drug-induced (10/28/2021)  Sliding scale insulin      Headache (88/61/2372)  Uncertain etiology. No abnormalities on CT scan.                   Caron Galvan MD  10/30/2021 1:01 PM

## 2021-10-30 NOTE — PROGRESS NOTES
TRANSFER - IN REPORT:    Verbal report received from Landen Nascimento RN(name) on Karlie Kiss  being received from [facility](unit) for routine progression of care. Report consisted of patients Situation, Background, Assessment and   Recommendations(SBAR). Information from the following report(s) SBAR was reviewed with the receiving nurse. Opportunity for questions and clarification was provided. Assessment completed upon patients arrival to unit and care assumed.

## 2021-10-30 NOTE — PROGRESS NOTES
Hospitalist Progress Note   Admit Date:  10/28/2021 12:07 PM   Name:  Daniel Coleman   Age:  80 y.o. Sex:  male  :  1940   MRN:  [id number]   [place]  [facility] [place]    Presenting Complaint: Slow Heart Rate    Reason(s) for Admission: Acute metabolic encephalopathy [A97.41]     Hospital Course & Interval History:     Daniel Coleman is a 80 y.o. male with medical history of sarcoid, restrictive lung disease, hypertension who presented with headache, altered mental status. On initial presentation to the emergency department he was complaining of a headache and able to speak in short sentences. At the time of my interview he was sedated and not following command. History provided by wife. For approximately last 2 weeks been feeling ill with progressive nausea and vomiting. He reported intermittent nonsevere abdominal pain. His wife reports no fevers, chills or rigors. She said that he did not complain of vision changes, chest pain, changes in urinary output, changes in bowel movements (some loose stools), peripheral edema. In the emergency department CT head chest abdomen pelvis did not demonstrate acute process, however did show chronic cerebral atrophy left-sided maxillary mass and chronic parenchymal lung disease. He was admitted 10/28 for further evaluation and management. Subjective (10/29/21):    Pt's wife Wilmar Casanova at bedside. Pt is lethargic but will follow commands and answer questions. He is oriented x person/place and knows president. Slow to answer questions. Still with dull frontal headache, no vision changes. Assessment & Plan:     # Acute metabolic Encephalopathy  # Delirium  - CT head/neck non acute  - MRI brain non acute  - TFT's wnl  - Neurology has seen   - PT/OT  10/29 - check UA, ammonia. Add prn seroquel.      # Left axillary mass  - IR consulted,   10/29 - procedure planned for this morning, deferred secondary to bradycardia    # Bradycardia  - Cardiology has seen  - holding coreg  - Echo reviewed - EF 60%, mild TVR, RVSP 54  - continue remote telemetry, rates now in 40/50's (was 31-40 on admit)    # acute/chronic respiratory failure  # Sarcoidosis  - baseline o2 requirement of 2L NC, requiring 5-6L NC  - Check ABG in AM (hx of untreated CAITLIN?)  - continue MDI's  - Change IV steroids to Pred 40mg daily (wheezing on admission)    # hyponatremia  - continue LR, Na 131, repeat BMP in AM    # SIHG  - continue SSI    # DLP  - statin    # BPH  - proscar          Dispo/Discharge Planning:      Suspect will need STR, check PPD. Diet:  DIET NPO  DVT PPx: hep sq  Code status: Full Code    Hospital Problems as of 10/29/2021 Date Reviewed: 10/28/2021        Codes Class Noted - Resolved POA    Axillary mass, left ICD-10-CM: R22.32  ICD-9-CM: 782.2  10/28/2021 - Present Yes        Nausea & vomiting ICD-10-CM: R11.2  ICD-9-CM: 787.01  10/28/2021 - Present Yes        Body mass index (BMI) of 25.0 to 25.9 in adult ICD-10-CM: Z68.25  ICD-9-CM: V85.21  10/28/2021 - Present Yes        * (Principal) Acute metabolic encephalopathy JLD-[id number]: G93.41  ICD-9-CM: 348.31  10/28/2021 - Present Yes        Bradycardia with 31-40 beats per minute ICD-10-CM: R00.1  ICD-9-CM: 427.89  10/28/2021 - Present No        Hyponatremia ICD-10-CM: E87.1  ICD-9-CM: 276.1  10/28/2021 - Present Yes        Electrolyte or fluid disorder ICD-10-CM: E87.8  ICD-9-CM: 276.9  10/28/2021 - Present Yes        Hyperglycemia, drug-induced ICD-10-CM: R73.9, T50.905A  ICD-9-CM: 790.29, E947.9  10/28/2021 - Present Yes        Restrictive lung disease (Chronic) ICD-10-CM: J98.4  ICD-9-CM: 518.89  4/1/2015 - Present Yes    Overview Signed 4/1/2015  9:30 AM by Jhon Robby. Residual from Sarcoidosis             Dyslipidemia (Chronic) ICD-10-CM: E78.5  ICD-9-CM: 272.4  3/13/2006 - Present Yes    Overview Signed 3/21/2019  8:31 AM by Mary Napoles MD     Lexiscan Cardiolite (3/19/19):  EF 70%. Normal perfusion. Sarcoidosis (Chronic) ICD-10-CM: D86.9  ICD-9-CM: 135  3/13/2006 - Present Yes        Benign prostatic hyperplasia (Chronic) ICD-10-CM: N40.0  ICD-9-CM: 600.00  3/13/2006 - Present Yes              Objective:     Patient Vitals for the past 24 hrs:   Temp Pulse Resp BP SpO2   10/29/21 2022 98.5 °F (36.9 °C) (!) 54 22 134/72 100 %   10/29/21 1536 97.6 °F (36.4 °C) (!) 57 28 118/64 100 %   10/29/21 1135 98.2 °F (36.8 °C) (!) 55 20 (!) 153/79 100 %   10/29/21 1020 -- -- -- (!) 142/73 --   10/29/21 0802 98.3 °F (36.8 °C) (!) 54 30 (!) 158/81 97 %   10/29/21 0735 -- (!) 58 30 136/66 98 %   10/29/21 0520 98.5 °F (36.9 °C) 89 (!) 32 137/88 91 %   10/29/21 0400 -- 64 -- -- --   10/29/21 0216 -- -- -- -- 98 %   10/29/21 0121 98.6 °F (37 °C) 61 30 (!) 146/83 93 %   10/29/21 0031 -- -- -- -- 98 %   10/29/21 0001 99.4 °F (37.4 °C) (!) 55 22 (!) 135/57 98 %   10/29/21 0000 -- (!) 54 -- -- --     Oxygen Therapy  O2 Sat (%): 100 % (10/29/21 2022)  Pulse via Oximetry: 54 beats per minute (10/29/21 2022)  O2 Device: Nasal cannula (10/29/21 2022)  Skin Assessment: Clean, dry, & intact (10/29/21 2022)  Skin Protection for O2 Device: No (10/29/21 2022)  O2 Flow Rate (L/min): 5.5 l/min (10/29/21 2022)    Estimated body mass index is 25.58 kg/m² as calculated from the following:    Height as of this encounter: 5' 4\" (1.626 m). Weight as of this encounter: 67.6 kg (149 lb). Intake/Output Summary (Last 24 hours) at 10/29/2021 2229  Last data filed at 10/29/2021 1803  Gross per 24 hour   Intake --   Output 1100 ml   Net -1100 ml         Physical Exam:     Blood pressure 134/72, pulse (!) 54, temperature 98.5 °F (36.9 °C), resp. rate 22, height 5' 4\" (1.626 m), weight 67.6 kg (149 lb), SpO2 100 %. General:    Well nourished. lethargic  Head:  Normocephalic, atraumatic  Eyes:  Sclerae appear normal.  Pupils equally round. ENT:  Nares appear normal, no drainage. Moist oral mucosa  Neck:  No restricted ROM.   Trachea midline   CV:   RRR. No m/r/g. No jugular venous distension. Lungs:   Diminished bilaterally, No wheezing, rhonchi, or rales. Respirations even, unlabored  Abdomen: Bowel sounds present. Soft, nontender, nondistended. Extremities: No cyanosis or clubbing. No edema  Skin:     No rashes and normal coloration. Warm and dry. Neuro:  CN II-XII grossly intact. Sensation intact. A&Ox3  Psych:  Normal mood and affect. I have reviewed ordered lab tests and independently visualized imaging below:    Recent Labs:  Recent Results (from the past 48 hour(s))   METABOLIC PANEL, COMPREHENSIVE    Collection Time: 10/28/21 12:18 PM   Result Value Ref Range    Sodium 131 (L) 138 - 145 mmol/L    Potassium 3.8 3.5 - 5.1 mmol/L    Chloride 96 (L) 98 - 107 mmol/L    CO2 30 21 - 32 mmol/L    Anion gap 5 (L) 7 - 16 mmol/L    Glucose 115 (H) 65 - 100 mg/dL    BUN 16 8 - 23 MG/DL    Creatinine 0.74 (L) 0.8 - 1.5 MG/DL    GFR est AA >60 >60 ml/min/1.73m2    GFR est non-AA >60 >60 ml/min/1.73m2    Calcium 8.8 8.3 - 10.4 MG/DL    Bilirubin, total 0.5 0.2 - 1.1 MG/DL    ALT (SGPT) 29 12 - 65 U/L    AST (SGOT) 39 (H) 15 - 37 U/L    Alk.  phosphatase 36 (L) 50 - 136 U/L    Protein, total 6.2 (L) 6.3 - 8.2 g/dL    Albumin 2.7 (L) 3.2 - 4.6 g/dL    Globulin 3.5 2.3 - 3.5 g/dL    A-G Ratio 0.8 (L) 1.2 - 3.5     CBC WITH AUTOMATED DIFF    Collection Time: 10/28/21 12:18 PM   Result Value Ref Range    WBC 10.0 4.3 - 11.1 K/uL    RBC 3.81 (L) 4.23 - 5.6 M/uL    HGB 11.1 (L) 13.6 - 17.2 g/dL    HCT 35.5 (L) 41.1 - 50.3 %    MCV 93.2 79.6 - 97.8 FL    MCH 29.1 26.1 - 32.9 PG    MCHC 31.3 (L) 31.4 - 35.0 g/dL    RDW 14.1 11.9 - 14.6 %    PLATELET 606 954 - 084 K/uL    MPV 9.0 (L) 9.4 - 12.3 FL    ABSOLUTE NRBC 0.00 0.0 - 0.2 K/uL    DF AUTOMATED      NEUTROPHILS 83 (H) 43 - 78 %    LYMPHOCYTES 5 (L) 13 - 44 %    MONOCYTES 10 4.0 - 12.0 %    EOSINOPHILS 1 0.5 - 7.8 %    BASOPHILS 0 0.0 - 2.0 %    IMMATURE GRANULOCYTES 1 0.0 - 5.0 % ABS. NEUTROPHILS 8.3 (H) 1.7 - 8.2 K/UL    ABS. LYMPHOCYTES 0.5 0.5 - 4.6 K/UL    ABS. MONOCYTES 1.0 0.1 - 1.3 K/UL    ABS. EOSINOPHILS 0.1 0.0 - 0.8 K/UL    ABS. BASOPHILS 0.0 0.0 - 0.2 K/UL    ABS. IMM.  GRANS. 0.1 0.0 - 0.5 K/UL   PROTHROMBIN TIME + INR    Collection Time: 10/28/21 12:18 PM   Result Value Ref Range    Prothrombin time 14.1 12.6 - 14.5 sec    INR 1.1     MAGNESIUM    Collection Time: 10/28/21 12:18 PM   Result Value Ref Range    Magnesium 2.1 1.8 - 2.4 mg/dL   TROPONIN-HIGH SENSITIVITY    Collection Time: 10/28/21 12:18 PM   Result Value Ref Range    Troponin-High Sensitivity 9.5 0 - 14 pg/mL   PHOSPHORUS    Collection Time: 10/28/21 12:18 PM   Result Value Ref Range    Phosphorus 2.9 2.3 - 3.7 MG/DL   HEMOGLOBIN A1C WITH EAG    Collection Time: 10/28/21 12:18 PM   Result Value Ref Range    Hemoglobin A1c 6.2 4.2 - 6.3 %    Est. average glucose 131 mg/dL   EKG, 12 LEAD, INITIAL    Collection Time: 10/28/21  1:00 PM   Result Value Ref Range    Ventricular Rate 53 BPM    Atrial Rate 53 BPM    P-R Interval 176 ms    QRS Duration 82 ms    Q-T Interval 448 ms    QTC Calculation (Bezet) 420 ms    Calculated P Axis 49 degrees    Calculated R Axis -18 degrees    Calculated T Axis 57 degrees    Diagnosis       Sinus bradycardia  Borderline ECG  When compared with ECG of 28-OCT-2021 12:10,  Previous ECG has undetermined rhythm, needs review  T wave inversion no longer evident in Inferior leads  QT has shortened  Confirmed by Ade Lazo MD (), DO LASSITER ([id number]) on 10/28/2021 2:05:22 PM     RPR    Collection Time: 10/28/21  4:09 PM   Result Value Ref Range    RPR NONREACTIVE NR     TSH 3RD GENERATION    Collection Time: 10/28/21  4:09 PM   Result Value Ref Range    TSH 0.583 0.358 - 3.740 uIU/mL   T4, FREE    Collection Time: 10/28/21  4:09 PM   Result Value Ref Range    T4, Free 1.1 0.78 - 1.46 NG/DL   VITAMIN B12    Collection Time: 10/28/21  4:09 PM   Result Value Ref Range    Vitamin B12 328 193 - 987 pg/mL   FOLATE    Collection Time: 10/28/21  4:09 PM   Result Value Ref Range    Folate 34.2 (H) 3.1 - 17.5 ng/mL   CORTISOL, BASELINE    Collection Time: 10/28/21  4:09 PM   Result Value Ref Range    Cortisol, baseline 11.8 ug/dL   HIV 1/2 AG/AB, 4TH GENERATION,W RFLX CONFIRM    Collection Time: 10/28/21  4:09 PM   Result Value Ref Range    HIV 1/2 Interpretation NONREACTIVE NR      HIV 1/2 result comment SEE NOTE (A) NR     NT-PRO BNP    Collection Time: 10/28/21  4:09 PM   Result Value Ref Range    NT pro- (H) <450 PG/ML   LACTIC ACID    Collection Time: 10/28/21  4:15 PM   Result Value Ref Range    Lactic acid 0.8 0.4 - 2.0 MMOL/L   CULTURE, BLOOD    Collection Time: 10/28/21  8:21 PM    Specimen: Blood   Result Value Ref Range    Special Requests: RIGHT ANTECUBITAL      Culture result: NO GROWTH AFTER 10 HOURS     CULTURE, BLOOD    Collection Time: 10/28/21  8:21 PM    Specimen: Blood   Result Value Ref Range    Special Requests: LEFT HAND      Culture result: NO GROWTH AFTER 10 HOURS     COVID-19 RAPID TEST    Collection Time: 10/28/21 11:59 PM   Result Value Ref Range    Specimen source NASAL      COVID-19 rapid test Not detected NOTD     SARS-COV-2, PCR    Collection Time: 10/28/21 11:59 PM    Specimen: Nasopharyngeal   Result Value Ref Range    Specimen source Nasopharyngeal      SARS-CoV-2 Not detected NOTD     GLUCOSE, POC    Collection Time: 10/29/21 12:09 AM   Result Value Ref Range    Glucose (POC) 75 65 - 100 mg/dL    [facility]    BLOOD GAS, ARTERIAL POC    Collection Time: 10/29/21  2:12 AM   Result Value Ref Range    Device: NASAL CANNULA      pH (POC) 7.39 7.35 - 7.45      pCO2 (POC) 47.7 (H) 35 - 45 MMHG    pO2 (POC) 109 (H) 75 - 100 MMHG    HCO3 (POC) 28.6 (H) 22 - 26 MMOL/L    sO2 (POC) 98.1 (H) 95 - 98 %    Base excess (POC) 2.8 mmol/L    Allens test (POC) Positive      Site RIGHT RADIAL      Specimen type (POC) ARTERIAL      [facility]     FIO2, L/min 6     RESPIRATORY VIRUS PANEL W/COVID-19, PCR    Collection Time: 10/29/21  2:20 AM    Specimen: Nasopharyngeal   Result Value Ref Range    Adenovirus NOT DETECTED NOTDET      Coronavirus 229E NOT DETECTED NOTDET      Coronavirus HKU1 NOT DETECTED NOTDET      Coronavirus CVNL63 NOT DETECTED NOTDET      Coronavirus OC43 NOT DETECTED NOTDET      SARS-CoV-2, PCR NOT DETECTED NOTDET      Metapneumovirus NOT DETECTED NOTDET      Rhinovirus and Enterovirus Detected (A) NOTDET      Influenza A NOT DETECTED NOTDET      Influenza B NOT DETECTED NOTDET      Parainfluenza 1 NOT DETECTED NOTDET      Parainfluenza 2 NOT DETECTED NOTDET      Parainfluenza 3 NOT DETECTED NOTDET      Parainfluenza virus 4 NOT DETECTED NOTDET      RSV by PCR NOT DETECTED NOTDET      B. parapertussis, PCR NOT DETECTED NOTDET      Bordetella pertussis - PCR NOT DETECTED NOTDET      Chlamydophila pneumoniae DNA, QL, PCR NOT DETECTED NOTDET      Mycoplasma pneumoniae DNA, QL, PCR NOT DETECTED NOTDET     GLUCOSE, POC    Collection Time: 10/29/21  5:14 AM   Result Value Ref Range    Glucose (POC) 113 (H) 65 - 100 mg/dL    Performed by [facility]    ECHO ADULT COMPLETE    Collection Time: 10/29/21  9:15 AM   Result Value Ref Range    Left Atrium Minor Axis 3.39 cm    Left Atrium Major Axis 5.87 cm    LA Area 2C 19.20 cm2    LA Area 4C 21.60 cm2    LV ED Vol A4C 33.50 cm3    LV ES Vol A4C 14.70 cm3    IVSd 1.05 (A) 0.60 - 1.00 cm    LVIDd 3.73 (A) 4.20 - 5.90 cm    LVIDs 2.54 cm    LVOT d 2.00 cm    LVOT VTI 35.90 cm    LVOT Peak Gradient 10.00 mmHg    LVPWd 1.07 (A) 0.60 - 1.00 cm    LV E' Lateral Velocity 9.38 cm/s    LV E' Septal Velocity 9.48 cm/s    AV Cusp 1.30 cm    Aortic Valve Systolic Mean Gradient 86.02 mmHg    AoV VTI 54.60 cm    Aortic Valve Systolic Peak Velocity 302.42 cm/s    AoV PG 30.00 mmHg    Aortic Valve Area by Continuity of VTI 2.06 cm2    Aortic Valve Area by Continuity of Peak Velocity 1.79 cm2    AO ASC D 3.60 cm    Mitral Valve E Wave Deceleration Time 194.00 ms    MV A Royer 186.00 cm/s    MV E Royer 142.00 cm/s    MV Mean Gradient 4.00 mmHg    Mitral Valve Annulus Velocity Time Integral 56.30 cm    Mitral Valve Max Velocity 208.00 cm/s    MV Peak Gradient 17.00 mmHg    RVIDd 2.80 cm    Tapse 2.38 (A) 1.50 - 2.00 cm    TR Max Velocity 339.00 cm/s    Triscuspid Valve Regurgitation Peak Gradient 46.00 mmHg    MV E/A 0.76     LV Mass .0 88.0 - 224.0 g    LV Mass AL Index 71.7 49.0 - 115.0 g/m2    E/E' lateral 15.14     E/E' septal 14.98     RVSP 54.0 mmHg    E/E' ratio (averaged) 15.06     Est. RA Pressure 8.0 mmHg    DONN/BSA Pk Royer 1.0 cm2/m2    DONN/BSA VTI 1.2 cm2/m2   GLUCOSE, POC    Collection Time: 10/29/21 10:59 AM   Result Value Ref Range    Glucose (POC) 122 (H) 65 - 100 mg/dL    Performed by [facility]    EKG, 12 LEAD, INITIAL    Collection Time: 10/29/21 11:19 AM   Result Value Ref Range    Ventricular Rate 43 BPM    Atrial Rate 43 BPM    P-R Interval 152 ms    QRS Duration 78 ms    Q-T Interval 486 ms    QTC Calculation (Bezet) 410 ms    Calculated P Axis 22 degrees    Calculated R Axis -12 degrees    Calculated T Axis 24 degrees    Diagnosis       Marked sinus bradycardia  Abnormal ECG  No previous ECGs available  Confirmed by [facility]  MD ()DO ([id number]) on 10/29/2021 1:41:23 PM     CBC W/O DIFF    Collection Time: 10/29/21 12:27 PM   Result Value Ref Range    WBC 8.1 4.3 - 11.1 K/uL    RBC 4.06 (L) 4.23 - 5.6 M/uL    HGB 12.3 (L) 13.6 - 17.2 g/dL    HCT 38.0 (L) 41.1 - 50.3 %    MCV 93.6 79.6 - 97.8 FL    MCH 30.3 26.1 - 32.9 PG    MCHC 32.4 31.4 - 35.0 g/dL    RDW 14.0 11.9 - 14.6 %    PLATELET 610 618 - 897 K/uL    MPV 9.0 (L) 9.4 - 12.3 FL    ABSOLUTE NRBC 0.00 0.0 - 0.2 K/uL   METABOLIC PANEL, BASIC    Collection Time: 10/29/21 12:27 PM   Result Value Ref Range    Sodium 131 (L) 138 - 145 mmol/L    Potassium 4.4 3.5 - 5.1 mmol/L    Chloride 96 (L) 98 - 107 mmol/L    CO2 33 (H) 21 - 32 mmol/L    Anion gap 2 (L) 7 - 16 mmol/L    Glucose 126 (H) 65 - 100 mg/dL    BUN 11 8 - 23 MG/DL    Creatinine 0.73 (L) 0.8 - 1.5 MG/DL    GFR est AA >60 >60 ml/min/1.73m2    GFR est non-AA >60 >60 ml/min/1.73m2    Calcium 9.0 8.3 - 10.4 MG/DL   MAGNESIUM    Collection Time: 10/29/21 12:27 PM   Result Value Ref Range    Magnesium 2.2 1.8 - 2.4 mg/dL   GLUCOSE, POC    Collection Time: 10/29/21  5:56 PM   Result Value Ref Range    Glucose (POC) 113 (H) 65 - 100 mg/dL    Performed by John Hedrick        All Micro Results     Procedure Component Value Units Date/Time    SARS-COV-2, PCR [[id number]] Collected: 10/28/21 2359    Order Status: Completed Specimen: Nasopharyngeal Updated: 10/29/21 0916     Specimen source Nasopharyngeal        SARS-CoV-2 Not detected        Comment:      The specimen is NEGATIVE for SARS-CoV-2, the novel coronavirus associated with COVID-19. This test has been authorized by the FDA under an Emergency Use Authorization (EUA) for use by authorized laboratories.         Fact sheet for Healthcare Providers: ConventionUpdate.co.nz       Fact sheet for Patients: ConventionUpdate.co.nz       Methodology: RT-PCR         CULTURE, BLOOD [[id number]] Collected: 10/28/21 2021    Order Status: Completed Specimen: Blood Updated: 10/29/21 0804     Special Requests: RIGHT ANTECUBITAL        Culture result: NO GROWTH AFTER 10 HOURS       CULTURE, BLOOD [[id number]] Collected: 10/28/21 2021    Order Status: Completed Specimen: Blood Updated: 10/29/21 0804     Special Requests: LEFT HAND        Culture result: NO GROWTH AFTER 10 HOURS       RESPIRATORY VIRUS PANEL W/COVID-19, PCR [[id number]]  (Abnormal) Collected: 10/29/21 0220    Order Status: Completed Specimen: Nasopharyngeal Updated: 10/29/21 0423     Adenovirus NOT DETECTED        Coronavirus 229E NOT DETECTED        Coronavirus HKU1 NOT DETECTED        Coronavirus CVNL63 NOT DETECTED        Coronavirus OC43 NOT DETECTED SARS-CoV-2, PCR NOT DETECTED        Metapneumovirus NOT DETECTED        Rhinovirus and Enterovirus Detected        Influenza A NOT DETECTED        Influenza B NOT DETECTED        Parainfluenza 1 NOT DETECTED        Parainfluenza 2 NOT DETECTED        Parainfluenza 3 NOT DETECTED        Parainfluenza virus 4 NOT DETECTED        RSV by PCR NOT DETECTED        B. parapertussis, PCR NOT DETECTED        Bordetella pertussis - PCR NOT DETECTED        Chlamydophila pneumoniae DNA, QL, PCR NOT DETECTED        Mycoplasma pneumoniae DNA, QL, PCR NOT DETECTED       COVID-19 RAPID TEST [[id number]] Collected: 10/28/21 4933    Order Status: Completed Specimen: Nasopharyngeal Updated: 10/29/21 0034     Specimen source NASAL        COVID-19 rapid test Not detected        Comment:      The specimen is NEGATIVE for SARS-CoV-2, the novel coronavirus associated with COVID-19. A negative result does not rule out COVID-19. This test has been authorized by the FDA under an Emergency Use Authorization (EUA) for use by authorized laboratories. Fact sheet for Healthcare Providers: Convention[employer] Codedate.co.nz  Fact sheet for Patients: [employer] Codedate.co.nz       Methodology: Isothermal Nucleic Acid Amplification               Other Studies:  XR CHEST SNGL V    Result Date: 10/29/2021   Portable view of the chest COMPARISON: Yesterday. Clinical history respiratory distress. FINDINGS: Bilateral ill-defined perihilar densities. There are bilateral interstitial densities. There is small left pleural effusion. There is no pneumothorax. Heart is enlarged. Surrounding bones are intact. 1. Bilateral ill-defined perihilar densities and interstitial densities, appearing similar dating back to March 2020 CT. Findings most likely pulmonary manifestation of sarcoidosis, given the provided history of sarcoidosis.  2. New focal density noted in the lower segment of the left upper lobe, not seen on the March 9, 2020 CT. This may be secondary to underlying sarcoid or possibly representing a focus of pneumonia. ECHO ADULT COMPLETE    Result Date: 10/29/2021  · TV: Mild tricuspid valve regurgitation is present. Right Ventricular Arterial Pressure (RVSP) is 54 mmHg. · Image quality for this study was technically difficult. · Echo study was technically difficulty. · LV: Estimated LVEF is 55 - 60%. Normal cavity size, systolic function (ejection fraction normal) and diastolic function. Mild concentric hypertrophy. Wall motion: normal. · LA: Moderately dilated left atrium. · RA: Mildly dilated right atrium. · AV: Mild aortic valve regurgitation is present. · MV: Mild mitral valve regurgitation is present. · PV: Mild pulmonic valve regurgitation is present.         Current Meds:  Current Facility-Administered Medications   Medication Dose Route Frequency    dextrose 5% and 0.9% NaCl infusion  125 mL/hr IntraVENous CONTINUOUS    methylPREDNISolone (PF) (SOLU-MEDROL) injection 20 mg  20 mg IntraVENous Q12H    lip protectant (BLISTEX) ointment 1 Each  1 Each Topical PRN    melatonin tablet 3 mg  3 mg Oral QHS    finasteride (PROSCAR) tablet 5 mg  5 mg Oral QHS    [Held by provider] rosuvastatin (CRESTOR) tablet 20 mg  20 mg Oral QHS    pantoprazole (PROTONIX) 40 mg in 0.9% sodium chloride 10 mL injection  40 mg IntraVENous DAILY    sodium chloride (NS) flush 5-40 mL  5-40 mL IntraVENous Q8H    sodium chloride (NS) flush 5-40 mL  5-40 mL IntraVENous PRN    acetaminophen (TYLENOL) tablet 650 mg  650 mg Oral Q6H PRN    Or    acetaminophen (TYLENOL) suppository 650 mg  650 mg Rectal Q6H PRN    polyethylene glycol (MIRALAX) packet 17 g  17 g Oral DAILY PRN    ondansetron (ZOFRAN ODT) tablet 4 mg  4 mg Oral Q8H PRN    Or    ondansetron (ZOFRAN) injection 4 mg  4 mg IntraVENous Q6H PRN    insulin lispro (HUMALOG) injection   SubCUTAneous Q6H    dextrose 40% (GLUTOSE) oral gel 1 Tube  15 g Oral PRN    glucagon (GLUCAGEN) injection 1 mg  1 mg IntraMUSCular PRN    dextrose (D50W) injection syrg 12.5-25 g  25-50 mL IntraVENous PRN    tiotropium-olodateroL (STIOLTO RESPIMAT) 2.5-2.5 mcg/actuation inhaler 2 Puff  2 Puff Inhalation DAILY    fluticasone (FLOVENT HFA) 110 mcg inhaler  1 Puff Inhalation DAILY    tuberculin injection 5 Units  5 Units IntraDERMal ONCE    [Held by provider] metoprolol succinate (TOPROL-XL) XL tablet 25 mg  25 mg Oral DAILY    [Held by provider] hydroCHLOROthiazide (HYDRODIURIL) tablet 12.5 mg  12.5 mg Oral DAILY    heparin (porcine) injection 5,000 Units  5,000 Units SubCUTAneous Q8H    albuterol (PROVENTIL HFA, VENTOLIN HFA, PROAIR HFA) inhaler 2 Puff  2 Puff Inhalation Q4H PRN       Signed:  Abi Wekes DO    Part of this note may have been written by using a voice dictation software. The note has been proof read but may still contain some grammatical/other typographical errors.

## 2021-10-30 NOTE — PROGRESS NOTES
Hospitalist Progress Note   Admit Date:  10/28/2021 12:07 PM   Name:  Ramona Schwartz   Age:  80 y.o. Sex:  male  :  1940   MRN:  [id number]   Room:  [facility]/    Presenting Complaint: Slow Heart Rate    Reason(s) for Admission: Acute metabolic encephalopathy [R90.70]     Hospital Course & Interval History:     Ramona Schwartz is a 80 y.o. male with medical history of sarcoid, restrictive lung disease, hypertension who presented with headache, altered mental status. On initial presentation to the emergency department he was complaining of a headache and able to speak in short sentences. At the time of my interview he was sedated and not following command. History provided by wife. For approximately last 2 weeks been feeling ill with progressive nausea and vomiting. He reported intermittent nonsevere abdominal pain. His wife reports no fevers, chills or rigors. She said that he did not complain of vision changes, chest pain, changes in urinary output, changes in bowel movements (some loose stools), peripheral edema. In the emergency department CT head chest abdomen pelvis did not demonstrate acute process, however did show chronic cerebral atrophy left-sided maxillary mass and chronic parenchymal lung disease. He was admitted 10/28 for further evaluation and management. Subjective (10/30/21):     Patient much more alert today. Still with headaches that he describes as starting in temporal regions and radiating around frontal head. Denies vision changes, photophobia. Does report some sensitivity to sound. Wife at bedside. Would like diet resumed for pt if possible. Agrees he appears much more alert today. Assessment & Plan:     # Acute metabolic Encephalopathy  # Delirium  - CT head/neck non acute  - MRI brain non acute  - TFT's wnl  - Neurology has seen   - PT/OT  10/29 - check UA, ammonia. Add prn seroquel. 10/3 0 - UA pending, ammonia wnl.  Did not require meds for agitation/delirium last night. Much more alert today. Oriented although he is minimally conversant due to headache    # Headache  92/45 - uncertain etiology. MRI brain wnl. Described as temporal. Will add ESR,CRP. Trial of toraol for few doses. Caffeine. D/w Neuro in AM if unimproved. ?need for LP    # Left axillary mass  - IR consulted,   10/29 - procedure planned for this morning, deferred secondary to bradycardia  10/30 -IR planned biopsy for Monday    # Bradycardia  - Cardiology has seen  - holding coreg  - Echo reviewed - EF 60%, mild TVR, RVSP 54    10/30 improved. Rate in 60's. Continue to hold Toprol. # acute/chronic respiratory failure  # Sarcoidosis  - baseline o2 requirement of 2L NC, requiring 5-6L NC  - continue MDI's  - Change IV steroids to Pred 40mg daily (wheezing on admission    10/30 - remains on 5L but likely can begin to wean. Continue pred 40mg. Pulm consulted per family request- pending for AM.     # hyponatremia  - continue LR, Na 131, repeat BMP in AM  - 10/30 - repeat BMP for AM         # DLP  - statin    # BPH  - proscar        Dispo/Discharge Planning:      Suspect will need STR, check PPD.      Diet:  ADULT DIET Dysphagia - Minced & Moist  DVT PPx: hep sq  Code status: Full Code    Hospital Problems as of 10/30/2021 Date Reviewed: 10/28/2021        Codes Class Noted - Resolved POA    Axillary mass, left ICD-10-CM: R22.32  ICD-9-CM: 782.2  10/28/2021 - Present Yes        Nausea & vomiting ICD-10-CM: R11.2  ICD-9-CM: 787.01  10/28/2021 - Present Yes        Body mass index (BMI) of 25.0 to 25.9 in adult ICD-10-CM: Z68.25  ICD-9-CM: V85.21  10/28/2021 - Present Yes        Headache ICD-10-CM: R51.9  ICD-9-CM: 784.0  10/30/2021 - Present Unknown        * (Principal) Acute metabolic encephalopathy FYW-54-SH: G93.41  ICD-9-CM: 348.31  10/28/2021 - Present Yes        Bradycardia ICD-10-CM: R00.1  ICD-9-CM: 427.89  10/28/2021 - Present No        Hyponatremia ICD-10-CM: E87.1  ICD-9-CM: 276.1 10/28/2021 - Present Yes        Electrolyte or fluid disorder ICD-10-CM: E87.8  ICD-9-CM: 276.9  10/28/2021 - Present Yes        Hyperglycemia, drug-induced ICD-10-CM: R73.9, T50.905A  ICD-9-CM: 790.29, E947.9  10/28/2021 - Present Yes        Restrictive lung disease (Chronic) ICD-10-CM: J98.4  ICD-9-CM: 518.89  4/1/2015 - Present Yes    Overview Signed 4/1/2015  9:30 AM by Jamesching Og. Residual from Sarcoidosis             Dyslipidemia (Chronic) ICD-10-CM: E78.5  ICD-9-CM: 272.4  3/13/2006 - Present Yes    Overview Signed 3/21/2019  8:31 AM by MD Isaías Navarroiscjudith Cardiolite (3/19/19):  EF 70%. Normal perfusion. Sarcoidosis (Chronic) ICD-10-CM: D86.9  ICD-9-CM: 305  3/13/2006 - Present Yes        Benign prostatic hyperplasia (Chronic) ICD-10-CM: N40.0  ICD-9-CM: 600.00  3/13/2006 - Present Yes              Objective:     Patient Vitals for the past 24 hrs:   Temp Pulse Resp BP SpO2   10/30/21 1147 97.6 °F (36.4 °C) (!) 52 24 114/73 98 %   10/30/21 0828 98.2 °F (36.8 °C) (!) 55 20 (!) 107/56 95 %   10/30/21 0733 -- -- -- -- 98 %   10/30/21 0425 97.4 °F (36.3 °C) 61 18 (!) 146/81 99 %   10/30/21 0400 -- 60 -- -- --   10/30/21 0000 -- (!) 50 -- -- --   10/29/21 2233 97.5 °F (36.4 °C) (!) 51 20 106/62 95 %   10/29/21 2022 98.5 °F (36.9 °C) (!) 54 22 134/72 100 %   10/29/21 2000 -- (!) 51 -- -- --   10/29/21 1536 97.6 °F (36.4 °C) (!) 57 28 118/64 100 %     Oxygen Therapy  O2 Sat (%): 98 % (10/30/21 1147)  Pulse via Oximetry: 57 beats per minute (10/30/21 0733)  O2 Device: Nasal cannula (10/30/21 0733)  Skin Assessment: Clean, dry, & intact (10/29/21 2022)  Skin Protection for O2 Device: No (10/29/21 2022)  O2 Flow Rate (L/min): 3 l/min (10/30/21 0733)    Estimated body mass index is 25.58 kg/m² as calculated from the following:    Height as of this encounter: 5' 4\" (1.626 m). Weight as of this encounter: 67.6 kg (149 lb).     Intake/Output Summary (Last 24 hours) at 10/30/2021 1451  Last data filed at 10/30/2021 0425  Gross per 24 hour   Intake --   Output 2050 ml   Net -2050 ml         Physical Exam:     Blood pressure 114/73, pulse (!) 52, temperature 97.6 °F (36.4 °C), resp. rate 24, height 5' 4\" (1.626 m), weight 67.6 kg (149 lb), SpO2 98 %. General:    Well nourished. Appears uncomfortable due to headache  Head:  Normocephalic, atraumatic  Eyes:  Sclerae appear normal.  Pupils equally round. ENT:  Nares appear normal, no drainage. Moist oral mucosa  Neck:  No restricted ROM. Trachea midline   CV:   RRR. No m/r/g. No jugular venous distension. Lungs:   Diminished bilaterally, No wheezing, rhonchi, or rales. Respirations even, unlabored  Abdomen: Bowel sounds present. Soft, nontender, nondistended. Extremities: No cyanosis or clubbing. No edema  Skin:     No rashes and normal coloration. Warm and dry. Neuro:  CN II-XII grossly intact. Sensation intact. A&Ox3  Psych:  Normal mood and affect.       I have reviewed ordered lab tests and independently visualized imaging below:    Recent Labs:  Recent Results (from the past 48 hour(s))   RPR    Collection Time: 10/28/21  4:09 PM   Result Value Ref Range    RPR NONREACTIVE NR     TSH 3RD GENERATION    Collection Time: 10/28/21  4:09 PM   Result Value Ref Range    TSH 0.583 0.358 - 3.740 uIU/mL   T4, FREE    Collection Time: 10/28/21  4:09 PM   Result Value Ref Range    T4, Free 1.1 0.78 - 1.46 NG/DL   VITAMIN B12    Collection Time: 10/28/21  4:09 PM   Result Value Ref Range    Vitamin B12 861 193 - 986 pg/mL   FOLATE    Collection Time: 10/28/21  4:09 PM   Result Value Ref Range    Folate 34.2 (H) 3.1 - 17.5 ng/mL   CORTISOL, BASELINE    Collection Time: 10/28/21  4:09 PM   Result Value Ref Range    Cortisol, baseline 11.8 ug/dL   HIV 1/2 AG/AB, 4TH GENERATION,W RFLX CONFIRM    Collection Time: 10/28/21  4:09 PM   Result Value Ref Range    HIV 1/2 Interpretation NONREACTIVE NR      HIV 1/2 result comment SEE NOTE (A) NR     NT-PRO BNP    Collection Time: 10/28/21  4:09 PM   Result Value Ref Range    NT pro- (H) <450 PG/ML   LACTIC ACID    Collection Time: 10/28/21  4:15 PM   Result Value Ref Range    Lactic acid 0.8 0.4 - 2.0 MMOL/L   CULTURE, BLOOD    Collection Time: 10/28/21  8:21 PM    Specimen: Blood   Result Value Ref Range    Special Requests: RIGHT ANTECUBITAL      Culture result: NO GROWTH 2 DAYS     CULTURE, BLOOD    Collection Time: 10/28/21  8:21 PM    Specimen: Blood   Result Value Ref Range    Special Requests: LEFT HAND      Culture result: NO GROWTH 2 DAYS     COVID-19 RAPID TEST    Collection Time: 10/28/21 11:59 PM   Result Value Ref Range    Specimen source NASAL      COVID-19 rapid test Not detected NOTD     SARS-COV-2, PCR    Collection Time: 10/28/21 11:59 PM    Specimen: Nasopharyngeal   Result Value Ref Range    Specimen source Nasopharyngeal      SARS-CoV-2 Not detected NOTD     GLUCOSE, POC    Collection Time: 10/29/21 12:09 AM   Result Value Ref Range    Glucose (POC) 75 65 - 100 mg/dL    Performed by [facility]    BLOOD GAS, ARTERIAL POC    Collection Time: 10/29/21  2:12 AM   Result Value Ref Range    Device: NASAL CANNULA      pH (POC) 7.39 7.35 - 7.45      pCO2 (POC) 47.7 (H) 35 - 45 MMHG    pO2 (POC) 109 (H) 75 - 100 MMHG    HCO3 (POC) 28.6 (H) 22 - 26 MMOL/L    sO2 (POC) 98.1 (H) 95 - 98 %    Base excess (POC) 2.8 mmol/L    Allens test (POC) Positive      Site RIGHT RADIAL      Specimen type (POC) ARTERIAL      Performed by [facility]     FIO2, L/min 6     RESPIRATORY VIRUS PANEL W/COVID-19, PCR    Collection Time: 10/29/21  2:20 AM    Specimen: Nasopharyngeal   Result Value Ref Range    Adenovirus NOT DETECTED NOTDET      Coronavirus 229E NOT DETECTED NOTDET      Coronavirus HKU1 NOT DETECTED NOTDET      Coronavirus CVNL63 NOT DETECTED NOTDET      Coronavirus OC43 NOT DETECTED NOTDET      SARS-CoV-2, PCR NOT DETECTED NOTDET      Metapneumovirus NOT DETECTED NOTDET Rhinovirus and Enterovirus Detected (A) NOTDET      Influenza A NOT DETECTED NOTDET      Influenza B NOT DETECTED NOTDET      Parainfluenza 1 NOT DETECTED NOTDET      Parainfluenza 2 NOT DETECTED NOTDET      Parainfluenza 3 NOT DETECTED NOTDET      Parainfluenza virus 4 NOT DETECTED NOTDET      RSV by PCR NOT DETECTED NOTDET      B. parapertussis, PCR NOT DETECTED NOTDET      Bordetella pertussis - PCR NOT DETECTED NOTDET      Chlamydophila pneumoniae DNA, QL, PCR NOT DETECTED NOTDET      Mycoplasma pneumoniae DNA, QL, PCR NOT DETECTED NOTDET     GLUCOSE, POC    Collection Time: 10/29/21  5:14 AM   Result Value Ref Range    Glucose (POC) 113 (H) 65 - 100 mg/dL    [facility]    ECHO ADULT COMPLETE    Collection Time: 10/29/21  9:15 AM   Result Value Ref Range    Left Atrium Minor Axis 3.39 cm    Left Atrium Major Axis 5.87 cm    LA Area 2C 19.20 cm2    LA Area 4C 21.60 cm2    LV ED Vol A4C 33.50 cm3    LV ES Vol A4C 14.70 cm3    IVSd 1.05 (A) 0.60 - 1.00 cm    LVIDd 3.73 (A) 4.20 - 5.90 cm    LVIDs 2.54 cm    LVOT d 2.00 cm    LVOT VTI 35.90 cm    LVOT Peak Gradient 10.00 mmHg    LVPWd 1.07 (A) 0.60 - 1.00 cm    LV E' Lateral Velocity 9.38 cm/s    LV E' Septal Velocity 9.48 cm/s    AV Cusp 1.30 cm    Aortic Valve Systolic Mean Gradient 93.83 mmHg    AoV VTI 54.60 cm    Aortic Valve Systolic Peak Velocity 853.59 cm/s    AoV PG 30.00 mmHg    Aortic Valve Area by Continuity of VTI 2.06 cm2    Aortic Valve Area by Continuity of Peak Velocity 1.79 cm2    AO ASC D 3.60 cm    Mitral Valve E Wave Deceleration Time 194.00 ms    MV A Royer 186.00 cm/s    MV E Royer 142.00 cm/s    MV Mean Gradient 4.00 mmHg    Mitral Valve Annulus Velocity Time Integral 56.30 cm    Mitral Valve Max Velocity 208.00 cm/s    MV Peak Gradient 17.00 mmHg    RVIDd 2.80 cm    Tapse 2.38 (A) 1.50 - 2.00 cm    TR Max Velocity 339.00 cm/s    Triscuspid Valve Regurgitation Peak Gradient 46.00 mmHg    MV E/A 0.76     LV Mass .0 88.0 - 224.0 g    LV Mass AL Index 71.7 49.0 - 115.0 g/m2    E/E' lateral 15.14     E/E' septal 14.98     RVSP 54.0 mmHg    E/E' ratio (averaged) 15.06     Est. RA Pressure 8.0 mmHg    DONN/BSA Pk Royer 1.0 cm2/m2    DONN/BSA VTI 1.2 cm2/m2   GLUCOSE, POC    Collection Time: 10/29/21 10:59 AM   Result Value Ref Range    Glucose (POC) 122 (H) 65 - 100 mg/dL    Performed by Kenyatta    EKG, 12 LEAD, INITIAL    Collection Time: 10/29/21 11:19 AM   Result Value Ref Range    Ventricular Rate 43 BPM    Atrial Rate 43 BPM    P-R Interval 152 ms    QRS Duration 78 ms    Q-T Interval 486 ms    QTC Calculation (Bezet) 410 ms    Calculated P Axis 22 degrees    Calculated R Axis -12 degrees    Calculated T Axis 24 degrees    Diagnosis       Marked sinus bradycardia  Abnormal ECG  No previous ECGs available  Confirmed by [facility]  MD ()DO ([id number]) on 10/29/2021 1:41:23 PM     CBC W/O DIFF    Collection Time: 10/29/21 12:27 PM   Result Value Ref Range    WBC 8.1 4.3 - 11.1 K/uL    RBC 4.06 (L) 4.23 - 5.6 M/uL    HGB 12.3 (L) 13.6 - 17.2 g/dL    HCT 38.0 (L) 41.1 - 50.3 %    MCV 93.6 79.6 - 97.8 FL    MCH 30.3 26.1 - 32.9 PG    MCHC 32.4 31.4 - 35.0 g/dL    RDW 14.0 11.9 - 14.6 %    PLATELET 400 327 - 902 K/uL    MPV 9.0 (L) 9.4 - 12.3 FL    ABSOLUTE NRBC 0.00 0.0 - 0.2 K/uL   METABOLIC PANEL, BASIC    Collection Time: 10/29/21 12:27 PM   Result Value Ref Range    Sodium 131 (L) 138 - 145 mmol/L    Potassium 4.4 3.5 - 5.1 mmol/L    Chloride 96 (L) 98 - 107 mmol/L    CO2 33 (H) 21 - 32 mmol/L    Anion gap 2 (L) 7 - 16 mmol/L    Glucose 126 (H) 65 - 100 mg/dL    BUN 11 8 - 23 MG/DL    Creatinine 0.73 (L) 0.8 - 1.5 MG/DL    GFR est AA >60 >60 ml/min/1.73m2    GFR est non-AA >60 >60 ml/min/1.73m2    Calcium 9.0 8.3 - 10.4 MG/DL   MAGNESIUM    Collection Time: 10/29/21 12:27 PM   Result Value Ref Range    Magnesium 2.2 1.8 - 2.4 mg/dL   GLUCOSE, POC    Collection Time: 10/29/21  5:56 PM   Result Value Ref Range    Glucose (POC) 113 (H) 65 - 100 mg/dL    Performed by Joyce Garcia POC    Collection Time: 10/29/21 11:16 PM   Result Value Ref Range    Glucose (POC) 136 (H) 65 - 100 mg/dL    Performed by Dino    AMMONIA    Collection Time: 10/30/21  1:46 AM   Result Value Ref Range    Ammonia <10 (L) 11 - 32 UMOL/L   GLUCOSE, POC    Collection Time: 10/30/21  5:54 AM   Result Value Ref Range    Glucose (POC) 140 (H) 65 - 100 mg/dL    Performed by Dino    MAGNESIUM    Collection Time: 10/30/21  9:18 AM   Result Value Ref Range    Magnesium 2.3 1.8 - 2.4 mg/dL   GLUCOSE, POC    Collection Time: 10/30/21 10:38 AM   Result Value Ref Range    Glucose (POC) 148 (H) 65 - 100 mg/dL    Performed by [facility]        All Micro Results     Procedure Component Value Units Date/Time    CULTURE, BLOOD [[id number]] Collected: 10/28/21 2021    Order Status: Completed Specimen: Blood Updated: 10/30/21 1111     Special Requests: LEFT HAND        Culture result: NO GROWTH 2 DAYS       CULTURE, BLOOD [[id number]] Collected: 10/28/21 2021    Order Status: Completed Specimen: Blood Updated: 10/30/21 1111     Special Requests: RIGHT ANTECUBITAL        Culture result: NO GROWTH 2 DAYS       SARS-COV-2, PCR [[id number]] Collected: 10/28/21 7149    Order Status: Completed Specimen: Nasopharyngeal Updated: 10/29/21 0916     Specimen source Nasopharyngeal        SARS-CoV-2 Not detected        Comment:      The specimen is NEGATIVE for SARS-CoV-2, the novel coronavirus associated with COVID-19. This test has been authorized by the FDA under an Emergency Use Authorization (EUA) for use by authorized laboratories.         Fact sheet for Healthcare Providers: ConventionUpdate.co.nz       Fact sheet for Patients: ConventionUpdate.co.nz       Methodology: RT-PCR         RESPIRATORY VIRUS PANEL W/COVID-19, PCR [[id number]]  (Abnormal) Collected: 10/29/21 0220    Order Status: Completed Specimen: Nasopharyngeal Updated: 10/29/21 0423     Adenovirus NOT DETECTED        Coronavirus 229E NOT DETECTED        Coronavirus HKU1 NOT DETECTED        Coronavirus CVNL63 NOT DETECTED        Coronavirus OC43 NOT DETECTED        SARS-CoV-2, PCR NOT DETECTED        Metapneumovirus NOT DETECTED        Rhinovirus and Enterovirus Detected        Influenza A NOT DETECTED        Influenza B NOT DETECTED        Parainfluenza 1 NOT DETECTED        Parainfluenza 2 NOT DETECTED        Parainfluenza 3 NOT DETECTED        Parainfluenza virus 4 NOT DETECTED        RSV by PCR NOT DETECTED        B. parapertussis, PCR NOT DETECTED        Bordetella pertussis - PCR NOT DETECTED        Chlamydophila pneumoniae DNA, QL, PCR NOT DETECTED        Mycoplasma pneumoniae DNA, QL, PCR NOT DETECTED       COVID-19 RAPID TEST [[id number]] Collected: 10/28/21 2571    Order Status: Completed Specimen: Nasopharyngeal Updated: 10/29/21 0034     Specimen source NASAL        COVID-19 rapid test Not detected        Comment:      The specimen is NEGATIVE for SARS-CoV-2, the novel coronavirus associated with COVID-19. A negative result does not rule out COVID-19. This test has been authorized by the FDA under an Emergency Use Authorization (EUA) for use by authorized laboratories. Fact sheet for Healthcare Providers: ConventionUpdate.co.nz  Fact sheet for Patients: ConventionUpdate.co.nz       Methodology: Isothermal Nucleic Acid Amplification               Other Studies:  No results found.     Current Meds:  Current Facility-Administered Medications   Medication Dose Route Frequency    ketorolac (TORADOL) injection 15 mg  15 mg IntraVENous Q6H PRN    lip protectant (BLISTEX) ointment 1 Each  1 Each Topical PRN    melatonin tablet 3 mg  3 mg Oral QHS    predniSONE (DELTASONE) tablet 40 mg  40 mg Oral DAILY WITH BREAKFAST    QUEtiapine (SEROquel) tablet 25 mg  25 mg Oral QHS PRN    finasteride (PROSCAR) tablet 5 mg  5 mg Oral QHS    [Held by provider] rosuvastatin (CRESTOR) tablet 20 mg  20 mg Oral QHS    pantoprazole (PROTONIX) 40 mg in 0.9% sodium chloride 10 mL injection  40 mg IntraVENous DAILY    sodium chloride (NS) flush 5-40 mL  5-40 mL IntraVENous Q8H    sodium chloride (NS) flush 5-40 mL  5-40 mL IntraVENous PRN    acetaminophen (TYLENOL) tablet 650 mg  650 mg Oral Q6H PRN    Or    acetaminophen (TYLENOL) suppository 650 mg  650 mg Rectal Q6H PRN    polyethylene glycol (MIRALAX) packet 17 g  17 g Oral DAILY PRN    ondansetron (ZOFRAN ODT) tablet 4 mg  4 mg Oral Q8H PRN    Or    ondansetron (ZOFRAN) injection 4 mg  4 mg IntraVENous Q6H PRN    dextrose 40% (GLUTOSE) oral gel 1 Tube  15 g Oral PRN    glucagon (GLUCAGEN) injection 1 mg  1 mg IntraMUSCular PRN    dextrose (D50W) injection syrg 12.5-25 g  25-50 mL IntraVENous PRN    tiotropium-olodateroL (STIOLTO RESPIMAT) 2.5-2.5 mcg/actuation inhaler 2 Puff  2 Puff Inhalation DAILY    fluticasone (FLOVENT HFA) 110 mcg inhaler  1 Puff Inhalation DAILY    heparin (porcine) injection 5,000 Units  5,000 Units SubCUTAneous Q8H    albuterol (PROVENTIL HFA, VENTOLIN HFA, PROAIR HFA) inhaler 2 Puff  2 Puff Inhalation Q4H PRN       Signed:  Sam Vega DO    Part of this note may have been written by using a voice dictation software. The note has been proof read but may still contain some grammatical/other typographical errors.

## 2021-10-30 NOTE — PROGRESS NOTES
Problem: Falls - Risk of  Goal: *Absence of Falls  Description: Document Banks Reason Fall Risk and appropriate interventions in the flowsheet. Outcome: Progressing Towards Goal  Note: Fall Risk Interventions:       Mentation Interventions: Adequate sleep, hydration, pain control, Bed/chair exit alarm, Door open when patient unattended, Evaluate medications/consider consulting pharmacy, More frequent rounding, Increase mobility, Reorient patient, Room close to nurse's station, Toileting rounds, Self-releasing belt    Medication Interventions: Assess postural VS orthostatic hypotension, Bed/chair exit alarm, Evaluate medications/consider consulting pharmacy, Patient to call before getting OOB, Teach patient to arise slowly    Elimination Interventions: Bed/chair exit alarm, Call light in reach, Elevated toilet seat, Patient to call for help with toileting needs, Stay With Me (per policy), Toilet paper/wipes in reach, Toileting schedule/hourly rounds              Problem: Patient Education: Go to Patient Education Activity  Goal: Patient/Family Education  Outcome: Progressing Towards Goal     Problem: Pressure Injury - Risk of  Goal: *Prevention of pressure injury  Description: Document Salvador Scale and appropriate interventions in the flowsheet.   Outcome: Progressing Towards Goal  Note: Pressure Injury Interventions:  Sensory Interventions: Avoid rigorous massage over bony prominences, Assess changes in LOC    Moisture Interventions: Apply protective barrier, creams and emollients, Absorbent underpads, Check for incontinence Q2 hours and as needed, Limit adult briefs, Maintain skin hydration (lotion/cream), Offer toileting Q_hr, Moisture barrier, Minimize layers    Activity Interventions: Assess need for specialty bed, Chair cushion, Increase time out of bed, PT/OT evaluation, Pressure redistribution bed/mattress(bed type)    Mobility Interventions: Assess need for specialty bed, HOB 30 degrees or less, Pressure redistribution bed/mattress (bed type), PT/OT evaluation    Nutrition Interventions: Document food/fluid/supplement intake, Discuss nutritional consult with provider    Friction and Shear Interventions: Apply protective barrier, creams and emollients, Foam dressings/transparent film/skin sealants, HOB 30 degrees or less                Problem: Patient Education: Go to Patient Education Activity  Goal: Patient/Family Education  Outcome: Progressing Towards Goal     Problem: Patient Education: Go to Patient Education Activity  Goal: Patient/Family Education  Outcome: Progressing Towards Goal

## 2021-10-30 NOTE — PROGRESS NOTES
Hourly rounds performed through shift, pt denies needs at this time. Bed in low position, locked and wife at bedisde. Will continue to monitor and report to night shift nurse.

## 2021-10-30 NOTE — PROGRESS NOTES
STG: Pt will tolerate dental soft diet with no signs of aspiration. LTG: Pt will tolerate the least restrictive diet at discharge without respiratory compromise    SPEECH LANGUAGE PATHOLOGY: DYSPHAGIA- Initial Assessment    NAME/AGE/GENDER: Ninfa Vaz is a 80 y.o. male  DATE: 10/30/2021  PRIMARY DIAGNOSIS: Acute metabolic encephalopathy [Y96.11]       ICD-10: Treatment Diagnosis: R13.13 Dysphagia, Pharyngeal Phase    RECOMMENDATIONS   DIET:   Easy to Chew  Thin Liquids    MEDICATIONS: With liquid     PRECAUTIONS, MODIFICATIONS, AND STRATEGIES  Small bites/sips  Upright at 90 degrees during meal  Alternate liquids/solids     RECOMMENDATIONS FOR CONTINUED SPEECH THERAPY:   YES: Anticipate need for ongoing speech therapy during this hospitalization. ASSESSMENT   Patient presents with oropharyngeal dysphagia characterized by requires soft diet due to dental implants and difficulty masticating . Recommend Dental soft diet and patient to sit upright at 90 degrees for all meals and 20 minutes post meal - Patient to alternate liquids with solids. EDUCATION:  Recommendations discussed with Patient and Family    REHABILITATION POTENTIAL FOR STATED GOALS: Excellent    PLAN    FREQUENCY/DURATION:   Continue to follow patient 2 times a week for 1 week to address above goals. Recommendations for next treatment session: Next treatment session will address diet tolerance    CONTINUATION OF SKILLED SERVICES/MEDICAL NECESSITY:  Patient is expected to demonstrate progress in  diet tolerance and swallow safety in order to  work toward diet advancement. SUBJECTIVE   \" I don't feel well. \"  Patient and spouse reported that he had no difficulty swallowing prior to hospitalization. Patient did report that he needs to be on a lactacid free diet and soft foods as he has recently had 2 dental implants.     Oxygen Device:   Pain: Pain Scale 1: Numeric (0 - 10)  Pain Intensity 1: 0    History of Present Injury/Illness: Mr. Bebe Smith  has a past medical history of Agent orange exposure (1960s), Arthritis, Asthma, Body mass index (BMI) of 25.0 to 25.9 in adult (10/28/2021), Chronic obstructive pulmonary disease ([address] Chronic pain, GERD (gastroesophageal reflux disease), Hyperlipidemia (3/13/2006), Hypertension, Sarcoidosis (2001), and Sleep apnea. He also has no past medical history of Aneurysm ([address] Arrhythmia, Autoimmune disease ([address] CAD (coronary artery disease), Cancer ([address] Chronic kidney disease, Coagulation disorder ([address] Diabetes ([address] Endocarditis, Heart failure ([address] Liver disease, Nicotine vapor product user, Non-nicotine vapor product user, Psychiatric disorder, PUD (peptic ulcer disease), Rheumatic fever, Seizures ([address] Stroke ([address] Thromboembolus ([address] or Thyroid disease. Casi Oakley He also  has a past surgical history that includes hx tonsillectomy (as a child); hx other surgical (2006); pr chest surgery procedure unlisted (2001); hx hernia repair (Left, 2004); hx lap cholecystectomy (2006); hx hernia repair (Right, 1990's); hx orthopaedic (Right, 2014); and hx knee replacement (Left, 2016). PRECAUTIONS/ALLERGIES: Lactose     Problem List:  (Impairments causing functional limitations):  Oropharyngeal dysphagia    Previous Dysphagia: NONE REPORTED  Diet Prior to Evaluation: regular / thin    Orientation:  Person  Place  Time  Situation    Cognitive-Linguistic Screening:  Alertness  Alert  Speech Production:   Fully intelligible  Expressive Language:  Fluent speech  Receptive Language: Answers yes/no questions appropriately and Follows commands  Cognition:   Within functional limits  Prior Level of Function: LIves at home with spouse  Recommendations: Given results of screening, patient appears to be functioning at baseline. No acute assessment of speech, language, or cognition warranted.      OBJECTIVE   Oral Motor:   Labial: No impairment  Dentition: Intact  Oral Hygiene: Adequate  Lingual: No impairment    Dysphagia evaluation:   Patient consumed trials of ice / thin liquids / applesauce and fruit cocktail    Tool Used: Dysphagia Outcome and Severity Scale (JESSICA)    Score Comments   Normal Diet  [] 7 With no strategies or extra time needed   Functional Swallow  [x] 6 May have mild oral or pharyngeal delay   Mild Dysphagia  [] 5 Which may require one diet consistency restricted    Mild-Moderate Dysphagia  [] 4 With 1-2 diet consistencies restricted   Moderate Dysphagia  [] 3 With 2 or more diet consistencies restricted   Moderate-Severe Dysphagia  [] 2 With partial PO strategies (trials with ST only)   Severe Dysphagia  [] 1 With inability to tolerate any PO safely      Score:  Initial: 6 Most Recent: 6 (Date 10/30/21 )   Interpretation of Tool: The Dysphagia Outcome and Severity Scale (JESSICA) is a simple, easy-to-use, 7-point scale developed to systematically rate the functional severity of dysphagia based on objective assessment and make recommendations for diet level, independence level, and type of nutrition. Current Medications:   No current facility-administered medications on file prior to encounter. Current Outpatient Medications on File Prior to Encounter   Medication Sig Dispense Refill    melatonin 5 mg tablet Take 10 mg by mouth nightly.  rosuvastatin (CRESTOR) 20 mg tablet TAKE 1 TABLET NIGHTLY 90 Tablet 3    azelastine (ASTELIN) 137 mcg (0.1 %) nasal spray 1 [place] by Both Nostrils route two (2) times a day. Use in each nostril as directed 3 Bottle 3    tamsulosin (Flomax) 0.4 mg capsule Take 1 Capsule by mouth daily. 90 Capsule 3    temazepam (RESTORIL) 15 mg capsule Take 1 Capsule by mouth nightly as needed for Sleep. Max Daily Amount: 15 mg. 90 Capsule 1    omeprazole (PRILOSEC) 40 mg capsule Take 1 Capsule by mouth daily. 90 Capsule 3    tadalafiL, pulm. hypertension, (Adcirca) 20 mg tablet Take 2 Tablets by mouth daily.  60 Tablet 11    fluticasone-umeclidinium-vilanterol (TRELEGY ELLIPTA) 100-62.5-25 mcg inhaler Take 1 Puff by inhalation daily. 3 Inhaler 3    finasteride (Proscar) 5 mg tablet Take 1 Tab by mouth nightly. 90 Tab 3    ascorbate calcium-bioflavonoid (DOROTHY-C WITH BIOFLAVONOIDS) 1,000-200 mg tab       predniSONE (DELTASONE) 10 mg tablet Take 10 mg by mouth daily. 90 Tab 3    cholecalciferol, vitamin D3, (VITAMIN D3) 2,000 unit tab Take  by mouth daily.  Aspirin, Buffered 81 mg tab Take  by mouth daily.  multivitamin (ONE A DAY) tablet Take 1 Tab by mouth daily.  b complex vitamins tablet Take 1 Tab by mouth daily.  ondansetron (ZOFRAN ODT) 4 mg disintegrating tablet Take 1 Tablet by mouth every eight (8) hours as needed for Nausea or Vomiting (diarrhea). 30 Tablet 0    OTHER Handicap placard  Dx: J44.9 COPD 1 Each 0    bisoprolol-hydroCHLOROthiazide (Ziac) 2.5-6.25 mg per tablet Take 1 Tablet by mouth daily. 90 Tablet 3    albuterol (PROVENTIL VENTOLIN) 2.5 mg /3 mL (0.083 %) nebu 3 mL by Nebulization route every six (6) hours as needed for Wheezing. 180 Each 3    ferrous sulfate (Iron) 325 mg (65 mg iron) tablet Take  by mouth Daily (before breakfast).  DISABLED PLACARD (DISABLED PLACARD) DMV Supply prescription to [facility] with completed form [id number].  OXYGEN-AIR DELIVERY SYSTEMS by Does Not Apply route. 2 lpm QD      diclofenac (VOLTAREN) 1 % topical gel Apply 4 g to affected area four (4) times daily.  prn          INTERDISCIPLINARY COLLABORATION: RN    After treatment position/precautions:  Upright in chair  wife at bedside    Total Treatment Duration:   Time In: 1435  Time Out: [address], SLP

## 2021-10-31 NOTE — PROGRESS NOTES
Pt called for albuterol inhaler tx for dyspnea/tightness of breathing. States he takes nebulized albuterol at home regularly 3- 6 times per day. I offered to change his order from prn to q6 and prn Albuterol inhaler which he agreed he wanted.

## 2021-10-31 NOTE — PROGRESS NOTES
Hospitalist Progress Note   Admit Date:  10/28/2021 12:07 PM   Name:  Babar Holland   Age:  80 y.o. Sex:  male  :  1940   MRN:  [id number]   Room:  [facility]    Presenting Complaint: Slow Heart Rate    Reason(s) for Admission: Acute metabolic encephalopathy [W78.16]     Hospital Course & Interval History:     Babar Holland is a 80 y.o. male with medical history of sarcoid, restrictive lung disease, hypertension who presented with headache, altered mental status. On initial presentation to the emergency department he was complaining of a headache and able to speak in short sentences. At the time of my interview he was sedated and not following command. History provided by wife. For approximately last 2 weeks been feeling ill with progressive nausea and vomiting. He reported intermittent nonsevere abdominal pain. His wife reports no fevers, chills or rigors. She said that he did not complain of vision changes, chest pain, changes in urinary output, changes in bowel movements (some loose stools), peripheral edema. In the emergency department CT head chest abdomen pelvis did not demonstrate acute process, however did show chronic cerebral atrophy left-sided maxillary mass and chronic parenchymal lung disease. He was admitted 10/28 for further evaluation and management. Subjective (10/31/21): Patient woke from sleep this morning. Says headache had improved. No other acute complaints    Assessment & Plan:     # Acute metabolic Encephalopathy  # Delirium  - CT head/neck non acute  - MRI brain non acute  - TFT's wnl  - Neurology has seen   - PT/OT  10/29 - check UA, ammonia. Add prn seroquel. 10/30- UA pending, ammonia wnl. Did not require meds for agitation/delirium last night. Much more alert today. Oriented although he is minimally conversant due to headache  10/31 - continues to be more alert. Workup negative to date. UA negative    # Headache   - uncertain etiology.  MRI brain wnl. Described as temporal. Will add ESR,CRP. Trial of toraol for few doses. Caffeine. D/w Neuro in AM if unimproved. ?need for LP  10/31 - headaches have resolved. # Left axillary mass  - IR consulted,   10/29 - procedure planned for this morning, deferred secondary to bradycardia  10/31 -IR planned biopsy for Monday. NPO after MN, Holding heparin    # Bradycardia  - Cardiology has seen  - holding coreg  - Echo reviewed - EF 60%, mild TVR, RVSP 54  10/30 improved. Rate in 60's. Continue to hold Toprol. # HTN  - 10/31 - suboptimal control. Add norvasc. Still holding Toprol. # acute/chronic respiratory failure  # Sarcoidosis  - baseline o2 requirement of 2L NC,  - continue MDI's  10/30 - remains on 5L but likely can begin to wean. Continue pred 40mg. Pulm consulted per family request- pending for AM.   10/31 - improved to 3L NC (5L NC yesterday). # hyponatremia  - 10/31 - corrected. Monitor off IVF         # DLP  - statin    # BPH  - proscar    # Leukocytosis  - 10/31 - WBC elevated 15K today. Suspect secondary to steroids. Check procal. Pursue workup if worse in AM or develops fever. Dispo/Discharge Planning:      Suspect will need STR, check PPD.      Diet:  ADULT DIET Dysphagia - Minced & Moist; Lactose-Controlled  DVT PPx: hep sq  Code status: Full Code    Hospital Problems as of 10/31/2021 Date Reviewed: 10/28/2021        Codes Class Noted - Resolved POA    Axillary mass, left ICD-10-CM: R22.32  ICD-9-CM: 782.2  10/28/2021 - Present Yes        Nausea & vomiting ICD-10-CM: R11.2  ICD-9-CM: 787.01  10/28/2021 - Present Yes        Body mass index (BMI) of 25.0 to 25.9 in adult ICD-10-CM: Z68.25  ICD-9-CM: V85.21  10/28/2021 - Present Yes        Headache ICD-10-CM: R51.9  ICD-9-CM: 784.0  10/30/2021 - Present Unknown        * (Principal) Acute metabolic encephalopathy WVP-[id number]: G93.41  ICD-9-CM: 348.31  10/28/2021 - Present Yes        Bradycardia ICD-10-CM: R00.1  ICD-9-CM: 427.89  10/28/2021 - Present No        Hyponatremia ICD-10-CM: E87.1  ICD-9-CM: 276.1  10/28/2021 - Present Yes        Electrolyte or fluid disorder ICD-10-CM: E87.8  ICD-9-CM: 276.9  10/28/2021 - Present Yes        Hyperglycemia, drug-induced ICD-10-CM: R73.9, T50.905A  ICD-9-CM: 790.29, E947.9  10/28/2021 - Present Yes        Restrictive lung disease (Chronic) ICD-10-CM: J98.4  ICD-9-CM: 518.89  4/1/2015 - Present Yes    Overview Signed 4/1/2015  9:30 AM by Nohelia Smart. Residual from Sarcoidosis             Dyslipidemia (Chronic) ICD-10-CM: E78.5  ICD-9-CM: 272.4  3/13/2006 - Present Yes    Overview Signed 3/21/2019  8:31 AM by Gus Loo MD     Lexiscan Cardiolite (3/19/19):  EF 70%. Normal perfusion. Sarcoidosis (Chronic) ICD-10-CM: D86.9  ICD-9-CM: 629  3/13/2006 - Present Yes        Benign prostatic hyperplasia (Chronic) ICD-10-CM: N40.0  ICD-9-CM: 600.00  3/13/2006 - Present Yes              Objective:     Patient Vitals for the past 24 hrs:   Temp Pulse Resp BP SpO2   10/31/21 1110 -- -- -- -- 94 %   10/31/21 1042 97.6 °F (36.4 °C) 65 20 (!) 169/85 95 %   10/31/21 0935 -- -- -- -- 99 %   10/31/21 0712 97.6 °F (36.4 °C) 66 19 114/73 99 %   10/31/21 0337 97.9 °F (36.6 °C) 71 22 138/82 97 %   10/31/21 0011 97.8 °F (36.6 °C) 70 28 (!) 143/86 96 %   10/30/21 2011 98.1 °F (36.7 °C) 61 16 (!) 137/57 94 %   10/30/21 1607 98.4 °F (36.9 °C) (!) 55 20 (!) 163/76 93 %     Oxygen Therapy  O2 Sat (%): 94 % (10/31/21 1110)  Pulse via Oximetry: 63 beats per minute (10/31/21 1110)  O2 Device: Nasal cannula (10/31/21 1110)  Skin Assessment: Clean, dry, & intact (10/29/21 2022)  Skin Protection for O2 Device: No (10/29/21 2022)  O2 Flow Rate (L/min): 3 l/min (10/31/21 1110)    Estimated body mass index is 25.58 kg/m² as calculated from the following:    Height as of this encounter: 5' 4\" (1.626 m). Weight as of this encounter: 67.6 kg (149 lb).     Intake/Output Summary (Last 24 hours) at 10/31/2021 misael Royal 71 filed at 10/31/2021 0536  Gross per 24 hour   Intake 120 ml   Output 425 ml   Net -305 ml         Physical Exam:     Blood pressure (!) 169/85, pulse 65, temperature 97.6 °F (36.4 °C), resp. rate 20, height 5' 4\" (1.626 m), weight 67.6 kg (149 lb), SpO2 94 %. General:    Well nourished. Appears uncomfortable due to headache  Head:  Normocephalic, atraumatic  Eyes:  Sclerae appear normal.  Pupils equally round. ENT:  Nares appear normal, no drainage. Moist oral mucosa  Neck:  No restricted ROM. Trachea midline   CV:   RRR. No m/r/g. No jugular venous distension. Lungs:   Diminished bilaterally, No wheezing, rhonchi, or rales. Respirations even, unlabored  Abdomen: Bowel sounds present. Soft, nontender, nondistended. Extremities: No cyanosis or clubbing. No edema  Skin:     No rashes and normal coloration. Warm and dry. Neuro:  CN II-XII grossly intact. Sensation intact. A&Ox3  Psych:  Normal mood and affect.       I have reviewed ordered lab tests and independently visualized imaging below:    Recent Labs:  Recent Results (from the past 48 hour(s))   GLUCOSE, POC    Collection Time: 10/29/21  5:56 PM   Result Value Ref Range    Glucose (POC) 113 (H) 65 - 100 mg/dL    Performed by [address] [place] [address], POC    Collection Time: 10/29/21 11:16 PM   Result Value Ref Range    Glucose (POC) 136 (H) 65 - 100 mg/dL    Performed by Dino    AMMONIA    Collection Time: 10/30/21  1:46 AM   Result Value Ref Range    Ammonia <10 (L) 11 - 32 UMOL/L   GLUCOSE, POC    Collection Time: 10/30/21  5:54 AM   Result Value Ref Range    Glucose (POC) 140 (H) 65 - 100 mg/dL    Performed by Dino    MAGNESIUM    Collection Time: 10/30/21  9:18 AM   Result Value Ref Range    Magnesium 2.3 1.8 - 2.4 mg/dL   GLUCOSE, POC    Collection Time: 10/30/21 10:38 AM   Result Value Ref Range    Glucose (POC) 148 (H) 65 - 100 mg/dL    Performed by Morgan Bee    SED RATE, AUTOMATED    Collection Time: 10/30/21  3:25 PM   Result Value Ref Range    Sed rate, automated 25 (H) 0 - 20 mm/hr   C REACTIVE PROTEIN, QT    Collection Time: 10/30/21  3:25 PM   Result Value Ref Range    C-Reactive protein 3.3 (H) 0.0 - 0.9 mg/dL   URINALYSIS W/ RFLX MICROSCOPIC    Collection Time: 10/30/21  4:48 PM   Result Value Ref Range    Color YELLOW      Appearance CLEAR      Specific gravity 1.016 1.001 - 1.023      pH (UA) 6.0 5.0 - 9.0      Protein Negative NEG mg/dL    Glucose Negative mg/dL    Ketone Negative NEG mg/dL    Bilirubin Negative NEG      Blood Negative NEG      Urobilinogen 1.0 0.2 - 1.0 EU/dL    Nitrites Negative NEG      Leukocyte Esterase Negative NEG     GLUCOSE, POC    Collection Time: 10/30/21  5:27 PM   Result Value Ref Range    Glucose (POC) 162 (H) 65 - 100 mg/dL    Performed by [facility]    METABOLIC PANEL, BASIC    Collection Time: 10/31/21  7:43 AM   Result Value Ref Range    Sodium 136 (L) 138 - 145 mmol/L    Potassium 3.3 (L) 3.5 - 5.1 mmol/L    Chloride 101 98 - 107 mmol/L    CO2 31 21 - 32 mmol/L    Anion gap 4 (L) 7 - 16 mmol/L    Glucose 91 65 - 100 mg/dL    BUN 16 8 - 23 MG/DL    Creatinine 0.75 (L) 0.8 - 1.5 MG/DL    GFR est AA >60 >60 ml/min/1.73m2    GFR est non-AA >60 >60 ml/min/1.73m2    Calcium 9.5 8.3 - 10.4 MG/DL   CBC WITH AUTOMATED DIFF    Collection Time: 10/31/21  7:43 AM   Result Value Ref Range    WBC 15.5 (H) 4.3 - 11.1 K/uL    RBC 3.98 (L) 4.23 - 5.6 M/uL    HGB 12.1 (L) 13.6 - 17.2 g/dL    HCT 38.0 (L) 41.1 - 50.3 %    MCV 95.5 79.6 - 97.8 FL    MCH 30.4 26.1 - 32.9 PG    MCHC 31.8 31.4 - 35.0 g/dL    RDW 14.3 11.9 - 14.6 %    PLATELET 863 742 - 754 K/uL    MPV 9.1 (L) 9.4 - 12.3 FL    ABSOLUTE NRBC 0.00 0.0 - 0.2 K/uL    DF AUTOMATED      NEUTROPHILS 87 (H) 43 - 78 %    LYMPHOCYTES 3 (L) 13 - 44 %    MONOCYTES 10 4.0 - 12.0 %    EOSINOPHILS 0 (L) 0.5 - 7.8 %    BASOPHILS 0 0.0 - 2.0 %    IMMATURE GRANULOCYTES 0 0.0 - 5.0 %    ABS.  NEUTROPHILS 13. 5 (H) 1.7 - 8.2 K/UL    ABS. LYMPHOCYTES 0.5 0.5 - 4.6 K/UL    ABS. MONOCYTES 1.6 (H) 0.1 - 1.3 K/UL    ABS. EOSINOPHILS 0.0 0.0 - 0.8 K/UL    ABS. BASOPHILS 0.0 0.0 - 0.2 K/UL    ABS. IMM. GRANS. 0.1 0.0 - 0.5 K/UL   MAGNESIUM    Collection Time: 10/31/21  7:43 AM   Result Value Ref Range    Magnesium 2.3 1.8 - 2.4 mg/dL       All Micro Results     Procedure Component Value Units Date/Time    CULTURE, BLOOD [[id number]] Collected: 10/28/21 2021    Order Status: Completed Specimen: Blood Updated: 10/31/21 0640     Special Requests: RIGHT ANTECUBITAL        Culture result: NO GROWTH 3 DAYS       CULTURE, BLOOD [[id number]] Collected: 10/28/21 2021    Order Status: Completed Specimen: Blood Updated: 10/31/21 0640     Special Requests: LEFT HAND        Culture result: NO GROWTH 3 DAYS       SARS-COV-2, PCR [[id number]] Collected: 10/28/21 6289    Order Status: Completed Specimen: Nasopharyngeal Updated: 10/29/21 0916     Specimen source Nasopharyngeal        SARS-CoV-2 Not detected        Comment:      The specimen is NEGATIVE for SARS-CoV-2, the novel coronavirus associated with COVID-19. This test has been authorized by the FDA under an Emergency Use Authorization (EUA) for use by authorized laboratories.         Fact sheet for Healthcare Providers: ConventionUpdate.co.nz       Fact sheet for Patients: ConventionUpdate.co.nz       Methodology: RT-PCR         RESPIRATORY VIRUS PANEL W/COVID-19, PCR [[id number]]  (Abnormal) Collected: 10/29/21 0220    Order Status: Completed Specimen: Nasopharyngeal Updated: 10/29/21 0423     Adenovirus NOT DETECTED        Coronavirus 229E NOT DETECTED        Coronavirus HKU1 NOT DETECTED        Coronavirus CVNL63 NOT DETECTED        Coronavirus OC43 NOT DETECTED        SARS-CoV-2, PCR NOT DETECTED        Metapneumovirus NOT DETECTED        Rhinovirus and Enterovirus Detected        Influenza A NOT DETECTED        Influenza B NOT DETECTED Parainfluenza 1 NOT DETECTED        Parainfluenza 2 NOT DETECTED        Parainfluenza 3 NOT DETECTED        Parainfluenza virus 4 NOT DETECTED        RSV by PCR NOT DETECTED        B. parapertussis, PCR NOT DETECTED        Bordetella pertussis - PCR NOT DETECTED        Chlamydophila pneumoniae DNA, QL, PCR NOT DETECTED        Mycoplasma pneumoniae DNA, QL, PCR NOT DETECTED       COVID-19 RAPID TEST [[id number]] Collected: 10/28/21 5964    Order Status: Completed Specimen: Nasopharyngeal Updated: 10/29/21 0034     Specimen source NASAL        COVID-19 rapid test Not detected        Comment:      The specimen is NEGATIVE for SARS-CoV-2, the novel coronavirus associated with COVID-19. A negative result does not rule out COVID-19. This test has been authorized by the FDA under an Emergency Use Authorization (EUA) for use by authorized laboratories. Fact sheet for Healthcare Providers: [employer]  Fact sheet for Patients: [employer]       Methodology: Isothermal Nucleic Acid Amplification               Other Studies:  No results found.     Current Meds:  Current Facility-Administered Medications   Medication Dose Route Frequency    albuterol (PROVENTIL HFA, VENTOLIN HFA, PROAIR HFA) inhaler 2 Puff  2 Puff Inhalation Q6H RT    albuterol (PROVENTIL HFA, VENTOLIN HFA, PROAIR HFA) inhaler 2 Puff  2 Puff Inhalation Q4H PRN    tamsulosin (FLOMAX) capsule 0.4 mg  0.4 mg Oral DAILY    lip protectant (BLISTEX) ointment 1 Each  1 Each Topical PRN    melatonin tablet 3 mg  3 mg Oral QHS    predniSONE (DELTASONE) tablet 40 mg  40 mg Oral DAILY WITH BREAKFAST    QUEtiapine (SEROquel) tablet 25 mg  25 mg Oral QHS PRN    finasteride (PROSCAR) tablet 5 mg  5 mg Oral QHS    [Held by provider] rosuvastatin (CRESTOR) tablet 20 mg  20 mg Oral QHS    pantoprazole (PROTONIX) 40 mg in 0.9% sodium chloride 10 mL injection  40 mg IntraVENous DAILY    sodium chloride (NS) flush 5-40 mL  5-40 mL IntraVENous Q8H    sodium chloride (NS) flush 5-40 mL  5-40 mL IntraVENous PRN    acetaminophen (TYLENOL) tablet 650 mg  650 mg Oral Q6H PRN    Or    acetaminophen (TYLENOL) suppository 650 mg  650 mg Rectal Q6H PRN    polyethylene glycol (MIRALAX) packet 17 g  17 g Oral DAILY PRN    ondansetron (ZOFRAN ODT) tablet 4 mg  4 mg Oral Q8H PRN    Or    ondansetron (ZOFRAN) injection 4 mg  4 mg IntraVENous Q6H PRN    dextrose 40% (GLUTOSE) oral gel 1 Tube  15 g Oral PRN    glucagon (GLUCAGEN) injection 1 mg  1 mg IntraMUSCular PRN    dextrose (D50W) injection syrg 12.5-25 g  25-50 mL IntraVENous PRN    tiotropium-olodateroL (STIOLTO RESPIMAT) 2.5-2.5 mcg/actuation inhaler 2 Puff  2 Puff Inhalation DAILY    fluticasone (FLOVENT HFA) 110 mcg inhaler  1 Puff Inhalation DAILY    heparin (porcine) injection 5,000 Units  5,000 Units SubCUTAneous Q8H       Signed:  Mercy Steinberg DO    Part of this note may have been written by using a voice dictation software. The note has been proof read but may still contain some grammatical/other typographical errors.

## 2021-10-31 NOTE — PROGRESS NOTES
[facility] PROGRESS NOTE           10/31/2021 1:01 PM    Admit Date: 10/28/2021      Subjective:   Patient notes his headache has improved. He still feels weak. Heart rate is improved with current rate of 70. No significant bradycardic events based on review of telemetry. ROS:  Cardiovascular:  As noted above    Objective:      Vitals:    10/31/21 0011 10/31/21 0337 10/31/21 0712 10/31/21 0935   BP: (!) 143/86 138/82 114/73    Pulse: 70 71 66    Resp: 28 22 19    Temp: 97.8 °F (36.6 °C) 97.9 °F (36.6 °C) 97.6 °F (36.4 °C)    SpO2: 96% 97% 99% 99%   Weight:       Height:           Physical Exam:  General-No Acute Distress  Neck- supple, no JVD  CV- regular rate and rhythm no MRG  Lung- clear bilaterally  Abd- soft, nontender, nondistended  Ext- no edema bilaterally. Skin- warm and dry      Data Review:   Recent Labs     10/31/21  0743 10/30/21  0918 10/29/21  1227 10/29/21  1227 10/28/21  1218 10/28/21  1218   *  --   --  131*   < > 131*   K 3.3*  --   --  4.4   < > 3.8   MG 2.3 2.3   < > 2.2   < > 2.1   BUN 16  --   --  11   < > 16   CREA 0.75*  --   --  0.73*   < > 0.74*   GLU 91  --   --  126*   < > 115*   WBC 15.5*  --   --  8.1   < > 10.0   HGB 12.1*  --   --  12.3*   < > 11.1*   HCT 38.0*  --   --  38.0*   < > 35.5*     --   --  221   < > 233   INR  --   --   --   --   --  1.1    < > = values in this interval not displayed. No results found for: EM Yap    Assessment/Plan:     Principal Problem:    Acute metabolic encephalopathy (70/10/7926)  Appears improved. Continue supportive care. No evidence of CVA on CT scan. Active Problems:    Axillary mass, left (10/28/2021)  Concerning for malignancy. Plans for biopsy on Monday. Low risk from a cardiovascular standpoint for this procedure         Sarcoidosis (3/13/2006)  On prednisone.       Bradycardia (10/28/2021)  Resolved off of beta-blocker and with improvement in his overall condition. Suspect likely increased vagal tone with this illness. However, no strong indication for continued beta-blocker use. Would avoid this in the future. Hyponatremia (10/28/2021)  Stop diuretic therapy. Improved      Electrolyte or fluid disorder (10/28/2021)  Defer to primary team      Hyperglycemia, drug-induced (10/28/2021)  Sliding scale insulin      Headache (10/30/2021)  Improved. Uncertain etiology. No abnormalities on CT scan.           Please call if needed

## 2021-11-01 PROBLEM — R11.2 NAUSEA & VOMITING: Status: RESOLVED | Noted: 2021-01-01 | Resolved: 2021-01-01

## 2021-11-01 PROBLEM — R00.1 BRADYCARDIA: Status: RESOLVED | Noted: 2021-01-01 | Resolved: 2021-01-01

## 2021-11-01 PROBLEM — E87.8 ELECTROLYTE OR FLUID DISORDER: Status: RESOLVED | Noted: 2021-01-01 | Resolved: 2021-01-01

## 2021-11-01 PROBLEM — J44.9 COPD, MODERATE (HCC): Status: ACTIVE | Noted: 2021-01-01

## 2021-11-01 PROBLEM — E87.1 HYPONATREMIA: Status: RESOLVED | Noted: 2021-01-01 | Resolved: 2021-01-01

## 2021-11-01 PROBLEM — G93.41 ACUTE METABOLIC ENCEPHALOPATHY: Status: RESOLVED | Noted: 2021-01-01 | Resolved: 2021-01-01

## 2021-11-01 NOTE — PROGRESS NOTES
Physical therapy note: Attempted PT this AM & again in PM. In IR in AM and talking with physician in the PM. Will continue to treat as pt is able and time/schedule permit.     Griffin Ruiz, PTA

## 2021-11-01 NOTE — PROGRESS NOTES
Chart reviewed by CM for discharge planning. CM awaiting most recent therapy evaluation, as staff confirmed patient continues to improve. Tentative Disposition: STR vs HH. CM continues to follow plan of care.

## 2021-11-01 NOTE — PROGRESS NOTES
Comprehensive Nutrition Assessment    Type and Reason for Visit: Reassess  Unintentional weight loss (hospitalist)    Nutrition Recommendations/Plan:   Meals and Snacks:  Change current diet. Implement easy to chew per SLP raffaele, add lactose controlled. Nutrition Supplement Therapy:   Medical food supplement therapy:  Initiate Ensure Enlive three times per day (this provides 350 kcal and 20 grams protein per bottle)      Malnutrition Assessment:  Malnutrition Status: At risk for malnutrition (specify) (NV pta, po <25% needs)    Nutrition Assessment:   Nutrition History: Per initial assessment:Nutrition history provided by pt's wife a bedside. Pt normally eats 3 small meals a day and will also have a protein shake per wifes request. Pt's wife reported poor PO intake at home since he was experiencing severe N/V. Pt's wife stated she has not noticed any wt loss. 11/1 pt reports lactose intolerance. Nutrition Background: Pt with PMH significant for sarcoid, restrictive lung disease, and HTN. Presented with altered mental status and headache. Reported N/V for 2 weeks and intermittent abdominal pain. Pt admitted for acute metabolic encephalopathy. +axillary mass IR bx 11/1. Daily Update:  Pt in restroom at initial RD attempt to visit. At second attempt pt is up to recliner, evening tray in room untouched. Pt c/o poor appetite, disinterest in eating. He has multiple outside food and beverages in room. He reports his wife is attempting to promote oral intake. He declines offers from tray and items from outside. Her reports lactose intolerance, noted to have at least one item from outside food/beveragess containing lactose. Pt c/o foods being too ground up for him. Nutrition Related Findings:   SLP 10/29: Easy to Chew;  11/1: pt thin appearance, no joaquin muscle wasting noted.      Per SLP eval oropharyngeal dysphagia characterized by requires soft diet due to dental implants and difficulty masticating Current Nutrition Therapies:  ADULT DIET Dysphagia - Minced & Moist    Current Intake:   Average Meal Intake: 1-25% Average Supplement Intake: None ordered (has several outside supplements in room )      Anthropometric Measures:  Height: 5' 4\" (162.6 cm)  Current Body Wt: 69 kg (152 lb 1.9 oz) (11/1), Weight source: Bed scale  BMI: 26.1, Overweight (BMI 25.0-29. 9)  Admission Body Weight: 149 lb 0.5 oz  Ideal Body Weight (lbs) (Calculated): 130 lbs (59 kg), 114.6 %  Usual Body Wt:  (150-160# per pt's wife )        Edema: No data recorded   Estimated Daily Nutrient Needs:  Energy (kcal/day): 0394-5858 (Kcal/kg (25-30), Weight Used: Current (69 kg (11/1)))  Protein (g/day): 83-89 (1.2-1.3 g/kg) Weight Used: (Current)  Fluid (ml/day):   (1 ml/kcal)    Nutrition Diagnosis:   · Inadequate oral intake related to  (poor appetite, fatigue, disinterest in oral intake) as evidenced by  (self report of barriers, po <25% needs)    Nutrition Interventions:   Food and/or Nutrient Delivery: Modify current diet, Start oral nutrition supplement     Coordination of Nutrition Care: Continue to monitor while inpatient    Goals:   Previous Goal Met: Goal(s) achieved  Active Goal: Meet >50% estimated needs by nutrition follow-up    Nutrition Monitoring and Evaluation:      Food/Nutrient Intake Outcomes: Food and nutrient intake, Supplement intake  Physical Signs/Symptoms Outcomes: Biochemical data, GI status, Meal time behavior, Weight    Discharge Planning:     Too soon to determine    Soraya Sweeney RD, LDN   Contact: [phone]

## 2021-11-01 NOTE — PROCEDURES
Department of Interventional Radiology  [phone]        Interventional Radiology Brief Procedure Note    Patient: Solitario Sanchez MRN: [id number]  SSN: [id number]    YOB: 1940  Age: 80 y.o.   Sex: male      Date of Procedure: 11/1/2021    Pre-Procedure Diagnosis: axillary LN    Post-Procedure Diagnosis: SAME    Procedure(s): Image Guided Biopsy    Brief Description of Procedure: US guided core biopsy left axillary LN    Performed By: Blanca Wolff PA-C     Assistants: None    Anesthesia:Lidocaine    Estimated Blood Loss: Less than 10ml    Specimens:  5 18 gauge cores    Implants:  None    Findings: large left axillary LN    Complications: None    Recommendations: routine wound care     Follow Up: prn    Signed By: Blanca Wolff PA-C     November 1, 2021

## 2021-11-01 NOTE — PROGRESS NOTES
Hospitalist Progress Note   Admit Date:  10/28/2021 12:07 PM   Name:  Brian Rivera   Age:  80 y.o. Sex:  male  :  1940   MRN:  [id number]   Room:  [facility] [place]    Presenting Complaint: Slow Heart Rate    Reason(s) for Admission: Acute metabolic encephalopathy [Y93.41]     Hospital Course & Interval History:     Brian Rivera is a 80 y.o. male with medical history of sarcoid, restrictive lung disease, hypertension who presented with headache, altered mental status. On initial presentation to the emergency department he was complaining of a headache and able to speak in short sentences. At the time of my interview he was sedated and not following command. History provided by wife. For approximately last 2 weeks been feeling ill with progressive nausea and vomiting. He reported intermittent nonsevere abdominal pain. His wife reports no fevers, chills or rigors. She said that he did not complain of vision changes, chest pain, changes in urinary output, changes in bowel movements (some loose stools), peripheral edema. In the emergency department CT head chest abdomen pelvis did not demonstrate acute process, however did show chronic cerebral atrophy left-sided maxillary mass and chronic parenchymal lung disease. He was admitted 10/28 for further evaluation and management. S/p Axillary mass biopsy on . Weak on admission and following with PT. Complains of HA, MRI brain unremarkable. Subjective (21): Patient asleep on arrival, seen post biopsy. Says his headache is back. Wife had reported to me she saw a norco 5mg #90 tabs prescription at home for him she was unware of. Patient says he takes 1-2 x per week at most.     Assessment & Plan:     # Acute metabolic Encephalopathy  # Delirium  - RESOLVED  - CT head/neck non acute  - MRI brain non acute  - TFT's wnl  - Neurology has seen   - ammonia/UA unremarkable  - RPP positive for Rhinovirus and Enterovirus.  -AMS resolved. Patient seems more bothered by headache than anything else. Imaging as above ok. See below. # Headache  75/51 - uncertain etiology. MRI brain wnl. Described as temporal. Will add ESR,CRP. Trial of toraol for few doses. Caffeine. D/w Neuro in AM if unimproved. ?need for LP  10/31 - headaches have resolved. ESR/CRP not impressive    11/1 - Says he takes generic excedrin at home for HA. Will give trial of fiorcet. May need Neuro eval again for retractable headaches vs trial of Depacon if not improved. ..although not completely classic for migraine. # Left axillary mass  - IR consulted,   10/29 - procedure planned for this morning, deferred secondary to bradycardia  11/1 - s/p axillary mass biopsy. Have spoken with Onc who will make f/u for OP. # Bradycardia  - Cardiology has seen  - holding coreg  - Echo reviewed - EF 60%, mild TVR, RVSP 54  - Resolved, continue holding BB    # HTN  - 10/31 - suboptimal control. Add norvasc. Still holding BB  - 11/1 - a little better since norvasc added yesterday but still in 150-160's SBP. May need to titrate up tomorrow. # acute/chronic respiratory failure  # Sarcoidosis  - baseline o2 requirement of 2L NC,  - continue MDI's  10/30 - remains on 5L but likely can begin to wean. Continue pred 40mg. Pulm consulted per family request- pending for AM.   11/1 - Lilyherber Pierce has seen today. Stable on 2-3L NC. Continue prednisone, consider weaning soon. Cont MDI's. Started lasix 20mg daily. # Hypokalemia  11/1 - Add daily supplement for K 3.4 with start of lasix today. # hyponatremia  - 10/31 - corrected. Monitor off IVF         # DLP  - statin    # BPH  - proscar      Dispo/Discharge Planning:      Suspect will need STR, check PPD.      Diet:  ADULT DIET Easy to Chew; Lactose-Controlled  ADULT ORAL NUTRITION SUPPLEMENT Breakfast, Lunch, Dinner; Standard High Calorie/High Protein  DVT PPx: hep sq  Code status: Full Code    Hospital Problems as of 11/1/2021 Date Reviewed: 10/28/2021        Codes Class Noted - Resolved POA    Axillary mass, left ICD-10-CM: R22.32  ICD-9-CM: 782.2  10/28/2021 - Present Yes        RESOLVED: Nausea & vomiting ICD-10-CM: R11.2  ICD-9-CM: 787.01  10/28/2021 - 11/1/2021 Yes        Body mass index (BMI) of 25.0 to 25.9 in adult (Chronic) ICD-10-CM: H58.44  ICD-9-CM: V85.21  10/28/2021 - Present Yes        COPD, moderate ([facility] ICD-10-CM: J44.9  ICD-9-CM: 416  11/1/2021 - Present Unknown        Headache ICD-10-CM: R51.9  ICD-9-CM: 784.0  10/30/2021 - Present Unknown        Hyperglycemia, drug-induced ICD-10-CM: R73.9, T50.905A  ICD-9-CM: 790.29, E947.9  10/28/2021 - Present Yes        Pulmonary hypertension ([facility] ICD-10-CM: I27.20  ICD-9-CM: 416.8  1/10/2018 - Present Yes        Pulmonary hypertension due to hypoxia (Chronic) ICD-10-CM: I27.23  ICD-9-CM: 416.8, 799.02  6/20/2016 - Present     Overview Addendum 10/28/2021  3:11 PM by MD Dr. Trent Feliz  1.  6/6/2016 Echo- [id number]  2. Echo (11/23/20):  EF 55-60%. Mild LAE.  AVSC. Trace TR. Normal RV. Normal RA size. RVSP 35. Restrictive lung disease (Chronic) ICD-10-CM: J98.4  ICD-9-CM: 518.89  4/1/2015 - Present Yes    Overview Signed 4/1/2015  9:30 AM by Araseli Wagner. Residual from Sarcoidosis             Dyslipidemia (Chronic) ICD-10-CM: E78.5  ICD-9-CM: 272.4  3/13/2006 - Present Yes    Overview Signed 3/21/2019  8:31 AM by Zoey Dimas MD     Lexiscan Cardiolite (3/19/19):  EF 70%. Normal perfusion.               Sarcoidosis (Chronic) ICD-10-CM: D86.9  ICD-9-CM: 150  3/13/2006 - Present Yes        Benign prostatic hyperplasia (Chronic) ICD-10-CM: N40.0  ICD-9-CM: 600.00  3/13/2006 - Present Yes        * (Principal) RESOLVED: Acute metabolic encephalopathy [id number]: G93.41  ICD-9-CM: 348.31  10/28/2021 - 11/1/2021 Yes        RESOLVED: Bradycardia ICD-10-CM: R00.1  ICD-9-CM: 427.89  10/28/2021 - 11/1/2021 No        RESOLVED: Hyponatremia ICD-10-CM: E87.1  ICD-9-CM: 276.1  10/28/2021 - 11/1/2021 Yes        RESOLVED: Electrolyte or fluid disorder ICD-10-CM: E87.8  ICD-9-CM: 276.9  10/28/2021 - 11/1/2021 Yes              Objective:     Patient Vitals for the past 24 hrs:   Temp Pulse Resp BP SpO2   11/01/21 1228 98.5 °F (36.9 °C) 68 20 (!) 152/77 95 %   11/01/21 1126 -- 69 -- -- 98 %   11/01/21 1120 -- 87 -- -- 97 %   11/01/21 1110 -- 80 -- -- 99 %   11/01/21 1050 -- 60 -- -- 97 %   11/01/21 1025 -- 66 22 -- 96 %   11/01/21 0833 97.8 °F (36.6 °C) 61 20 (!) 157/83 100 %   11/01/21 0731 97.5 °F (36.4 °C) (!) 56 20 (!) 171/88 100 %   11/01/21 0411 97.6 °F (36.4 °C) (!) 59 17 (!) 165/88 100 %   11/01/21 0216 -- -- -- -- 99 %   10/31/21 2310 98.3 °F (36.8 °C) (!) 58 17 111/74 99 %   10/31/21 2035 -- -- -- -- 98 %   10/31/21 1934 98 °F (36.7 °C) 64 18 132/82 100 %     Oxygen Therapy  O2 Sat (%): 95 % (11/01/21 1228)  Pulse via Oximetry: 69 beats per minute (11/01/21 1126)  O2 Device: Nasal cannula (11/01/21 1025)  Skin Assessment: Clean, dry, & intact (10/29/21 2022)  Skin Protection for O2 Device: No (10/29/21 2022)  O2 Flow Rate (L/min): 5 l/min (11/01/21 1025)    Estimated body mass index is 26.11 kg/m² as calculated from the following:    Height as of this encounter: 5' 4\" (1.626 m). Weight as of this encounter: 69 kg (152 lb 1.9 oz). Intake/Output Summary (Last 24 hours) at 11/1/2021 1834  Last data filed at 11/1/2021 1454  Gross per 24 hour   Intake 240 ml   Output --   Net 240 ml         Physical Exam:     Blood pressure (!) 152/77, pulse 68, temperature 98.5 °F (36.9 °C), resp. rate 20, height 5' 4\" (1.626 m), weight 69 kg (152 lb 1.9 oz), SpO2 95 %. General:    Well nourished. Appears uncomfortable due to headache. Oriented x 3  Head:  Normocephalic, atraumatic  Eyes:  Sclerae appear normal.  Pupils equally round. ENT:  Nares appear normal, no drainage. Moist oral mucosa  Neck:  No restricted ROM. Trachea midline   CV:   RRR. No m/r/g.   No jugular venous distension. Lungs:   Diminished bilaterally, No wheezing, rhonchi, or rales. Respirations even, unlabored    Axilla -   Large left palpable mass - now bandaged with some ecchymosis post bx. Abdomen: Bowel sounds present. Soft, nontender, nondistended. Extremities: No cyanosis or clubbing. No edema  Skin:     No rashes and normal coloration. Warm and dry. Neuro:  CN II-XII grossly intact. Sensation intact. A&Ox3  Psych:  Flat affect     I have reviewed ordered lab tests and independently visualized imaging below:    Recent Labs:  Recent Results (from the past 48 hour(s))   METABOLIC PANEL, BASIC    Collection Time: 10/31/21  7:43 AM   Result Value Ref Range    Sodium 136 (L) 138 - 145 mmol/L    Potassium 3.3 (L) 3.5 - 5.1 mmol/L    Chloride 101 98 - 107 mmol/L    CO2 31 21 - 32 mmol/L    Anion gap 4 (L) 7 - 16 mmol/L    Glucose 91 65 - 100 mg/dL    BUN 16 8 - 23 MG/DL    Creatinine 0.75 (L) 0.8 - 1.5 MG/DL    GFR est AA >60 >60 ml/min/1.73m2    GFR est non-AA >60 >60 ml/min/1.73m2    Calcium 9.5 8.3 - 10.4 MG/DL   CBC WITH AUTOMATED DIFF    Collection Time: 10/31/21  7:43 AM   Result Value Ref Range    WBC 15.5 (H) 4.3 - 11.1 K/uL    RBC 3.98 (L) 4.23 - 5.6 M/uL    HGB 12.1 (L) 13.6 - 17.2 g/dL    HCT 38.0 (L) 41.1 - 50.3 %    MCV 95.5 79.6 - 97.8 FL    MCH 30.4 26.1 - 32.9 PG    MCHC 31.8 31.4 - 35.0 g/dL    RDW 14.3 11.9 - 14.6 %    PLATELET 867 011 - 673 K/uL    MPV 9.1 (L) 9.4 - 12.3 FL    ABSOLUTE NRBC 0.00 0.0 - 0.2 K/uL    DF AUTOMATED      NEUTROPHILS 87 (H) 43 - 78 %    LYMPHOCYTES 3 (L) 13 - 44 %    MONOCYTES 10 4.0 - 12.0 %    EOSINOPHILS 0 (L) 0.5 - 7.8 %    BASOPHILS 0 0.0 - 2.0 %    IMMATURE GRANULOCYTES 0 0.0 - 5.0 %    ABS. NEUTROPHILS 13.5 (H) 1.7 - 8.2 K/UL    ABS. LYMPHOCYTES 0.5 0.5 - 4.6 K/UL    ABS. MONOCYTES 1.6 (H) 0.1 - 1.3 K/UL    ABS. EOSINOPHILS 0.0 0.0 - 0.8 K/UL    ABS. BASOPHILS 0.0 0.0 - 0.2 K/UL    ABS. IMM.  GRANS. 0.1 0.0 - 0.5 K/UL   MAGNESIUM    Collection Time: 10/31/21  7:43 AM   Result Value Ref Range    Magnesium 2.3 1.8 - 2.4 mg/dL   PROCALCITONIN    Collection Time: 10/31/21  4:33 PM   Result Value Ref Range    Procalcitonin <0.05 ng/mL   CBC W/O DIFF    Collection Time: 11/01/21  7:11 AM   Result Value Ref Range    WBC 8.0 4.3 - 11.1 K/uL    RBC 3.92 (L) 4.23 - 5.6 M/uL    HGB 11.7 (L) 13.6 - 17.2 g/dL    HCT 36.7 (L) 41.1 - 50.3 %    MCV 93.6 79.6 - 97.8 FL    MCH 29.8 26.1 - 32.9 PG    MCHC 31.9 31.4 - 35.0 g/dL    RDW 14.1 11.9 - 14.6 %    PLATELET 688 656 - 952 K/uL    MPV 9.4 9.4 - 12.3 FL    ABSOLUTE NRBC 0.00 0.0 - 0.2 K/uL   METABOLIC PANEL, BASIC    Collection Time: 11/01/21  7:11 AM   Result Value Ref Range    Sodium 138 138 - 145 mmol/L    Potassium 3.4 (L) 3.5 - 5.1 mmol/L    Chloride 101 98 - 107 mmol/L    CO2 33 (H) 21 - 32 mmol/L    Anion gap 4 (L) 7 - 16 mmol/L    Glucose 96 65 - 100 mg/dL    BUN 16 8 - 23 MG/DL    Creatinine 0.64 (L) 0.8 - 1.5 MG/DL    GFR est AA >60 >60 ml/min/1.73m2    GFR est non-AA >60 >60 ml/min/1.73m2    Calcium 9.6 8.3 - 10.4 MG/DL       All Micro Results     Procedure Component Value Units Date/Time    CULTURE, BLOOD [[id number]] Collected: 10/28/21 2021    Order Status: Completed Specimen: Blood Updated: 11/01/21 0706     Special Requests: RIGHT ANTECUBITAL        Culture result: NO GROWTH 4 DAYS       CULTURE, BLOOD [[id number]] Collected: 10/28/21 2021    Order Status: Completed Specimen: Blood Updated: 11/01/21 0706     Special Requests: LEFT HAND        Culture result: NO GROWTH 4 DAYS       SARS-COV-2, PCR [[id number]] Collected: 10/28/21 2359    Order Status: Completed Specimen: Nasopharyngeal Updated: 10/29/21 0970     Specimen source Nasopharyngeal        SARS-CoV-2 Not detected        Comment:      The specimen is NEGATIVE for SARS-CoV-2, the novel coronavirus associated with COVID-19. This test has been authorized by the FDA under an Emergency Use Authorization (EUA) for use by authorized laboratories. Fact sheet for Healthcare Providers: ConventionUpdate.co.nz       Fact sheet for Patients: ConventionUpdate.co.nz       Methodology: RT-PCR         RESPIRATORY VIRUS PANEL W/COVID-19, PCR [[id number]]  (Abnormal) Collected: 10/29/21 0220    Order Status: Completed Specimen: Nasopharyngeal Updated: 10/29/21 0423     Adenovirus NOT DETECTED        Coronavirus 229E NOT DETECTED        Coronavirus HKU1 NOT DETECTED        Coronavirus CVNL63 NOT DETECTED        Coronavirus OC43 NOT DETECTED        SARS-CoV-2, PCR NOT DETECTED        Metapneumovirus NOT DETECTED        Rhinovirus and Enterovirus Detected        Influenza A NOT DETECTED        Influenza B NOT DETECTED        Parainfluenza 1 NOT DETECTED        Parainfluenza 2 NOT DETECTED        Parainfluenza 3 NOT DETECTED        Parainfluenza virus 4 NOT DETECTED        RSV by PCR NOT DETECTED        B. parapertussis, PCR NOT DETECTED        Bordetella pertussis - PCR NOT DETECTED        Chlamydophila pneumoniae DNA, QL, PCR NOT DETECTED        Mycoplasma pneumoniae DNA, QL, PCR NOT DETECTED       COVID-19 RAPID TEST [[id number]] Collected: 10/28/21 9499    Order Status: Completed Specimen: Nasopharyngeal Updated: 10/29/21 0034     Specimen source NASAL        COVID-19 rapid test Not detected        Comment:      The specimen is NEGATIVE for SARS-CoV-2, the novel coronavirus associated with COVID-19. A negative result does not rule out COVID-19. This test has been authorized by the FDA under an Emergency Use Authorization (EUA) for use by authorized laboratories. Fact sheet for Healthcare Providers: ConventionRingostatdate.co.nz  Fact sheet for Patients: Mapiliarydate.co.nz       Methodology: Isothermal Nucleic Acid Amplification               Other Studies:  US GUIDE BX LYMPH NOD SUPER    Result Date: 11/1/2021  Title: Ultrasound guided axillary S biopsy.  History: 80-year-old male with multiple large masses in the left axilla. Biopsy requested. :  Diego Palomino PA-C Supervising Physician: Greyson Mac M.D. Consent: Informed written and oral consent was obtained from the patient after explanation of benefits and risks (including, but not limited to: Infection, Hemorrhage, Nerve injury) of procedure. Procedure: Maximal sterile barrier technique was used. Following routine prep and drape of the left axilla, a local field block with 1% lidocaine was achieved. Ultrasound evaluation of the left axilla was performed. Using real-time ultrasound guidance, 5 18-gauge, 2 cm core biopsies were obtained and placed in RPMI and formalin. Following the biopsy, ultrasound examination demonstrates no evidence of hematoma. A dressing was applied. Complications: None. Medications: None. Findings: 5.4 cm left axillary mass. Technically successful core biopsy of a left axillary mass. Specimen: Sent to pathology. Plan: The patient was returned to his hospital room in stable condition.        Current Meds:  Current Facility-Administered Medications   Medication Dose Route Frequency    temazepam (RESTORIL) capsule 15 mg  15 mg Oral QHS PRN    furosemide (LASIX) tablet 20 mg  20 mg Oral DAILY    butalbital-acetaminophen-caffeine (FIORICET, ESGIC) -40 mg per tablet 1 Tablet  1 Tablet Oral Q4H PRN    [START ON 11/2/2021] potassium chloride (K-DUR, KLOR-CON) SR tablet 20 mEq  20 mEq Oral DAILY    albuterol (PROVENTIL HFA, VENTOLIN HFA, PROAIR HFA) inhaler 2 Puff  2 Puff Inhalation Q6H RT    albuterol (PROVENTIL HFA, VENTOLIN HFA, PROAIR HFA) inhaler 2 Puff  2 Puff Inhalation Q4H PRN    amLODIPine (NORVASC) tablet 5 mg  5 mg Oral DAILY    hydrALAZINE (APRESOLINE) 20 mg/mL injection 20 mg  20 mg IntraVENous Q6H PRN    pantoprazole (PROTONIX) tablet 40 mg  40 mg Oral ACB    tamsulosin (FLOMAX) capsule 0.4 mg  0.4 mg Oral DAILY    lip protectant (BLISTEX) ointment 1 Each  1 Each Topical PRN    melatonin tablet 3 mg  3 mg Oral QHS    predniSONE (DELTASONE) tablet 40 mg  40 mg Oral DAILY WITH BREAKFAST    QUEtiapine (SEROquel) tablet 25 mg  25 mg Oral QHS PRN    finasteride (PROSCAR) tablet 5 mg  5 mg Oral QHS    rosuvastatin (CRESTOR) tablet 20 mg  20 mg Oral QHS    sodium chloride (NS) flush 5-40 mL  5-40 mL IntraVENous Q8H    sodium chloride (NS) flush 5-40 mL  5-40 mL IntraVENous PRN    acetaminophen (TYLENOL) tablet 650 mg  650 mg Oral Q6H PRN    Or    acetaminophen (TYLENOL) suppository 650 mg  650 mg Rectal Q6H PRN    polyethylene glycol (MIRALAX) packet 17 g  17 g Oral DAILY PRN    ondansetron (ZOFRAN ODT) tablet 4 mg  4 mg Oral Q8H PRN    Or    ondansetron (ZOFRAN) injection 4 mg  4 mg IntraVENous Q6H PRN    dextrose 40% (GLUTOSE) oral gel 1 Tube  15 g Oral PRN    glucagon (GLUCAGEN) injection 1 mg  1 mg IntraMUSCular PRN    dextrose (D50W) injection syrg 12.5-25 g  25-50 mL IntraVENous PRN    tiotropium-olodateroL (STIOLTO RESPIMAT) 2.5-2.5 mcg/actuation inhaler 2 Puff  2 Puff Inhalation DAILY    fluticasone (FLOVENT HFA) 110 mcg inhaler  1 Puff Inhalation DAILY    [Held by provider] heparin (porcine) injection 5,000 Units  5,000 Units SubCUTAneous Q8H       Signed:  Skip Po, DO    Part of this note may have been written by using a voice dictation software. The note has been proof read but may still contain some grammatical/other typographical errors.

## 2021-11-01 NOTE — PROGRESS NOTES
SPEECH PATHOLOGY NOTE:    Per chart, patient NPO for biopsy today. Will follow up for diet tolerance at later time/date once diet resumed.        Ngozi [address] CCC-SLP

## 2021-11-01 NOTE — PROGRESS NOTES
OT treatment attempted; pt off floor for procedure. Will f/u as schedule/ pt's status allows.     Arron Arevalo, OT

## 2021-11-01 NOTE — PROGRESS NOTES
Hourly rounds complete this shift, no new complaints at this time, pt c/o r elbow skin tare, patient states he hit his arm on the side rale in bed, Patient NPO am bath given: bed in low, locked position, call light and bedside table within reach,  all needs met. Will continue to monitor Report to day shift nurse.

## 2021-11-01 NOTE — CONSULTS
FLORIAN/ Jason Sainz ENCOUNTER :  11/1/2021    Date of Admission:  10/28/2021  Length of Stay: 4 days     The patient's chart has been reviewed and the chart has been discussed with nursing staff. Subjective: This patient has been seen and evaluated at the request of Dr. Marshall Moon for worsening hypoxemia. Patient is a 80 y.o.  male presents with headache and bradycardia. He has been followed by Dr Nubia Zaragoza in our office. He has a history of sarcoidosis (mediastinal lymphadenopathy and calcification, VAT 2001) as well as mild pulmonary hypertension Group 1/Group 3, who has been followed at [facility] since Feb 2018. He does have CAITLIN but has not been able to use CPAP in the past. He underwent right heart catheterization at [place] by Dr. Andie Lerma on 2/12/18 with mPAP 26), and is currently treated with tadalafil 40mg daily. His RHC and echocardiogram are noted below. He was last seen by us in July 2021 and was stable. He has been on prednisone 10mg for sarcoidosis/Stage III COPD as well as Trelegy inhaler daily. He rarely requires albuterol. Baseline O2 needs: with inogen POC he is at 1-2 at rest, and 2lpm with exertion. He began taking Daliresp but began having side effects (jittery, tearly eyes, headache) and medication was stopped. His spirometry shows mixed restriction and severe obstruction and his last spirometry was worse. His echocardiogram was repeated here on 10/28 showing worsening RVSP 54 mm hg up from 35 in 2018. Pt would like to know if [facility] follow up can be virtual. He had a chest CT in March 2020 showing chronic, stable changes with multiple hilar lymph nodes and scarlike bilateral hilar masses. Chest CT this admission showed multiple large masses in the left axilla. He had a biopsy today of left axilla. MRI was done showing no acute abnormality.      He is currently on 40 mg prednisone, albuterol and stiolto here.  He has called for nasal spray refills and recently had more congestion. His main complaint is his headache. He is retired [employer]. Hx of shoulder ankle and knees are getting injections of stem cells. He has a slow-healing basal cell site s/p Mohs in 2021. Past Surgical History:   Procedure Laterality Date    HX HERNIA REPAIR Left     Left inguinal hernia repair    HX HERNIA REPAIR Right 's    Right inguinal hernia    HX KNEE REPLACEMENT Left 2016    HX LAP CHOLECYSTECTOMY  2006    HX ORTHOPAEDIC Right 2014    ankle replacement @ [place]    HX OTHER SURGICAL  2006    Liver biopsy--negative for sarcoidosis    HX TONSILLECTOMY  as a child    ID CHEST SURGERY PROCEDURE UNLISTED      Lung bx for sarcoidosis      Social History     Tobacco Use    Smoking status: Former Smoker     Packs/day: 1.50     Years: 5.00     Pack years: 7.50     Types: Cigarettes     Quit date: 1968     Years since quittin.8    Smokeless tobacco: Never Used   Substance Use Topics    Alcohol use: Yes     Alcohol/week: 0.0 standard drinks     Comment: socially      Family History   Problem Relation Age of Onset    Diabetes Mother     Hypertension Mother     Cancer Mother         Liver Cancer    Elevated Lipids Mother     Hypertension Father     Stroke Father     Cancer Sister     Headache Neg Hx       Allergies   Allergen Reactions    Lactose Diarrhea      Prior to Admission Medications   Prescriptions Last Dose Informant Patient Reported? Taking? Aspirin, Buffered 81 mg tab 10/22/2021 at Unknown time  Yes Yes   Sig: Take  by mouth daily. DISABLED PLACARD (DISABLED PLACARD) DMV   Yes No   Sig: Supply prescription to [facility] with completed form [id number]. OTHER   No No   Sig: Handicap placard  Dx: J44.9 COPD   OXYGEN-AIR DELIVERY SYSTEMS   Yes No   Sig: by Does Not Apply route.  2 lpm QD   albuterol (PROVENTIL VENTOLIN) 2.5 mg /3 mL (0.083 %) nebu   No No   Sig: 3 mL by Nebulization route every six (6) hours as needed for Wheezing. ascorbate calcium-bioflavonoid (DOROTHY-C WITH BIOFLAVONOIDS) 1,000-200 mg tab 10/27/2021 at Unknown time  Yes Yes   azelastine (ASTELIN) 137 mcg (0.1 %) nasal spray 10/22/2021 at Unknown time  No Yes   [place] by Both Nostrils route two (2) times a day. Use in each nostril as directed   b complex vitamins tablet 10/27/2021 at Unknown time  Yes Yes   Sig: Take 1 Tab by mouth daily. bisoprolol-hydroCHLOROthiazide (Ziac) 2.5-6.25 mg per tablet   No No   Sig: Take 1 Tablet by mouth daily. cholecalciferol, vitamin D3, (VITAMIN D3) 2,000 unit tab 10/27/2021 at Unknown time  Yes Yes   Sig: Take  by mouth daily. diclofenac (VOLTAREN) 1 % topical gel   Yes No   Sig: Apply 4 g to affected area four (4) times daily. prn   ferrous sulfate (Iron) 325 mg (65 mg iron) tablet   Yes No   Sig: Take  by mouth Daily (before breakfast). finasteride (Proscar) 5 mg tablet 10/22/2021 at Unknown time  No Yes   Sig: Take 1 Tab by mouth nightly. fluticasone-umeclidinium-vilanterol (TRELEGY ELLIPTA) 100-62.5-25 mcg inhaler 10/27/2021 at Unknown time  No Yes   Sig: Take 1 Puff by inhalation daily. melatonin 5 mg tablet 10/27/2021 at Unknown time  Yes Yes   Sig: Take 10 mg by mouth nightly. multivitamin (ONE A DAY) tablet 10/27/2021 at Unknown time  Yes Yes   Sig: Take 1 Tab by mouth daily. omeprazole (PRILOSEC) 40 mg capsule 10/22/2021 at Unknown time  No Yes   Sig: Take 1 Capsule by mouth daily. ondansetron (ZOFRAN ODT) 4 mg disintegrating tablet   No No   Sig: Take 1 Tablet by mouth every eight (8) hours as needed for Nausea or Vomiting (diarrhea). predniSONE (DELTASONE) 10 mg tablet 10/22/2021 at Unknown time  No Yes   Sig: Take 10 mg by mouth daily. rosuvastatin (CRESTOR) 20 mg tablet 10/22/2021 at Unknown time  No Yes   Sig: TAKE 1 TABLET NIGHTLY   tadalafiL, pulm. hypertension, (Adcirca) 20 mg tablet 10/22/2021 at Unknown time  No Yes   Sig: Take 2 Tablets by mouth daily.    tamsulosin (Flomax) 0.4 mg capsule 10/22/2021 at Unknown time  No Yes   Sig: Take 1 Capsule by mouth daily. temazepam (RESTORIL) 15 mg capsule 10/22/2021 at Unknown time  No Yes   Sig: Take 1 Capsule by mouth nightly as needed for Sleep. Max Daily Amount: 15 mg.       Facility-Administered Medications: None       MEDS SCHEDULED:    Current Facility-Administered Medications   Medication Dose Route Frequency    temazepam (RESTORIL) capsule 15 mg  15 mg Oral QHS PRN    albuterol (PROVENTIL HFA, VENTOLIN HFA, PROAIR HFA) inhaler 2 Puff  2 Puff Inhalation Q6H RT    albuterol (PROVENTIL HFA, VENTOLIN HFA, PROAIR HFA) inhaler 2 Puff  2 Puff Inhalation Q4H PRN    amLODIPine (NORVASC) tablet 5 mg  5 mg Oral DAILY    hydrALAZINE (APRESOLINE) 20 mg/mL injection 20 mg  20 mg IntraVENous Q6H PRN    pantoprazole (PROTONIX) tablet 40 mg  40 mg Oral ACB    tamsulosin (FLOMAX) capsule 0.4 mg  0.4 mg Oral DAILY    lip protectant (BLISTEX) ointment 1 Each  1 Each Topical PRN    melatonin tablet 3 mg  3 mg Oral QHS    predniSONE (DELTASONE) tablet 40 mg  40 mg Oral DAILY WITH BREAKFAST    QUEtiapine (SEROquel) tablet 25 mg  25 mg Oral QHS PRN    finasteride (PROSCAR) tablet 5 mg  5 mg Oral QHS    rosuvastatin (CRESTOR) tablet 20 mg  20 mg Oral QHS    sodium chloride (NS) flush 5-40 mL  5-40 mL IntraVENous Q8H    sodium chloride (NS) flush 5-40 mL  5-40 mL IntraVENous PRN    acetaminophen (TYLENOL) tablet 650 mg  650 mg Oral Q6H PRN    Or    acetaminophen (TYLENOL) suppository 650 mg  650 mg Rectal Q6H PRN    polyethylene glycol (MIRALAX) packet 17 g  17 g Oral DAILY PRN    ondansetron (ZOFRAN ODT) tablet 4 mg  4 mg Oral Q8H PRN    Or    ondansetron (ZOFRAN) injection 4 mg  4 mg IntraVENous Q6H PRN    dextrose 40% (GLUTOSE) oral gel 1 Tube  15 g Oral PRN    glucagon (GLUCAGEN) injection 1 mg  1 mg IntraMUSCular PRN    dextrose (D50W) injection syrg 12.5-25 g  25-50 mL IntraVENous PRN    tiotropium-olodateroL (STIOLTO RESPIMAT) 2.5-2.5 mcg/actuation inhaler 2 Puff  2 Puff Inhalation DAILY    fluticasone (FLOVENT HFA) 110 mcg inhaler  1 Puff Inhalation DAILY    [Held by provider] heparin (porcine) injection 5,000 Units  5,000 Units SubCUTAneous Q8H         Review of Systems  A comprehensive review of systems was negative except for that written in the HPI. Objective:     Vitals:    11/01/21 1110 11/01/21 1120 11/01/21 1126 11/01/21 1228   BP:    (!) 152/77   Pulse: 80 87 69 68   Resp:    20   Temp:    98.5 °F (36.9 °C)   SpO2: 99% 97% 98% 95%   Weight:       Height:         No intake/output data recorded. 10/30 1901 - 11/01 0700  In: -   Out: 18 [Urine:425]  PHYSICAL EXAM     Physical Exam:   General:  Alert, cooperative, no acute distress, appears stated age. Eyes:  Conjunctivae/corneas clear. Nose: Nares patent and moist.    Mouth/Throat: Lips, mucosa, and tongue pink and intact. Sinuses are tender on palpation   Neck: Supple, symmetrical.   Respiratory:   CTA to auscultation bilaterally on 3 lpm   Cardiovascular:  Regular rate and rhythm, S1, S2, no murmur, click, rub or gallop. GI:   Abdomen soft, non-tender. Bowel sounds active X 4 Q. No masses,     Musculoskeletal: Extremities symmetrical, atraumatic, no cyanosis, no edema. Skin: Skin color, texture, turgor normal. No rashes or lesions       Neurologic:  Alert and oriented. ACTIVITY: as tolerated   NUTRITION: ADA    IMAGES: Results from Hospital Encounter encounter on 10/28/21    CTA HEAD NECK W WO CONT    Impression  No acute arterial occlusive disease. Large masses in the left axilla. Results from [place] encounter on 10/28/21    MRI BRAIN WO CONT    Impression  No acute abnormality. Mild degenerative change. Results from Hospital Encounter encounter on 10/28/21    ECHO ADULT COMPLETE    Interpretation Summary  · TV: Mild tricuspid valve regurgitation is present. Right Ventricular Arterial Pressure (RVSP) is 54 mmHg.   · Image quality for this study was technically difficult. · Echo study was technically difficulty. · LV: Estimated LVEF is 55 - 60%. Normal cavity size, systolic function (ejection fraction normal) and diastolic function. Mild concentric hypertrophy. Wall motion: normal.  · LA: Moderately dilated left atrium. · RA: Mildly dilated right atrium. · AV: Mild aortic valve regurgitation is present. · MV: Mild mitral valve regurgitation is present. · PV: Mild pulmonic valve regurgitation is present. RIGHT HEART CATH 2/12/18  Systemic BP: 157/82   Mean arterial pressure: 107  RA a wave: 5  RA v wave: 3  RA mean: 1  RV (s/d/ed): 45/0/4  PA: 44/15   PA mean: 26  PCW a wave: 7  PCW v wave: 7  PCW mean: 5  PA sat: 71%   Arterial sat: 100%  Hemoglobin: 13.4  Body surface area: 1.71  Cardiac Output 4.30 L/min (Debra)  Cardiac Index 2.51 L/min/m2 (Debra)  Cardiac Output 5.16 L/min (Thermodilution)  Cardiac Index 3.02 L/min/m2 (Thermodilution)  PVR: 4.9 Wood Units/ 391 dyn-sec/cm5 (Debra)  SVR: 1972 dyn-sec/cm5 (Debra)   PVR: 4.1 Wood Units/ 326 dyn-sec/cm5 (Thermodilution)  SVR: 1643 dyn-sec/cm5 (Thermodilution)   Conclusions:    · Mild precapillary pulmonary hypertension (mPAP 26, PVR 4.1 MOJICA), notably     with patient likely hypovolemic with low RA and PCW pressures, which may     lead to underestimation of mPAP.    · Normal cardiac output and index.    · Low RA and PCW pressures, in keeping with known NPO state.        TTE 9/27/2018: RVSP 47.69 (down from 60-65mmHg)with right atrial pressure estimated at 8. Normal left ventricular ejection fraction of 36% diastolic function is indeterminate.         DIAGNOSTIC TESTS:  Spirometry: Mixed restriction and severe obstruction.   Both appear worse on today's spirometry    4/1/2015 5/19/2016 11/28/2017  4/27/2018  10/10/2018  9/19/2019   FVC  2.52 (69%)  2.01 (62%)  1.90 (58%)  1.84 (56%)  1.74 (53%)  1.55 (45%)   FEV1  1.47 (52%)  1.15 (49%)  1.02 (40%)  0.95 (37% )  0.98 (39%)  0.84 (32%)   FEV1/FVC  0.58 0. 57  0.42  0.51  0.57 0.57  0.54   TLC  3.93 (65%)    3.56 (63%)  3.71 (66%)  3.72 (66%)     FRC  1.86 (59%)    2.22 (75%)  2.02 (69%)  1.74 (53%)     RV  1.41 (63%)    1.66 (75%)  1.77 (80%)  1.98 (89%)     DLCO  16.1 (62%)    9.7 (42%)  9.6 (41%)  9.5 (41%)          LAB  Recent Labs     11/01/21  0711 10/31/21  0743   WBC 8.0 15.5*   HGB 11.7* 12.1*   HCT 36.7* 38.0*    267     Recent Labs     11/01/21  0711 10/31/21  0743 10/30/21  0918    136*  --    K 3.4* 3.3*  --     101  --    CO2 33* 31  --    GLU 96 91  --    BUN 16 16  --    CREA 0.64* 0.75*  --    MG  --  2.3 2.3     No results for input(s): LCAD, LAC in the last 72 hours. ABG:   No results for input(s): PH, PCO2, PO2, HCO3, PHI, PCO2I, PO2I, HCO3I, FIO2I in the last 72 hours. Cultures:   No results for input(s): SDES, CULT in the last 72 hours. Assessment/Plan:         Axillary mass, left (10/28/2021)  S/P Bx       Hypoxemia  On 3 lpm here and at home with underlying moderate-severe COPD, sarcoidosis and PHTN with worsening RVSP. Sarcoidosis (3/13/2006)  Prednisone increased to 40 mg from 10 mg. Would try and wean back down to 10 mg      Restrictive lung disease (4/1/2015)    COPD- STAGE III  On home trelegy    CAITLIN- try CPAP here, agreeable. Ongoing insomnia and multiple meds used- he thinks that this may be attributing to headaches. Allergic rhinitis on asteline at home, ? Sinusitis. Prophylaxis: PPI, DVT  Full Code  [employer], NP-C    More than 50% of time documented was spent in face-to-face contact with the patient and in the care of the patient on the floor/unit where the patient is located. I have spoken with and examined the patient. I agree with the above assessment and plan as documented. Gen: alert, calm, elderly  Lungs:  Scattered rales, mostly clear, no wheeze, 4-5L NC  Heart:  RRR with no Murmur/Rubs/Gallops  Abd: soft  Ext: trace edema in ankles and feet    CT appears stable, no wheezing. RVSP is higher and perhaps slightly overloaded as reason for increased O2 requirement. Don't see other good reason. Will await biopsy. Start lasix 20mg daily orally, continue Pred 40 for now, but may start to taper in the next couple days, not sure it is needed.     Emily Tyler MD

## 2021-11-01 NOTE — PROGRESS NOTES
[facility] CARDIOLOGY PROGRESS NOTE           11/1/2021 1:01 PM    Admit Date: 10/28/2021      Subjective:   Patient notes his headache has improved. He still feels weak. Heart rate is improved with current rate of 70. No significant bradycardic events based on review of telemetry. ROS:  Cardiovascular:  As noted above    Objective:      Vitals:    10/31/21 2310 11/01/21 0216 11/01/21 0411 11/01/21 0731   BP: 111/74  (!) 165/88 (!) 171/88   Pulse: (!) 58  (!) 59 (!) 56   Resp: 17  17 20   Temp: 98.3 °F (36.8 °C)  97.6 °F (36.4 °C) 97.5 °F (36.4 °C)   SpO2: 99% 99% 100% 100%   Weight:   152 lb 1.9 oz (69 kg)    Height:           Physical Exam:  General-No Acute Distress  Neck- supple, no JVD  CV- regular rate and rhythm no MRG  Lung- clear bilaterally  Abd- soft, nontender, nondistended  Ext- no edema bilaterally. Skin- warm and dry      Data Review:   Recent Labs     10/31/21  0743 10/30/21  0918 10/29/21  1227 10/29/21  1227   *  --   --  131*   K 3.3*  --   --  4.4   MG 2.3 2.3   < > 2.2   BUN 16  --   --  11   CREA 0.75*  --   --  0.73*   GLU 91  --   --  126*   WBC 15.5*  --   --  8.1   HGB 12.1*  --   --  12.3*   HCT 38.0*  --   --  38.0*     --   --  221    < > = values in this interval not displayed. No results found for: EM Ornelas    Assessment/Plan:     Principal Problem:    Acute metabolic encephalopathy (06/26/8597)  Appears improved. Continue supportive care. No evidence of CVA on CT scan. Active Problems:    Axillary mass, left (10/28/2021)  Concerning for malignancy. Plans for biopsy on Monday. Low risk from a cardiovascular standpoint for this procedure         Sarcoidosis (3/13/2006)  On prednisone. Bradycardia (10/28/2021)  Resolved off of beta-blocker and with improvement in his overall condition. Suspect likely increased vagal tone with this illness. However, no strong indication for continued beta-blocker use.   Would avoid this in the future. Hyponatremia (10/28/2021)  Stop diuretic therapy. Improved      Electrolyte or fluid disorder (10/28/2021)  Defer to primary team      Hyperglycemia, drug-induced (10/28/2021)  Sliding scale insulin      Headache (10/30/2021)  Improved. Uncertain etiology. No abnormalities on CT scan.           Please call if needed

## 2021-11-01 NOTE — PROGRESS NOTES
[facility] CARDIOLOGY PROGRESS NOTE           11/1/2021 1:01 PM    Admit Date: 10/28/2021      Subjective:   Patient notes his headache has improved. He still feels weak. Heart rate is improved with current rate of 70. No significant bradycardic events based on review of telemetry. Does not need PPM      ROS:  Cardiovascular:  As noted above    Objective:      Vitals:    10/31/21 2310 11/01/21 0216 11/01/21 0411 11/01/21 0731   BP: 111/74  (!) 165/88 (!) 171/88   Pulse: (!) 58  (!) 59 (!) 56   Resp: 17  17 20   Temp: 98.3 °F (36.8 °C)  97.6 °F (36.4 °C) 97.5 °F (36.4 °C)   SpO2: 99% 99% 100% 100%   Weight:   152 lb 1.9 oz (69 kg)    Height:           Physical Exam:  General-No Acute Distress  Neck- supple, no JVD  CV- regular rate and rhythm no MRG  Lung- clear bilaterally  Abd- soft, nontender, nondistended  Ext- no edema bilaterally. Skin- warm and dry      Data Review:   Recent Labs     10/31/21  0743 10/30/21  0918 10/29/21  1227 10/29/21  1227   *  --   --  131*   K 3.3*  --   --  4.4   MG 2.3 2.3   < > 2.2   BUN 16  --   --  11   CREA 0.75*  --   --  0.73*   GLU 91  --   --  126*   WBC 15.5*  --   --  8.1   HGB 12.1*  --   --  12.3*   HCT 38.0*  --   --  38.0*     --   --  221    < > = values in this interval not displayed. No results found for: EM Avelar    Assessment/Plan:     Principal Problem:    Acute metabolic encephalopathy (35/82/9568)  Appears improved. Continue supportive care. No evidence of CVA on CT scan. Active Problems:    Axillary mass, left (10/28/2021)  Concerning for malignancy. Plans for biopsy on Monday. Low risk from a cardiovascular standpoint for this procedure         Sarcoidosis (3/13/2006)  On prednisone. Bradycardia (10/28/2021)  Resolved off of beta-blocker and with improvement in his overall condition. Suspect likely increased vagal tone with this illness.   However, no strong indication for continued beta-blocker use.  Would avoid this in the future. Hyponatremia (10/28/2021)  Stop diuretic therapy. Improved      Electrolyte or fluid disorder (10/28/2021)  Defer to primary team      Hyperglycemia, drug-induced (10/28/2021)  Sliding scale insulin      Headache (10/30/2021)  Improved. Uncertain etiology. No abnormalities on CT scan.           Please call if needed

## 2021-11-01 NOTE — PROGRESS NOTES
Patient reports that he is getting \"antsy. \" Asks how long it will be before his procedure. Patient told that we have to wait for the room and a nurse to become available for his procedure. Patient given tv remote for possible distraction. Patient has no other needs at this time. Bed remains low, locked and side rails up. Call light within reach.

## 2021-11-01 NOTE — PROGRESS NOTES
Hourly rounds performed through shift, pt denies during shift. Wife was at the bedside most of the shift. Report given to night nurse.

## 2021-11-01 NOTE — PROGRESS NOTES
TRANSFER - OUT REPORT:    Verbal report given to Flora Awan on [place] [facility]  being transferred to 6th floor for routine progression of care       Report consisted of patients Situation, Background, Assessment and   Recommendations(SBAR). Information from the following report(s) SBAR, Procedure Summary and MAR was reviewed with the receiving nurse. Opportunity for questions and clarification was provided. Pt tolerated procedure well. Visit Vitals  BP (!) 157/83 (BP 1 Location: Left upper arm, BP Patient Position: At rest)   Pulse 66   Temp 97.8 °F (36.6 °C)   Resp 22   Ht 5' 4\" (1.626 m)   Wt 69 kg (152 lb 1.9 oz)   SpO2 96%   BMI 26.11 kg/m²     Past Medical History:   Diagnosis Date    Agent orange exposure 1960s    Arthritis     Asthma     Body mass index (BMI) of 25.0 to 25.9 in adult 10/28/2021    Chronic obstructive pulmonary disease (HCC)     Chronic pain     GERD (gastroesophageal reflux disease)     Hyperlipidemia 3/13/2006    Hypertension     Sarcoidosis 2001    Lung Biopsy was done in 2001;  Liver Biopsy--Negative for sarcidosis 5/06    Sleep apnea      Peripheral IV 10/29/21 Right Wrist (Active)   Site Assessment Other (Comment) 11/01/21 0715   Phlebitis Assessment 0 11/01/21 0715   Infiltration Assessment 0 11/01/21 0715   Dressing Status Other (comment) 11/01/21 0715   Dressing Type Transparent;Tape 11/01/21 0715   Hub Color/Line Status Pink 11/01/21 0411   Alcohol Cap Used No 11/01/21 0411                 External Urinary Catheter 10/29/21 (Active)   Site Assessment Clean, dry, & intact 10/30/21 2135   Repositioned No 10/30/21 2135   Perineal Care Yes 10/30/21 2011   Wick Changed Yes 10/30/21 2011   Suction Canister/Tubing Changed No 10/30/21 1852   Urine Output (mL) 425 ml 10/31/21 0557

## 2021-11-02 PROBLEM — J44.9 COPD, MODERATE (HCC): Chronic | Status: ACTIVE | Noted: 2021-01-01

## 2021-11-02 NOTE — PROGRESS NOTES
Pt placed on CPAP - pt is in no distress at this time      11/01/21 2309   Oxygen Therapy   O2 Sat (%) 96 %   Pulse via Oximetry 69 beats per minute   O2 Device CPAP mask   O2 Flow Rate (L/min) 3 l/min  (bleed in)   Respiratory   Respiratory (WDL) X   Respiratory Pattern Regular   Chest/Tracheal Assessment Chest expansion, symmetrical   Breath Sounds Bilateral Diminished   CPAP/BIPAP   CPAP/BIPAP Start/Stop On   Device Mode CPAP, auto-titrating   $$ CPAP Daily Yes   Mask Type and Size Full face; Medium   Skin Condition intact   EPAP (cm H2O)   (5-20 cm H2O)   Total RR (Spontaneous) 18 breaths per minute   Leak (Estimated)   (mask fits well)   Pt's Home Machine No   Biomedical Check Performed Yes   Settings Verified Yes   Alarm Settings   Apnea 20   Pulmonary Toilet   Pulmonary Toilet H. O.B elevated;Cough and deep breath

## 2021-11-02 NOTE — PROGRESS NOTES
Hourly rounds complete this shift,  bed in low, locked position, call light and bedside table within reach,  all needs met. Will continue to monitor Report to day shift nurse.

## 2021-11-02 NOTE — PROGRESS NOTES
Critical Care Note: 11/2/2021    Brain Sanchez  Admission Date: 10/28/2021    Length of Stay: 5 days        Background: Patient is a 80 y.o.  male presents with headache and bradycardia.     He has been followed by Dr Mercedez Mohamud in our office. He has a history of sarcoidosis (mediastinal lymphadenopathy and calcification, VAT 2001) as well as mild pulmonary hypertension Group 1/Group 3, who has been followed at [facility] since Feb 2018. He does have CAITLIN but has not been able to use CPAP in the past. He underwent right heart catheterization at [place] by Dr. Heena Wen on 2/12/18 with mPAP 26), and is currently treated with tadalafil 40mg daily. His RHC and echocardiogram are noted below.      He was last seen by us in July 2021 and was stable. He has been on prednisone 10mg for sarcoidosis/Stage III COPD as well as Trelegy inhaler daily. He rarely requires albuterol. Baseline O2 needs: with inogen POC he is at 1-2 at rest, and 2lpm with exertion. He began taking Daliresp but began having side effects (jittery, tearly eyes, headache) and medication was stopped.     His spirometry shows mixed restriction and severe obstruction and his last spirometry was worse. His echocardiogram was repeated here on 10/28 showing worsening RVSP 54 mm hg up from 35 in 2018. Pt would like to know if [facility] follow up can be virtual. He had a chest CT in March 2020 showing chronic, stable changes with multiple hilar lymph nodes and scarlike bilateral hilar masses. Chest CT this admission showed multiple large masses in the left axilla. He had a biopsy today of left axilla. MRI was done showing no acute abnormality.     He is currently on 40 mg prednisone, albuterol and stiolto here. He has called for nasal spray refills and recently had more congestion. His main complaint is his headache. He is retired [employer]. Subjective: On home oxygen, fell several times last night.  Looks miserable  CPAP ordered last night  Speech evaluation today and recommended GI eval    Review of Systems  Constitutional: negative for fever, chills, sweats  Cardiovascular: negative for chest pain, palpitations, syncope, edema  Gastrointestinal:  negative for dysphagia, reflux, vomiting, diarrhea, abdominal pain, or melena  Neurologic:  negative for focal weakness, numbness, headache    No current facility-administered medications on file prior to encounter. Current Outpatient Medications on File Prior to Encounter   Medication Sig Dispense Refill    melatonin 5 mg tablet Take 10 mg by mouth nightly.  rosuvastatin (CRESTOR) 20 mg tablet TAKE 1 TABLET NIGHTLY 90 Tablet 3    azelastine (ASTELIN) 137 mcg (0.1 %) nasal spray 1 Notus by Both Nostrils route two (2) times a day. Use in each nostril as directed 3 Bottle 3    tamsulosin (Flomax) 0.4 mg capsule Take 1 Capsule by mouth daily. 90 Capsule 3    temazepam (RESTORIL) 15 mg capsule Take 1 Capsule by mouth nightly as needed for Sleep. Max Daily Amount: 15 mg. 90 Capsule 1    omeprazole (PRILOSEC) 40 mg capsule Take 1 Capsule by mouth daily. 90 Capsule 3    tadalafiL, pulm. hypertension, (Adcirca) 20 mg tablet Take 2 Tablets by mouth daily. 60 Tablet 11    fluticasone-umeclidinium-vilanterol (TRELEGY ELLIPTA) 100-62.5-25 mcg inhaler Take 1 Puff by inhalation daily. 3 Inhaler 3    finasteride (Proscar) 5 mg tablet Take 1 Tab by mouth nightly. 90 Tab 3    ascorbate calcium-bioflavonoid (DOROTHY-C WITH BIOFLAVONOIDS) 1,000-200 mg tab       predniSONE (DELTASONE) 10 mg tablet Take 10 mg by mouth daily. 90 Tab 3    cholecalciferol, vitamin D3, (VITAMIN D3) 2,000 unit tab Take  by mouth daily.  Aspirin, Buffered 81 mg tab Take  by mouth daily.  multivitamin (ONE A DAY) tablet Take 1 Tab by mouth daily.  b complex vitamins tablet Take 1 Tab by mouth daily.       ondansetron (ZOFRAN ODT) 4 mg disintegrating tablet Take 1 Tablet by mouth every eight (8) hours as needed for Nausea or Vomiting (diarrhea). 30 Tablet 0    OTHER Handicap placard  Dx: J44.9 COPD 1 Each 0    bisoprolol-hydroCHLOROthiazide (Ziac) 2.5-6.25 mg per tablet Take 1 Tablet by mouth daily. 90 Tablet 3    albuterol (PROVENTIL VENTOLIN) 2.5 mg /3 mL (0.083 %) nebu 3 mL by Nebulization route every six (6) hours as needed for Wheezing. 180 Each 3    ferrous sulfate (Iron) 325 mg (65 mg iron) tablet Take  by mouth Daily (before breakfast).  DISABLED PLACARD (DISABLED PLACARD) DMV Supply prescription to [facility] with completed form [id number].  OXYGEN-AIR DELIVERY SYSTEMS by Does Not Apply route. 2 lpm QD      diclofenac (VOLTAREN) 1 % topical gel Apply 4 g to affected area four (4) times daily. prn         Objective:     Vitals:    11/02/21 0422 11/02/21 0849 11/02/21 0858 11/02/21 1146   BP: (!) 155/86  (!) 169/89 (!) 92/59   Pulse: 64  66 82   Resp: 17  16 16   Temp: 97.7 °F (36.5 °C)  98 °F (36.7 °C) 97.9 °F (36.6 °C)   SpO2: 97% 97% 99% 98%   Weight:       Height:           Intake/Output:    Physical Exam:          GEN: well developed and in no acute distress  HEENT:no alar flaring or epistaxis, oral mucosa moist without cyanosis,   NECK:  no JVD, no retractions, no thyromegaly or masses,   LUNGS:  CTA on NC  HEART:  RRR with no M,G,R;  ABDOMEN:  soft with no tenderness; positive bowel sounds present  EXTREMITIES:  warm with no cyanosis, no lower leg edema  SKIN:  no jaundice, right eye orbital ecchymosis   NEURO: A & O X 3      ACTIVITY: with assistance  NUTRITION: ADA    IMAGES: Results from Hospital Encounter encounter on 10/28/21    CTA HEAD NECK W WO CONT    Impression  No acute arterial occlusive disease. Large masses in the left axilla. Results from [place] encounter on 10/28/21    MRI BRAIN WO CONT    Impression  No acute abnormality. Mild degenerative change.        Results from [place] encounter on 10/28/21    ECHO ADULT COMPLETE    Interpretation Summary  · TV: Mild tricuspid valve regurgitation is present. Right Ventricular Arterial Pressure (RVSP) is 54 mmHg. · Image quality for this study was technically difficult. · Echo study was technically difficulty. · LV: Estimated LVEF is 55 - 60%. Normal cavity size, systolic function (ejection fraction normal) and diastolic function. Mild concentric hypertrophy. Wall motion: normal.  · LA: Moderately dilated left atrium. · RA: Mildly dilated right atrium. · AV: Mild aortic valve regurgitation is present. · MV: Mild mitral valve regurgitation is present. · PV: Mild pulmonic valve regurgitation is present. LAB  Recent Labs     11/02/21 0626 11/01/21 0711 10/31/21  0743   WBC 11.6* 8.0 15.5*   HGB 12.5* 11.7* 12.1*   HCT 38.7* 36.7* 38.0*    241 267     Recent Labs     11/02/21 0626 11/01/21 0711 10/31/21  0743   * 138 136*   K 3.3* 3.4* 3.3*   CL 94* 101 101   CO2 33* 33* 31   * 96 91   BUN 17 16 16   CREA 0.59* 0.64* 0.75*   MG  --   --  2.3     No results for input(s): LCAD, LAC in the last 72 hours. ABG:   No results for input(s): PH, PCO2, PO2, HCO3, PHI, PCO2I, PO2I, HCO3I, FIO2I in the last 72 hours. Cultures:   No results for input(s): SDES, CULT in the last 72 hours. Assessment/Plan:       Axillary mass, left (10/28/2021)  S/P Bx, F pending        Hypoxemia  On 3 lpm here and at home with underlying moderate-severe COPD, sarcoidosis and PHTN with worsening RVSP.        Sarcoidosis (3/13/2006)  Prednisone increased to 40 mg from 10 mg per hospitalist. Milli Feeling back down to 10 mg       Restrictive lung disease (4/1/2015)    COPD- STAGE III  On home trelegy     CAITLIN- try CPAP here, agreeable. Ongoing insomnia and multiple meds used- he thinks that this may be attributing to headaches. PHTN with worsening RVSP. -CT appears stable, no wheezing.  RVSP is higher and perhaps slightly overloaded as reason for increased O2 requirement  -possibly from untreated CAITLIN, hypoxemia    Allergic rhinitis on asteline at home, ? Sinusitis. Axillary mass, left (10/28/2021)     Dysphagia  Per speech: Recommend continue easy to chew diet and thin liquids. Will follow up x1, but likely will benefit from GI workup to rule out esophageal component. Reportedly was scheduled for outpatient EGD   Ask dietician to see    Prophylaxis: PPI, DVT  Full Code    Fallon Vargas NP    I have spoken with and examined the patient. I agree with the above assessment and plan as documented. Gen: alert, looks fatigued  Lungs:  Mild scattered rales, mostly clear, no wheezing  Heart:  RRR with no Murmur/Rubs/Gallops  Abd: soft  Ext: 1+ edema ankles and feet    Agree with going back to Prednisone 10. Tried lasix, but BPs lower and he fell with some unsteadiness, possible presyncope type symptoms. Agree with holding lasix. May need to hold norvasc and could still diurese slowly potentially. Okay to stop continuous pulse oximetry and spot check O2. Wean O2 as tolerated to 90% or better. Pending axillary mass biopsy. Ongoing dysphagia.       Virginia Rouse MD

## 2021-11-02 NOTE — PROGRESS NOTES
STG: Pt will tolerate dental soft diet/easy to chew diet and thin liquids with no signs of aspiration. LTG: Pt will tolerate the least restrictive diet at discharge without respiratory compromise    SPEECH LANGUAGE PATHOLOGY: DYSPHAGIA- Daily Note 1    NAME/AGE/GENDER: Leta Dakin is a 80 y.o. male  DATE: 11/2/2021  PRIMARY DIAGNOSIS: Acute metabolic encephalopathy [T83.52]      ICD-10: Treatment Diagnosis: R13.12 Dysphagia, Oropharyngeal Phase    RECOMMENDATIONS   DIET:   Easy to Chew  Thin Liquids    MEDICATIONS: With liquid     PRECAUTIONS, MODIFICATIONS, AND STRATEGIES  Small bites/sips  Upright at 90 degrees during meal  Alternate liquids/solids     RECOMMENDATIONS FOR CONTINUED SPEECH THERAPY:   YES: Anticipate need for ongoing speech therapy during this hospitalization. ASSESSMENT   Patient presents with s/sx and complaints concerning for esophageal dysphagia. Throat clearing intermittently with both solids and liquids and reports sticking sensation that improves slightly when alternating solids/liquids. Reduced mastication efficiency after recent dental work, but functional with soft solids. Patient endorses sticking sensation, food coming back up, poor appetite, nausea/vomiting. Reports would lay down at night at home then vomiting significant amount of food. Recommend continue easy to chew diet and thin liquids. Will follow up x1, but likely will benefit from GI workup to rule out esophageal component. Reportedly was scheduled for outpatient EGD this Thursday but wife cancelled due to current hospitalization. EDUCATION:  Recommendations discussed with Patient and Family, MD, RN  REHABILITATION POTENTIAL FOR STATED GOALS: Excellent    PLAN    FREQUENCY/DURATION:   Continue to follow patient 2 times a week for 1 week to address above goals.  Recommendations for next treatment session: Next treatment session will address diet tolerance    CONTINUATION OF SKILLED SERVICES/MEDICAL NECESSITY:  Patient is expected to demonstrate progress in  swallow function, diet tolerance and swallow safety in order to  improve swallow safety. Patient continues to require skilled intervention due to dysphagia. SUBJECTIVE   Patient reports fell 2x last night-bruising around right eye noted. Reports also hit right arm. RN aware. Wife at bedside     Oxygen Device: NC     Pain: Pain Scale 1: Numeric (0 - 10)  Pain Intensity 1: 0    History of Present Injury/Illness: Mr. Mima Woods  has a past medical history of Agent orange exposure (1960s), Arthritis, Asthma, Body mass index (BMI) of 25.0 to 25.9 in adult (10/28/2021), Chronic obstructive pulmonary disease ([facility] [address] Chronic pain, GERD (gastroesophageal reflux disease), Sarcoidosis (2001), and Sleep apnea. . He also  has a past surgical history that includes hx tonsillectomy (as a child); hx other surgical (2006); pr chest surgery procedure unlisted (2001); hx hernia repair (Left, 2004); hx lap cholecystectomy (2006); hx hernia repair (Right, 1990's); hx orthopaedic (Right, 2014); and hx knee replacement (Left, 2016). PRECAUTIONS/ALLERGIES: Lactose     Problem List:  (Impairments causing functional limitations):  Oropharyngeal dysphagia  Concerns for esophageal dysphagia    Previous Dysphagia:YES  Patient provided more insight into events leading up to hospitalization and dysphagia this date.   + nausea/vomiting, regurgitation, reflux, stasis sensation. Reports will be laying down at night then cough and \"then i'll fit up the toilet bowl vomiting\" History of welch's esophagus   Reports some difficulty chewing regular textures due to recent dental work and prefers soft solids at this time. Orientation:  Person  Place  Time  Situation      OBJECTIVE   Dysphagia treatment:   Patient consumed trials of thin liquid (cup and straw), puree, and mixed fruit. Patient demonstrated intermittent delayed throat clear with liquid and solids.  Reports sticking sensation pointing to sternal notch, particularly with solids. Mildly decreased mastication efficiency/strenth due to recent dental extraction, but functional with increased time and softer solids. Slight improvement in clearance with reported decreased sticking sensation with thin liquid rinse. Vocal quality remained clear throughout. a few minutes after consuming puree, patient appeared to look as though he was about to vomit. Reports applesauce came back up. Tool Used: Dysphagia Outcome and Severity Scale (JESSICA)    Score Comments   Normal Diet  [] 7 With no strategies or extra time needed   Functional Swallow  [x] 6 May have mild oral or pharyngeal delay   Mild Dysphagia  [] 5 Which may require one diet consistency restricted    Mild-Moderate Dysphagia  [] 4 With 1-2 diet consistencies restricted   Moderate Dysphagia  [] 3 With 2 or more diet consistencies restricted   Moderate-Severe Dysphagia  [] 2 With partial PO strategies (trials with ST only)   Severe Dysphagia  [] 1 With inability to tolerate any PO safely      Score:  Initial: 6 Most Recent: 6 (Date 11/02/21 )   Interpretation of Tool: The Dysphagia Outcome and Severity Scale (JESSICA) is a simple, easy-to-use, 7-point scale developed to systematically rate the functional severity of dysphagia based on objective assessment and make recommendations for diet level, independence level, and type of nutrition.          INTERDISCIPLINARY COLLABORATION: RN, MD    After treatment position/precautions:  Upright in bed  Call light within reach  wife at bedside  RN notified    Total Treatment Duration:   Time In: 1745  Time Out: Ashley

## 2021-11-02 NOTE — PROGRESS NOTES
Hospitalist Progress Note   Admit Date:  10/28/2021 12:07 PM   Name:  Nila Urbina   Age:  80 y.o. Sex:  male  :  1940   MRN:  [id number]   Room:  [facility]    Presenting Complaint: Slow Heart Rate    Reason(s) for Admission: Acute metabolic encephalopathy [H48.97]     Hospital Course & Interval History:     Nila Urbina is a 80 y.o. male with medical history of sarcoid, restrictive lung disease, hypertension who presented with headache, altered mental status. On initial presentation to the emergency department he was complaining of a headache and able to speak in short sentences. At the time of my interview he was sedated and not following command. History provided by wife. For approximately last 2 weeks been feeling ill with progressive nausea and vomiting. He reported intermittent nonsevere abdominal pain. His wife reports no fevers, chills or rigors. She said that he did not complain of vision changes, chest pain, changes in urinary output, changes in bowel movements (some loose stools), peripheral edema. In the emergency department CT head chest abdomen pelvis did not demonstrate acute process, however did show chronic cerebral atrophy left-sided maxillary mass and chronic parenchymal lung disease. He was admitted 10/28 for further evaluation and management. S/p Axillary mass biopsy on . Weak on admission and following with PT. Complains of HA, MRI brain unremarkable. Subjective (21): Sitting on side of bed with head down. Says his headache continues. Took 2 falls without LOC last evening, now with right black eye. Complains of feeling like food gets stuck in throat and vomiting at night when lying flat. Denies CP/SOB. Denies F/C.     Assessment & Plan:     # Acute metabolic Encephalopathy  # Delirium  - RESOLVED  - CT head/neck non acute  - MRI brain non acute  - TFT's wnl  - Neurology has seen   - ammonia/UA unremarkable  - RVP positive for Rhinovirus and Enterovirus. 11/1 -AMS resolved. Patient seems more bothered by headache than anything else. Imaging ok. # Headache  28/79 - uncertain etiology. MRI brain wnl. Described as temporal. Will add ESR,CRP. Trial of toraol for few doses. Caffeine. D/w Neuro in AM if unimproved. ?need for LP  10/31 - headaches have resolved. ESR/CRP not impressive  11/1 - Says he takes generic excedrin at home for HA. Will give trial of fiorcet. May need Neuro eval again for retractable headaches vs trial of Depacon if not improved. ..although not completely classic for migraine. 11/2 - Headache slightly worse today. Says it's about the same as his chronic headaches. #Dysphagia  -SLP evaluated  -Check barium swallow  -Had OP EGD planned on 11/4 already so may need GI to see IP    # Left axillary mass  - IR consulted,   10/29 - procedure planned for this morning, deferred secondary to bradycardia  11/1 - s/p axillary mass biopsy. Have spoken with Onc who will make f/u for OP. # Bradycardia  - Cardiology has seen  - holding coreg  - Echo reviewed - EF 60%, mild TVR, RVSP 54  - Resolved, continue holding BB    # HTN  - 10/31 - suboptimal control. Add norvasc. Still holding BB  - 11/1 - a little better since norvasc added yesterday but still in 150-160's SBP. May need to titrate up tomorrow. -11/2 - BP much improved    # acute/chronic respiratory failure  # Sarcoidosis  - baseline o2 requirement of 2L NC,  - continue MDI's  10/30 - remains on 5L but likely can begin to wean. Continue pred 40mg. Pulm consulted per family request- pending for AM.   11/1 - Gladystine Sale has seen today. Stable on 2-3L NC. Continue prednisone, consider weaning soon. Cont MDI's. Started lasix 20mg daily. 11/2 - Lasix stopped. Prednisone weaned to 10mg daily. # Hypokalemia  11/1 - Add daily supplement for K 3.4 with start of lasix today. 11/2 - K 3.3. Replace PO    # hyponatremia  - 10/31 - corrected.  Monitor off IVF         # DLP  - statin    # BPH  - proscar      Dispo/Discharge Planning:      PT/OT pending, suspect will need STR, check PPD. Diet:  ADULT DIET Easy to Chew; Lactose-Controlled  ADULT ORAL NUTRITION SUPPLEMENT Breakfast, Lunch, Dinner; Standard High Calorie/High Protein  DVT PPx: hep sq  Code status: Full Code    Hospital Problems as of 11/2/2021 Date Reviewed: 10/28/2021        Codes Class Noted - Resolved POA    Axillary mass, left ICD-10-CM: R22.32  ICD-9-CM: 782.2  10/28/2021 - Present Yes        RESOLVED: Nausea & vomiting ICD-10-CM: R11.2  ICD-9-CM: 787.01  10/28/2021 - 11/1/2021 Yes        Body mass index (BMI) of 25.0 to 25.9 in adult (Chronic) ICD-10-CM: N84.78  ICD-9-CM: V85.21  10/28/2021 - Present Yes        COPD, moderate ([facility] [address] ICD-10-CM: J44.9  ICD-9-CM: 776  11/1/2021 - Present Unknown        Headache ICD-10-CM: R51.9  ICD-9-CM: 784.0  10/30/2021 - Present Unknown        Hyperglycemia, drug-induced ICD-10-CM: R73.9, T50.905A  ICD-9-CM: 790.29, E947.9  10/28/2021 - Present Yes        Pulmonary hypertension ([facility] [address] ICD-10-CM: I27.20  ICD-9-CM: 416.8  1/10/2018 - Present Yes        Pulmonary hypertension due to hypoxia (Chronic) ICD-10-CM: I27.23  ICD-9-CM: 416.8, 799.02  6/20/2016 - Present     Overview Addendum 10/28/2021  3:11 PM by MD Dr. Blayne Pepper  1.  6/6/2016 Echo- [id number]-45-50  2. Echo (11/23/20):  EF 55-60%. Mild LAE.  AVSC. Trace TR. Normal RV. Normal RA size. RVSP 35. Restrictive lung disease (Chronic) ICD-10-CM: J98.4  ICD-9-CM: 518.89  4/1/2015 - Present Yes    Overview Signed 4/1/2015  9:30 AM by Faye Levine. Residual from Sarcoidosis             Dyslipidemia (Chronic) ICD-10-CM: E78.5  ICD-9-CM: 272.4  3/13/2006 - Present Yes    Overview Signed 3/21/2019  8:31 AM by Saundra Sales MD     Lexiscan Cardiolite (3/19/19):  EF 70%. Normal perfusion.               Sarcoidosis (Chronic) ICD-10-CM: D86.9  ICD-9-CM: 135  3/13/2006 - Present Yes        Benign prostatic hyperplasia (Chronic) ICD-10-CM: N40.0  ICD-9-CM: 600.00  3/13/2006 - Present Yes        * (Principal) RESOLVED: Acute metabolic encephalopathy KGQ-32-LF: G93.41  ICD-9-CM: 348.31  10/28/2021 - 11/1/2021 Yes        RESOLVED: Bradycardia ICD-10-CM: R00.1  ICD-9-CM: 427.89  10/28/2021 - 11/1/2021 No        RESOLVED: Hyponatremia ICD-10-CM: E87.1  ICD-9-CM: 276.1  10/28/2021 - 11/1/2021 Yes        RESOLVED: Electrolyte or fluid disorder ICD-10-CM: E87.8  ICD-9-CM: 276.9  10/28/2021 - 11/1/2021 Yes              Objective:     Patient Vitals for the past 24 hrs:   Temp Pulse Resp BP SpO2   11/02/21 1146 97.9 °F (36.6 °C) 82 -- (!) 92/59 --   11/02/21 0858 98 °F (36.7 °C) 66 16 (!) 169/89 99 %   11/02/21 0849 -- -- -- -- 97 %   11/02/21 0422 97.7 °F (36.5 °C) 64 17 (!) 155/86 97 %   11/02/21 0124 -- -- -- -- 95 %   11/02/21 0023 97.7 °F (36.5 °C) 63 16 -- 96 %   11/01/21 2309 -- -- -- -- 96 %   11/01/21 2119 -- -- -- -- 100 %   11/01/21 2032 97.7 °F (36.5 °C) 62 18 (!) 150/81 98 %     Oxygen Therapy  O2 Sat (%): 99 % (11/02/21 0858)  Pulse via Oximetry: 73 beats per minute (11/02/21 0849)  O2 Device: Nasal cannula (11/02/21 0849)  Skin Assessment: Clean, dry, & intact (10/29/21 2022)  Skin Protection for O2 Device: No (10/29/21 2022)  O2 Flow Rate (L/min): 3 l/min (increased to 5 for bathroom ) (11/02/21 0849)    Estimated body mass index is 26.11 kg/m² as calculated from the following:    Height as of this encounter: 5' 4\" (1.626 m). Weight as of this encounter: 69 kg (152 lb 1.9 oz). Intake/Output Summary (Last 24 hours) at 11/2/2021 1350  Last data filed at 11/1/2021 1454  Gross per 24 hour   Intake 240 ml   Output --   Net 240 ml         Physical Exam:     Blood pressure (!) 92/59, pulse 82, temperature 97.9 °F (36.6 °C), resp. rate 16, height 5' 4\" (1.626 m), weight 69 kg (152 lb 1.9 oz), SpO2 99 %. General:    Well nourished. Appears uncomfortable due to headache.  Oriented x 3  Head:  Normocephalic, atraumatic  Eyes:  Sclerae appear normal.  Pupils equally round. ENT:  Nares appear normal, no drainage. Moist oral mucosa  Neck:  No restricted ROM. Trachea midline   CV:   RRR. No m/r/g. No jugular venous distension. Lungs:   Diminished bilaterally, No wheezing, rhonchi, or rales. Respirations even, unlabored    Axilla -   Large left palpable mass - now bandaged with some ecchymosis post bx. Abdomen: Bowel sounds present. Soft, nontender, nondistended. Extremities: No cyanosis or clubbing. No edema  Skin:     No rashes and normal coloration. Warm and dry. Neuro:  CN II-XII grossly intact. Sensation intact.   A&Ox3  Psych:  Flat affect     I have reviewed ordered lab tests and independently visualized imaging below:    Recent Labs:  Recent Results (from the past 48 hour(s))   PROCALCITONIN    Collection Time: 10/31/21  4:33 PM   Result Value Ref Range    Procalcitonin <0.05 ng/mL   CBC W/O DIFF    Collection Time: 11/01/21  7:11 AM   Result Value Ref Range    WBC 8.0 4.3 - 11.1 K/uL    RBC 3.92 (L) 4.23 - 5.6 M/uL    HGB 11.7 (L) 13.6 - 17.2 g/dL    HCT 36.7 (L) 41.1 - 50.3 %    MCV 93.6 79.6 - 97.8 FL    MCH 29.8 26.1 - 32.9 PG    MCHC 31.9 31.4 - 35.0 g/dL    RDW 14.1 11.9 - 14.6 %    PLATELET 672 650 - 928 K/uL    MPV 9.4 9.4 - 12.3 FL    ABSOLUTE NRBC 0.00 0.0 - 0.2 K/uL   METABOLIC PANEL, BASIC    Collection Time: 11/01/21  7:11 AM   Result Value Ref Range    Sodium 138 138 - 145 mmol/L    Potassium 3.4 (L) 3.5 - 5.1 mmol/L    Chloride 101 98 - 107 mmol/L    CO2 33 (H) 21 - 32 mmol/L    Anion gap 4 (L) 7 - 16 mmol/L    Glucose 96 65 - 100 mg/dL    BUN 16 8 - 23 MG/DL    Creatinine 0.64 (L) 0.8 - 1.5 MG/DL    GFR est AA >60 >60 ml/min/1.73m2    GFR est non-AA >60 >60 ml/min/1.73m2    Calcium 9.6 8.3 - 10.4 MG/DL   CBC W/O DIFF    Collection Time: 11/02/21  6:26 AM   Result Value Ref Range    WBC 11.6 (H) 4.3 - 11.1 K/uL    RBC 4.31 4.23 - 5.6 M/uL    HGB 12.5 (L) 13.6 - 17.2 g/dL    HCT 38.7 (L) 41.1 - 50.3 %    MCV 89.8 79.6 - 97.8 FL    MCH 29.0 26.1 - 32.9 PG    MCHC 32.3 31.4 - 35.0 g/dL    RDW 13.5 11.9 - 14.6 %    PLATELET 841 626 - 807 K/uL    MPV 9.1 (L) 9.4 - 12.3 FL    ABSOLUTE NRBC 0.00 0.0 - 0.2 K/uL   METABOLIC PANEL, BASIC    Collection Time: 11/02/21  6:26 AM   Result Value Ref Range    Sodium 133 (L) 136 - 145 mmol/L    Potassium 3.3 (L) 3.5 - 5.1 mmol/L    Chloride 94 (L) 98 - 107 mmol/L    CO2 33 (H) 21 - 32 mmol/L    Anion gap 6 (L) 7 - 16 mmol/L    Glucose 122 (H) 65 - 100 mg/dL    BUN 17 8 - 23 MG/DL    Creatinine 0.59 (L) 0.8 - 1.5 MG/DL    GFR est AA >60 >60 ml/min/1.73m2    GFR est non-AA >60 >60 ml/min/1.73m2    Calcium 9.6 8.3 - 10.4 MG/DL       All Micro Results     Procedure Component Value Units Date/Time    CULTURE, BLOOD [[id number]] Collected: 10/28/21 2021    Order Status: Completed Specimen: Blood Updated: 11/02/21 0748     Special Requests: LEFT HAND        Culture result: NO GROWTH 5 DAYS       CULTURE, BLOOD [[id number]] Collected: 10/28/21 2021    Order Status: Completed Specimen: Blood Updated: 11/02/21 0748     Special Requests: RIGHT ANTECUBITAL        Culture result: NO GROWTH 5 DAYS       SARS-COV-2, PCR [[id number]] Collected: 10/28/21 8420    Order Status: Completed Specimen: Nasopharyngeal Updated: 10/29/21 0916     Specimen source Nasopharyngeal        SARS-CoV-2 Not detected        Comment:      The specimen is NEGATIVE for SARS-CoV-2, the novel coronavirus associated with COVID-19. This test has been authorized by the FDA under an Emergency Use Authorization (EUA) for use by authorized laboratories.         Fact sheet for Healthcare Providers: ConventionUpdate.co.nz       Fact sheet for Patients: ConventionUpdate.co.nz       Methodology: RT-PCR         RESPIRATORY VIRUS PANEL W/COVID-19, PCR [[id number]]  (Abnormal) Collected: 10/29/21 0220    Order Status: Completed Specimen: Nasopharyngeal Updated: 10/29/21 9371 Adenovirus NOT DETECTED        Coronavirus 229E NOT DETECTED        Coronavirus HKU1 NOT DETECTED        Coronavirus CVNL63 NOT DETECTED        Coronavirus OC43 NOT DETECTED        SARS-CoV-2, PCR NOT DETECTED        Metapneumovirus NOT DETECTED        Rhinovirus and Enterovirus Detected        Influenza A NOT DETECTED        Influenza B NOT DETECTED        Parainfluenza 1 NOT DETECTED        Parainfluenza 2 NOT DETECTED        Parainfluenza 3 NOT DETECTED        Parainfluenza virus 4 NOT DETECTED        RSV by PCR NOT DETECTED        B. parapertussis, PCR NOT DETECTED        Bordetella pertussis - PCR NOT DETECTED        Chlamydophila pneumoniae DNA, QL, PCR NOT DETECTED        Mycoplasma pneumoniae DNA, QL, PCR NOT DETECTED       COVID-19 RAPID TEST [[id number]] Collected: 10/28/21 4495    Order Status: Completed Specimen: Nasopharyngeal Updated: 10/29/21 0034     Specimen source NASAL        COVID-19 rapid test Not detected        Comment:      The specimen is NEGATIVE for SARS-CoV-2, the novel coronavirus associated with COVID-19. A negative result does not rule out COVID-19. This test has been authorized by the FDA under an Emergency Use Authorization (EUA) for use by authorized laboratories. Fact sheet for Healthcare Providers: ConventionUpdate.co.nz  Fact sheet for Patients: ConventionUpdate.co.nz       Methodology: Isothermal Nucleic Acid Amplification               Other Studies:  No results found.     Current Meds:  Current Facility-Administered Medications   Medication Dose Route Frequency    furosemide (LASIX) tablet 20 mg  20 mg Oral DAILY    butalbital-acetaminophen-caffeine (FIORICET, ESGIC) -40 mg per tablet 1 Tablet  1 Tablet Oral Q4H PRN    potassium chloride (K-DUR, KLOR-CON) SR tablet 20 mEq  20 mEq Oral DAILY    melatonin tablet 5 mg  5 mg Oral QHS    HYDROcodone-acetaminophen (NORCO) 5-325 mg per tablet 1 Tablet  1 Tablet Oral Q4H PRN    albuterol (PROVENTIL HFA, VENTOLIN HFA, PROAIR HFA) inhaler 2 Puff  2 Puff Inhalation Q6H RT    albuterol (PROVENTIL HFA, VENTOLIN HFA, PROAIR HFA) inhaler 2 Puff  2 Puff Inhalation Q4H PRN    amLODIPine (NORVASC) tablet 5 mg  5 mg Oral DAILY    hydrALAZINE (APRESOLINE) 20 mg/mL injection 20 mg  20 mg IntraVENous Q6H PRN    pantoprazole (PROTONIX) tablet 40 mg  40 mg Oral ACB    tamsulosin (FLOMAX) capsule 0.4 mg  0.4 mg Oral DAILY    lip protectant (BLISTEX) ointment 1 Each  1 Each Topical PRN    predniSONE (DELTASONE) tablet 40 mg  40 mg Oral DAILY WITH BREAKFAST    finasteride (PROSCAR) tablet 5 mg  5 mg Oral QHS    rosuvastatin (CRESTOR) tablet 20 mg  20 mg Oral QHS    sodium chloride (NS) flush 5-40 mL  5-40 mL IntraVENous Q8H    sodium chloride (NS) flush 5-40 mL  5-40 mL IntraVENous PRN    polyethylene glycol (MIRALAX) packet 17 g  17 g Oral DAILY PRN    ondansetron (ZOFRAN ODT) tablet 4 mg  4 mg Oral Q8H PRN    Or    ondansetron (ZOFRAN) injection 4 mg  4 mg IntraVENous Q6H PRN    dextrose 40% (GLUTOSE) oral gel 1 Tube  15 g Oral PRN    glucagon (GLUCAGEN) injection 1 mg  1 mg IntraMUSCular PRN    dextrose (D50W) injection syrg 12.5-25 g  25-50 mL IntraVENous PRN    tiotropium-olodateroL (STIOLTO RESPIMAT) 2.5-2.5 mcg/actuation inhaler 2 Puff  2 Puff Inhalation DAILY    fluticasone (FLOVENT HFA) 110 mcg inhaler  1 Puff Inhalation DAILY    [Held by provider] heparin (porcine) injection 5,000 Units  5,000 Units SubCUTAneous Q8H       Signed:  Coit Shone, DO    Part of this note may have been written by using a voice dictation software. The note has been proof read but may still contain some grammatical/other typographical errors.

## 2021-11-03 NOTE — PROGRESS NOTES
ACUTE PHYSICAL THERAPY GOALS:  (Developed with and agreed upon by patient and/or caregiver. )  LTG:  (1.)Mr. Sanchez will move from supine to sit and sit to supine , scoot up and down and roll side to side in flat bed without siderails with  STAND BY ASSIST within 7 day(s). (2.)Mr. Sanchez will perform all functional transfers with  STAND BY ASSIST using the least restrictive/no device within 7 day(s). (3.)Mr. Sanchez will ambulate with  CONTACT GUARD ASSIST for 50+ feet with normal vital sign response with the least restrictive/no device within 7 day(s). PHYSICAL THERAPY: Daily Note and AM Treatment Day # 2    Rosette Titus is a 80 y.o. male   PRIMARY DIAGNOSIS: Acute metabolic encephalopathy  Acute metabolic encephalopathy [Q95.83]         ASSESSMENT:     REHAB RECOMMENDATIONS: CURRENT LEVEL OF FUNCTION:  (Most Recently Demonstrated)   Recommendation to date pending progress:  Setting:   Short-term Rehab  Equipment:    To Be Determined Bed Mobility:   Minimal Assistance x 2  Sit to Stand:   Minimal Assistance x 2  Transfers:   Minimal Assistance x 2  Gait/Mobility:   Minimal Assistance x 2     ASSESSMENT:  Mr. Malina Tamayo was supine on contact. He stood and was able to amb around the room and to the BR. He stood at the sink and worked on balance. Sat in chair and performed B LE ex. He is making progress toward goals. SUBJECTIVE:   Mr. Malina Tamayo asked \"Do you think I need to go to rehab? \"    SOCIAL HISTORY/ LIVING ENVIRONMENT: Mr. Malina Tamayo lives with his wife, he is independent in home and community and very active per his wife  Home Environment: Private residence  One/Two Story Residence: Two story  Living Alone: No  Support Systems: Spouse/Significant Other  OBJECTIVE:     PAIN: VITAL SIGNS: LINES/DRAINS:   Pre Treatment:  0  Post Treatment: 0   .  O2 Device: Nasal cannula     MOBILITY: I Mod I S SBA CGA Min Mod Max Total  NT x2 Comments:   Bed Mobility    Rolling [] [] [] [] [] [] [] [] [] [] [] Supine to Sit [] [] [] [] [] [x] [] [] [] [] [x]    Scooting [] [] [] [] [] [] [] [] [] [] []    Sit to Supine [] [] [] [] [] [] [] [] [] [] []    Transfers    Sit to Stand [] [] [] [] [] [x] [] [] [] [] [x]    Bed to Chair [] [] [] [] [] [] [] [] [] [] []    Stand to Sit [] [] [] [] [] [x] [] [] [] [] [x]    I=Independent, Mod I=Modified Independent, S=Supervision, SBA=Standby Assistance, CGA=Contact Guard Assistance,   Min=Minimal Assistance, Mod=Moderate Assistance, Max=Maximal Assistance, Total=Total Assistance, NT=Not Tested    BALANCE: Good Fair+ Fair Fair- Poor NT Comments   Sitting Static [] [x] [] [] [] []    Sitting Dynamic [] [] [x] [] [] []              Standing Static [] [] [x] [] [] []    Standing Dynamic [] [] [x] [] [] []      GAIT: I Mod I S SBA CGA Min Mod Max Total  NT x2 Comments:   Level of Assistance [] [] [] [] [] [] [] [] [] [] []    Distance 30'    DME Rolling Walker    Gait Quality Slow, short steps    Weightbearing  Status N/A     I=Independent, Mod I=Modified Independent, S=Supervision, SBA=Standby Assistance, CGA=Contact Guard Assistance,   Min=Minimal Assistance, Mod=Moderate Assistance, Max=Maximal Assistance, Total=Total Assistance, NT=Not Tested    PLAN:   FREQUENCY/DURATION: PT Plan of Care: 3 times/week for duration of hospital stay or until stated goals are met, whichever comes first.  TREATMENT:     TREATMENT:   ($$ Therapeutic Activity: 38-52 mins    )  Co-Treatment PT/OT necessary due to patient's decreased overall endurance/tolerance levels, as well as need for high level skilled assistance to complete functional transfers/mobility and functional tasks  Therapeutic Activity (44 Minutes): Therapeutic activity included Supine to Sit, Scooting, Transfer Training, Ambulation on level ground, Sitting balance , Standing balance and B LE ex to improve functional Mobility, Strength and Activity tolerance.     TREATMENT GRID:   Date:  11/3/21 Date:   Date:     Activity/Exercise Seated Parameters Parameters Parameters   Heel raises X 20 B     Toe raises X 20 B     LAQ's X 20 B     Hip Flex X 20 B     Hip ABD X 20 B                           AFTER TREATMENT POSITION/PRECAUTIONS:  Chair, Needs within reach, RN notified and Visitors at bedside    INTERDISCIPLINARY COLLABORATION:  RN/PCT, PT/PTA and OT/ACUÑA    TOTAL TREATMENT DURATION:  PT Patient Time In/Time Out  Time In: 0924  Time Out: [address] [facility], [facility]

## 2021-11-03 NOTE — PROGRESS NOTES
Hourly rounding completed during shift. All needs met at this time. Bed L/L with call bell in reach, bed alarm on. Report given to oncoming RN.

## 2021-11-03 NOTE — PROGRESS NOTES
Critical Care Note: 11/3/2021    [facility]  Admission Date: 10/28/2021    Length of Stay: 6 days        Background: Patient is a 80 y.o.  male presents with headache and bradycardia.     He has been followed by Dr Xavi Galvan in our office. He has a history of sarcoidosis (mediastinal lymphadenopathy and calcification, VAT 2001) as well as mild pulmonary hypertension Group 1/Group 3, who has been followed at [facility] since Feb 2018. He does have CAITLIN but has not been able to use CPAP in the past. He underwent right heart catheterization at [facility] by Dr. Karen Garrido on 2/12/18 with mPAP 26), and is currently treated with tadalafil 40mg daily. His RHC and echocardiogram are noted below.      He was last seen by us in July 2021 and was stable. He has been on prednisone 10mg for sarcoidosis/Stage III COPD as well as Trelegy inhaler daily. He rarely requires albuterol. Baseline O2 needs: with inogen POC he is at 1-2 at rest, and 2lpm with exertion. He began taking Daliresp but began having side effects (jittery, tearly eyes, headache) and medication was stopped.     His spirometry shows mixed restriction and severe obstruction and his last spirometry was worse. His echocardiogram was repeated here on 10/28 showing worsening RVSP 54 mm hg up from 35 in 2018. Pt would like to know if [facility] follow up can be virtual. He had a chest CT in March 2020 showing chronic, stable changes with multiple hilar lymph nodes and scarlike bilateral hilar masses. Chest CT this admission showed multiple large masses in the left axilla. He had a biopsy today of left axilla. MRI was done showing no acute abnormality.     He is currently on 40 mg prednisone, albuterol and stiolto here. He has called for nasal spray refills and recently had more congestion. His main complaint is his headache. He is retired [employer].      Subjective:     Refused CPAP, wearing O2  Viral panel positive for rhinovirus, enterovirus    Review of Systems  Constitutional: negative for fever, chills, sweats  Cardiovascular: negative for chest pain, palpitations, syncope, edema  Gastrointestinal:  negative for dysphagia, reflux, vomiting, diarrhea, abdominal pain, or melena  Neurologic:  negative for focal weakness, numbness, headache    No current facility-administered medications on file prior to encounter. Current Outpatient Medications on File Prior to Encounter   Medication Sig Dispense Refill    melatonin 5 mg tablet Take 10 mg by mouth nightly.  rosuvastatin (CRESTOR) 20 mg tablet TAKE 1 TABLET NIGHTLY 90 Tablet 3    azelastine (ASTELIN) 137 mcg (0.1 %) nasal spray 1 [place] by Both Nostrils route two (2) times a day. Use in each nostril as directed 3 Bottle 3    tamsulosin (Flomax) 0.4 mg capsule Take 1 Capsule by mouth daily. 90 Capsule 3    temazepam (RESTORIL) 15 mg capsule Take 1 Capsule by mouth nightly as needed for Sleep. Max Daily Amount: 15 mg. 90 Capsule 1    omeprazole (PRILOSEC) 40 mg capsule Take 1 Capsule by mouth daily. 90 Capsule 3    tadalafiL, pulm. hypertension, (Adcirca) 20 mg tablet Take 2 Tablets by mouth daily. 60 Tablet 11    fluticasone-umeclidinium-vilanterol (TRELEGY ELLIPTA) 100-62.5-25 mcg inhaler Take 1 Puff by inhalation daily. 3 Inhaler 3    finasteride (Proscar) 5 mg tablet Take 1 Tab by mouth nightly. 90 Tab 3    ascorbate calcium-bioflavonoid (DOROTHY-C WITH BIOFLAVONOIDS) 1,000-200 mg tab       predniSONE (DELTASONE) 10 mg tablet Take 10 mg by mouth daily. 90 Tab 3    cholecalciferol, vitamin D3, (VITAMIN D3) 2,000 unit tab Take  by mouth daily.  Aspirin, Buffered 81 mg tab Take  by mouth daily.  multivitamin (ONE A DAY) tablet Take 1 Tab by mouth daily.  b complex vitamins tablet Take 1 Tab by mouth daily.  ondansetron (ZOFRAN ODT) 4 mg disintegrating tablet Take 1 Tablet by mouth every eight (8) hours as needed for Nausea or Vomiting (diarrhea).  30 Tablet 0    OTHER Handicap placard  Dx: J44.9 COPD 1 Each 0    bisoprolol-hydroCHLOROthiazide (Ziac) 2.5-6.25 mg per tablet Take 1 Tablet by mouth daily. 90 Tablet 3    albuterol (PROVENTIL VENTOLIN) 2.5 mg /3 mL (0.083 %) nebu 3 mL by Nebulization route every six (6) hours as needed for Wheezing. 180 Each 3    ferrous sulfate (Iron) 325 mg (65 mg iron) tablet Take  by mouth Daily (before breakfast).  DISABLED PLACARD (DISABLED PLACARD) DMV Supply prescription to [facility] with completed form RG-007A.  OXYGEN-AIR DELIVERY SYSTEMS by Does Not Apply route. 2 lpm QD      diclofenac (VOLTAREN) 1 % topical gel Apply 4 g to affected area four (4) times daily. prn         Objective:     Vitals:    11/03/21 0413 11/03/21 0418 11/03/21 0707 11/03/21 0727   BP: (!) 136/91 (!) 136/91 114/82    Pulse: 69 69 63    Resp:  18 18    Temp: 97.3 °F (36.3 °C) 98.1 °F (36.7 °C) 98.1 °F (36.7 °C)    SpO2: 100% 100% 98% 100%   Weight:  146 lb 13.2 oz (66.6 kg)     Height:           Intake/Output:    Physical Exam:          GEN: well developed and in no acute distress  HEENT:no alar flaring or epistaxis, oral mucosa moist without cyanosis,   NECK:  no JVD, no retractions, no thyromegaly or masses,   LUNGS:  CTA on NC  HEART:  RRR with no M,G,R;  ABDOMEN:  soft with no tenderness; positive bowel sounds present  EXTREMITIES:  warm with no cyanosis, no lower leg edema  SKIN:  no jaundice, right eye orbital ecchymosis   NEURO: A & O X 3      ACTIVITY: with assistance  NUTRITION: ADA    IMAGES: Results from Hospital Encounter encounter on 10/28/21    CTA HEAD NECK W WO CONT    Impression  No acute arterial occlusive disease. Large masses in the left axilla. Results from [place] encounter on 10/28/21    MRI BRAIN WO CONT    Impression  No acute abnormality. Mild degenerative change.        Results from Hospital Encounter encounter on 10/28/21    ECHO ADULT COMPLETE    Interpretation Summary  · TV: Mild tricuspid valve regurgitation is present. Right Ventricular Arterial Pressure (RVSP) is 54 mmHg. · Image quality for this study was technically difficult. · Echo study was technically difficulty. · LV: Estimated LVEF is 55 - 60%. Normal cavity size, systolic function (ejection fraction normal) and diastolic function. Mild concentric hypertrophy. Wall motion: normal.  · LA: Moderately dilated left atrium. · RA: Mildly dilated right atrium. · AV: Mild aortic valve regurgitation is present. · MV: Mild mitral valve regurgitation is present. · PV: Mild pulmonic valve regurgitation is present. LAB  Recent Labs     11/03/21  0607 11/02/21  0626 11/01/21  0711   WBC 10.4 11.6* 8.0   HGB 13.1* 12.5* 11.7*   HCT 40.5* 38.7* 36.7*    301 241     Recent Labs     11/03/21  0607 11/02/21  0626 11/01/21  0711   * 133* 138   K 3.6 3.3* 3.4*   CL 93* 94* 101   CO2 32 33* 33*   GLU 87 122* 96   BUN 16 17 16   CREA 0.61* 0.59* 0.64*     10/29   0 Result Notes     Ref Range & Units 10/29/21 0220   Adenovirus NOTDET   NOT DETECTED    Coronavirus 229E NOTDET   NOT DETECTED    Coronavirus HKU1 NOTDET   NOT DETECTED    Coronavirus CVNL63 NOTDET   NOT DETECTED    Coronavirus OC43 NOTDET   NOT DETECTED    SARS-CoV-2, PCR NOTDET   NOT DETECTED    Metapneumovirus NOTDET   NOT DETECTED    Rhinovirus and Enterovirus NOTDET   Detected Abnormal     Influenza A NOTDET   NOT DETECTED    Influenza B NOTDET   NOT DETECTED    Parainfluenza 1 NOTDET   NOT DETECTED    Parainfluenza 2 NOTDET   NOT DETECTED    Parainfluenza 3 NOTDET   NOT DETECTED    Parainfluenza virus 4 NOTDET   NOT DETECTED    RSV by PCR NOTDET   NOT DETECTED    B. parapertussis, PCR NOTDET   NOT DETECTED    Bordetella pertussis - PCR NOTDET   NOT DETECTED    Chlamydophila pneumoniae DNA, QL, PCR NOTDET   NOT DETECTED    Mycoplasma pneumoniae DNA, QL, PCR NOTDET   NOT DETECTED             No results for input(s): LCAD, LAC in the last 72 hours.     ABG:   No results for input(s): PH, PCO2, PO2, HCO3, PHI, PCO2I, PO2I, HCO3I, FIO2I in the last 72 hours. Cultures:   No results for input(s): SDES, CULT in the last 72 hours. Assessment/Plan:       Axillary mass, left (10/28/2021)  S/P Bx, F pending        Hypoxemia  On 3 lpm here and at home with underlying moderate-severe COPD, sarcoidosis and PHTN with worsening RVSP. no change on CT.        Sarcoidosis (3/13/2006)  Prednisone increased to 40 mg from 10 mg per hospitalist.now back down to 10 mg       Restrictive lung disease (4/1/2015)    COPD- STAGE III  On home trelegy     CAITLIN- try CPAP here, agreeable. Ongoing insomnia and multiple meds used- he thinks that this may be attributing to headaches. PHTN with worsening RVSP. -CT appears stable, no wheezing. RVSP is higher and perhaps slightly overloaded as reason for increased O2 requirement  -possibly from untreated CAITLIN, hypoxemia    Allergic rhinitis on asteline at home, ? Sinusitis. Axillary mass, left (10/28/2021)     Dysphagia  Per speech: Recommend continue easy to chew diet and thin liquids. Will follow up x1, but likely will benefit from GI workup to rule out esophageal component. Reportedly was scheduled for outpatient EGD   Esophogram ordered  Dietician is following. Now on home O2 and home prednisone dosing. He cannot tolerate CPAP. Hospitalist managing care- biopsy result, esophagram, dysphagia, H/A. Respiratory status stable on home oxygen. No further respiratory needs noted at this time. Will sign off of the treatment team.  Please call if we can be of further assistance. Thank you. Prophylaxis: PPI, DVT  Full Code    Felicia Range, NP    I have spoken with and examined the patient. I agree with the above assessment and plan as documented. Gen: alert, general malaise  Lungs:  clear  Heart:  RRR with no Murmur/Rubs/Gallops  Abd: soft  Ext: trace    Back to 2L NC. Viral syndrome (Rhino/enterovirus). Still with headache and weakness.  Steroids back to 10mg. He is scheduled to follow op with Dr. Nubia Zaragoza at the end of the month. Will sign off, call with questions.      Bubba Hopkins MD

## 2021-11-03 NOTE — PROGRESS NOTES
ACUTE OT GOALS:  (Developed with and agreed upon by patient and/or caregiver.)  1. Pt will toilet with SBA   2. Pt will complete functional mobility for ADLs with SBA using AD as needed  3. Pt will complete lower body dressing with SBA using AE as needed  4. Pt will complete grooming and hygiene at sink with SBA  5. Pt will demonstrate independence with HEP to promote increased BUE strength and functional use for ADLs  6. Pt will tolerate 23 minutes functional activity with min or fewer rest breaks to promote increased endurance for ADLs  7. Pt will independently demonstrate/ verbalize 2+ energy conservation techniques to promote increased endurance for ADLs        Timeframe: 7 days    OCCUPATIONAL THERAPY: Daily Note OT Treatment Day # 2    Brian Rivera is a 80 y.o. male   PRIMARY DIAGNOSIS: Acute metabolic encephalopathy  Acute metabolic encephalopathy [M35.98]       [place] / Plan[place] / Product Type: Medicare /   ASSESSMENT:     REHAB RECOMMENDATIONS: CURRENT LEVEL OF FUNCTION:  (Most Recently Demonstrated)   Recommendation to date pending progress:  Setting:   Short-term Rehab  Equipment:    None Bathing:   Minimal Assistance  Dressing:   Minimal Assistance  Feeding/Grooming:   Minimal Assistance  Toileting:   Minimal Assistance  Functional Mobility:   Minimal Assistance     ASSESSMENT:  Mr. Wei Saravia remains generally weak with deficits in strength, endurance, balance, and mobility impacting ADLs. Pt w/ improved cognition, however continues to demonstrate decreased problem solving, processing,  and safety awareness and required min cues to safely and successfully complete functional tasks. Pt had 2 recent falls while hospitalized, educated on fall prevention techniques and required min cues for carryover. Pt completed ADLs and mobility for ADLs with Kamille overall using RW. Pt is progressing towards goals, continue POC.       SUBJECTIVE:   Mr. Wei Saravia states, \"I'm weak.\"    SOCIAL HISTORY/LIVING ENVIRONMENT:   Home Environment: Private residence  One/Two Story Residence: Two story  Living Alone: No  Support Systems: Spouse/Significant Other    OBJECTIVE:     PAIN: VITAL SIGNS: LINES/DRAINS:   Pre Treatment: Pain Screen  Pain Scale 1: Numeric (0 - 10)  Pain Intensity 1: 0  Post Treatment: 0     O2 Device: Nasal cannula     ACTIVITIES OF DAILY LIVING: I Mod I S SBA CGA Min Mod Max Total NT Comments   BASIC ADLs:              Bathing/ Showering [] [] [] [] [] [] [] [] [] []    Toileting [] [] [] [] [] [x] [] [] [] []    Dressing [] [] [] [] [] [x] [] [] [] [] Gown, brief with cues for hand placement   Feeding [] [] [] [] [] [] [] [] [] []    Grooming [] [] [] [] [x] [] [] [] [] []    Personal Device Care [] [] [] [] [] [] [] [] [] []    Functional Mobility [] [] [] [] [] [x] [] [] [] [] RW   I=Independent, Mod I=Modified Independent, S=Supervision, SBA=Standby Assistance, CGA=Contact Guard Assistance,   Min=Minimal Assistance, Mod=Moderate Assistance, Max=Maximal Assistance, Total=Total Assistance, NT=Not Tested    MOBILITY: I Mod I S SBA CGA Min Mod Max Total  NT x2 Comments:   Supine to sit [] [] [] [] [x] [] [] [] [] [] []    Sit to supine [] [] [] [] [] [] [] [] [] [] []    Sit to stand [] [] [] [] [x] [] [] [] [] [] []    Bed to chair [] [] [] [] [] [x] [] [] [] [] []    I=Independent, Mod I=Modified Independent, S=Supervision, SBA=Standby Assistance, CGA=Contact Guard Assistance,   Min=Minimal Assistance, Mod=Moderate Assistance, Max=Maximal Assistance, Total=Total Assistance, NT=Not Tested    PLAN:   FREQUENCY/DURATION: OT Plan of Care: 3 times/week for duration of hospital stay or until stated goals are met, whichever comes first.    TREATMENT:   TREATMENT:   ($$ Self Care/Home Management: 23-37 mins    )  Co-Treatment PT/OT necessary due to patient's decreased overall endurance/tolerance levels, as well as need for high level skilled assistance to complete functional transfers/mobility and functional tasks  Self Care (29 Minutes): Self care including Toileting, Upper Body Dressing, Lower Body Dressing and Grooming to increase independence and decrease level of assistance required.     TREATMENT GRID:  N/A    AFTER TREATMENT POSITION/PRECAUTIONS:  Alarm Activated, Chair, Needs within reach and RN notified    INTERDISCIPLINARY COLLABORATION:  RN/PCT and PT/PTA    TOTAL TREATMENT DURATION:  OT Patient Time In/Time Out  Time In: 7090  Time Out: 7[facility]

## 2021-11-03 NOTE — PROGRESS NOTES
Hospitalist Progress Note   Admit Date:  10/28/2021 12:07 PM   Name:  Moreno Driver   Age:  80 y.o. Sex:  male  :  1940   MRN:  [id number]   Room:  [facility]    Presenting Complaint: Slow Heart Rate    Reason(s) for Admission: Acute metabolic encephalopathy [H41.20]     Hospital Course & Interval History:     Moreno Driver is a 80 y.o. male with medical history of sarcoid, restrictive lung disease, hypertension who presented with headache, altered mental status. On initial presentation to the emergency department he was complaining of a headache and able to speak in short sentences. At the time of my interview he was sedated and not following command. History provided by wife. For approximately last 2 weeks been feeling ill with progressive nausea and vomiting. He reported intermittent nonsevere abdominal pain. His wife reports no fevers, chills or rigors. She said that he did not complain of vision changes, chest pain, changes in urinary output, changes in bowel movements (some loose stools), peripheral edema. In the emergency department CT head chest abdomen pelvis did not demonstrate acute process, however did show chronic cerebral atrophy left-sided maxillary mass and chronic parenchymal lung disease. He was admitted 10/28 for further evaluation and management. On 21 he took 2 falls without LOC, now with right black eye. Complains of issue with swallowing. H  S/p Axillary mass biopsy on . Weak on admission and following with PT. Complains of HA, MRI brain unremarkable. Subjective (21):  Lying in bed. HA much improved. Ate most of breakfast this AM but still with global sensation. Denies CP/SOB. Denies F/C. Assessment & Plan:     # Acute metabolic Encephalopathy  # Delirium  - RESOLVED  - CT head/neck non acute  - MRI brain non acute  - TFT's wnl  - Neurology has seen   - ammonia/UA unremarkable  - RVP positive for Rhinovirus and Enterovirus.  -AMS resolved. Patient seems more bothered by headache than anything else. Imaging ok. # Headache  26/33 - uncertain etiology. MRI brain wnl. Described as temporal. Will add ESR,CRP. Trial of toraol for few doses. Caffeine. D/w Neuro in AM if unimproved. ?need for LP  10/31 - headaches have resolved. ESR/CRP not impressive  11/1 - Says he takes generic excedrin at home for HA. Will give trial of fiorcet. May need Neuro eval again for retractable headaches vs trial of Depacon if not improved. ..although not completely classic for migraine. 11/2 - Headache slightly worse today. Says it's about the same as his chronic headaches. 11/3 - Headache better. No acute complaints. #Dysphagia  -SLP evaluated  -Check barium swallow  -Had OP EGD planned on 11/4 already so may need GI to see IP  -11/3 Barium swallow/esophagram pending, may need GI to see tomorrow    # Left axillary mass  - IR consulted,   10/29 - procedure planned for this morning, deferred secondary to bradycardia  11/1 - s/p axillary mass biopsy. Have spoken with Onc who will make f/u for OP. # Bradycardia  - Cardiology has seen  - holding coreg  - Echo reviewed - EF 60%, mild TVR, RVSP 54  - Resolved, continue holding BB    # HTN  - 10/31 - suboptimal control. Add norvasc. Still holding BB  - 11/1 - a little better since norvasc added yesterday but still in 150-160's SBP. May need to titrate up tomorrow. -11/2 - BP much improved    # acute/chronic respiratory failure  # Sarcoidosis  - baseline o2 requirement of 2L NC,  - continue MDI's  10/30 - remains on 5L but likely can begin to wean. Continue pred 40mg. Pulm consulted per family request- pending for AM.   11/1 - Carson Mckinney has seen today. Stable on 2-3L NC. Continue prednisone, consider weaning soon. Cont MDI's. Started lasix 20mg daily. 11/2 - Lasix stopped. Prednisone weaned to 10mg daily. # Hypokalemia  11/1 - Add daily supplement for K 3.4 with start of lasix today. 11/2 - K 3.3. Replace PO  11/3 - K 3.6. Resolved    # hyponatremia  - 10/31 - corrected. Monitor off IVF  - 11/3 Na down to 132, no acute issues         # DLP  - statin    # BPH  - proscar      Dispo/Discharge Planning:      PT/OT pending, suspect will need STR, check PPD. Diet:  ADULT DIET Easy to Chew; Lactose-Controlled  ADULT ORAL NUTRITION SUPPLEMENT Breakfast, Lunch, Dinner; Standard High Calorie/High Protein  DVT PPx: hep sq  Code status: Full Code    Hospital Problems as of 11/3/2021 Date Reviewed: 10/28/2021          Codes Class Noted - Resolved POA    Axillary mass, left ICD-10-CM: R22.32  ICD-9-CM: 782.2  10/28/2021 - Present Yes        RESOLVED: Nausea & vomiting ICD-10-CM: R11.2  ICD-9-CM: 787.01  10/28/2021 - 11/1/2021 Yes        Body mass index (BMI) of 25.0 to 25.9 in adult (Chronic) ICD-10-CM: Q34.51  ICD-9-CM: V85.21  10/28/2021 - Present Yes        COPD, moderate (HCC) (Chronic) ICD-10-CM: J44.9  ICD-9-CM: 496  11/1/2021 - Present Unknown        Headache ICD-10-CM: R51.9  ICD-9-CM: 784.0  10/30/2021 - Present Unknown        Hyperglycemia, drug-induced ICD-10-CM: R73.9, T50.905A  ICD-9-CM: 790.29, E947.9  10/28/2021 - Present Yes        Pulmonary hypertension ([address] ICD-10-CM: I27.20  ICD-9-CM: 416.8  1/10/2018 - Present Yes        Pulmonary hypertension due to hypoxia (Chronic) ICD-10-CM: I27.23  ICD-9-CM: 416.8, 799.02  6/20/2016 - Present     Overview Addendum 10/28/2021  3:11 PM by MD Dr. Nubia Montano  1.  6/6/2016 Echo- [id number]  2. Echo (11/23/20):  EF 55-60%. Mild LAE.  AVSC. Trace TR. Normal RV. Normal RA size. RVSP 35. Restrictive lung disease (Chronic) ICD-10-CM: J98.4  ICD-9-CM: 518.89  4/1/2015 - Present Yes    Overview Signed 4/1/2015  9:30 AM by Efrain Zepeda.      Residual from Sarcoidosis             Dyslipidemia (Chronic) ICD-10-CM: E78.5  ICD-9-CM: 272.4  3/13/2006 - Present Yes    Overview Signed 3/21/2019  8:31 AM by MD Killian House (3/19/19):  EF 70%. Normal perfusion. Sarcoidosis (Chronic) ICD-10-CM: D86.9  ICD-9-CM: 135  3/13/2006 - Present Yes        Benign prostatic hyperplasia (Chronic) ICD-10-CM: N40.0  ICD-9-CM: 600.00  3/13/2006 - Present Yes        * (Principal) RESOLVED: Acute metabolic encephalopathy DFS-65-ZD: G93.41  ICD-9-CM: 348.31  10/28/2021 - 11/1/2021 Yes        RESOLVED: Bradycardia ICD-10-CM: R00.1  ICD-9-CM: 427.89  10/28/2021 - 11/1/2021 No        RESOLVED: Hyponatremia ICD-10-CM: E87.1  ICD-9-CM: 276.1  10/28/2021 - 11/1/2021 Yes        RESOLVED: Electrolyte or fluid disorder ICD-10-CM: E87.8  ICD-9-CM: 276.9  10/28/2021 - 11/1/2021 Yes              Objective:     Patient Vitals for the past 24 hrs:   Temp Pulse Resp BP SpO2   11/03/21 0727 -- -- -- -- 100 %   11/03/21 0707 98.1 °F (36.7 °C) 63 18 114/82 98 %   11/03/21 0418 98.1 °F (36.7 °C) 69 18 (!) 136/91 100 %   11/03/21 0200 -- -- -- -- 97 %   11/02/21 2342 98.1 °F (36.7 °C) 72 16 (!) 128/58 100 %   11/02/21 2016 -- -- -- -- 97 %   11/02/21 2000 97.8 °F (36.6 °C) 75 16 138/86 100 %   11/02/21 1657 98.1 °F (36.7 °C) 74 18 130/86 97 %   11/02/21 1146 97.9 °F (36.6 °C) 82 16 (!) 92/59 98 %     Oxygen Therapy  O2 Sat (%): 100 % (11/03/21 0727)  Pulse via Oximetry: 80 beats per minute (11/03/21 0727)  O2 Device: Nasal cannula (11/03/21 0700)  Skin Assessment: Clean, dry, & intact (10/29/21 2022)  Skin Protection for O2 Device: No (10/29/21 2022)  O2 Flow Rate (L/min): 2 l/min (11/03/21 0727)    Estimated body mass index is 25.2 kg/m² as calculated from the following:    Height as of this encounter: 5' 4\" (1.626 m). Weight as of this encounter: 66.6 kg (146 lb 13.2 oz). Intake/Output Summary (Last 24 hours) at 11/3/2021 0913  Last data filed at 11/3/2021 0528  Gross per 24 hour   Intake --   Output 500 ml   Net -500 ml         Physical Exam:     Blood pressure 114/82, pulse 63, temperature 98.1 °F (36.7 °C), resp.  rate 18, height 5' 4\" (1.626 m), weight 66.6 kg (146 lb 13.2 oz), SpO2 100 %. General:    Well nourished. Appears uncomfortable due to headache. Oriented x 3  Head:  Normocephalic, atraumatic  Eyes:  Sclerae appear normal.  Pupils equally round. ENT:  Nares appear normal, no drainage. Moist oral mucosa  Neck:  No restricted ROM. Trachea midline   CV:   RRR. No m/r/g. No jugular venous distension. Lungs:   Diminished bilaterally, No wheezing, rhonchi, or rales. Respirations even, unlabored    Axilla -   Large left palpable mass - now bandaged with some ecchymosis post bx. Abdomen: Bowel sounds present. Soft, nontender, nondistended. Extremities: No cyanosis or clubbing. No edema  Skin:     No rashes and normal coloration. Warm and dry. Neuro:  CN II-XII grossly intact. Sensation intact.   A&Ox3  Psych:  Flat affect     I have reviewed ordered lab tests and independently visualized imaging below:    Recent Labs:  Recent Results (from the past 48 hour(s))   CBC W/O DIFF    Collection Time: 11/02/21  6:26 AM   Result Value Ref Range    WBC 11.6 (H) 4.3 - 11.1 K/uL    RBC 4.31 4.23 - 5.6 M/uL    HGB 12.5 (L) 13.6 - 17.2 g/dL    HCT 38.7 (L) 41.1 - 50.3 %    MCV 89.8 79.6 - 97.8 FL    MCH 29.0 26.1 - 32.9 PG    MCHC 32.3 31.4 - 35.0 g/dL    RDW 13.5 11.9 - 14.6 %    PLATELET 962 961 - 152 K/uL    MPV 9.1 (L) 9.4 - 12.3 FL    ABSOLUTE NRBC 0.00 0.0 - 0.2 K/uL   METABOLIC PANEL, BASIC    Collection Time: 11/02/21  6:26 AM   Result Value Ref Range    Sodium 133 (L) 136 - 145 mmol/L    Potassium 3.3 (L) 3.5 - 5.1 mmol/L    Chloride 94 (L) 98 - 107 mmol/L    CO2 33 (H) 21 - 32 mmol/L    Anion gap 6 (L) 7 - 16 mmol/L    Glucose 122 (H) 65 - 100 mg/dL    BUN 17 8 - 23 MG/DL    Creatinine 0.59 (L) 0.8 - 1.5 MG/DL    GFR est AA >60 >60 ml/min/1.73m2    GFR est non-AA >60 >60 ml/min/1.73m2    Calcium 9.6 8.3 - 10.4 MG/DL   CBC W/O DIFF    Collection Time: 11/03/21  6:07 AM   Result Value Ref Range    WBC 10.4 4.3 - 11.1 K/uL    RBC 4.36 4.23 - 5.6 M/uL    HGB 13.1 (L) 13.6 - 17.2 g/dL    HCT 40.5 (L) 41.1 - 50.3 %    MCV 92.9 79.6 - 97.8 FL    MCH 30.0 26.1 - 32.9 PG    MCHC 32.3 31.4 - 35.0 g/dL    RDW 13.4 11.9 - 14.6 %    PLATELET 503 935 - 230 K/uL    MPV 9.7 9.4 - 12.3 FL    ABSOLUTE NRBC 0.00 0.0 - 0.2 K/uL   METABOLIC PANEL, BASIC    Collection Time: 11/03/21  6:07 AM   Result Value Ref Range    Sodium 132 (L) 138 - 145 mmol/L    Potassium 3.6 3.5 - 5.1 mmol/L    Chloride 93 (L) 98 - 107 mmol/L    CO2 32 21 - 32 mmol/L    Anion gap 7 7 - 16 mmol/L    Glucose 87 65 - 100 mg/dL    BUN 16 8 - 23 MG/DL    Creatinine 0.61 (L) 0.8 - 1.5 MG/DL    GFR est AA >60 >60 ml/min/1.73m2    GFR est non-AA >60 >60 ml/min/1.73m2    Calcium 9.8 8.3 - 10.4 MG/DL       All Micro Results     Procedure Component Value Units Date/Time    CULTURE, BLOOD [[id number]] Collected: 10/28/21 2021    Order Status: Completed Specimen: Blood Updated: 11/02/21 0748     Special Requests: LEFT HAND        Culture result: NO GROWTH 5 DAYS       CULTURE, BLOOD [[id number]] Collected: 10/28/21 2021    Order Status: Completed Specimen: Blood Updated: 11/02/21 0748     Special Requests: RIGHT ANTECUBITAL        Culture result: NO GROWTH 5 DAYS       SARS-COV-2, PCR [[id number]] Collected: 10/28/21 1075    Order Status: Completed Specimen: Nasopharyngeal Updated: 10/29/21 0916     Specimen source Nasopharyngeal        SARS-CoV-2 Not detected        Comment:      The specimen is NEGATIVE for SARS-CoV-2, the novel coronavirus associated with COVID-19. This test has been authorized by the FDA under an Emergency Use Authorization (EUA) for use by authorized laboratories.         Fact sheet for Healthcare Providers: ConventionUpdate.co.nz       Fact sheet for Patients: ConventionUpdate.co.nz       Methodology: RT-PCR         RESPIRATORY VIRUS PANEL W/COVID-19, PCR [[id number]]  (Abnormal) Collected: 10/29/21 0220    Order Status: Completed Specimen: Nasopharyngeal Updated: 10/29/21 0423     Adenovirus NOT DETECTED        Coronavirus 229E NOT DETECTED        Coronavirus HKU1 NOT DETECTED        Coronavirus CVNL63 NOT DETECTED        Coronavirus OC43 NOT DETECTED        SARS-CoV-2, PCR NOT DETECTED        Metapneumovirus NOT DETECTED        Rhinovirus and Enterovirus Detected        Influenza A NOT DETECTED        Influenza B NOT DETECTED        Parainfluenza 1 NOT DETECTED        Parainfluenza 2 NOT DETECTED        Parainfluenza 3 NOT DETECTED        Parainfluenza virus 4 NOT DETECTED        RSV by PCR NOT DETECTED        B. parapertussis, PCR NOT DETECTED        Bordetella pertussis - PCR NOT DETECTED        Chlamydophila pneumoniae DNA, QL, PCR NOT DETECTED        Mycoplasma pneumoniae DNA, QL, PCR NOT DETECTED       COVID-19 RAPID TEST [[id number]] Collected: 10/28/21 4711    Order Status: Completed Specimen: Nasopharyngeal Updated: 10/29/21 0034     Specimen source NASAL        COVID-19 rapid test Not detected        Comment:      The specimen is NEGATIVE for SARS-CoV-2, the novel coronavirus associated with COVID-19. A negative result does not rule out COVID-19. This test has been authorized by the FDA under an Emergency Use Authorization (EUA) for use by authorized laboratories. Fact sheet for Healthcare Providers: ConventionUpdate.co.nz  Fact sheet for Patients: ConventionUpdate.co.nz       Methodology: Isothermal Nucleic Acid Amplification               Other Studies:  No results found.     Current Meds:  Current Facility-Administered Medications   Medication Dose Route Frequency    HYDROcodone-acetaminophen (NORCO) 5-325 mg per tablet 1 Tablet  1 Tablet Oral Q6H PRN    predniSONE (DELTASONE) tablet 10 mg  10 mg Oral DAILY WITH BREAKFAST    butalbital-acetaminophen-caffeine (FIORICET, ESGIC) -40 mg per tablet 1 Tablet  1 Tablet Oral Q4H PRN    potassium chloride (K-DUR, KLOR-CON) SR tablet 20 mEq  20 mEq Oral DAILY    melatonin tablet 5 mg  5 mg Oral QHS    albuterol (PROVENTIL HFA, VENTOLIN HFA, PROAIR HFA) inhaler 2 Puff  2 Puff Inhalation Q6H RT    albuterol (PROVENTIL HFA, VENTOLIN HFA, PROAIR HFA) inhaler 2 Puff  2 Puff Inhalation Q4H PRN    amLODIPine (NORVASC) tablet 5 mg  5 mg Oral DAILY    hydrALAZINE (APRESOLINE) 20 mg/mL injection 20 mg  20 mg IntraVENous Q6H PRN    pantoprazole (PROTONIX) tablet 40 mg  40 mg Oral ACB    tamsulosin (FLOMAX) capsule 0.4 mg  0.4 mg Oral DAILY    lip protectant (BLISTEX) ointment 1 Each  1 Each Topical PRN    finasteride (PROSCAR) tablet 5 mg  5 mg Oral QHS    rosuvastatin (CRESTOR) tablet 20 mg  20 mg Oral QHS    sodium chloride (NS) flush 5-40 mL  5-40 mL IntraVENous Q8H    sodium chloride (NS) flush 5-40 mL  5-40 mL IntraVENous PRN    polyethylene glycol (MIRALAX) packet 17 g  17 g Oral DAILY PRN    ondansetron (ZOFRAN ODT) tablet 4 mg  4 mg Oral Q8H PRN    Or    ondansetron (ZOFRAN) injection 4 mg  4 mg IntraVENous Q6H PRN    dextrose 40% (GLUTOSE) oral gel 1 Tube  15 g Oral PRN    glucagon (GLUCAGEN) injection 1 mg  1 mg IntraMUSCular PRN    dextrose (D50W) injection syrg 12.5-25 g  25-50 mL IntraVENous PRN    tiotropium-olodateroL (STIOLTO RESPIMAT) 2.5-2.5 mcg/actuation inhaler 2 Puff  2 Puff Inhalation DAILY    fluticasone (FLOVENT HFA) 110 mcg inhaler  1 Puff Inhalation DAILY    [Held by provider] heparin (porcine) injection 5,000 Units  5,000 Units SubCUTAneous Q8H       Signed:  Alina Uriarte DO    Part of this note may have been written by using a voice dictation software. The note has been proof read but may still contain some grammatical/other typographical errors.

## 2021-11-03 NOTE — PROGRESS NOTES
Comprehensive Nutrition Assessment    Type and Reason for Visit: Reassess  Unintentional weight loss (hospitalist) initial  Poor Intake consult (11/2) (pulmonary)    Nutrition Recommendations/Plan:   Meals and Snacks:  Change current diet. Easy to chew per prior SLP, lactose controlled per baseline. Nutrition Supplement Therapy:   Medical food supplement therapy:  Continue Ensure Enlive three times per day (this provides 350 kcal and 20 grams protein per bottle) Discussed with pt and wife benefit of additional calories and protein provided, product is lactose free. Malnutrition Assessment:  Malnutrition Status: At risk for malnutrition (specify) (NV pta, po <25% needs)    Nutrition Assessment:   Nutrition History: Per initial assessment:Nutrition history provided by pt's wife a bedside. Pt normally eats 3 small meals a day and will also have a protein shake per wifes request. Pt's wife reported poor PO intake at home since he was experiencing severe N/V. Pt's wife stated she has not noticed any wt loss. 11/1 pt reports lactose intolerance. Nutrition Background: Pt with PMH significant for sarcoid, restrictive lung disease, and HTN. Presented with altered mental status and headache. Reported N/V for 2 weeks and intermittent abdominal pain. Pt admitted for acute metabolic encephalopathy. +axillary mass IR bx 11/1. Nutrition Interval:  Visit with pt and wife at bedside. Pt is up to edge of bed for the majority of visit he keeps his head in his lap, only briefly sitting up, c/o headache. Wife has refused ensure due to concerns for lactose. Pt has accepted some ensure clear from nursing. Current serving untouched. Pt reports preference for orgain and another soy based supplement at baseline. Wife encouraged to bring from home to promote oral intake.   He indicates his am meal intake today is the most he's consumed thus far, pt reports more acceptance of easy to chew over prior minced and moist.    Nutrition Related Findings:   SLP 10/29: Easy to Chew;  11/1: pt thin appearance, no joaquin muscle wasting noted. Per SLP eval oropharyngeal dysphagia characterized by requires soft diet due to dental implants and difficulty masticating   Esophagram Pending  Current Nutrition Therapies:  ADULT ORAL NUTRITION SUPPLEMENT Breakfast, Lunch, Dinner; Standard High Calorie/High Protein  ADULT DIET Easy to Chew; Lactose-Controlled; Ensure enlive clears only please, lactouse allergy    Current Intake:   Average Meal Intake: 1-25% Average Supplement Intake:  (has consumed small amounts of ensure clear) Additional Caloric Sources: continues to eat drink some outside food sources    Anthropometric Measures:  Height: 5' 4\" (162.6 cm)  Current Body Wt: 66.6 kg (146 lb 13.2 oz) (11/3), Weight source: Bed scale  BMI: 25.2, Overweight (BMI 25.0-29. 9)  Admission Body Weight: 149 lb 0.5 oz  Ideal Body Weight (lbs) (Calculated): 130 lbs (59 kg), 114.6 %  Usual Body Wt:  (150-160# per pt's wife )        Edema: No data recorded   Estimated Daily Nutrient Needs:  Energy (kcal/day): 8039-3594 (Kcal/kg (25-30), Weight Used: Current (69 kg (11/1)))  Protein (g/day): 83-89 (1.2-1.3 g/kg) Weight Used: (Current)  Fluid (ml/day):   (1 ml/kcal)    Nutrition Diagnosis:   · Inadequate oral intake related to  (poor appetite, fatigue, disinterest in oral intake) as evidenced by  (self and family report, po <25% estimated needs)    Nutrition Interventions:   Food and/or Nutrient Delivery: Continue current diet, Continue oral nutrition supplement     Coordination of Nutrition Care: Continue to monitor while inpatient    Goals:   Previous Goal Met: No progress toward goal(s)  Active Goal: Meet >50% estimated needs by nutrition follow-up    Nutrition Monitoring and Evaluation:      Food/Nutrient Intake Outcomes: Food and nutrient intake, Supplement intake  Physical Signs/Symptoms Outcomes: Biochemical data, GI status, Weight, Meal time behavior    Discharge Planning:     Too soon to determine    Soraya Veloz, RD, LDN   Contact: [phone]

## 2021-11-03 NOTE — PROGRESS NOTES
Hourly rounds completed throughout shift. Bed low/locked. Bed  Alarm on. Call light within reach. All pt needs are met at this time. Will continue to monitor and give bedside report to oncoming nurse.

## 2021-11-03 NOTE — PROGRESS NOTES
CM met with patient and spouse to discuss discharge planning. Patient recommended for REHAB by OT. Patient and spouse are in agreement with STR upon discharge. CM provided list of SNF this day. CM to follow up tomorrow, to obtain SNF choices. PPD has been ordered. CM continues to follow plan of care.

## 2021-11-04 NOTE — PROGRESS NOTES
Hospitalist Progress Note   Admit Date:  10/28/2021 12:07 PM   Name:  Nila Urbina   Age:  80 y.o. Sex:  male  :  1940   MRN:  [id number]   Room:  [facility]    Presenting Complaint: Slow Heart Rate    Reason(s) for Admission: Acute metabolic encephalopathy [W27.74]     Hospital Course & Interval History:     Nila Urbina is a 80 y.o. male with medical history of sarcoid, restrictive lung disease, hypertension who presented with headache, altered mental status. On initial presentation to the emergency department he was complaining of a headache and able to speak in short sentences. At the time of my interview he was sedated and not following command. History provided by wife. For approximately last 2 weeks been feeling ill with progressive nausea and vomiting. He reported intermittent nonsevere abdominal pain. His wife reports no fevers, chills or rigors. She said that he did not complain of vision changes, chest pain, changes in urinary output, changes in bowel movements (some loose stools), peripheral edema. In the emergency department CT head chest abdomen pelvis did not demonstrate acute process, however did show chronic cerebral atrophy left-sided maxillary mass and chronic parenchymal lung disease. He was admitted 10/28 for further evaluation and management. On 21 he took 2 falls without LOC, now with right black eye. Complains of issue with swallowing. H  S/p Axillary mass biopsy on . Weak on admission and following with PT. Complains of HA, MRI brain unremarkable. Subjective (21):  Lying in bed. HA much improved. Ate most of breakfast this AM but still with global sensation. Denies CP/SOB. Denies F/C. Assessment & Plan:     # Acute metabolic Encephalopathy  # Delirium  - RESOLVED  - CT head/neck non acute  - MRI brain non acute  - TFT's wnl  - Neurology has seen   - ammonia/UA unremarkable  - RVP positive for Rhinovirus and Enterovirus.  -AMS resolved. Patient seems more bothered by headache than anything else. Imaging ok. # Headache  05/54 - uncertain etiology. MRI brain wnl. Described as temporal. Will add ESR,CRP. Trial of toraol for few doses. Caffeine. D/w Neuro in AM if unimproved. ?need for LP  10/31 - headaches have resolved. ESR/CRP not impressive  11/1 - Says he takes generic excedrin at home for HA. Will give trial of fiorcet. May need Neuro eval again for retractable headaches vs trial of Depacon if not improved. ..although not completely classic for migraine. 11/2 - Headache slightly worse today. Says it's about the same as his chronic headaches. 11/3 - Headache better. No acute complaints. 11/4 - Headache worse today. Increase Norco.    #Dysphagia  -SLP evaluated  -Check barium swallow  -Had OP EGD planned on 11/4 already so may need GI to see IP  -11/3 Barium swallow/esophagram pending, may need GI to see tomorrow  -11/4 Esophagram with dysmotility, GI to do EGD 11/5    # Left axillary mass  - IR consulted,   10/29 - procedure planned for this morning, deferred secondary to bradycardia  11/1 - s/p axillary mass biopsy. 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 Onc following. Will need OP PET. # Bradycardia  - Cardiology has seen  - holding coreg  - Echo reviewed - EF 60%, mild TVR, RVSP 54  - Resolved, continue holding BB    # HTN  - 10/31 - suboptimal control. Add norvasc. Still holding BB  - 11/1 - a little better since norvasc added yesterday but still in 150-160's SBP. May need to titrate up tomorrow. -11/2 - BP much improved    # acute/chronic respiratory failure  # Sarcoidosis  - baseline o2 requirement of 2L NC,  - continue MDI's  10/30 - remains on 5L but likely can begin to wean. Continue pred 40mg. Pulm consulted per family request- pending for AM.   11/1 - Dona Orlando has seen today. Stable on 2-3L NC. Continue prednisone, consider weaning soon. Cont MDI's. Started lasix 20mg daily. 11/2 - Lasix stopped.  Prednisone weaned to 10mg daily. # Hypokalemia  11/1 - Add daily supplement for K 3.4 with start of lasix today. 11/2 - K 3.3. Replace PO  11/3 - K 3.6. Resolved    # hyponatremia  - 10/31 - corrected. Monitor off IVF  - 11/3 Na down to 132, no acute issues  - 11/4 Na 133 --> ?due to Small cell         # DLP  - statin    # BPH  - proscar      Dispo/Discharge Planning:      PT/OT pending, suspect will need STR, check PPD. Diet:  ADULT ORAL NUTRITION SUPPLEMENT Breakfast, Lunch, Dinner; Standard High Calorie/High Protein  ADULT DIET Dysphagia - Soft & Bite Sized; Lactose-Controlled  DIET NPO Sips of Water with Meds  DVT PPx: hep sq  Code status: Full Code    Hospital Problems as of 11/4/2021 Date Reviewed: 10/28/2021          Codes Class Noted - Resolved POA    Axillary mass, left ICD-10-CM: R22.32  ICD-9-CM: 782.2  10/28/2021 - Present Yes        RESOLVED: Nausea & vomiting ICD-10-CM: R11.2  ICD-9-CM: 787.01  10/28/2021 - 11/1/2021 Yes        Body mass index (BMI) of 25.0 to 25.9 in adult (Chronic) ICD-10-CM: K49.86  ICD-9-CM: V85.21  10/28/2021 - Present Yes        COPD, moderate (HCC) (Chronic) ICD-10-CM: J44.9  ICD-9-CM: 496  11/1/2021 - Present Unknown        Headache ICD-10-CM: R51.9  ICD-9-CM: 784.0  10/30/2021 - Present Unknown        Hyperglycemia, drug-induced ICD-10-CM: R73.9, T50.905A  ICD-9-CM: 790.29, E947.9  10/28/2021 - Present Yes        Pulmonary hypertension ([facility] [address] ICD-10-CM: I27.20  ICD-9-CM: 416.8  1/10/2018 - Present Yes        Pulmonary hypertension due to hypoxia (Chronic) ICD-10-CM: I27.23  ICD-9-CM: 416.8, 799.02  6/20/2016 - Present     Overview Addendum 10/28/2021  3:11 PM by Leopold Bernheim, MD Dr. Lamar Mar  1.  6/6/2016 Echo- [id number]  2. Echo (11/23/20):  EF 55-60%. Mild LAE.  AVSC. Trace TR. Normal RV. Normal RA size. RVSP 35.               Restrictive lung disease (Chronic) ICD-10-CM: J98.4  ICD-9-CM: 518.89  4/1/2015 - Present Yes    Overview Signed 4/1/2015  9:30 AM by Antonieta Chisholm. Residual from Sarcoidosis             Dyslipidemia (Chronic) ICD-10-CM: E78.5  ICD-9-CM: 272.4  3/13/2006 - Present Yes    Overview Signed 3/21/2019  8:31 AM by MD [facility] (3/19/19):  EF 70%. Normal perfusion. Sarcoidosis (Chronic) ICD-10-CM: D86.9  ICD-9-CM: 493  3/13/2006 - Present Yes        Benign prostatic hyperplasia (Chronic) ICD-10-CM: N40.0  ICD-9-CM: 600.00  3/13/2006 - Present Yes        * (Principal) RESOLVED: Acute metabolic encephalopathy [id number]: G93.41  ICD-9-CM: 348.31  10/28/2021 - 11/1/2021 Yes        RESOLVED: Bradycardia ICD-10-CM: R00.1  ICD-9-CM: 427.89  10/28/2021 - 11/1/2021 No        RESOLVED: Hyponatremia ICD-10-CM: E87.1  ICD-9-CM: 276.1  10/28/2021 - 11/1/2021 Yes        RESOLVED: Electrolyte or fluid disorder ICD-10-CM: E87.8  ICD-9-CM: 276.9  10/28/2021 - 11/1/2021 Yes              Objective:     Patient Vitals for the past 24 hrs:   Temp Pulse Resp BP SpO2   11/04/21 1120 98 °F (36.7 °C) 77 16 133/81 99 %   11/04/21 0748 98 °F (36.7 °C) 66 16 (!) 146/87 100 %   11/04/21 0603 97.7 °F (36.5 °C) (!) 59 16 (!) 155/91 100 %   11/04/21 0107 98 °F (36.7 °C) 66 14 135/80 99 %   11/03/21 2000 97.9 °F (36.6 °C) 70 18 (!) 150/84 100 %   11/03/21 1932 -- -- -- -- 100 %   11/03/21 1636 98.3 °F (36.8 °C) 72 18 110/62 95 %   11/03/21 1400 -- -- -- -- 99 %     Oxygen Therapy  O2 Sat (%): 99 % (11/04/21 1120)  Pulse via Oximetry: 66 beats per minute (11/03/21 1932)  O2 Device: Nasal cannula (11/04/21 0640)  Skin Assessment: Clean, dry, & intact (10/29/21 2022)  Skin Protection for O2 Device: No (10/29/21 2022)  O2 Flow Rate (L/min): 3 l/min (11/04/21 0640)    Estimated body mass index is 25.2 kg/m² as calculated from the following:    Height as of this encounter: 5' 4\" (1.626 m). Weight as of this encounter: 66.6 kg (146 lb 13.2 oz).     Intake/Output Summary (Last 24 hours) at 11/4/2021 1155  Last data filed at 11/4/2021 0603  Gross per 24 hour   Intake --   Output 200 ml   Net -200 ml         Physical Exam:     Blood pressure 133/81, pulse 77, temperature 98 °F (36.7 °C), resp. rate 16, height 5' 4\" (1.626 m), weight 66.6 kg (146 lb 13.2 oz), SpO2 99 %. General:    Well nourished. Appears uncomfortable due to headache. Oriented x 3  Head:  Normocephalic, atraumatic  Eyes:  Sclerae appear normal.  Pupils equally round. ENT:  Nares appear normal, no drainage. Moist oral mucosa  Neck:  No restricted ROM. Trachea midline   CV:   RRR. No m/r/g. No jugular venous distension. Lungs:   Diminished bilaterally, No wheezing, rhonchi, or rales. Respirations even, unlabored    Axilla -   Large left palpable mass - now bandaged with some ecchymosis post bx. Abdomen: Bowel sounds present. Soft, nontender, nondistended. Extremities: No cyanosis or clubbing. No edema  Skin:     No rashes and normal coloration. Warm and dry. Neuro:  CN II-XII grossly intact. Sensation intact.   A&Ox3  Psych:  Flat affect     I have reviewed ordered lab tests and independently visualized imaging below:    Recent Labs:  Recent Results (from the past 48 hour(s))   CBC W/O DIFF    Collection Time: 11/03/21  6:07 AM   Result Value Ref Range    WBC 10.4 4.3 - 11.1 K/uL    RBC 4.36 4.23 - 5.6 M/uL    HGB 13.1 (L) 13.6 - 17.2 g/dL    HCT 40.5 (L) 41.1 - 50.3 %    MCV 92.9 79.6 - 97.8 FL    MCH 30.0 26.1 - 32.9 PG    MCHC 32.3 31.4 - 35.0 g/dL    RDW 13.4 11.9 - 14.6 %    PLATELET 099 832 - 464 K/uL    MPV 9.7 9.4 - 12.3 FL    ABSOLUTE NRBC 0.00 0.0 - 0.2 K/uL   METABOLIC PANEL, BASIC    Collection Time: 11/03/21  6:07 AM   Result Value Ref Range    Sodium 132 (L) 138 - 145 mmol/L    Potassium 3.6 3.5 - 5.1 mmol/L    Chloride 93 (L) 98 - 107 mmol/L    CO2 32 21 - 32 mmol/L    Anion gap 7 7 - 16 mmol/L    Glucose 87 65 - 100 mg/dL    BUN 16 8 - 23 MG/DL    Creatinine 0.61 (L) 0.8 - 1.5 MG/DL    GFR est AA >60 >60 ml/min/1.73m2    GFR est non-AA >60 >60 ml/min/1.73m2 Calcium 9.8 8.3 - 10.4 MG/DL   GLUCOSE, POC    Collection Time: 11/03/21 10:44 PM   Result Value Ref Range    Glucose (POC) 187 (H) 65 - 100 mg/dL    Performed by XochiltasiaNADIRA    CBC W/O DIFF    Collection Time: 11/04/21  6:59 AM   Result Value Ref Range    WBC 15.9 (H) 4.3 - 11.1 K/uL    RBC 4.75 4.23 - 5.6 M/uL    HGB 13.6 13.6 - 17.2 g/dL    HCT 43.3 41.1 - 50.3 %    MCV 91.2 79.6 - 97.8 FL    MCH 28.6 26.1 - 32.9 PG    MCHC 31.4 31.4 - 35.0 g/dL    RDW 13.7 11.9 - 14.6 %    PLATELET 215 588 - 202 K/uL    MPV 8.8 (L) 9.4 - 12.3 FL    ABSOLUTE NRBC 0.00 0.0 - 0.2 K/uL   METABOLIC PANEL, BASIC    Collection Time: 11/04/21  6:59 AM   Result Value Ref Range    Sodium 133 (L) 136 - 145 mmol/L    Potassium 3.5 3.5 - 5.1 mmol/L    Chloride 93 (L) 98 - 107 mmol/L    CO2 37 (H) 21 - 32 mmol/L    Anion gap 3 (L) 7 - 16 mmol/L    Glucose 128 (H) 65 - 100 mg/dL    BUN 19 8 - 23 MG/DL    Creatinine 0.67 (L) 0.8 - 1.5 MG/DL    GFR est AA >60 >60 ml/min/1.73m2    GFR est non-AA >60 >60 ml/min/1.73m2    Calcium 10.0 8.3 - 10.4 MG/DL   SARS-COV-2    Collection Time: 11/04/21 11:10 AM   Result Value Ref Range    SARS-CoV-2 Please find results under separate order         All Micro Results     Procedure Component Value Units Date/Time    SARS-COV-2, PCR [[id number]] Collected: 11/04/21 1110    Order Status: Completed Updated: 11/04/21 1118    CULTURE, BLOOD [[id number]] Collected: 10/28/21 2021    Order Status: Completed Specimen: Blood Updated: 11/02/21 0748     Special Requests: LEFT HAND        Culture result: NO GROWTH 5 DAYS       CULTURE, BLOOD [[id number]] Collected: 10/28/21 2021    Order Status: Completed Specimen: Blood Updated: 11/02/21 0748     Special Requests: RIGHT ANTECUBITAL        Culture result: NO GROWTH 5 DAYS       SARS-COV-2, PCR [[id number]] Collected: 10/28/21 7919    Order Status: Completed Specimen: Nasopharyngeal Updated: 10/29/21 0916     Specimen source Nasopharyngeal        SARS-CoV-2 Not detected Comment:      The specimen is NEGATIVE for SARS-CoV-2, the novel coronavirus associated with COVID-19. This test has been authorized by the FDA under an Emergency Use Authorization (EUA) for use by authorized laboratories. Fact sheet for Healthcare Providers: [employer]       Fact sheet for Patients: [employer]       Methodology: RT-PCR         RESPIRATORY VIRUS PANEL W/COVID-19, PCR [[id number]]  (Abnormal) Collected: 10/29/21 0220    Order Status: Completed Specimen: Nasopharyngeal Updated: 10/29/21 0423     Adenovirus NOT DETECTED        Coronavirus 229E NOT DETECTED        Coronavirus HKU1 NOT DETECTED        Coronavirus CVNL63 NOT DETECTED        Coronavirus OC43 NOT DETECTED        SARS-CoV-2, PCR NOT DETECTED        Metapneumovirus NOT DETECTED        Rhinovirus and Enterovirus Detected        Influenza A NOT DETECTED        Influenza B NOT DETECTED        Parainfluenza 1 NOT DETECTED        Parainfluenza 2 NOT DETECTED        Parainfluenza 3 NOT DETECTED        Parainfluenza virus 4 NOT DETECTED        RSV by PCR NOT DETECTED        B. parapertussis, PCR NOT DETECTED        Bordetella pertussis - PCR NOT DETECTED        Chlamydophila pneumoniae DNA, QL, PCR NOT DETECTED        Mycoplasma pneumoniae DNA, QL, PCR NOT DETECTED       COVID-19 RAPID TEST [[id number]] Collected: 10/28/21 9439    Order Status: Completed Specimen: Nasopharyngeal Updated: 10/29/21 0034     Specimen source NASAL        COVID-19 rapid test Not detected        Comment:      The specimen is NEGATIVE for SARS-CoV-2, the novel coronavirus associated with COVID-19. A negative result does not rule out COVID-19. This test has been authorized by the FDA under an Emergency Use Authorization (EUA) for use by authorized laboratories.         Fact sheet for Healthcare Providers: [employer]  Fact sheet for Patients: [email or web address].nz       Methodology: Isothermal Nucleic Acid Amplification               Other Studies:  XR BA SWALLOW ESOPHOGRAM    Result Date: 11/4/2021  Barium swallow INDICATION:  Food gets stuck in throat. Vomiting while lying flat. Fluoro time: 1.1 minutes Spot Films:  8 Study is limited due to patient's difficulty lying flat and standing. Therefore study was performed with thin barium liquid through a straw with patient in semirecumbent position on the fluoroscopic table. FINDINGS: Esophagus is mildly dilated without obvious stricture or obstruction. Severe esophageal dysmotility noted throughout the intrathoracic esophagus with poor primary and secondary peristaltic waves and tertiary contractions noted throughout the mid to distal intrathoracic esophagus. There is a small sliding-type hiatal hernia. Gastroesophageal reflux difficult to evaluate as patient had difficulty lying flat. With patient on right lateral decubitus view contrast was noted in the distal esophagus suggesting gastroesophageal reflux. No constricting or obstructing lesions are evident. Limited imaging upper abdomen demonstrates no evidence of gastric outlet obstruction. Mildly dilated esophagus without constricting or obstructing mass. Severe esophageal dysmotility throughout the intrathoracic esophagus with probable gastroesophageal reflux. Small sliding-type hiatal hernia is present. CT HEAD WO CONT    Result Date: 11/3/2021  HEAD CT WITHOUT CONTRAST  11/3/2021 HISTORY:   Fall with head trauma  Fall with head trauma TECHNIQUE: Noncontrast axial images were obtained through the brain. All CT scans at this facility used dose modulation, interactive reconstruction and/or weight based dosing when appropriate to reduce radiation dose to as low as reasonably achievable.  COMPARISON: Brain MRI October 28, 2021 FINDINGS: There is no acute intracranial hemorrhage, significant mass effect or CT evidence of acute large artery territorial infarction. Please note that a hyperacute infarct or small vessel infarct may not be apparent on initial CT imaging. Cerebral volume loss is present with ventriculomegaly and bilateral hippocampal atrophy. There is no hydrocephalus , intra-axial mass or abnormal extra-axial fluid collection. There are no displaced skull fractures. The mastoid air cells and paranasal sinuses are clear where imaged. Cerebral volume loss. Hippocampal atrophy. No acute intracranial hemorrhage.        Current Meds:  Current Facility-Administered Medications   Medication Dose Route Frequency    pantoprazole (PROTONIX) tablet 40 mg  40 mg Oral ACB&D    HYDROcodone-acetaminophen (NORCO) 5-325 mg per tablet 1 Tablet  1 Tablet Oral Q6H PRN    predniSONE (DELTASONE) tablet 10 mg  10 mg Oral DAILY WITH BREAKFAST    butalbital-acetaminophen-caffeine (FIORICET, ESGIC) -40 mg per tablet 1 Tablet  1 Tablet Oral Q4H PRN    potassium chloride (K-DUR, KLOR-CON) SR tablet 20 mEq  20 mEq Oral DAILY    melatonin tablet 5 mg  5 mg Oral QHS    albuterol (PROVENTIL HFA, VENTOLIN HFA, PROAIR HFA) inhaler 2 Puff  2 Puff Inhalation Q6H RT    albuterol (PROVENTIL HFA, VENTOLIN HFA, PROAIR HFA) inhaler 2 Puff  2 Puff Inhalation Q4H PRN    amLODIPine (NORVASC) tablet 5 mg  5 mg Oral DAILY    hydrALAZINE (APRESOLINE) 20 mg/mL injection 20 mg  20 mg IntraVENous Q6H PRN    tamsulosin (FLOMAX) capsule 0.4 mg  0.4 mg Oral DAILY    lip protectant (BLISTEX) ointment 1 Each  1 Each Topical PRN    finasteride (PROSCAR) tablet 5 mg  5 mg Oral QHS    rosuvastatin (CRESTOR) tablet 20 mg  20 mg Oral QHS    sodium chloride (NS) flush 5-40 mL  5-40 mL IntraVENous Q8H    sodium chloride (NS) flush 5-40 mL  5-40 mL IntraVENous PRN    polyethylene glycol (MIRALAX) packet 17 g  17 g Oral DAILY PRN    ondansetron (ZOFRAN ODT) tablet 4 mg  4 mg Oral Q8H PRN    Or    ondansetron (ZOFRAN) injection 4 mg  4 mg IntraVENous Q6H PRN    dextrose 40% (GLUTOSE) oral gel 1 Tube  15 g Oral PRN    glucagon (GLUCAGEN) injection 1 mg  1 mg IntraMUSCular PRN    dextrose (D50W) injection syrg 12.5-25 g  25-50 mL IntraVENous PRN    tiotropium-olodateroL (STIOLTO RESPIMAT) 2.5-2.5 mcg/actuation inhaler 2 Puff  2 Puff Inhalation DAILY    fluticasone (FLOVENT HFA) 110 mcg inhaler  1 Puff Inhalation DAILY    [Held by provider] heparin (porcine) injection 5,000 Units  5,000 Units SubCUTAneous Q8H       Signed:  Selena Alston DO    Part of this note may have been written by using a voice dictation software. The note has been proof read but may still contain some grammatical/other typographical errors.

## 2021-11-04 NOTE — PROGRESS NOTES
Problem: Falls - Risk of  Goal: *Absence of Falls  Description: Document Dotty Sainz Fall Risk and appropriate interventions in the flowsheet. Outcome: Progressing Towards Goal  Note: Fall Risk Interventions:  Mobility Interventions: OT consult for ADLs, Communicate number of staff needed for ambulation/transfer, Bed/chair exit alarm    Mentation Interventions: Reorient patient, Bed/chair exit alarm    Medication Interventions: Teach patient to arise slowly, Bed/chair exit alarm    Elimination Interventions: Call light in reach, Bed/chair exit alarm              Problem: Pressure Injury - Risk of  Goal: *Prevention of pressure injury  Description: Document Salvador Scale and appropriate interventions in the flowsheet.   Outcome: Progressing Towards Goal  Note: Pressure Injury Interventions:  Sensory Interventions: Keep linens dry and wrinkle-free, Maintain/enhance activity level    Moisture Interventions: Apply protective barrier, creams and emollients, Absorbent underpads, Minimize layers    Activity Interventions: PT/OT evaluation    Mobility Interventions: PT/OT evaluation, HOB 30 degrees or less    Nutrition Interventions: Document food/fluid/supplement intake    Friction and Shear Interventions: Apply protective barrier, creams and emollients, Foam dressings/transparent film/skin sealants, HOB 30 degrees or less

## 2021-11-04 NOTE — CONSULTS
[facility] Hematology and Oncology: Inpatient Hematology / Oncology Consult Note    Reason for Consult:    Referring Physician:  Nestor Jackson DO    History of Present Illness:  Mr. Shante Packer is a 80 y.o. male admitted on 10/28/2021 with a primary diagnosis of   Encounter Diagnoses   Name Primary?  Acute nonintractable headache, unspecified headache type Yes    Symptomatic bradycardia     Chest wall mass     Hydrocephalus, unspecified type (HCC)     Bradycardia with 31-40 beats per minute     Dyslipidemia     Axillary mass, left     Bradycardia     Sarcoidosis     Hyponatremia     Intractable headache, unspecified chronicity pattern, unspecified headache type     Nausea and vomiting, intractability of vomiting not specified, unspecified vomiting type     Pulmonary hypertension ([address]     Restrictive lung disease     Acute metabolic encephalopathy     COPD, moderate ([address]    . He has a history of sarcoidosis with mediastinal lymphadenopathy in the past.  He presented to the ED on 10/28 with headache, AMS, N/V. As part of his work-up he had a CT which showed multiple large masses in the left axilla, with no obvious primary lesion (extensive parenchymal lung disease with perihilar consolidation/mediastinal and hilar calcification noted). 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 We are consulted for recommendations. Review of Systems:  ROS:  Constitutional: Positive for weakness, malaise, fatigue. CV: Negative for chest pain, palpitations, edema. Respiratory: Negative for dyspnea, cough, wheezing. GI: Negative for nausea, abdominal pain, diarrhea.   Neuro: positive for headache     Allergies   Allergen Reactions    Lactose Diarrhea     Past Medical History:   Diagnosis Date    Agent orange exposure 1960s    Arthritis     Asthma     Body mass index (BMI) of 25.0 to 25.9 in adult 10/28/2021    Chronic obstructive pulmonary disease ([facility]     Chronic pain     GERD (gastroesophageal reflux disease)     Sarcoidosis     Lung Biopsy was done in ; Liver Biopsy--Negative for sarcidosis     Sleep apnea      Past Surgical History:   Procedure Laterality Date    HX HERNIA REPAIR Left 2004    Left inguinal hernia repair    HX HERNIA REPAIR Right     Right inguinal hernia    HX KNEE REPLACEMENT Left 2016    HX LAP CHOLECYSTECTOMY  2006    HX ORTHOPAEDIC Right 2014    ankle replacement @ [place]    HX OTHER SURGICAL  2006    Liver biopsy--negative for sarcoidosis    HX TONSILLECTOMY  as a child    NM CHEST SURGERY PROCEDURE UNLISTED      Lung bx for sarcoidosis     Family History   Problem Relation Age of Onset    Diabetes Mother     Hypertension Mother     Cancer Mother         Liver Cancer    Elevated Lipids Mother     Hypertension Father     Stroke Father     Cancer Sister     Headache Neg Hx      Social History     Socioeconomic History    Marital status:      Spouse name: Not on file    Number of children: Not on file    Years of education: Not on file    Highest education level: Not on file   Occupational History    Occupation: [employer]   Tobacco Use    Smoking status: Former Smoker     Packs/day: 1.50     Years: 5.00     Pack years: 7.50     Types: Cigarettes     Quit date: 1968     Years since quittin.8    Smokeless tobacco: Never Used   Vaping Use    Vaping Use: Never used   Substance and Sexual Activity    Alcohol use: Yes     Alcohol/week: 0.0 standard drinks     Comment: socially    Drug use: No    Sexual activity: Not on file   Other Topics Concern    Not on file   Social History Narrative    He is originally from [place], but retired to [place]. He is retired from [employer]. He has worked in [employer] and has travelled extensively to [place], [place], [facility], [place], and [place] ([facility]  and lives with wife.      Social Determinants of Health Financial Resource Strain:     Difficulty of Paying Living Expenses: Not on file   Food Insecurity:     Worried About Running Out of Food in the Last Year: Not on file    Larry of Food in the Last Year: Not on file   Transportation Needs:     Lack of Transportation (Medical): Not on file    Lack of Transportation (Non-Medical):  Not on file   Physical Activity:     Days of Exercise per Week: Not on file    Minutes of Exercise per Session: Not on file   Stress:     Feeling of Stress : Not on file   Social Connections:     Frequency of Communication with Friends and Family: Not on file    Frequency of Social Gatherings with Friends and Family: Not on file    Attends Hinduism Services: Not on file    Active Member of [address] [place] [address] [employer] or Organizations: Not on file    Attends Club or Organization Meetings: Not on file    Marital Status: Not on file   Intimate Partner Violence:     Fear of Current or Ex-Partner: Not on file    Emotionally Abused: Not on file    Physically Abused: Not on file    Sexually Abused: Not on file   Housing Stability:     Unable to Pay for Housing in the Last Year: Not on file    Number of [place] in the Last Year: Not on file    Unstable Housing in the Last Year: Not on file     Current Facility-Administered Medications   Medication Dose Route Frequency Provider Last Rate Last Admin    pantoprazole (PROTONIX) tablet 40 mg  40 mg Oral ACB&D Smith Thomas, NP        HYDROcodone-acetaminophen ([address]) 7.5-325 mg per tablet 1 Tablet  1 Tablet Oral Q8H PRN Kaleigh Lieu, DO        predniSONE (DELTASONE) tablet 10 mg  10 mg Oral DAILY WITH BREAKFAST Arcelia Catalan C, NP   10 mg at 11/04/21 2085    butalbital-acetaminophen-caffeine (FIORICET, ESGIC) -40 mg per tablet 1 Tablet  1 Tablet Oral Q4H PRN Janeenbhumika Dougherty C, DO   1 Tablet at 11/04/21 0144    potassium chloride (K-DUR, KLOR-CON) SR tablet 20 mEq  20 mEq Oral DAILY Dunia Dover C, DO   20 mEq at 11/04/21 0656    melatonin tablet 5 mg  5 mg Oral QHS Dunia Dover, DO   5 mg at 11/03/21 2209    albuterol (PROVENTIL HFA, VENTOLIN HFA, PROAIR HFA) inhaler 2 Puff  2 Puff Inhalation Q6H RT Laina Santos MD   2 Puff at 11/04/21 1405    albuterol (PROVENTIL HFA, VENTOLIN HFA, PROAIR HFA) inhaler 2 Puff  2 Puff Inhalation Q4H PRN Laina Santos MD   2 Puff at 10/31/21 0939    amLODIPine (NORVASC) tablet 5 mg  5 mg Oral DAILY Dunia Dover, DO   5 mg at 11/04/21 8745    hydrALAZINE (APRESOLINE) 20 mg/mL injection 20 mg  20 mg IntraVENous Q6H PRN Jordan DU, DO        tamsulosin (FLOMAX) capsule 0.4 mg  0.4 mg Oral DAILY Dunia Dover, DO   0.4 mg at 11/04/21 6510    lip protectant (BLISTEX) ointment 1 Each  1 Each Topical PRN Liliana Triana MD        finasteride (PROSCAR) tablet 5 mg  5 mg Oral QHS Liliana Triana MD   5 mg at 11/03/21 2210    rosuvastatin (CRESTOR) tablet 20 mg  20 mg Oral QHS Liliana Triana MD   20 mg at 11/03/21 2209    sodium chloride (NS) flush 5-40 mL  5-40 mL IntraVENous Q8H Moises Franco MD   10 mL at 11/04/21 1150    sodium chloride (NS) flush 5-40 mL  5-40 mL IntraVENous PRN Moises Franco MD        polyethylene glycol (MIRALAX) packet 17 g  17 g Oral DAILY PRN Liliana Triana MD   17 g at 10/31/21 0546    ondansetron (ZOFRAN ODT) tablet 4 mg  4 mg Oral Q8H PRN Liliana Triana MD        Or    ondansetron (ZOFRAN) injection 4 mg  4 mg IntraVENous Q6H PRN Moises Franco MD        dextrose 40% (GLUTOSE) oral gel 1 Tube  15 g Oral PRN Moises Franco MD        glucagon (GLUCAGEN) injection 1 mg  1 mg IntraMUSCular PRN Liliana Triana MD        dextrose (D50W) injection syrg 12.5-25 g  25-50 mL IntraVENous PRN Moises Franco MD        tiotropium-olodateroL (STIOLTO RESPIMAT) 2.5-2.5 mcg/actuation inhaler 2 Puff  2 Puff Inhalation DAILY Liliana Triana MD   2 Puff at 11/03/21 0727    fluticasone (FLOVENT HFA) 110 mcg inhaler  1 Puff Inhalation DAILY Cass Franco MD   1 Puff at 21 0726    [Held by provider] heparin (porcine) injection 5,000 Units  5,000 Units SubCUTAneous Q8H Jolly Guajardo MD   5,000 Units at 10/31/21 1428       OBJECTIVE:  Patient Vitals for the past 8 hrs:   BP Temp Pulse Resp SpO2   21 1513 126/73 97.3 °F (36.3 °C) 62 16 100 %   21 1407 -- -- -- -- 100 %   21 1120 133/81 98 °F (36.7 °C) 77 16 99 %   21 0748 (!) 146/87 98 °F (36.7 °C) 66 16 100 %     Temp (24hrs), Av.9 °F (36.6 °C), Min:97.3 °F (36.3 °C), Max:98.3 °F (36.8 °C)    No intake/output data recorded. Physical Exam:  Constitutional: Well developed, well nourished male in no acute distress, sitting comfortably in the hospital bed. Appears uncomfortable. HEENT: Normocephalic and atraumatic. Sclerae anicteric. Neck supple without JVD. No thyromegaly present. Lymph node   No palpable submandibular, cervical, supraclavicular lymph nodes. Multiple palpable left axillary nodes. Skin Warm and dry. No bruising and no rash noted. No erythema. No pallor. Respiratory Lungs are clear to auscultation bilaterally without wheezes, rales or rhonchi, normal air exchange without accessory muscle use. CVS Normal rate, regular rhythm and normal S1 and S2. No murmurs, gallops, or rubs. Abdomen Soft, nontender and nondistended, normoactive bowel sounds. No palpable mass. No hepatosplenomegaly. Neuro Grossly nonfocal with no obvious sensory or motor deficits. MSK Normal range of motion in general.  No edema and no tenderness. Psych Appropriate mood and affect.       Labs:    Recent Results (from the past 24 hour(s))   GLUCOSE, POC    Collection Time: 21 10:44 PM   Result Value Ref Range    Glucose (POC) 187 (H) 65 - 100 mg/dL    Performed by Santa    CBC W/O DIFF    Collection Time: 21  6:59 AM   Result Value Ref Range    WBC 15.9 (H) 4.3 - 11.1 K/uL    RBC 4.75 4.23 - 5.6 M/uL    HGB 13.6 13.6 - 17.2 g/dL    HCT 43.3 41.1 - 50.3 %    MCV 91.2 79.6 - 97.8 FL    MCH 28.6 26.1 - 32.9 PG    MCHC 31.4 31.4 - 35.0 g/dL    RDW 13.7 11.9 - 14.6 %    PLATELET 164 575 - 039 K/uL    MPV 8.8 (L) 9.4 - 12.3 FL    ABSOLUTE NRBC 0.00 0.0 - 0.2 K/uL   METABOLIC PANEL, BASIC    Collection Time: 11/04/21  6:59 AM   Result Value Ref Range    Sodium 133 (L) 136 - 145 mmol/L    Potassium 3.5 3.5 - 5.1 mmol/L    Chloride 93 (L) 98 - 107 mmol/L    CO2 37 (H) 21 - 32 mmol/L    Anion gap 3 (L) 7 - 16 mmol/L    Glucose 128 (H) 65 - 100 mg/dL    BUN 19 8 - 23 MG/DL    Creatinine 0.67 (L) 0.8 - 1.5 MG/DL    GFR est AA >60 >60 ml/min/1.73m2    GFR est non-AA >60 >60 ml/min/1.73m2    Calcium 10.0 8.3 - 10.4 MG/DL   SARS-COV-2    Collection Time: 11/04/21 11:10 AM   Result Value Ref Range    SARS-CoV-2 Please find results under separate order     SARS-COV-2, PCR    Collection Time: 11/04/21 11:10 AM    Specimen: Nasopharyngeal   Result Value Ref Range    Specimen source Nasopharyngeal      SARS-CoV-2 Not detected NOTD         Imaging:      ASSESSMENT:    Active Problems:    Axillary mass, left (10/28/2021)      Body mass index (BMI) of 25.0 to 25.9 in adult (10/28/2021)      Dyslipidemia (3/13/2006)      Overview: Lexiscan Cardiolite (3/19/19):  EF 70%. Normal perfusion.        Sarcoidosis (3/13/2006)      Benign prostatic hyperplasia (3/13/2006)      Restrictive lung disease (4/1/2015)      Overview: Residual from Sarcoidosis      Pulmonary hypertension ([facility] (1/10/2018)      Hyperglycemia, drug-induced (10/28/2021)      Headache (10/30/2021)      COPD, moderate ([facility] (11/1/2021)        PLAN / RECOMMENDATIONS:  Small cell carcinoma  Unusual presentation with no obvious primary, only disease manifestation is bulky axillary adenopathy  He needs PET/CT, it appears that the changes in the lung are sarcoid related but work-up might be considered if no other primary is found  Besides lung, other possible primaries for small cell carcinoma would be  (prostate/bladder)  This might be a case for CancerType ID also, to determine site of origin  Ultimately, it is unlikely he will be eligible for curative therapy, although if his staging is truly negative elsewhere, I would get rad onc to weigh in on the possibility of axillary RT    Lab studies and imaging studies were personally reviewed. Pertinent old records were reviewed. Case discussed with Dr. Juarez Garcia. Thank you for allowing me to participate in the care of Mr. Bean Pena. We will follow along with you.         Tamra Marina MD  [facility] Hematology and Oncology  [address] [place] [address]  Office : [phone]  Fax : [phone]

## 2021-11-04 NOTE — CONSULTS
[facility] Consult Note       Primary GI Physician: Dr. Israel Varela    Referring Provider:  Dr. Krishan Trujillo Date:  11/4/2021    Admit Date:  10/28/2021    Chief Complaint:  Dysphagia    Subjective:     History of Present Illness:  Patient is a 80 y.o. male with PMH including but not limited to sarcoidosis, GERD, restrictive lung disease, HTN, hx of PUD, CAITLIN, new axillary mass, who is seen in consultation at the request of Dr. Shell Gabriel for dysphagia. He was admitted on 10/28/2021 with acute encephalopathy and delirium which has resolved. Neurology is on board. He was evaluated in our office on 9/15/021 for rumination and vomiting and had an outpatient EGD scheduled for today. The spouse canceled this appt when he was admitted. He has been having progressive anorexia over the last 2 weeks with weight loss. He continues to have dysphagia with rumination to solids and liquids at the suprasternal notch. He denies any melena. He has been having new onset constipation since he has decreased his oral intake recently. He denies any hematochezia. He denies GERD, but takes omeprazole 40mg one po daily. He has been taking NSAIDS including Excedrin for recent headaches. He underwent BS as below. Previous EGD on 9/26/2013 by Dr. Farhan Kiran revealed a 1cm gastric ulcer and possible short segment Taylor's esophagus, but biopsies negative for intestinal metaplasia. Did reveal chronic inflammation of the esophagus. Colonoscopy normal 7/7/2010 by Dr. Niki Fox    Barium swallow 11/4/2021     INDICATION:  Food gets stuck in throat. Vomiting while lying flat.     Fluoro time: 1.1 minutes  Spot Films:  8     Study is limited due to patient's difficulty lying flat and standing. Therefore  study was performed with thin barium liquid through a straw with patient in  semirecumbent position on the fluoroscopic table.     FINDINGS: Esophagus is mildly dilated without obvious stricture or obstruction.   Severe esophageal dysmotility noted throughout the intrathoracic esophagus with  poor primary and secondary peristaltic waves and tertiary contractions noted  throughout the mid to distal intrathoracic esophagus. There is a small  sliding-type hiatal hernia. Gastroesophageal reflux difficult to evaluate as  patient had difficulty lying flat. With patient on right lateral decubitus view  contrast was noted in the distal esophagus suggesting gastroesophageal reflux. No constricting or obstructing lesions are evident. Limited imaging upper  abdomen demonstrates no evidence of gastric outlet obstruction.     IMPRESSION  Mildly dilated esophagus without constricting or obstructing mass. Severe esophageal dysmotility throughout the intrathoracic esophagus with  probable gastroesophageal reflux. Small sliding-type hiatal hernia is present. PMH:  Past Medical History:   Diagnosis Date    Agent orange exposure 1960s    Arthritis     Asthma     Body mass index (BMI) of 25.0 to 25.9 in adult 10/28/2021    Chronic obstructive pulmonary disease (HCC)     Chronic pain     GERD (gastroesophageal reflux disease)     Sarcoidosis 2001    Lung Biopsy was done in 2001; Liver Biopsy--Negative for sarcidosis 5/06    Sleep apnea        PSH:  Past Surgical History:   Procedure Laterality Date    HX HERNIA REPAIR Left 2004    Left inguinal hernia repair    HX HERNIA REPAIR Right 1990's    Right inguinal hernia    HX KNEE REPLACEMENT Left 2016    HX LAP CHOLECYSTECTOMY  2006    HX ORTHOPAEDIC Right 2014    ankle replacement @ [place]    HX OTHER SURGICAL  2006    Liver biopsy--negative for sarcoidosis    HX TONSILLECTOMY  as a child    IN CHEST SURGERY PROCEDURE UNLISTED  2001    Lung bx for sarcoidosis       Allergies: Allergies   Allergen Reactions    Lactose Diarrhea       Home Medications:  Prior to Admission medications    Medication Sig Start Date End Date Taking?  Authorizing Provider   melatonin 5 mg tablet Take 10 mg by mouth nightly. Yes Provider, Historical   rosuvastatin (CRESTOR) 20 mg tablet TAKE 1 TABLET NIGHTLY 9/13/21  Yes Ulisses Hammer MD   azelastine (ASTELIN) 137 mcg (0.1 %) nasal spray 1 [place] by Both Nostrils route two (2) times a day. Use in each nostril as directed 7/27/21  Yes Jason Garcia, JANAY   tamsulosin (Flomax) 0.4 mg capsule Take 1 Capsule by mouth daily. 7/26/21  Yes Ulisses Hammer MD   temazepam (RESTORIL) 15 mg capsule Take 1 Capsule by mouth nightly as needed for Sleep. Max Daily Amount: 15 mg. 7/26/21  Yes Ulisses Hammer MD   omeprazole (PRILOSEC) 40 mg capsule Take 1 Capsule by mouth daily. 7/26/21  Yes Ulisses Hammer MD   tadalafiL, pulm. hypertension, (Adcirca) 20 mg tablet Take 2 Tablets by mouth daily. 7/8/21  Yes Monty Yates MD   fluticasone-umeclidinium-vilanterol (TRELEGY ELLIPTA) 100-62.5-25 mcg inhaler Take 1 Puff by inhalation daily. 6/17/21  Yes Monty Yates MD   finasteride (Proscar) 5 mg tablet Take 1 Tab by mouth nightly. 1/25/21  Yes Ulisses Hammer MD   ascorbate calcium-bioflavonoid (DOROTHY-C WITH BIOFLAVONOIDS) 1,000-200 mg tab    Yes Provider, Historical   predniSONE (DELTASONE) 10 mg tablet Take 10 mg by mouth daily. 9/19/19  Yes Monty Yates MD   cholecalciferol, vitamin D3, (VITAMIN D3) 2,000 unit tab Take  by mouth daily. Yes Provider, Historical   Aspirin, Buffered 81 mg tab Take  by mouth daily. Yes Provider, Historical   multivitamin (ONE A DAY) tablet Take 1 Tab by mouth daily. Yes Provider, Historical   b complex vitamins tablet Take 1 Tab by mouth daily. Yes Provider, Historical   ondansetron (ZOFRAN ODT) 4 mg disintegrating tablet Take 1 Tablet by mouth every eight (8) hours as needed for Nausea or Vomiting (diarrhea). 10/1/21   Ulisses Hammer MD   OTHER Handicap placard  Dx: Y93.7 COPD 8/2/21   Monty Yates MD   bisoprolol-hydroCHLOROthiazide (Ziac) 2.5-6.25 mg per tablet Take 1 Tablet by mouth daily.  7/26/21   Ulisses Hammer MD albuterol (PROVENTIL VENTOLIN) 2.5 mg /3 mL (0.083 %) nebu 3 mL by Nebulization route every six (6) hours as needed for Wheezing. 7/8/21   Juan Rivers MD   ferrous sulfate (Iron) 325 mg (65 mg iron) tablet Take  by mouth Daily (before breakfast). Provider, Historical   DISABLED PLACARD (DISABLED PLACARD) DMV Supply prescription to [facility] with completed form [id number]. 1/26/16   Provider, Historical   OXYGEN-AIR DELIVERY SYSTEMS by Does Not Apply route. 2 lpm QD    Provider, Historical   diclofenac (VOLTAREN) 1 % topical gel Apply 4 g to affected area four (4) times daily.  prn    Provider, Historical       Hospital Medications:  Current Facility-Administered Medications   Medication Dose Route Frequency    HYDROcodone-acetaminophen (NORCO) 5-325 mg per tablet 1 Tablet  1 Tablet Oral Q6H PRN    predniSONE (DELTASONE) tablet 10 mg  10 mg Oral DAILY WITH BREAKFAST    butalbital-acetaminophen-caffeine (FIORICET, ESGIC) -40 mg per tablet 1 Tablet  1 Tablet Oral Q4H PRN    potassium chloride (K-DUR, KLOR-CON) SR tablet 20 mEq  20 mEq Oral DAILY    melatonin tablet 5 mg  5 mg Oral QHS    albuterol (PROVENTIL HFA, VENTOLIN HFA, PROAIR HFA) inhaler 2 Puff  2 Puff Inhalation Q6H RT    albuterol (PROVENTIL HFA, VENTOLIN HFA, PROAIR HFA) inhaler 2 Puff  2 Puff Inhalation Q4H PRN    amLODIPine (NORVASC) tablet 5 mg  5 mg Oral DAILY    hydrALAZINE (APRESOLINE) 20 mg/mL injection 20 mg  20 mg IntraVENous Q6H PRN    pantoprazole (PROTONIX) tablet 40 mg  40 mg Oral ACB    tamsulosin (FLOMAX) capsule 0.4 mg  0.4 mg Oral DAILY    lip protectant (BLISTEX) ointment 1 Each  1 Each Topical PRN    finasteride (PROSCAR) tablet 5 mg  5 mg Oral QHS    rosuvastatin (CRESTOR) tablet 20 mg  20 mg Oral QHS    sodium chloride (NS) flush 5-40 mL  5-40 mL IntraVENous Q8H    sodium chloride (NS) flush 5-40 mL  5-40 mL IntraVENous PRN    polyethylene glycol (MIRALAX) packet 17 g  17 g Oral DAILY PRN    ondansetron (ZOFRAN ODT) tablet 4 mg  4 mg Oral Q8H PRN    Or    ondansetron (ZOFRAN) injection 4 mg  4 mg IntraVENous Q6H PRN    dextrose 40% (GLUTOSE) oral gel 1 Tube  15 g Oral PRN    glucagon (GLUCAGEN) injection 1 mg  1 mg IntraMUSCular PRN    dextrose (D50W) injection syrg 12.5-25 g  25-50 mL IntraVENous PRN    tiotropium-olodateroL (STIOLTO RESPIMAT) 2.5-2.5 mcg/actuation inhaler 2 Puff  2 Puff Inhalation DAILY    fluticasone (FLOVENT HFA) 110 mcg inhaler  1 Puff Inhalation DAILY    [Held by provider] heparin (porcine) injection 5,000 Units  5,000 Units SubCUTAneous Q8H       Social History:  Social History     Tobacco Use    Smoking status: Former Smoker     Packs/day: 1.50     Years: 5.00     Pack years: 7.50     Types: Cigarettes     Quit date: 1968     Years since quittin.8    Smokeless tobacco: Never Used   Substance Use Topics    Alcohol use: Yes     Alcohol/week: 0.0 standard drinks     Comment: socially       Family History:  Family History   Problem Relation Age of Onset    Diabetes Mother     Hypertension Mother     Cancer Mother         Liver Cancer    Elevated Lipids Mother     Hypertension Father     Stroke Father     Cancer Sister     Headache Neg Hx        Review of Systems:  A detailed 10 system ROS is obtained, with pertinent positives as listed above. All others are negative. Diet:  NPO    Objective:     Physical Exam:  Vitals:  Visit Vitals  BP (!) 146/87 (BP 1 Location: Left arm, BP Patient Position: At rest)   Pulse 66   Temp 98 °F (36.7 °C)   Resp 16   Ht 5' 4\" (1.626 m)   Wt 66.6 kg (146 lb 13.2 oz)   SpO2 100%   BMI 25.20 kg/m²     Gen:  Pt is alert, cooperative, no acute distress SITTING UP IN CHAIR WITH HIS HEAD LYING ON THE TABLE; RIGHT BLACK EYE  Skin:  Extremities and face reveal no rashes. HEENT: Sclerae anicteric. Extra-occular muscles are intact. No abnormal pigmentation of the lips. The neck is supple. Cardiovascular: Regular rate and rhythm.  No murmurs, gallops, or rubs. Respiratory:  Comfortable breathing with no accessory muscle use. Clear breath sounds anteriorly with no wheezes, rales, or rhonchi. DECREASED IN THE BASES; ON O2 3L NC  GI:  Abdomen nondistended, soft, and nontender. Normal active bowel sounds. No enlargement of the liver or spleen. No masses palpable. Musculoskeletal:  No pitting edema of the lower legs. Neurological:  Gross memory appears intact. Patient is alert and oriented. Psychiatric:  Mood appears appropriate with judgement intact. Laboratory:    Recent Labs     11/04/21  0659 11/03/21  0607 11/02/21  0626   WBC 15.9* 10.4 11.6*   HGB 13.6 13.1* 12.5*   HCT 43.3 40.5* 38.7*    205 301   MCV 91.2 92.9 89.8   * 132* 133*   K 3.5 3.6 3.3*   CL 93* 93* 94*   CO2 37* 32 33*   BUN 19 16 17   CREA 0.67* 0.61* 0.59*   CA 10.0 9.8 9.6   * 87 122*          Assessment:     Active Problems:    Axillary mass, left (10/28/2021)      Body mass index (BMI) of 25.0 to 25.9 in adult (10/28/2021)      Dyslipidemia (3/13/2006)      Overview: Lexiscan Cardiolite (3/19/19):  EF 70%. Normal perfusion. Sarcoidosis (3/13/2006)      Benign prostatic hyperplasia (3/13/2006)      Restrictive lung disease (4/1/2015)      Overview: Residual from Sarcoidosis      Pulmonary hypertension ([address] (1/10/2018)      Hyperglycemia, drug-induced (10/28/2021)      Headache (10/30/2021)      COPD, moderate ([address] (11/1/2021)    80 y.o. male with PMH including but not limited to sarcoidosis, GERD, restrictive lung disease, HTN, hx of PUD, CAITLIN, new axillary mass, who is seen in consultation at the request of Dr. Blossom Mariano for dysphagia. He was evaluated in our office on 9/15/021 for rumination and vomiting and had an outpatient EGD scheduled for today. The spouse canceled this appt when he was admitted. He has been having progressive anorexia over the last 2 weeks with weight loss.  He continues to have dysphagia with rumination to solids and liquids at the suprasternal notch. He underwent BS on 11/4/2021 that revealed Mildly dilated esophagus without constricting or obstructing mass. Severe esophageal dysmotility throughout the intrathoracic esophagus with probable gastroesophageal reflux. Small sliding-type hiatal hernia is present. His symptoms are likely dysmotility and GERD, but will obtain EGD to look for PUD, esophagitis, gastritis and can empirically dilate. Plan:     -EGD with dilation tomorrow by Dr.C. Aden 11/5/2021. Risks and benefits were discussed with patient to include risk of infection, bleeding, perforation,anesthesia, and possible mortality. He verbalized understanding and wishes to proceed with EGD. -Increase pantoprazole to 40mg bid  -NPO after midnight    Emilye Comes. [place], [place]  [facility]    Patient is seen and examined in collaboration with Dr. Cathy Abebe. Assessment and plan as per Dr. Cathy Abebe.

## 2021-11-04 NOTE — MANAGEMENT PLAN
[facility] Hematology & Oncology        Inpatient Hematology / Oncology Plan of Care    Reason for Consult:  Acute metabolic encephalopathy [S61.35]  Referring Physician:  James Daniel DO    History of Present Illness:  Mr. Agueda Barrera is a 79 yo male with PMH of sarcoidosis (mediastinal lymphadenopathy and calcification, VAT 2001), COPD, and pulmonary hypertension. He presented to ED 10/28/2021 with complaint of HA, AMS, vision changes, nausea and vomiting. CT head: Mild ventriculomegaly may represent centrally predominant volume loss versus normal pressure hydrocephalus. Otherwise unremarkable unenhanced CT scan of the brain. 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 MRI brain: No acute abnormality. Mild degenerative change. Allergies   Allergen Reactions    Lactose Diarrhea     Past Medical History:   Diagnosis Date    Agent orange exposure 1960s    Arthritis     Asthma     Body mass index (BMI) of 25.0 to 25.9 in adult 10/28/2021    Chronic obstructive pulmonary disease (HCC)     Chronic pain     GERD (gastroesophageal reflux disease)     Sarcoidosis 2001    Lung Biopsy was done in 2001;  Liver Biopsy--Negative for sarcidosis 5/06    Sleep apnea      Past Surgical History:   Procedure Laterality Date    HX HERNIA REPAIR Left 2004    Left inguinal hernia repair    HX HERNIA REPAIR Right 1990's    Right inguinal hernia    HX KNEE REPLACEMENT Left 2016    HX LAP CHOLECYSTECTOMY  2006    HX ORTHOPAEDIC Right 2014    ankle replacement @ [facility]    HX OTHER SURGICAL  2006    Liver biopsy--negative for sarcoidosis    HX TONSILLECTOMY  as a child    NC CHEST SURGERY PROCEDURE UNLISTED  2001    Lung bx for sarcoidosis     Family History   Problem Relation Age of Onset    Diabetes Mother     Hypertension Mother     Cancer Mother         Liver Cancer    Elevated Lipids Mother     Hypertension Father     Stroke Father    Jessie Zavala Cancer Sister     Headache Neg Hx      Social History     Socioeconomic History    Marital status:      Spouse name: Not on file    Number of children: Not on file    Years of education: Not on file    Highest education level: Not on file   Occupational History    Occupation: [employer]   Tobacco Use    Smoking status: Former Smoker     Packs/day: 1.50     Years: 5.00     Pack years: 7.50     Types: Cigarettes     Quit date: 1968     Years since quittin.8    Smokeless tobacco: Never Used   Vaping Use    Vaping Use: Never used   Substance and Sexual Activity    Alcohol use: Yes     Alcohol/week: 0.0 standard drinks     Comment: socially    Drug use: No    Sexual activity: Not on file   Other Topics Concern    Not on file   Social History Narrative    He is originally from [place], but retired to [place]. He is retired from [employer]. He has worked in [employer] and has travelled extensively to [facility], [place], [facility], [place] and [facility], and [place] ([facility]  and lives with wife. Social Determinants of Health     Financial Resource Strain:     Difficulty of Paying Living Expenses: Not on file   Food Insecurity:     Worried About Running Out of Food in the Last Year: Not on file    Larry of Food in the Last Year: Not on file   Transportation Needs:     Lack of Transportation (Medical): Not on file    Lack of Transportation (Non-Medical):  Not on file   Physical Activity:     Days of Exercise per Week: Not on file    Minutes of Exercise per Session: Not on file   Stress:     Feeling of Stress : Not on file   Social Connections:     Frequency of Communication with Friends and Family: Not on file    Frequency of Social Gatherings with Friends and Family: Not on file    Attends Orthodoxy Services: Not on file    Active Member of Clubs or Organizations: Not on file    Attends Club or Organization Meetings: Not on file    Marital Status: Not on file   Intimate Partner Violence:     Fear of Current or Ex-Partner: Not on file    Emotionally Abused: Not on file    Physically Abused: Not on file    Sexually Abused: Not on file   Housing Stability:     Unable to Pay for Housing in the Last Year: Not on file    Number of Places Lived in the Last Year: Not on file    Unstable Housing in the Last Year: Not on file     Current Facility-Administered Medications   Medication Dose Route Frequency Provider Last Rate Last Admin    pantoprazole (PROTONIX) tablet 40 mg  40 mg Oral ACB&D Antonio Viera NP        HYDROcodone-acetaminophen (NORCO) 5-325 mg per tablet 1 Tablet  1 Tablet Oral Q6H PRN Lurene Khari E, DO   1 Tablet at 11/03/21 2210    predniSONE (DELTASONE) tablet 10 mg  10 mg Oral DAILY WITH Arcelia Sanchez NP   10 mg at 11/04/21 9057    butalbital-acetaminophen-caffeine (FIORICET, ESGIC) -40 mg per tablet 1 Tablet  1 Tablet Oral Q4H PRN Meenakshi DU, DO   1 Tablet at 11/04/21 0144    potassium chloride (K-DUR, KLOR-CON) SR tablet 20 mEq  20 mEq Oral DAILY Dunia Dover DO   20 mEq at 11/04/21 0928    melatonin tablet 5 mg  5 mg Oral QHS Dunia Dover DO   5 mg at 11/03/21 2209    albuterol (PROVENTIL HFA, VENTOLIN HFA, PROAIR HFA) inhaler 2 Puff  2 Puff Inhalation Q6H RT David Arreola MD   2 Puff at 11/03/21 1932    albuterol (PROVENTIL HFA, VENTOLIN HFA, PROAIR HFA) inhaler 2 Puff  2 Puff Inhalation Q4H PRN David Arreola MD   2 Puff at 10/31/21 0939    amLODIPine (NORVASC) tablet 5 mg  5 mg Oral DAILY Dunia Dover DO   5 mg at 11/04/21 9441    hydrALAZINE (APRESOLINE) 20 mg/mL injection 20 mg  20 mg IntraVENous Q6H PRN Meenakshi Troncoso C, DO        tamsulosin (FLOMAX) capsule 0.4 mg  0.4 mg Oral DAILY Dunia Dover DO   0.4 mg at 11/04/21 4401    lip protectant (BLISTEX) ointment 1 Each  1 Each Topical PRN Eulalio Sifuentes MD        finasteride (PROSCAR) tablet 5 mg  5 mg Oral QHS Ira Franco MD   5 mg at 21 2210    rosuvastatin (CRESTOR) tablet 20 mg  20 mg Oral QHS Concepción Hudson MD   20 mg at 21 2209    sodium chloride (NS) flush 5-40 mL  5-40 mL IntraVENous Q8H Ash Franco MD   10 mL at 21 0651    sodium chloride (NS) flush 5-40 mL  5-40 mL IntraVENous PRN Ash Franco MD        polyethylene glycol (MIRALAX) packet 17 g  17 g Oral DAILY PRN Concepción Hudson MD   17 g at 10/31/21 0546    ondansetron (ZOFRAN ODT) tablet 4 mg  4 mg Oral Q8H PRN Concepción Hudson MD        Or    ondansetron (ZOFRAN) injection 4 mg  4 mg IntraVENous Q6H PRN Ash Franco MD        dextrose 40% (GLUTOSE) oral gel 1 Tube  15 g Oral PRN Ash Franco MD        glucagon (GLUCAGEN) injection 1 mg  1 mg IntraMUSCular PRN Concepción Hudson MD        dextrose (D50W) injection syrg 12.5-25 g  25-50 mL IntraVENous PRN Ash Franco MD        tiotropium-olodateroL (STIOLTO RESPIMAT) 2.5-2.5 mcg/actuation inhaler 2 Puff  2 Puff Inhalation DAILY Concepción Hudson MD   2 Puff at 21 0727    fluticasone (FLOVENT HFA) 110 mcg inhaler  1 Puff Inhalation DAILY Concepción Hudson MD   1 Puff at 21 0726    [Held by provider] heparin (porcine) injection 5,000 Units  5,000 Units SubCUTAneous Q8H Concepción Hudson MD   5,000 Units at 10/31/21 1428       OBJECTIVE:  Patient Vitals for the past 8 hrs:   BP Temp Pulse Resp SpO2   21 0748 (!) 146/87 98 °F (36.7 °C) 66 16 100 %   21 0603 (!) 155/91 97.7 °F (36.5 °C) (!) 59 16 100 %     Temp (24hrs), Av °F (36.7 °C), Min:97.7 °F (36.5 °C), Max:98.3 °F (36.8 °C)    No intake/output data recorded. Physical Exam:  Constitutional: Well developed,male in no acute distress, sitting  in the hospital bed. HEENT: Normocephalic and atraumatic. Oropharynx is clear, mucous membranes are moist.  Pupils are equal, round, and reactive to light. Extraocular muscles are intact. Sclerae anicteric. Skin Warm and dry.   No bruising and no rash noted. No erythema. No pallor. Respiratory Lungs are clear to auscultation bilaterally without wheezes, rales or rhonchi, normal air exchange without accessory muscle use. CVS Normal rate, regular rhythm and normal S1 and S2. No murmurs, gallops, or rubs. Abdomen Soft, nontender and nondistended, normoactive bowel sounds. Neuro Grossly nonfocal with no obvious sensory or motor deficits. Complains of back pain. MSK Normal range of motion in general.  No edema and no tenderness. Psych Appropriate mood and affect.         Labs:    Recent Results (from the past 24 hour(s))   GLUCOSE, POC    Collection Time: 11/03/21 10:44 PM   Result Value Ref Range    Glucose (POC) 187 (H) 65 - 100 mg/dL    Performed by Santa    CBC W/O DIFF    Collection Time: 11/04/21  6:59 AM   Result Value Ref Range    WBC 15.9 (H) 4.3 - 11.1 K/uL    RBC 4.75 4.23 - 5.6 M/uL    HGB 13.6 13.6 - 17.2 g/dL    HCT 43.3 41.1 - 50.3 %    MCV 91.2 79.6 - 97.8 FL    MCH 28.6 26.1 - 32.9 PG    MCHC 31.4 31.4 - 35.0 g/dL    RDW 13.7 11.9 - 14.6 %    PLATELET 618 118 - 959 K/uL    MPV 8.8 (L) 9.4 - 12.3 FL    ABSOLUTE NRBC 0.00 0.0 - 0.2 K/uL   METABOLIC PANEL, BASIC    Collection Time: 11/04/21  6:59 AM   Result Value Ref Range    Sodium 133 (L) 136 - 145 mmol/L    Potassium 3.5 3.5 - 5.1 mmol/L    Chloride 93 (L) 98 - 107 mmol/L    CO2 37 (H) 21 - 32 mmol/L    Anion gap 3 (L) 7 - 16 mmol/L    Glucose 128 (H) 65 - 100 mg/dL    BUN 19 8 - 23 MG/DL    Creatinine 0.67 (L) 0.8 - 1.5 MG/DL    GFR est AA >60 >60 ml/min/1.73m2    GFR est non-AA >60 >60 ml/min/1.73m2    Calcium 10.0 8.3 - 10.4 MG/DL       Imaging:  [unfilled]    ASSESSMENT:  Problem List  Date Reviewed: 10/28/2021          Codes Class Noted    Axillary mass, left ICD-10-CM: R22.32  ICD-9-CM: 782.2  10/28/2021        Body mass index (BMI) of 25.0 to 25.9 in adult (Chronic) ICD-10-CM: L70.09  ICD-9-CM: V85.21  10/28/2021        COPD, moderate (HCC) (Chronic) ICD-10-CM: J44.9  ICD-9-CM: 496  11/1/2021        Headache ICD-10-CM: R51.9  ICD-9-CM: 784.0  10/30/2021        Hyperglycemia, drug-induced ICD-10-CM: R73.9, T50.905A  ICD-9-CM: 790.29, E947.9  10/28/2021        Dysphagia ICD-10-CM: R13.10  ICD-9-CM: 787.20  7/26/2021        Dry eye syndrome of bilateral lacrimal glands ICD-10-CM: U89.690  ICD-9-CM: 375.15  2/28/2020        Lens replaced by other means ICD-10-CM: Z96.1  ICD-9-CM: V43.1  2/28/2020        Open angle with borderline findings, high risk, bilateral ICD-10-CM: H40.023  ICD-9-CM: 365.05  2/28/2020        Dry eyes ICD-10-CM: P00.547  ICD-9-CM: 375.15  10/14/2019        Osteoarthritis of spine ICD-10-CM: M47.9  ICD-9-CM: 721.90  9/24/2018        Pulmonary hypertension ([facility] ICD-10-CM: I27.20  ICD-9-CM: 416.8  1/10/2018        COPD with asthma ([facility] ICD-10-CM: J44.9  ICD-9-CM: 493.20  11/28/2017        Open angle glaucoma suspect with borderline findings at low risk ICD-10-CM: H40.019  ICD-9-CM: 365.01  10/3/2017        Primary insomnia ICD-10-CM: F51.01  ICD-9-CM: 307.42  3/14/2017        CAITLIN on CPAP (Chronic) ICD-10-CM: G47.33, Z99.89  ICD-9-CM: 327.23, V46.8  8/23/2016    Overview Addendum 7/26/2021  2:45 PM by Sherry Doran MD     CPAP 13 cm    Formatting of this note might be different from the original.  Overview:   CPAP 13 cm             Nocturnal hypoxemia (Chronic) ICD-10-CM: G47.34  ICD-9-CM: 327.24  6/20/2016        Pulmonary hypertension due to hypoxia (Chronic) ICD-10-CM: D39.31  ICD-9-CM: 416.8, 799.02  6/20/2016    Overview Addendum 10/28/2021  3:11 PM by Gorman Ganser, MD Dr. [facility]  1.  6/6/2016 Echo- [id number]  2. Echo (11/23/20):  EF 55-60%. Mild LAE.  AVSC. Trace TR. Normal RV. Normal RA size. RVSP 35. Restrictive lung disease (Chronic) ICD-10-CM: J98.4  ICD-9-CM: 518.89  4/1/2015    Overview Signed 4/1/2015  9:30 AM by Christina Frank.      Residual from Sarcoidosis             Chronic GERD ICD-10-CM: K21.9  ICD-9-CM: 530.81  2/24/2014        Epiretinal membrane ICD-10-CM: H35.379  ICD-9-CM: 362.56  1/14/2014        Elevated prostate specific antigen (PSA) (Chronic) ICD-10-CM: R97.20  ICD-9-CM: 790.93  11/4/2013        Dyslipidemia (Chronic) ICD-10-CM: E78.5  ICD-9-CM: 272.4  3/13/2006    Overview Signed 3/21/2019  8:31 AM by Elaine Mueller MD     Lexiscan Cardiolite (3/19/19):  EF 70%. Normal perfusion. Sarcoidosis (Chronic) ICD-10-CM: D86.9  ICD-9-CM: 305  3/13/2006        Benign prostatic hyperplasia (Chronic) ICD-10-CM: N40.0  ICD-9-CM: 600.00  3/13/2006                RECOMMENDATIONS:  Small cell carcinoma of unknown origin  -needs OP PET    Sarcoidosis  -on prednisone 10 mg     Lab studies and imaging studies were personally reviewed. Pertinent old records were reviewed. Thank you for allowing us to participate in the care of Mr. Camille Velasco. Formal consult note by  to follow.          Ladarius Siu NP   [facility]ematology [facility] On[facility]  [address] [address]  Office : [phone]  Fax : [phone]

## 2021-11-04 NOTE — PROGRESS NOTES
CM met with patient and patient's spouse at bedside to discuss discharge planning. Spouse is requesting SW CM submit referrals to    1. [address]  2. [address] 3. [facility]    Referrals placed this day. TIFFANY CM to follow up. CM continues to follow.

## 2021-11-04 NOTE — PROGRESS NOTES
ACUTE PHYSICAL THERAPY GOALS:  (Developed with and agreed upon by patient and/or caregiver. )  LTG:  (1.)Mr. Sanchez will move from supine to sit and sit to supine , scoot up and down and roll side to side in flat bed without siderails with  STAND BY ASSIST within 7 day(s). (2.)Mr. Sanchez will perform all functional transfers with  STAND BY ASSIST using the least restrictive/no device within 7 day(s). (3.)Mr. Sanchez will ambulate with  CONTACT GUARD ASSIST for 50+ feet with normal vital sign response with the least restrictive/no device within 7 day(s). PHYSICAL THERAPY: Daily Note and AM Treatment Day # 3    Tevin Vu is a 80 y.o. male   PRIMARY DIAGNOSIS: Acute metabolic encephalopathy  Acute metabolic encephalopathy [E45.23]  Procedure(s) (LRB):  ESOPHAGOGASTRODUODENOSCOPY (EGD) DILATION/ 26/ 225 (N/A)       ASSESSMENT:     REHAB RECOMMENDATIONS: CURRENT LEVEL OF FUNCTION:  (Most Recently Demonstrated)   Recommendation to date pending progress:  Setting:   Short-term Rehab  Equipment:    To Be Determined Bed Mobility:   Not tested  Sit to Stand:   Minimal Assistance  Transfers:   Not tested  Gait/Mobility:   legs gave away and pt lifted back onto bed. ASSESSMENT:  Mr. Shante Packer was sitting EOB with head in hands. RN stated he was complaining of a headache and wanted a heating pad which had been ordered. Pt stood to RW and took 3 steps before his knees buckled. He was able to be lifted back onto bed. Pt left at EOB as found with bed alarm engaged and RN notified of events. SUBJECTIVE:   Mr. Shante Packer stated he has headaches often.      SOCIAL HISTORY/ LIVING ENVIRONMENT: Mr. Shante Packer lives with his wife, he is independent in home and community and very active per his wife  Home Environment: Private residence  One/Two Story Residence: Two story  Living Alone: No  Support Systems: Spouse/Significant Other  OBJECTIVE:     PAIN: VITAL SIGNS: LINES/DRAINS:   Pre Treatment:  0  Post Treatment: 0 .  O2 Device: Nasal cannula     MOBILITY: I Mod I S SBA CGA Min Mod Max Total  NT x2 Comments:   Bed Mobility    Rolling [] [] [] [] [] [] [] [] [] [x] []    Supine to Sit [] [] [] [] [] [] [] [] [] [x] []    Scooting [] [] [] [] [] [] [] [] [] [x] []    Sit to Supine [] [] [] [] [] [] [] [] [] [x] []    Transfers    Sit to Stand [] [] [] [] [] [x] [] [] [] [] []    Bed to Chair [] [] [] [] [] [] [] [] [] [] []    Stand to Sit [] [] [] [] [] [x] [] [] [] [] []    I=Independent, Mod I=Modified Independent, S=Supervision, SBA=Standby Assistance, CGA=Contact Guard Assistance,   Min=Minimal Assistance, Mod=Moderate Assistance, Max=Maximal Assistance, Total=Total Assistance, NT=Not Tested    BALANCE: Good Fair+ Fair Fair- Poor NT Comments   Sitting Static [] [x] [] [] [] []    Sitting Dynamic [] [] [x] [] [] []              Standing Static [] [] [] [] [] []    Standing Dynamic [] [] [] [] [] []      GAIT: I Mod I S SBA CGA Min Mod Max Total  NT x2 Comments:   Level of Assistance [] [] [] [] [] [] [] [] [] [] []    Distance     DME N/A    Gait Quality     Weightbearing  Status N/A     I=Independent, Mod I=Modified Independent, S=Supervision, SBA=Standby Assistance, CGA=Contact Guard Assistance,   Min=Minimal Assistance, Mod=Moderate Assistance, Max=Maximal Assistance, Total=Total Assistance, NT=Not Tested    PLAN:   FREQUENCY/DURATION: PT Plan of Care: 3 times/week for duration of hospital stay or until stated goals are met, whichever comes first.  TREATMENT:     TREATMENT:   ($$ Therapeutic Activity: 8-22 mins    )  Therapeutic Activity (18 Minutes): Therapeutic activity included Scooting, Transfer Training, Ambulation on level ground, Sitting balance  and Standing balance to improve functional Mobility, Strength and Activity tolerance.     TREATMENT GRID:   Date:  11/3/21 Date:   Date:     Activity/Exercise Seated Parameters Parameters Parameters   Heel raises X 20 B     Toe raises X 20 B     LAQ's X 20 B     Hip Flex X 20 B     Hip ABD X 20 B                           AFTER TREATMENT POSITION/PRECAUTIONS:  Chair, Needs within reach, RN notified and Visitors at bedside    INTERDISCIPLINARY COLLABORATION:  RN/PCT and PT/PTA    TOTAL TREATMENT DURATION:  PT Patient Time In/Time Out  Time In: 1050  Time Out: Devonte Avila PTA

## 2021-11-04 NOTE — PROGRESS NOTES
Hourly rounding completed during shift. All needs met at this time. PRN Norco given per md order x1 today and effective. Bed L/L with call bell in reach, bed alarm on, wife at bedside. Report will be given to oncoming RN.

## 2021-11-04 NOTE — PROGRESS NOTES
Hourly rounding performed throughout shift. Pt continually complains of HA, no relief after Norco or Fioricet. Pt reports no change in pain with positional changes or application of ice pack. No vision changes reported. Pt lying supine in bed resting, call bell within reach. Bed alarm on, bed locked and in low position. Will continue to monitor and give report to oncoming RN.

## 2021-11-05 NOTE — PROCEDURES
Endoscopic Gastroduodenoscopy Procedure Note    Indications: Dysphagia, rumination, vomiting, weight loss, axillary mass (SCC)    Anesthesia/Sedation: MAC IV     Pre-Procedure Physical:    Current Facility-Administered Medications   Medication Dose Route Frequency    lactated Ringers infusion  100 mL/hr IntraVENous CONTINUOUS    pantoprazole (PROTONIX) tablet 40 mg  40 mg Oral ACB&D    HYDROcodone-acetaminophen (NORCO) 7.5-325 mg per tablet 1 Tablet  1 Tablet Oral Q8H PRN    predniSONE (DELTASONE) tablet 10 mg  10 mg Oral DAILY WITH BREAKFAST    butalbital-acetaminophen-caffeine (FIORICET, ESGIC) -40 mg per tablet 1 Tablet  1 Tablet Oral Q4H PRN    potassium chloride (K-DUR, KLOR-CON) SR tablet 20 mEq  20 mEq Oral DAILY    melatonin tablet 5 mg  5 mg Oral QHS    albuterol (PROVENTIL HFA, VENTOLIN HFA, PROAIR HFA) inhaler 2 Puff  2 Puff Inhalation Q6H RT    albuterol (PROVENTIL HFA, VENTOLIN HFA, PROAIR HFA) inhaler 2 Puff  2 Puff Inhalation Q4H PRN    amLODIPine (NORVASC) tablet 5 mg  5 mg Oral DAILY    hydrALAZINE (APRESOLINE) 20 mg/mL injection 20 mg  20 mg IntraVENous Q6H PRN    tamsulosin (FLOMAX) capsule 0.4 mg  0.4 mg Oral DAILY    lip protectant (BLISTEX) ointment 1 Each  1 Each Topical PRN    finasteride (PROSCAR) tablet 5 mg  5 mg Oral QHS    rosuvastatin (CRESTOR) tablet 20 mg  20 mg Oral QHS    sodium chloride (NS) flush 5-40 mL  5-40 mL IntraVENous Q8H    sodium chloride (NS) flush 5-40 mL  5-40 mL IntraVENous PRN    polyethylene glycol (MIRALAX) packet 17 g  17 g Oral DAILY PRN    ondansetron (ZOFRAN ODT) tablet 4 mg  4 mg Oral Q8H PRN    Or    ondansetron (ZOFRAN) injection 4 mg  4 mg IntraVENous Q6H PRN    dextrose 40% (GLUTOSE) oral gel 1 Tube  15 g Oral PRN    glucagon (GLUCAGEN) injection 1 mg  1 mg IntraMUSCular PRN    dextrose (D50W) injection syrg 12.5-25 g  25-50 mL IntraVENous PRN    tiotropium-olodateroL (STIOLTO RESPIMAT) 2.5-2.5 mcg/actuation inhaler 2 Puff  2 Puff Inhalation DAILY    fluticasone (FLOVENT HFA) 110 mcg inhaler  1 Puff Inhalation DAILY    [Held by provider] heparin (porcine) injection 5,000 Units  5,000 Units SubCUTAneous Q8H     Facility-Administered Medications Ordered in Other Encounters   Medication Dose Route Frequency    lidocaine (PF) (XYLOCAINE) 20 mg/mL (2 %) injection   IntraVENous PRN    propofoL (DIPRIVAN) 10 mg/mL injection   IntraVENous PRN      Lactose    Patient Vitals for the past 8 hrs:   BP Temp Pulse Resp SpO2   11/05/21 0852 (!) 157/84 97.6 °F (36.4 °C) 66 16 100 %   11/05/21 0828 -- -- -- -- 100 %   11/05/21 0734 (!) 143/88 97.9 °F (36.6 °C) (!) 59 18 100 %   11/05/21 0222 (!) 149/81 97.9 °F (36.6 °C) (!) 56 16 100 %       Exam      Airway: clear   Heart: normal S1and S2    Lungs: clear bilateral  Abdomen: soft, nontender, bowel sounds present and normal in all quads   Mental Status: awake, alert and oriented to person, place and time          Procedure Details     Informed consent was obtained for the procedure, including conscious sedation. Risks of pancreatitis, infection, perforation, hemorrhage, adverse drug reaction and aspiration were discussed. The patient was placed in the left lateral decubitus position. Based on the pre-procedure assessment, including review of the patient's medical history, medications, allergies, and review of systems, he had been deemed to be an appropriate candidate for conscious sedation; he was therefore sedated with the medications listed below. He was monitored continuously with ECG tracing, pulse oximetry, blood pressure monitoring, and direct observation. The EGD gastroscope was inserted into the mouth and advanced under direct vision to the second portion of the duodenum. A careful inspection was made as the gastroscope was withdrawn, including a retroflexed view of the proximal stomach; findings and interventions are described below.  Appropriate photodocumentation was obtained. Findings:   Esophagus- Schatzki ring. Dilated with 15-18 mm balloon. Mild heme resulted. Stomach- Normal.  Duodenum- Normal.    Therapies: Dilation    Specimens: None    Estimated Blood Loss: 0 cc           Complications:   None; patient tolerated the procedure well. Attending Attestation:  I performed the procedure. Impression:    Schatzki ring. Dilated with 15-18 mm balloon.     Recommendations:  Repeat dilation prn  Will sign off for now; please call back if further issues  Will see as outpt in office in 1 mo

## 2021-11-05 NOTE — PROGRESS NOTES
.  [facility] Progress Note             Date: 11/5/2021    GI Problem: 81 y/o m with dysphagia, rumination, vomiting, and inability to eat    History of Present Illness:  Patient is a 80 y.o. male who is seen in consultation for 81 y/o m with dysphagia, rumination, vomiting, and inability to eat.      Hospital Medications:  Current Facility-Administered Medications   Medication Dose Route Frequency    pantoprazole (PROTONIX) tablet 40 mg  40 mg Oral ACB&D    HYDROcodone-acetaminophen (NORCO) 7.5-325 mg per tablet 1 Tablet  1 Tablet Oral Q8H PRN    predniSONE (DELTASONE) tablet 10 mg  10 mg Oral DAILY WITH BREAKFAST    butalbital-acetaminophen-caffeine (FIORICET, ESGIC) -40 mg per tablet 1 Tablet  1 Tablet Oral Q4H PRN    potassium chloride (K-DUR, KLOR-CON) SR tablet 20 mEq  20 mEq Oral DAILY    melatonin tablet 5 mg  5 mg Oral QHS    albuterol (PROVENTIL HFA, VENTOLIN HFA, PROAIR HFA) inhaler 2 Puff  2 Puff Inhalation Q6H RT    albuterol (PROVENTIL HFA, VENTOLIN HFA, PROAIR HFA) inhaler 2 Puff  2 Puff Inhalation Q4H PRN    amLODIPine (NORVASC) tablet 5 mg  5 mg Oral DAILY    hydrALAZINE (APRESOLINE) 20 mg/mL injection 20 mg  20 mg IntraVENous Q6H PRN    tamsulosin (FLOMAX) capsule 0.4 mg  0.4 mg Oral DAILY    lip protectant (BLISTEX) ointment 1 Each  1 Each Topical PRN    finasteride (PROSCAR) tablet 5 mg  5 mg Oral QHS    rosuvastatin (CRESTOR) tablet 20 mg  20 mg Oral QHS    sodium chloride (NS) flush 5-40 mL  5-40 mL IntraVENous Q8H    sodium chloride (NS) flush 5-40 mL  5-40 mL IntraVENous PRN    polyethylene glycol (MIRALAX) packet 17 g  17 g Oral DAILY PRN    ondansetron (ZOFRAN ODT) tablet 4 mg  4 mg Oral Q8H PRN    Or    ondansetron (ZOFRAN) injection 4 mg  4 mg IntraVENous Q6H PRN    dextrose 40% (GLUTOSE) oral gel 1 Tube  15 g Oral PRN    glucagon (GLUCAGEN) injection 1 mg  1 mg IntraMUSCular PRN    dextrose (D50W) injection syrg 12.5-25 g  25-50 mL IntraVENous PRN    tiotropium-olodateroL (STIOLTO RESPIMAT) 2.5-2.5 mcg/actuation inhaler 2 Puff  2 Puff Inhalation DAILY    fluticasone (FLOVENT HFA) 110 mcg inhaler  1 Puff Inhalation DAILY    [Held by provider] heparin (porcine) injection 5,000 Units  5,000 Units SubCUTAneous Q8H       Objective:     Physical Exam:  Vitals:  Visit Vitals  BP (!) 143/88 (BP 1 Location: Left upper arm, BP Patient Position: Supine)   Pulse (!) 59   Temp 97.9 °F (36.6 °C)   Resp 18   Ht 5' 4\" (1.626 m)   Wt 66.6 kg (146 lb 13.2 oz)   SpO2 100%   BMI 25.20 kg/m²       General: No acute distress. Skin:  Extremities and face reveal no rashes. No camacho erythema. No telangiectasias on the chest wall. HEENT: Sclerae anicteric. No oral ulcers. No abnormal pigmentation of the lips. The neck is supple. Cardiovascular: Regular rate and rhythm. No murmurs, gallops, or rubs. Respiratory:  Comfortable breathing  With no accessory muscle use. Clear breath sounds with no wheezes, rales, or rhonchi. GI:  Abdomen nondistended, soft, and nontender. Normal active bowel sounds. No enlargement of the liver or spleen. No masses palpable. Musculoskeletal:  No pitting edema of the lower legs. Extremities have good range of motion. Neurological:  Gross memory appears intact. Patient is alert and oriented. Psychiatric:  Mood appears appropriate with judgement intact. Lymphatic:  No cervical or supraclavicular adenopathy. Laboratory:    Recent Labs     11/05/21  0649   WBC 11.7*   RBC 4.46   HGB 13.5*   HCT 41.3         Recent Labs     11/05/21  0649   *   *   K 3.8   CL 94*   CO2 33*   BUN 16   CREA 0.61*   CA 9.7     No results for input(s): PTP, INR, APTT, INREXT in the last 72 hours. No results for input(s): TBIL, CBIL, AP, ALB, TP, AML, LPSE in the last 72 hours.     No lab exists for component: SGOT, GPT    Assessment:       A 80 y.o. male with 81 y/o m with dysphagia, rumination, vomiting, and inability to eat      Plan: EGD with dilation    Signed By: Maria De Jesus Gerardo MD     November 5, 2021

## 2021-11-05 NOTE — PROGRESS NOTES
TRANSFER - OUT REPORT: 
 
Verbal report given to Florrie Bernheim, RN(name) on Vaibhav Hooper  being transferred to ***(unit) for {TRANSFER CARE:68876} Report consisted of patients Situation, Background, Assessment and  
Recommendations(SBAR). Information from the following report(s) {SBAR REPORTS LYMR:18192} was reviewed with the receiving nurse. Lines:  
Peripheral IV 11/04/21 Anterior; Left Forearm (Active) Site Assessment Intact; Drainage (comment) 11/05/21 0080 Phlebitis Assessment 0 11/05/21 6510 Infiltration Assessment 0 11/05/21 0927 Dressing Status Old drainage; Intact; Dry 11/05/21 6019 Dressing Type Tape; Transparent 11/05/21 5694 Hub Color/Line Status Patent; Flushed 11/05/21 0244 Alcohol Cap Used No 11/05/21 0244 Opportunity for questions and clarification was provided. Patient transported with: 
 {TRANSPORTDETAILS:46323}

## 2021-11-05 NOTE — PROGRESS NOTES
Problem: Falls - Risk of  Goal: *Absence of Falls  Description: Document Starleen Freeze Fall Risk and appropriate interventions in the flowsheet. Outcome: Progressing Towards Goal  Note: Fall Risk Interventions:  Mobility Interventions: Patient to call before getting OOB    Mentation Interventions: More frequent rounding    Medication Interventions: Teach patient to arise slowly    Elimination Interventions: Call light in reach, Urinal in reach    History of Falls Interventions: Bed/chair exit alarm, Vital signs minimum Q4HRs X 24 hrs (comment for end date)         Problem: Patient Education: Go to Patient Education Activity  Goal: Patient/Family Education  Outcome: Progressing Towards Goal     Problem: Pressure Injury - Risk of  Goal: *Prevention of pressure injury  Description: Document Salvador Scale and appropriate interventions in the flowsheet.   Outcome: Progressing Towards Goal  Note: Pressure Injury Interventions:  Sensory Interventions: Minimize linen layers    Moisture Interventions: Minimize layers    Activity Interventions: Pressure redistribution bed/mattress(bed type)    Mobility Interventions: PT/OT evaluation    Nutrition Interventions: Document food/fluid/supplement intake    Friction and Shear Interventions: Apply protective barrier, creams and emollients, Foam dressings/transparent film/skin sealants, HOB 30 degrees or less                Problem: Patient Education: Go to Patient Education Activity  Goal: Patient/Family Education  Outcome: Progressing Towards Goal     Problem: Patient Education: Go to Patient Education Activity  Goal: Patient/Family Education  Outcome: Progressing Towards Goal     Problem: Patient Education: Go to Patient Education Activity  Goal: Patient/Family Education  Outcome: Progressing Towards Goal

## 2021-11-05 NOTE — PROGRESS NOTES
Physical therapy note: Attempted PT today. Pt had ESOPHAGEAL DILATION this AM under anesthesia. Will continue to treat as pt is able and time/schedule permit.     Rand Pearson, PTA

## 2021-11-05 NOTE — PROGRESS NOTES
[facility] Hematology & Oncology        Inpatient Hematology / Oncology Progress Note      Admission Date: 10/28/2021 12:07 PM  Reason for Admission/Hospital Course: Acute metabolic encephalopathy [L03.93]      24 Hour Events:  Discharge to rehab pending  No overnight events  Labs and VSS  Wife at bedside    ROS:  Constitutional: negative for fever, chills, weakness, malaise, fatigue. CV: negative for chest pain, palpitations, edema. Respiratory:  negative for dyspnea, cough, wheezing. GI: negative for nausea, abdominal pain, diarrhea. 10 point review of systems is otherwise negative with the exception of the elements mentioned above in the HPI. Allergies   Allergen Reactions    Lactose Diarrhea       OBJECTIVE:  Patient Vitals for the past 8 hrs:   BP Temp Pulse Resp SpO2   21 1006 (!) 139/57 -- 69 18 100 %   21 0957 134/62 -- 64 18 99 %   21 0946 128/62 -- 65 18 100 %   21 0936 (!) 136/57 -- 68 16 97 %   21 0927 (!) 142/64 98.6 °F (37 °C) 75 12 99 %   21 0924 (!) 142/64 -- 66 16 98 %   21 0852 (!) 157/84 97.6 °F (36.4 °C) 66 16 100 %   21 0828 -- -- -- -- 100 %   21 0734 (!) 143/88 97.9 °F (36.6 °C) (!) 59 18 100 %     Temp (24hrs), Av.8 °F (36.6 °C), Min:97.3 °F (36.3 °C), Max:98.6 °F (37 °C)    701 - 1900  In: 300 [I.V.:300]  Out: 0     Physical Exam:  Constitutional: Well developed,  male  sitting comfortably in the hospital bed. HEENT: Normocephalic and atraumatic. Oropharynx is clear, mucous membranes are moist.  Pupils are equal, round, and reactive to light. Extraocular muscles are intact. Sclerae anicteric. Lymph node   Multiple palpable left axillary nodes not examined today   Skin Warm and dry. No bruising and no rash noted. No erythema. No pallor. Respiratory Lungs are clear to auscultation bilaterally without wheezes, rales or rhonchi, normal air exchange without accessory muscle use.     CVS Normal rate, regular rhythm and normal S1 and S2. No murmurs, gallops, or rubs. Abdomen Soft, nontender and nondistended, normoactive bowel sounds. Neuro Grossly nonfocal with no obvious sensory or motor deficits. MSK Normal range of motion in general.  No edema and no tenderness. Psych Appropriate mood and affect.         Labs:      Recent Labs     11/05/21  0649 11/04/21  0659 11/03/21  0607   WBC 11.7* 15.9* 10.4   RBC 4.46 4.75 4.36   HGB 13.5* 13.6 13.1*   HCT 41.3 43.3 40.5*   MCV 92.6 91.2 92.9   MCH 30.3 28.6 30.0   MCHC 32.7 31.4 32.3   RDW 13.7 13.7 13.4    289 205        Recent Labs     11/05/21  0649 11/04/21  0659 11/03/21  0607   * 133* 132*   K 3.8 3.5 3.6   CL 94* 93* 93*   CO2 33* 37* 32   AGAP 6* 3* 7   * 128* 87   BUN 16 19 16   CREA 0.61* 0.67* 0.61*   GFRAA >60 >60 >60   GFRNA >60 >60 >60   CA 9.7 10.0 9.8         Imaging:    Medications:  Current Facility-Administered Medications   Medication Dose Route Frequency    lactated Ringers infusion  100 mL/hr IntraVENous CONTINUOUS    pantoprazole (PROTONIX) tablet 40 mg  40 mg Oral ACB&D    HYDROcodone-acetaminophen (NORCO) 7.5-325 mg per tablet 1 Tablet  1 Tablet Oral Q8H PRN    predniSONE (DELTASONE) tablet 10 mg  10 mg Oral DAILY WITH BREAKFAST    butalbital-acetaminophen-caffeine (FIORICET, ESGIC) -40 mg per tablet 1 Tablet  1 Tablet Oral Q4H PRN    potassium chloride (K-DUR, KLOR-CON) SR tablet 20 mEq  20 mEq Oral DAILY    melatonin tablet 5 mg  5 mg Oral QHS    albuterol (PROVENTIL HFA, VENTOLIN HFA, PROAIR HFA) inhaler 2 Puff  2 Puff Inhalation Q6H RT    albuterol (PROVENTIL HFA, VENTOLIN HFA, PROAIR HFA) inhaler 2 Puff  2 Puff Inhalation Q4H PRN    amLODIPine (NORVASC) tablet 5 mg  5 mg Oral DAILY    hydrALAZINE (APRESOLINE) 20 mg/mL injection 20 mg  20 mg IntraVENous Q6H PRN    tamsulosin (FLOMAX) capsule 0.4 mg  0.4 mg Oral DAILY    lip protectant (BLISTEX) ointment 1 Each  1 Each Topical PRN    finasteride (PROSCAR) tablet 5 mg  5 mg Oral QHS    rosuvastatin (CRESTOR) tablet 20 mg  20 mg Oral QHS    sodium chloride (NS) flush 5-40 mL  5-40 mL IntraVENous Q8H    sodium chloride (NS) flush 5-40 mL  5-40 mL IntraVENous PRN    polyethylene glycol (MIRALAX) packet 17 g  17 g Oral DAILY PRN    ondansetron (ZOFRAN ODT) tablet 4 mg  4 mg Oral Q8H PRN    Or    ondansetron (ZOFRAN) injection 4 mg  4 mg IntraVENous Q6H PRN    dextrose 40% (GLUTOSE) oral gel 1 Tube  15 g Oral PRN    glucagon (GLUCAGEN) injection 1 mg  1 mg IntraMUSCular PRN    dextrose (D50W) injection syrg 12.5-25 g  25-50 mL IntraVENous PRN    tiotropium-olodateroL (STIOLTO RESPIMAT) 2.5-2.5 mcg/actuation inhaler 2 Puff  2 Puff Inhalation DAILY    fluticasone (FLOVENT HFA) 110 mcg inhaler  1 Puff Inhalation DAILY    [Held by provider] heparin (porcine) injection 5,000 Units  5,000 Units SubCUTAneous Q8H         ASSESSMENT:    Problem List  Date Reviewed: 10/28/2021          Codes Class Noted    Axillary mass, left ICD-10-CM: R22.32  ICD-9-CM: 782.2  10/28/2021        Body mass index (BMI) of 25.0 to 25.9 in adult (Chronic) ICD-10-CM: A44.28  ICD-9-CM: V85.21  10/28/2021        COPD, moderate (HCC) (Chronic) ICD-10-CM: J44.9  ICD-9-CM: 496  11/1/2021        Headache ICD-10-CM: R51.9  ICD-9-CM: 784.0  10/30/2021        Hyperglycemia, drug-induced ICD-10-CM: R73.9, T50.905A  ICD-9-CM: 790.29, E947.9  10/28/2021        Dysphagia ICD-10-CM: R13.10  ICD-9-CM: 787.20  7/26/2021        Dry eye syndrome of bilateral lacrimal glands ICD-10-CM: T34.036  ICD-9-CM: 375.15  2/28/2020        Lens replaced by other means ICD-10-CM: Z96.1  ICD-9-CM: V43.1  2/28/2020        Open angle with borderline findings, high risk, bilateral ICD-10-CM: H40.023  ICD-9-CM: 365.05  2/28/2020        Dry eyes ICD-10-CM: U73.683  ICD-9-CM: 375.15  10/14/2019        Osteoarthritis of spine ICD-10-CM: M47.9  ICD-9-CM: 721.90  9/24/2018        Pulmonary hypertension (HCC) ICD-10-CM: I27.20  ICD-9-CM: 416.8  1/10/2018        COPD with asthma ([facility] ICD-10-CM: J44.9  ICD-9-CM: 493.20  11/28/2017        Open angle glaucoma suspect with borderline findings at low risk ICD-10-CM: H40.019  ICD-9-CM: 365.01  10/3/2017        Primary insomnia ICD-10-CM: F51.01  ICD-9-CM: 307.42  3/14/2017        CAITLIN on CPAP (Chronic) ICD-10-CM: G47.33, Z99.89  ICD-9-CM: 327.23, V46.8  8/23/2016    Overview Addendum 7/26/2021  2:45 PM by Jodie Lopez MD     CPAP 13 cm    Formatting of this note might be different from the original.  Overview:   CPAP 13 cm             Nocturnal hypoxemia (Chronic) ICD-10-CM: G47.34  ICD-9-CM: 327.24  6/20/2016        Pulmonary hypertension due to hypoxia (Chronic) ICD-10-CM: B56.20  ICD-9-CM: 416.8, 799.02  6/20/2016    Overview Addendum 10/28/2021  3:11 PM by Lattie Olszewski, MD Dr. Juliann   1.  6/6/2016 Echo- [id number]  2. Echo (11/23/20):  EF 55-60%. Mild LAE.  AVSC. Trace TR. Normal RV. Normal RA size. RVSP 35. Restrictive lung disease (Chronic) ICD-10-CM: J98.4  ICD-9-CM: 518.89  4/1/2015    Overview Signed 4/1/2015  9:30 AM by Laurie Pandya. Residual from Sarcoidosis             Chronic GERD ICD-10-CM: K21.9  ICD-9-CM: 530.81  2/24/2014        Epiretinal membrane ICD-10-CM: H35.379  ICD-9-CM: 362.56  1/14/2014        Elevated prostate specific antigen (PSA) (Chronic) ICD-10-CM: R97.20  ICD-9-CM: 790.93  11/4/2013        Dyslipidemia (Chronic) ICD-10-CM: E78.5  ICD-9-CM: 272.4  3/13/2006    Overview Signed 3/21/2019  8:31 AM by Alma Rosa Negron MD     Lexiscjudith Cardiolite (3/19/19):  EF 70%. Normal perfusion.               Sarcoidosis (Chronic) ICD-10-CM: D86.9  ICD-9-CM: 400  3/13/2006        Benign prostatic hyperplasia (Chronic) ICD-10-CM: N40.0  ICD-9-CM: 600.00  3/13/2006             Acute nonintractable headache, unspecified headache type Yes    Symptomatic bradycardia      Chest wall mass      Hydrocephalus, unspecified type ([address]      Bradycardia with 31-40 beats per minute      Dyslipidemia      Axillary mass, left      Bradycardia      Sarcoidosis      Hyponatremia      Intractable headache, unspecified chronicity pattern, unspecified headache type      Nausea and vomiting, intractability of vomiting not specified, unspecified vomiting type      Pulmonary hypertension (HCC)      Restrictive lung disease      Acute metabolic encephalopathy      COPD, moderate (HCC)     .  He has a history of sarcoidosis with mediastinal lymphadenopathy in the past.  He presented to the ED on 10/28 with headache, AMS, N/V. As part of his work-up he had a CT which showed multiple large masses in the left axilla, with no obvious primary lesion (extensive parenchymal lung disease with perihilar consolidation/mediastinal and hilar calcification noted). 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 We are consulted for recommendations. PLAN:  Small cell carcinoma  Unusual presentation with no obvious primary, only disease manifestation is bulky axillary adenopathy  He needs PET/CT, it appears that the changes in the lung are sarcoid related but work-up might be considered if no other primary is found  Besides lung, other possible primaries for small cell carcinoma would be  (prostate/bladder)  This might be a case for CancerType ID also, to determine site of origin  Ultimately, it is unlikely he will be eligible for curative therapy, although if his staging is truly negative elsewhere, I would get rad onc to weigh in on the possibility of axillary RT    11/5 Wife present at visit. Discussed that patient needs PET and best case scenario would be to take patient for PET that is scheduled on Monday then take to SNF. Then patient would follow up with Dr. Wendy Cotton few days after PET. Discussed with CM. Goals and plan of care reviewed with the patient and wife.   All questions answered to the best of our ability.             Iris Do NP   [facility]ematology [facility] Oncology  [address] [address]  Office : [phone]  Fax : [phone]

## 2021-11-05 NOTE — PROGRESS NOTES
TRANSFER - IN REPORT:    Verbal report received from Ariane on Elta Kiss  being received from  [id number] for ordered procedure      Report consisted of patients Situation, Background, Assessment and   Recommendations(SBAR). Information from the following report(s) SBAR, Intake/Output, MAR, Recent Results, Med Rec Status and Pre Procedure Checklist was reviewed with the receiving nurse. Opportunity for questions and clarification was provided.

## 2021-11-05 NOTE — PROGRESS NOTES
CM met with patient and patient's spouse regarding discharge planning. CM informed spouse, [employer] has denied the patient for STR due to \" patient not meeting level of care required for admission\". Request pending with [facility], awaiting update regarding bed availability. CM spoke with Zakia Velasquez with [facility] who confirmed patient is appropriate for STR placement. Zakia Velasquez also informed SW CM, oncology treatment would need to be deferred if patient would like to accept bed offer, until [facility] has been completed. CM was receptive to this information. CM awaiting update from [facility], as spouse prefers the patient to discharge to [address] [place] [address], if appropriate. CM remains available. Update: Patient has been approved for REHAB at [facility]. Per Kellie Fall [phone], updated clinicals will need to be submitted Sunday 11/7. CM will assist with this request. CM confirmed with RN CM Supervisor Lori Hardy, patient will need to be discharged Monday prior to outpatient appointment for PET Scan arranged with [facility] Monday 11/8 at 0800, if medically stable. CM will arrange transport to [facility]. CM will also place transport on will call following PET Scan, to transport the patient to [facility]. CM remains available.

## 2021-11-05 NOTE — PROGRESS NOTES
CM also faxed referral to [facility], as spouse requested. SW CM to follow up. CM continues follow plan of care.

## 2021-11-05 NOTE — PROGRESS NOTES
ACUTE PHYSICAL THERAPY GOALS:  (Developed with and agreed upon by patient and/or caregiver. )  LTG:  (1.)Mr. Sanchez will move from supine to sit and sit to supine , scoot up and down and roll side to side in flat bed without siderails with  STAND BY ASSIST within 7 day(s). (2.)Mr. Sanchez will perform all functional transfers with  STAND BY ASSIST using the least restrictive/no device within 7 day(s). (3.)Mr. Sanchez will ambulate with  CONTACT GUARD ASSIST for 50+ feet with normal vital sign response with the least restrictive/no device within 7 day(s). PHYSICAL THERAPY: Daily Note and PM Treatment Day # 4    Liban Babin is a 80 y.o. male   PRIMARY DIAGNOSIS: Acute metabolic encephalopathy  Acute metabolic encephalopathy [N35.90]  Procedure(s) (LRB):  ESOPHAGOGASTRODUODENOSCOPY (EGD) DILATION/ 26 ROOM 625 (N/A)  ESOPHAGEAL DILATION (N/A)  Day of Surgery    ASSESSMENT:     REHAB RECOMMENDATIONS: CURRENT LEVEL OF FUNCTION:  (Most Recently Demonstrated)   Recommendation to date pending progress:  Setting:   Short-term Rehab  Equipment:    To Be Determined Bed Mobility:   Not tested  Sit to Stand:   Minimal Assistance  Transfers:   Not tested  Gait/Mobility:   legs gave away and pt lifted back onto bed. ASSESSMENT:  Mr. Catherine Rodney was sitting EOB with head in hands, wife present. Pt stood to RW and and amb 15' with chair follow . Sat and rested then stood and amb 10' in same manner. Left in chair after treatment. SUBJECTIVE:   Mr. Catherine Rodney stated he has headaches often.      SOCIAL HISTORY/ LIVING ENVIRONMENT: Mr. Catherine Rodney lives with his wife, he is independent in home and community and very active per his wife  Home Environment: Private residence  One/Two Story Residence: Two story  Living Alone: No  Support Systems: Spouse/Significant Other  OBJECTIVE:     PAIN: VITAL SIGNS: LINES/DRAINS:   Pre Treatment:  0  Post Treatment: 0   .  O2 Device: Nasal cannula     MOBILITY: I Mod I S SBA CGA Min Mod Max Total  NT x2 Comments:   Bed Mobility    Rolling [] [] [] [] [] [] [] [] [] [x] []    Supine to Sit [] [] [] [] [] [] [] [] [] [x] []    Scooting [] [] [] [] [] [] [] [] [] [x] []    Sit to Supine [] [] [] [] [] [] [] [] [] [x] []    Transfers    Sit to Stand [] [] [] [] [] [x] [] [] [] [] []    Bed to Chair [] [] [] [] [] [] [] [] [] [] []    Stand to Sit [] [] [] [] [] [x] [] [] [] [] []    I=Independent, Mod I=Modified Independent, S=Supervision, SBA=Standby Assistance, CGA=Contact Guard Assistance,   Min=Minimal Assistance, Mod=Moderate Assistance, Max=Maximal Assistance, Total=Total Assistance, NT=Not Tested    BALANCE: Good Fair+ Fair Fair- Poor NT Comments   Sitting Static [] [x] [] [] [] []    Sitting Dynamic [] [] [x] [] [] []              Standing Static [] [] [] [] [] []    Standing Dynamic [] [] [] [] [] []      GAIT: I Mod I S SBA CGA Min Mod Max Total  NT x2 Comments:   Level of Assistance [] [] [] [] [] [] [] [] [] [] []    Distance 15' + 10'    DME N/A    Gait Quality Flexed posture, very slow    Weightbearing  Status N/A     I=Independent, Mod I=Modified Independent, S=Supervision, SBA=Standby Assistance, CGA=Contact Guard Assistance,   Min=Minimal Assistance, Mod=Moderate Assistance, Max=Maximal Assistance, Total=Total Assistance, NT=Not Tested    PLAN:   FREQUENCY/DURATION: PT Plan of Care: 3 times/week for duration of hospital stay or until stated goals are met, whichever comes first.  TREATMENT:     TREATMENT:   ($$ Therapeutic Activity: 23-37 mins    )  Therapeutic Activity (23 Minutes): Therapeutic activity included Scooting, Transfer Training, Ambulation on level ground, Sitting balance  and Standing balance to improve functional Mobility, Strength and Activity tolerance.     TREATMENT GRID:   Date:  11/3/21 Date:   Date:     Activity/Exercise Seated Parameters Parameters Parameters   Heel raises X 20 B     Toe raises X 20 B     LAQ's X 20 B     Hip Flex X 20 B     Hip ABD X 20 B AFTER TREATMENT POSITION/PRECAUTIONS:  Chair, Needs within reach, RN notified and Visitors at bedside    INTERDISCIPLINARY COLLABORATION:  RN/PCT, PT/PTA and Rehab Attendant     TOTAL TREATMENT DURATION:  PT Patient Time In/Time Out  Time In: 1507  Time Out: 56 [address], PTA

## 2021-11-05 NOTE — ANESTHESIA PREPROCEDURE EVALUATION
Anesthetic History   No history of anesthetic complications            Review of Systems / Medical History  Patient summary reviewed and pertinent labs reviewed    Pulmonary    COPD    Sleep apnea: CPAP  Shortness of breath      Comments: ILD  sarcoid   Neuro/Psych              Cardiovascular                  Exercise tolerance: <4 METS  Comments: Moderate Pulm HTN   GI/Hepatic/Renal     GERD           Endo/Other        Arthritis     Other Findings   Comments: 100% on [facility]           Physical Exam    Airway  Mallampati: II  TM Distance: > 6 cm  Neck ROM: normal range of motion   Mouth opening: Normal     Cardiovascular    Rhythm: regular  Rate: normal    Murmur  Pertinent negatives: No peripheral edema   Dental    Dentition: Caps/crowns     Pulmonary  Breath sounds clear to auscultation          Prolonged expiration    Comments: Poor excursion Abdominal         Other Findings            Anesthetic Plan    ASA: 3  Anesthesia type: total IV anesthesia          Induction: Intravenous  Anesthetic plan and risks discussed with: Patient      Slow induction maintaining spontaeous respiratory and avoiding CO2 elevation

## 2021-11-05 NOTE — PROGRESS NOTES
Hospitalist Progress Note   Admit Date:  10/28/2021 12:07 PM   Name:  Caroline Rojas   Age:  80 y.o. Sex:  male  :  1940   MRN:  [id number]   Room:  [facility]    Presenting Complaint: Slow Heart Rate    Reason(s) for Admission: Acute metabolic encephalopathy [O81.02]     Hospital Course & Interval History:     Caroline Rojas is a 80 y.o. male with medical history of sarcoid, restrictive lung disease, hypertension who presented with headache, altered mental status. On initial presentation to the emergency department he was complaining of a headache and able to speak in short sentences. At the time of my interview he was sedated and not following command. History provided by wife. For approximately last 2 weeks been feeling ill with progressive nausea and vomiting. He reported intermittent nonsevere abdominal pain. His wife reports no fevers, chills or rigors. She said that he did not complain of vision changes, chest pain, changes in urinary output, changes in bowel movements (some loose stools), peripheral edema. In the emergency department CT head chest abdomen pelvis did not demonstrate acute process, however did show chronic cerebral atrophy left-sided maxillary mass and chronic parenchymal lung disease. He was admitted 10/28 for further evaluation and management. On 21 he took 2 falls without LOC, now with right black eye. Complains of issue with swallowing. H  S/p Axillary mass biopsy on  which showed small carcinoma. Weak on admission and following with PT. Complains of HA, MRI brain unremarkable. EGD  with schatski's ring so was dilated. Subjective (21):  Lying in bed. Drowsy after EGD. Per wife, the patient came back with HA. Denies CP/SOB. Denies F/C.     Assessment & Plan:     # Acute metabolic Encephalopathy  # Delirium  - RESOLVED  - CT head/neck non acute  - MRI brain non acute  - TFT's wnl  - Neurology has seen   - ammonia/UA unremarkable  - RVP positive for Rhinovirus and Enterovirus. 11/1 -AMS resolved. Patient seems more bothered by headache than anything else. Imaging ok. # Headache  39/89 - uncertain etiology. MRI brain wnl. Described as temporal. Will add ESR,CRP. Trial of toraol for few doses. Caffeine. D/w Neuro in AM if unimproved. ?need for LP  10/31 - headaches have resolved. ESR/CRP not impressive  11/1 - Says he takes generic excedrin at home for HA. Will give trial of fiorcet. May need Neuro eval again for retractable headaches vs trial of Depacon if not improved. ..although not completely classic for migraine. 11/2 - Headache slightly worse today. Says it's about the same as his chronic headaches. 11/3 - Headache better. No acute complaints. 11/4 - Headache worse today. Increase Norco.  11/5 - Still with HA, but drowsy after EGD    #Dysphagia  -SLP evaluated  -Check barium swallow  -Had OP EGD planned on 11/4 already so may need GI to see IP  -11/3 Barium swallow/esophagram pending, may need GI to see tomorrow  -11/4 Esophagram with dysmotility, GI to do EGD 11/5  -11/5 EGD with Schatski's ring, s/p dilation    # Left axillary mass  - IR consulted,   10/29 - procedure planned for this morning, deferred secondary to bradycardia  11/1 - s/p axillary mass biopsy. 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 Onc following. Will need OP PET. # Bradycardia  - Cardiology has seen  - holding coreg  - Echo reviewed - EF 60%, mild TVR, RVSP 54  - Resolved, continue holding BB    # HTN  - 10/31 - suboptimal control. Add norvasc. Still holding BB  - 11/1 - a little better since norvasc added yesterday but still in 150-160's SBP. May need to titrate up tomorrow. -11/2 - BP much improved    # acute/chronic respiratory failure  # Sarcoidosis  - baseline o2 requirement of 2L NC,  - continue MDI's  10/30 - remains on 5L but likely can begin to wean. Continue pred 40mg. Pulm consulted per family request- pending for AM.   11/1 - Renee Mart has seen today.  Stable on 2-3L NC. Continue prednisone, consider weaning soon. Cont MDI's. Started lasix 20mg daily. 11/2 - Lasix stopped. Prednisone weaned to 10mg daily. # Hypokalemia  11/1 - Add daily supplement for K 3.4 with start of lasix today. 11/2 - K 3.3. Replace PO  11/3 - K 3.6. Resolved    # hyponatremia  - 10/31 - corrected. Monitor off IVF  - 11/3 Na down to 132, no acute issues  - 11/5 Na 133 --> ?due to Small cell         # DLP  - statin    # BPH  - proscar      Dispo/Discharge Planning: To STR tomorrow or Monday     Diet:  ADULT ORAL NUTRITION SUPPLEMENT Breakfast, Lunch, Dinner; Standard High Calorie/High Protein  ADULT DIET Full Liquid  DVT PPx: hep sq  Code status: Full Code    Hospital Problems as of 11/5/2021 Date Reviewed: 10/28/2021          Codes Class Noted - Resolved POA    Axillary mass, left ICD-10-CM: R22.32  ICD-9-CM: 782.2  10/28/2021 - Present Yes        RESOLVED: Nausea & vomiting ICD-10-CM: R11.2  ICD-9-CM: 787.01  10/28/2021 - 11/1/2021 Yes        Body mass index (BMI) of 25.0 to 25.9 in adult (Chronic) ICD-10-CM: C14.74  ICD-9-CM: V85.21  10/28/2021 - Present Yes        COPD, moderate (HCC) (Chronic) ICD-10-CM: J44.9  ICD-9-CM: 496  11/1/2021 - Present Unknown        Headache ICD-10-CM: R51.9  ICD-9-CM: 784.0  10/30/2021 - Present Unknown        Hyperglycemia, drug-induced ICD-10-CM: R73.9, T50.905A  ICD-9-CM: 790.29, E947.9  10/28/2021 - Present Yes        Pulmonary hypertension ([facility] [address] ICD-10-CM: I27.20  ICD-9-CM: 416.8  1/10/2018 - Present Yes        Pulmonary hypertension due to hypoxia (Chronic) ICD-10-CM: I27.23  ICD-9-CM: 416.8, 799.02  6/20/2016 - Present     Overview Addendum 10/28/2021  3:11 PM by Lattie Olszewski, MD Dr. Juliann Rm  1.  6/6/2016 Echo- [id number]  2. Echo (11/23/20):  EF 55-60%. Mild LAE.  AVSC. Trace TR. Normal RV. Normal RA size. RVSP 35.               Restrictive lung disease (Chronic) ICD-10-CM: J98.4  ICD-9-CM: 518.89  4/1/2015 - Present Yes    Overview Signed 4/1/2015  9:30 AM by Efrain Zepeda. Residual from Sarcoidosis             Dyslipidemia (Chronic) ICD-10-CM: E78.5  ICD-9-CM: 272.4  3/13/2006 - Present Yes    Overview Signed 3/21/2019  8:31 AM by Vincenzo Garcia MD     Lexiscan Cardiolite (3/19/19):  EF 70%. Normal perfusion.               Sarcoidosis (Chronic) ICD-10-CM: D86.9  ICD-9-CM: 135  3/13/2006 - Present Yes        Benign prostatic hyperplasia (Chronic) ICD-10-CM: N40.0  ICD-9-CM: 600.00  3/13/2006 - Present Yes        * (Principal) RESOLVED: Acute metabolic encephalopathy SYW-85-CW: G93.41  ICD-9-CM: 348.31  10/28/2021 - 11/1/2021 Yes        RESOLVED: Bradycardia ICD-10-CM: R00.1  ICD-9-CM: 427.89  10/28/2021 - 11/1/2021 No        RESOLVED: Hyponatremia ICD-10-CM: E87.1  ICD-9-CM: 276.1  10/28/2021 - 11/1/2021 Yes        RESOLVED: Electrolyte or fluid disorder ICD-10-CM: E87.8  ICD-9-CM: 276.9  10/28/2021 - 11/1/2021 Yes              Objective:     Patient Vitals for the past 24 hrs:   Temp Pulse Resp BP SpO2   11/05/21 1006 -- 69 18 (!) 139/57 100 %   11/05/21 0957 -- 64 18 134/62 99 %   11/05/21 0946 -- 65 18 128/62 100 %   11/05/21 0936 -- 68 16 (!) 136/57 97 %   11/05/21 0927 98.6 °F (37 °C) 75 12 (!) 142/64 99 %   11/05/21 0924 -- 66 16 (!) 142/64 98 %   11/05/21 0852 97.6 °F (36.4 °C) 66 16 (!) 157/84 100 %   11/05/21 0828 -- -- -- -- 100 %   11/05/21 0734 97.9 °F (36.6 °C) (!) 59 18 (!) 143/88 100 %   11/05/21 0222 97.9 °F (36.6 °C) (!) 56 16 (!) 149/81 100 %   11/04/21 2336 97.8 °F (36.6 °C) (!) 58 16 130/81 100 %   11/04/21 2018 97.5 °F (36.4 °C) 68 16 132/75 100 %   11/04/21 1513 97.3 °F (36.3 °C) 62 16 126/73 100 %   11/04/21 1407 -- -- -- -- 100 %   11/04/21 1120 98 °F (36.7 °C) 77 16 133/81 99 %     Oxygen Therapy  O2 Sat (%): 100 % (11/05/21 1006)  Pulse via Oximetry: 62 beats per minute (11/05/21 0828)  O2 Device: Nasal cannula (11/05/21 1006)  Skin Assessment: Clean, dry, & intact (11/05/21 1006)  Skin Protection for O2 Device: No (11/05/21 1006)  O2 Flow Rate (L/min): 2 l/min (11/05/21 1006)    Estimated body mass index is 25.2 kg/m² as calculated from the following:    Height as of this encounter: 5' 4\" (1.626 m). Weight as of this encounter: 66.6 kg (146 lb 13.2 oz). Intake/Output Summary (Last 24 hours) at 11/5/2021 1046  Last data filed at 11/5/2021 0927  Gross per 24 hour   Intake 580 ml   Output 610 ml   Net -30 ml         Physical Exam:     Blood pressure (!) 139/57, pulse 69, temperature 98.6 °F (37 °C), resp. rate 18, height 5' 4\" (1.626 m), weight 66.6 kg (146 lb 13.2 oz), SpO2 100 %. General:    Well nourished. Appears uncomfortable due to headache. Oriented x 3  Head:  Normocephalic, atraumatic  Eyes:  Sclerae appear normal.  Pupils equally round. ENT:  Nares appear normal, no drainage. Moist oral mucosa  Neck:  No restricted ROM. Trachea midline   CV:   RRR. No m/r/g. No jugular venous distension. Lungs:   Diminished bilaterally, No wheezing, rhonchi, or rales. Respirations even, unlabored    Axilla -   Large left palpable mass - now bandaged with some ecchymosis post bx. Abdomen: Bowel sounds present. Soft, nontender, nondistended. Extremities: No cyanosis or clubbing. No edema  Skin:     No rashes and normal coloration. Warm and dry. Neuro:  CN II-XII grossly intact. Sensation intact.   A&Ox3  Psych:  Flat affect     I have reviewed ordered lab tests and independently visualized imaging below:    Recent Labs:  Recent Results (from the past 48 hour(s))   GLUCOSE, POC    Collection Time: 11/03/21 10:44 PM   Result Value Ref Range    Glucose (POC) 187 (H) 65 - 100 mg/dL    Performed by Santa    CBC W/O DIFF    Collection Time: 11/04/21  6:59 AM   Result Value Ref Range    WBC 15.9 (H) 4.3 - 11.1 K/uL    RBC 4.75 4.23 - 5.6 M/uL    HGB 13.6 13.6 - 17.2 g/dL    HCT 43.3 41.1 - 50.3 %    MCV 91.2 79.6 - 97.8 FL    MCH 28.6 26.1 - 32.9 PG    MCHC 31.4 31.4 - 35.0 g/dL    RDW 13.7 11.9 - 14.6 %    PLATELET 526 342 - 454 K/uL    MPV 8.8 (L) 9.4 - 12.3 FL    ABSOLUTE NRBC 0.00 0.0 - 0.2 K/uL   METABOLIC PANEL, BASIC    Collection Time: 11/04/21  6:59 AM   Result Value Ref Range    Sodium 133 (L) 136 - 145 mmol/L    Potassium 3.5 3.5 - 5.1 mmol/L    Chloride 93 (L) 98 - 107 mmol/L    CO2 37 (H) 21 - 32 mmol/L    Anion gap 3 (L) 7 - 16 mmol/L    Glucose 128 (H) 65 - 100 mg/dL    BUN 19 8 - 23 MG/DL    Creatinine 0.67 (L) 0.8 - 1.5 MG/DL    GFR est AA >60 >60 ml/min/1.73m2    GFR est non-AA >60 >60 ml/min/1.73m2    Calcium 10.0 8.3 - 10.4 MG/DL   SARS-COV-2    Collection Time: 11/04/21 11:10 AM   Result Value Ref Range    SARS-CoV-2 Please find results under separate order     SARS-COV-2, PCR    Collection Time: 11/04/21 11:10 AM    Specimen: Nasopharyngeal   Result Value Ref Range    Specimen source Nasopharyngeal      SARS-CoV-2 Not detected NOTD     CBC W/O DIFF    Collection Time: 11/05/21  6:49 AM   Result Value Ref Range    WBC 11.7 (H) 4.3 - 11.1 K/uL    RBC 4.46 4.23 - 5.6 M/uL    HGB 13.5 (L) 13.6 - 17.2 g/dL    HCT 41.3 41.1 - 50.3 %    MCV 92.6 79.6 - 97.8 FL    MCH 30.3 26.1 - 32.9 PG    MCHC 32.7 31.4 - 35.0 g/dL    RDW 13.7 11.9 - 14.6 %    PLATELET 000 870 - 099 K/uL    MPV 8.8 (L) 9.4 - 12.3 FL    ABSOLUTE NRBC 0.00 0.0 - 0.2 K/uL   METABOLIC PANEL, BASIC    Collection Time: 11/05/21  6:49 AM   Result Value Ref Range    Sodium 133 (L) 136 - 145 mmol/L    Potassium 3.8 3.5 - 5.1 mmol/L    Chloride 94 (L) 98 - 107 mmol/L    CO2 33 (H) 21 - 32 mmol/L    Anion gap 6 (L) 7 - 16 mmol/L    Glucose 115 (H) 65 - 100 mg/dL    BUN 16 8 - 23 MG/DL    Creatinine 0.61 (L) 0.8 - 1.5 MG/DL    GFR est AA >60 >60 ml/min/1.73m2    GFR est non-AA >60 >60 ml/min/1.73m2    Calcium 9.7 8.3 - 10.4 MG/DL       All Micro Results     Procedure Component Value Units Date/Time    SARS-COV-2, PCR [[id number]] Collected: 11/04/21 1110    Order Status: Completed Specimen: Nasopharyngeal Updated: 11/04/21 9319 Specimen source Nasopharyngeal        SARS-CoV-2 Not detected        Comment:      The specimen is NEGATIVE for SARS-CoV-2, the novel coronavirus associated with COVID-19. This test has been authorized by the FDA under an Emergency Use Authorization (EUA) for use by authorized laboratories. Fact sheet for Healthcare Providers: [employer] Plus[employer]Sah[employer]y.co[employer]nz       Fact sheet for Patients: [employer]co.       Methodology: RT-PCR         CULTURE, BLOOD [[id number]] Collected: 10/28/21 2021    Order Status: Completed Specimen: Blood Updated: 11/02/21 0748     Special Requests: LEFT HAND        Culture result: NO GROWTH 5 DAYS       CULTURE, BLOOD [[id number]] Collected: 10/28/21 2021    Order Status: Completed Specimen: Blood Updated: 11/02/21 0748     Special Requests: RIGHT ANTECUBITAL        Culture result: NO GROWTH 5 DAYS       SARS-COV-2, PCR [[id number]] Collected: 10/28/21 0109    Order Status: Completed Specimen: Nasopharyngeal Updated: 10/29/21 0916     Specimen source Nasopharyngeal        SARS-CoV-2 Not detected        Comment:      The specimen is NEGATIVE for SARS-CoV-2, the novel coronavirus associated with COVID-19. This test has been authorized by the FDA under an Emergency Use Authorization (EUA) for use by authorized laboratories.         Fact sheet for Healthcare Providers: [employer]Sah[employer].co.nz       Fact sheet for Patients: [employer] Plusdate[employer]co[employer]nz       Methodology: RT-PCR         RESPIRATORY VIRUS PANEL W/COVID-19, PCR [[id number]]  (Abnormal) Collected: 10/29/21 0220    Order Status: Completed Specimen: Nasopharyngeal Updated: 10/29/21 0423     Adenovirus NOT DETECTED        Coronavirus 229E NOT DETECTED        Coronavirus HKU1 NOT DETECTED        Coronavirus CVNL63 NOT DETECTED        Coronavirus OC43 NOT DETECTED        SARS-CoV-2, PCR NOT DETECTED        Metapneumovirus NOT DETECTED        Rhinovirus and Enterovirus Detected        Influenza A NOT DETECTED        Influenza B NOT DETECTED        Parainfluenza 1 NOT DETECTED        Parainfluenza 2 NOT DETECTED        Parainfluenza 3 NOT DETECTED        Parainfluenza virus 4 NOT DETECTED        RSV by PCR NOT DETECTED        B. parapertussis, PCR NOT DETECTED        Bordetella pertussis - PCR NOT DETECTED        Chlamydophila pneumoniae DNA, QL, PCR NOT DETECTED        Mycoplasma pneumoniae DNA, QL, PCR NOT DETECTED       COVID-19 RAPID TEST [[id number]] Collected: 10/28/21 8079    Order Status: Completed Specimen: Nasopharyngeal Updated: 10/29/21 0034     Specimen source NASAL        COVID-19 rapid test Not detected        Comment:      The specimen is NEGATIVE for SARS-CoV-2, the novel coronavirus associated with COVID-19. A negative result does not rule out COVID-19. This test has been authorized by the FDA under an Emergency Use Authorization (EUA) for use by authorized laboratories. Fact sheet for Healthcare Providers: [employer] Matchmaker.[employer].nz  Fact sheet for Patients: [employer]nz       Methodology: Isothermal Nucleic Acid Amplification               Other Studies:  No results found.     Current Meds:  Current Facility-Administered Medications   Medication Dose Route Frequency    lactated Ringers infusion  100 mL/hr IntraVENous CONTINUOUS    pantoprazole (PROTONIX) tablet 40 mg  40 mg Oral ACB&D    HYDROcodone-acetaminophen (NORCO) 7.5-325 mg per tablet 1 Tablet  1 Tablet Oral Q8H PRN    predniSONE (DELTASONE) tablet 10 mg  10 mg Oral DAILY WITH BREAKFAST    butalbital-acetaminophen-caffeine (FIORICET, ESGIC) -40 mg per tablet 1 Tablet  1 Tablet Oral Q4H PRN    potassium chloride (K-DUR, KLOR-CON) SR tablet 20 mEq  20 mEq Oral DAILY    melatonin tablet 5 mg  5 mg Oral QHS    albuterol (PROVENTIL HFA, VENTOLIN HFA, PROAIR HFA) inhaler 2 Puff  2 Puff Inhalation Q6H RT    albuterol (PROVENTIL HFA, VENTOLIN HFA, PROAIR HFA) inhaler 2 Puff  2 Puff Inhalation Q4H PRN    amLODIPine (NORVASC) tablet 5 mg  5 mg Oral DAILY    hydrALAZINE (APRESOLINE) 20 mg/mL injection 20 mg  20 mg IntraVENous Q6H PRN    tamsulosin (FLOMAX) capsule 0.4 mg  0.4 mg Oral DAILY    lip protectant (BLISTEX) ointment 1 Each  1 Each Topical PRN    finasteride (PROSCAR) tablet 5 mg  5 mg Oral QHS    rosuvastatin (CRESTOR) tablet 20 mg  20 mg Oral QHS    sodium chloride (NS) flush 5-40 mL  5-40 mL IntraVENous Q8H    sodium chloride (NS) flush 5-40 mL  5-40 mL IntraVENous PRN    polyethylene glycol (MIRALAX) packet 17 g  17 g Oral DAILY PRN    ondansetron (ZOFRAN ODT) tablet 4 mg  4 mg Oral Q8H PRN    Or    ondansetron (ZOFRAN) injection 4 mg  4 mg IntraVENous Q6H PRN    dextrose 40% (GLUTOSE) oral gel 1 Tube  15 g Oral PRN    glucagon (GLUCAGEN) injection 1 mg  1 mg IntraMUSCular PRN    dextrose (D50W) injection syrg 12.5-25 g  25-50 mL IntraVENous PRN    tiotropium-olodateroL (STIOLTO RESPIMAT) 2.5-2.5 mcg/actuation inhaler 2 Puff  2 Puff Inhalation DAILY    fluticasone (FLOVENT HFA) 110 mcg inhaler  1 Puff Inhalation DAILY    [Held by provider] heparin (porcine) injection 5,000 Units  5,000 Units SubCUTAneous Q8H       Signed:  Laura Ospina DO    Part of this note may have been written by using a voice dictation software. The note has been proof read but may still contain some grammatical/other typographical errors.

## 2021-11-05 NOTE — PROGRESS NOTES
TRANSFER - OUT REPORT:    Verbal report given to Stan Campbell RN(name) on Dusty Schmidt  being transferred to 6th floor room 625(unit) for routine post - op       Report consisted of patients Situation, Background, Assessment and   Recommendations(SBAR). Information from the following report(s) SBAR, Procedure Summary and Recent Results was reviewed with the receiving nurse. Lines:   Peripheral IV 11/04/21 Anterior; Left Forearm (Active)   Site Assessment Intact; Drainage (comment) 11/05/21 0927   Phlebitis Assessment 0 11/05/21 0927   Infiltration Assessment 0 11/05/21 0927   Dressing Status Old drainage; Intact; Dry 11/05/21 0927   Dressing Type Tape; Transparent 11/05/21 0927   Hub Color/Line Status Patent; Flushed 11/05/21 0244   Alcohol Cap Used No 11/05/21 0244        Opportunity for questions and clarification was provided.       Patient transported with:   O2 @ 2 liters  Tech

## 2021-11-05 NOTE — ANESTHESIA POSTPROCEDURE EVALUATION
Procedure(s):  ESOPHAGOGASTRODUODENOSCOPY (EGD) DILATION/ 26 ROOM 625  ESOPHAGEAL DILATION. total IV anesthesia    Anesthesia Post Evaluation      Multimodal analgesia: multimodal analgesia used between 6 hours prior to anesthesia start to PACU discharge  Patient location during evaluation: bedside  Patient participation: complete - patient participated  Level of consciousness: awake and responsive to light touch  Pain management: adequate  Airway patency: patent  Anesthetic complications: no  Cardiovascular status: acceptable, hemodynamically stable, blood pressure returned to baseline and stable  Respiratory status: acceptable, unassisted, spontaneous ventilation and nonlabored ventilation  Hydration status: acceptable  Post anesthesia nausea and vomiting:  controlled      INITIAL Post-op Vital signs:   Vitals Value Taken Time   /57 11/05/21 0936   Temp 37 °C (98.6 °F) 11/05/21 0927   Pulse 92 11/05/21 0939   Resp 16 11/05/21 0936   SpO2 100 % 11/05/21 0939   Vitals shown include unvalidated device data.

## 2021-11-06 NOTE — PROGRESS NOTES
Problem: Falls - Risk of  Goal: *Absence of Falls  Description: Document Bard  Fall Risk and appropriate interventions in the flowsheet.   Outcome: Progressing Towards Goal  Note: Fall Risk Interventions:  Mobility Interventions: Bed/chair exit alarm, Communicate number of staff needed for ambulation/transfer    Mentation Interventions: More frequent rounding    Medication Interventions: Bed/chair exit alarm, Assess postural VS orthostatic hypotension, Patient to call before getting OOB    Elimination Interventions: Call light in reach, Bed/chair exit alarm, Patient to call for help with toileting needs, Toileting schedule/hourly rounds    History of Falls Interventions: Bed/chair exit alarm, Door open when patient unattended, Room close to nurse's station

## 2021-11-06 NOTE — PROGRESS NOTES
Hospitalist Progress Note   Admit Date:  10/28/2021 12:07 PM   Name:  Reynold Case   Age:  80 y.o. Sex:  male  :  1940   MRN:  [id number]   Room:  [facility]    Presenting Complaint: Slow Heart Rate    Reason(s) for Admission: Acute metabolic encephalopathy [D73.62]     Hospital Course & Interval History:     Patient with past medical history of  Sarcoidosis  Restrictive lung disease  Hypertension     Patient presented with headache and altered mental status. CT of the head, chest, abdomen and pelvis did not demonstrate acute process. It shows chronic cerebral atrophy, left sided maxillary mass and chronic parenchymal lung disease     On 2021, he had falls without loss of consciousness. He developed right black eye. 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 Patient continues to complain of headache. MRI brain was unremarkable. He complains of dysphagia. 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 Esophagus was dilated,     Subjective (21): 2021. Patient is sitting up in bed. Talking well. Answering questions well. Complaining of headache, at both temporal areas, and the back of his head. Afebrile    Assessment & Plan: Active Problems:    Axillary mass, left (10/28/2021)  Status post biopsy  Showed small cell carcinoma  Oncology is following. Plan to follow-up as an outpatient, with PET scan. Body mass index (BMI) of 25.0 to 25.9 in adult (10/28/2021)        Dyslipidemia (3/13/2006)  On rosuvastatin      Sarcoidosis (3/13/2006)        Benign prostatic hyperplasia (3/13/2006)  On tamsulosin      Restrictive lung disease (2015)  Patient is on 2 L/min of oxygen via cannula      Pulmonary hypertension ([address] (1/10/2018)        Hyperglycemia, drug-induced (10/28/2021)  BS is in low to mid 100s. On Prednisone. Will cover with sliding scale with Humalog.        Headache (10/30/2021)  Work-up is negative for intracerebral lesions  Patient is willing to try compression therapy to his head  I advised nurse to apply headband. Will monitor        COPD, moderate ([address] (11/1/2021)  On oxygen cannula 2 L/min   not in exacerbation    I have discussed the plan of care with patient and care team.        Dispo/Discharge Planning:    to be determined     Diet:  ADULT ORAL NUTRITION SUPPLEMENT Breakfast, Lunch, Dinner; Standard High Calorie/High Protein  ADULT DIET Full Liquid  DVT PPx: Lovenox SC   Code status: Full Code    Hospital Problems as of 11/6/2021 Date Reviewed: 10/28/2021          Codes Class Noted - Resolved POA    Axillary mass, left ICD-10-CM: R22.32  ICD-9-CM: 782.2  10/28/2021 - Present Yes        RESOLVED: Nausea & vomiting ICD-10-CM: R11.2  ICD-9-CM: 787.01  10/28/2021 - 11/1/2021 Yes        Body mass index (BMI) of 25.0 to 25.9 in adult (Chronic) ICD-10-CM: A57.94  ICD-9-CM: V85.21  10/28/2021 - Present Yes        COPD, moderate (HCC) (Chronic) ICD-10-CM: J44.9  ICD-9-CM: 962  11/1/2021 - Present Unknown        Headache ICD-10-CM: R51.9  ICD-9-CM: 784.0  10/30/2021 - Present Unknown        Hyperglycemia, drug-induced ICD-10-CM: R73.9, T50.905A  ICD-9-CM: 790.29, E947.9  10/28/2021 - Present Yes        Pulmonary hypertension ([facility] [address] ICD-10-CM: I27.20  ICD-9-CM: 416.8  1/10/2018 - Present Yes        Pulmonary hypertension due to hypoxia (Chronic) ICD-10-CM: I27.23  ICD-9-CM: 416.8, 799.02  6/20/2016 - Present     Overview Addendum 10/28/2021  3:11 PM by MD Dr. Jamil Larson  1.  6/6/2016 Echo- [id number]  2. Echo (11/23/20):  EF 55-60%. Mild LAE.  AVSC. Trace TR. Normal RV. Normal RA size. RVSP 35. Restrictive lung disease (Chronic) ICD-10-CM: J98.4  ICD-9-CM: 518.89  4/1/2015 - Present Yes    Overview Signed 4/1/2015  9:30 AM by Yayo Weiss.      Residual from Sarcoidosis             Dyslipidemia (Chronic) ICD-10-CM: E78.5  ICD-9-CM: 272.4  3/13/2006 - Present Yes    Overview Signed 3/21/2019  8:31 AM by Martinez Nolen MD     Lexiscan Cardiolite (3/19/19):  EF 70%. Normal perfusion. Sarcoidosis (Chronic) ICD-10-CM: D86.9  ICD-9-CM: 135  3/13/2006 - Present Yes        Benign prostatic hyperplasia (Chronic) ICD-10-CM: N40.0  ICD-9-CM: 600.00  3/13/2006 - Present Yes        * (Principal) RESOLVED: Acute metabolic encephalopathy [facility]-86-PH: G93.41  ICD-9-CM: 348.31  10/28/2021 - 11/1/2021 Yes        RESOLVED: Bradycardia ICD-10-CM: R00.1  ICD-9-CM: 427.89  10/28/2021 - 11/1/2021 No        RESOLVED: Hyponatremia ICD-10-CM: E87.1  ICD-9-CM: 276.1  10/28/2021 - 11/1/2021 Yes        RESOLVED: Electrolyte or fluid disorder ICD-10-CM: E87.8  ICD-9-CM: 276.9  10/28/2021 - 11/1/2021 Yes              Objective:     Patient Vitals for the past 24 hrs:   Temp Pulse Resp BP SpO2   11/06/21 1200 98 °F (36.7 °C) 83 16 135/83 95 %   11/06/21 0821 -- -- -- -- 97 %   11/06/21 0758 97.9 °F (36.6 °C) 65 16 (!) 148/81 100 %   11/06/21 0352 97.4 °F (36.3 °C) 71 18 127/84 96 %   11/05/21 2345 97.8 °F (36.6 °C) 67 18 (!) 156/97 100 %   11/05/21 2055 -- -- -- -- 97 %   11/05/21 2009 98.1 °F (36.7 °C) 72 18 123/78 100 %   11/05/21 1605 98.3 °F (36.8 °C) 67 18 119/80 100 %   11/05/21 1430 -- -- -- -- 100 %     Oxygen Therapy  O2 Sat (%): 95 % (11/06/21 1200)  Pulse via Oximetry: 65 beats per minute (11/06/21 0821)  O2 Device: Nasal cannula (11/06/21 0821)  Skin Assessment: Clean, dry, & intact (11/05/21 1006)  Skin Protection for O2 Device: No (11/05/21 1006)  O2 Flow Rate (L/min): 2 l/min (11/06/21 0821)    Estimated body mass index is 25.2 kg/m² as calculated from the following:    Height as of this encounter: 5' 4\" (1.626 m). Weight as of this encounter: 66.6 kg (146 lb 13.2 oz).     Intake/Output Summary (Last 24 hours) at 11/6/2021 1205  Last data filed at 11/6/2021 0758  Gross per 24 hour   Intake --   Output 575 ml   Net -575 ml         Physical Exam:     Blood pressure 135/83, pulse 83, temperature 98 °F (36.7 °C), resp. rate 16, height 5' 4\" (1.626 m), weight 66.6 kg (146 lb 13.2 oz), SpO2 95 %. General:    Well nourished. No overt distress, patient is sitting up in bed. Talking well. Head:  Normocephalic, atraumatic  Eyes:  Sclerae appear normal.  Pupils equally round. ENT:  Nares appear normal, no drainage. Moist oral mucosa  Neck:  No restricted ROM. Trachea midline   CV:   RRR. No m/r/g. No jugular venous distension. Lungs:   CTAB. No wheezing, rhonchi, or rales. Respirations even, unlabored  Abdomen: Bowel sounds present. Soft, nontender, nondistended. Extremities: No cyanosis or clubbing. No edema  Skin:     No rashes and normal coloration. Warm and dry. Neuro:  CN II-XII grossly intact. Sensation intact. A&Ox3  Psych:  Normal mood and affect.       I have reviewed ordered lab tests and independently visualized imaging below:    Recent Labs:  Recent Results (from the past 48 hour(s))   CBC W/O DIFF    Collection Time: 11/05/21  6:49 AM   Result Value Ref Range    WBC 11.7 (H) 4.3 - 11.1 K/uL    RBC 4.46 4.23 - 5.6 M/uL    HGB 13.5 (L) 13.6 - 17.2 g/dL    HCT 41.3 41.1 - 50.3 %    MCV 92.6 79.6 - 97.8 FL    MCH 30.3 26.1 - 32.9 PG    MCHC 32.7 31.4 - 35.0 g/dL    RDW 13.7 11.9 - 14.6 %    PLATELET 777 739 - 681 K/uL    MPV 8.8 (L) 9.4 - 12.3 FL    ABSOLUTE NRBC 0.00 0.0 - 0.2 K/uL   METABOLIC PANEL, BASIC    Collection Time: 11/05/21  6:49 AM   Result Value Ref Range    Sodium 133 (L) 136 - 145 mmol/L    Potassium 3.8 3.5 - 5.1 mmol/L    Chloride 94 (L) 98 - 107 mmol/L    CO2 33 (H) 21 - 32 mmol/L    Anion gap 6 (L) 7 - 16 mmol/L    Glucose 115 (H) 65 - 100 mg/dL    BUN 16 8 - 23 MG/DL    Creatinine 0.61 (L) 0.8 - 1.5 MG/DL    GFR est AA >60 >60 ml/min/1.73m2    GFR est non-AA >60 >60 ml/min/1.73m2    Calcium 9.7 8.3 - 10.4 MG/DL   CBC W/O DIFF    Collection Time: 11/06/21  5:38 AM   Result Value Ref Range    WBC 15.3 (H) 4.3 - 11.1 K/uL    RBC 4.89 4.23 - 5.6 M/uL    HGB 14.3 13.6 - 17.2 g/dL    HCT 43.8 41.1 - 50.3 %    MCV 89.6 79.6 - 97.8 FL    MCH 29.2 26.1 - 32.9 PG    MCHC 32.6 31.4 - 35.0 g/dL    RDW 13.4 11.9 - 14.6 %    PLATELET 250 207 - 385 K/uL    MPV 8.7 (L) 9.4 - 12.3 FL    ABSOLUTE NRBC 0.00 0.0 - 0.2 K/uL   METABOLIC PANEL, BASIC    Collection Time: 11/06/21  5:38 AM   Result Value Ref Range    Sodium 129 (L) 138 - 145 mmol/L    Potassium 3.9 3.5 - 5.1 mmol/L    Chloride 92 (L) 98 - 107 mmol/L    CO2 30 21 - 32 mmol/L    Anion gap 7 7 - 16 mmol/L    Glucose 114 (H) 65 - 100 mg/dL    BUN 16 8 - 23 MG/DL    Creatinine 0.59 (L) 0.8 - 1.5 MG/DL    GFR est AA >60 >60 ml/min/1.73m2    GFR est non-AA >60 >60 ml/min/1.73m2    Calcium 9.5 8.3 - 10.4 MG/DL       All Micro Results     Procedure Component Value Units Date/Time    SARS-COV-2, PCR [[id number]] Collected: 11/04/21 1110    Order Status: Completed Specimen: Nasopharyngeal Updated: 11/04/21 1438     Specimen source Nasopharyngeal        SARS-CoV-2 Not detected        Comment:      The specimen is NEGATIVE for SARS-CoV-2, the novel coronavirus associated with COVID-19. This test has been authorized by the FDA under an Emergency Use Authorization (EUA) for use by authorized laboratories.         Fact sheet for Healthcare Providers: ConventionUpdate.co.nz       Fact sheet for Patients: ConventionUpdate.co.nz       Methodology: RT-PCR         CULTURE, BLOOD [[id number]] Collected: 10/28/21 2021    Order Status: Completed Specimen: Blood Updated: 11/02/21 0748     Special Requests: LEFT HAND        Culture result: NO GROWTH 5 DAYS       CULTURE, BLOOD [[id number]] Collected: 10/28/21 2021    Order Status: Completed Specimen: Blood Updated: 11/02/21 0748     Special Requests: RIGHT ANTECUBITAL        Culture result: NO GROWTH 5 DAYS       SARS-COV-2, PCR [[id number]] Collected: 10/28/21 9079    Order Status: Completed Specimen: Nasopharyngeal Updated: 10/29/21 7318     Specimen source Nasopharyngeal        SARS-CoV-2 Not detected        Comment:      The specimen is NEGATIVE for SARS-CoV-2, the novel coronavirus associated with COVID-19. This test has been authorized by the FDA under an Emergency Use Authorization (EUA) for use by authorized laboratories. Fact sheet for Healthcare Providers: uberalldate.co.nz       Fact sheet for Patients: wuaki.tv.co.nz       Methodology: RT-PCR         RESPIRATORY VIRUS PANEL W/COVID-19, PCR [[id number]]  (Abnormal) Collected: 10/29/21 0220    Order Status: Completed Specimen: Nasopharyngeal Updated: 10/29/21 0423     Adenovirus NOT DETECTED        Coronavirus 229E NOT DETECTED        Coronavirus HKU1 NOT DETECTED        Coronavirus CVNL63 NOT DETECTED        Coronavirus OC43 NOT DETECTED        SARS-CoV-2, PCR NOT DETECTED        Metapneumovirus NOT DETECTED        Rhinovirus and Enterovirus Detected        Influenza A NOT DETECTED        Influenza B NOT DETECTED        Parainfluenza 1 NOT DETECTED        Parainfluenza 2 NOT DETECTED        Parainfluenza 3 NOT DETECTED        Parainfluenza virus 4 NOT DETECTED        RSV by PCR NOT DETECTED        B. parapertussis, PCR NOT DETECTED        Bordetella pertussis - PCR NOT DETECTED        Chlamydophila pneumoniae DNA, QL, PCR NOT DETECTED        Mycoplasma pneumoniae DNA, QL, PCR NOT DETECTED       COVID-19 RAPID TEST [[id number]] Collected: 10/28/21 2359    Order Status: Completed Specimen: Nasopharyngeal Updated: 10/29/21 0034     Specimen source NASAL        COVID-19 rapid test Not detected        Comment:      The specimen is NEGATIVE for SARS-CoV-2, the novel coronavirus associated with COVID-19. A negative result does not rule out COVID-19. This test has been authorized by the FDA under an Emergency Use Authorization (EUA) for use by authorized laboratories.         Fact sheet for Healthcare Providers: ConventionUpdate.co.nz  Fact sheet for Patients: ConventionUpdate.co.nz       Methodology: Isothermal Nucleic Acid Amplification               Other Studies:  No results found.     Current Meds:  Current Facility-Administered Medications   Medication Dose Route Frequency    lactated Ringers infusion  100 mL/hr IntraVENous CONTINUOUS    enoxaparin (LOVENOX) injection 40 mg  40 mg SubCUTAneous Q24H    pantoprazole (PROTONIX) tablet 40 mg  40 mg Oral ACB&D    HYDROcodone-acetaminophen (NORCO) 7.5-325 mg per tablet 1 Tablet  1 Tablet Oral Q8H PRN    predniSONE (DELTASONE) tablet 10 mg  10 mg Oral DAILY WITH BREAKFAST    butalbital-acetaminophen-caffeine (FIORICET, ESGIC) -40 mg per tablet 1 Tablet  1 Tablet Oral Q4H PRN    potassium chloride (K-DUR, KLOR-CON) SR tablet 20 mEq  20 mEq Oral DAILY    melatonin tablet 5 mg  5 mg Oral QHS    albuterol (PROVENTIL HFA, VENTOLIN HFA, PROAIR HFA) inhaler 2 Puff  2 Puff Inhalation Q6H RT    albuterol (PROVENTIL HFA, VENTOLIN HFA, PROAIR HFA) inhaler 2 Puff  2 Puff Inhalation Q4H PRN    amLODIPine (NORVASC) tablet 5 mg  5 mg Oral DAILY    hydrALAZINE (APRESOLINE) 20 mg/mL injection 20 mg  20 mg IntraVENous Q6H PRN    tamsulosin (FLOMAX) capsule 0.4 mg  0.4 mg Oral DAILY    lip protectant (BLISTEX) ointment 1 Each  1 Each Topical PRN    finasteride (PROSCAR) tablet 5 mg  5 mg Oral QHS    rosuvastatin (CRESTOR) tablet 20 mg  20 mg Oral QHS    sodium chloride (NS) flush 5-40 mL  5-40 mL IntraVENous Q8H    sodium chloride (NS) flush 5-40 mL  5-40 mL IntraVENous PRN    polyethylene glycol (MIRALAX) packet 17 g  17 g Oral DAILY PRN    ondansetron (ZOFRAN ODT) tablet 4 mg  4 mg Oral Q8H PRN    Or    ondansetron (ZOFRAN) injection 4 mg  4 mg IntraVENous Q6H PRN    dextrose 40% (GLUTOSE) oral gel 1 Tube  15 g Oral PRN    glucagon (GLUCAGEN) injection 1 mg  1 mg IntraMUSCular PRN    dextrose (D50W) injection syrg 12.5-25 g 25-50 mL IntraVENous PRN    tiotropium-olodateroL (STIOLTO RESPIMAT) 2.5-2.5 mcg/actuation inhaler 2 Puff  2 Puff Inhalation DAILY    fluticasone (FLOVENT HFA) 110 mcg inhaler  1 Puff Inhalation DAILY       Signed:  Zara Kendall MD    Part of this note may have been written by using a voice dictation software. The note has been proof read but may still contain some grammatical/other typographical errors.

## 2021-11-06 NOTE — PROGRESS NOTES
Compression bandage applied around patients head this morning. Patient states he has had some relief from headache. \"It comes and goes\". Wife at bedside.

## 2021-11-06 NOTE — PROGRESS NOTES
Neurology Daily Progress Note     Assessment:     80year old male seen for headache and  acute metabolic encephalopathy secondary to hyponatremia, hypotension, bradycardia with delirium superimposed on underlying dementia. Neurology asked to reevaluate due to worsening mentation and headaches. CT of head showed cerebral volume loss with bilateral hippocampal atrophy. MRI brain negative for acute infarct. Hx of CAITLIN on CPAP, however he has been refusing CPAP. Nocturnal hypoxemia is likely contributing to his increased confusion. Recommend gentle fluid resuscuitation given he has poor fluid intake per nursing and hyponatremia. Will discontinue fiorcet . Recommend avoiding the usage of fiorcet or opiates for treatment of headaches as these medications can lead to rebound headaches. Will trial naproxen 250 mg po every 8  Hours as needed for pain. Plan:     Continue to correct metabolic abnormalities. Bolus IV  mls/ hr     Discontinue fiorcet. Recommend avoiding the usage of fiorcet or opiates for treatment of headaches as these medications can lead to rebound headaches    Will trial naproxen 250 mg po every 8  Hours as needed for pain. Management of Delirium     Non-pharmacological intervention  · Reorient the patient throughout the day  · Window open and lights on during the day. Lights off, television off, noises down during the night. If able, decrease nursing checks at night  · Therapies as often as possible  · Avoid restraints to the best of your ability   · Avoid sensory deprivation by using glasses and hearing aids, if applicable       Pharmacological intervention  · Replete electrolyte abnormalities and correct metabolic abnormalities  · Limit benzodiazepines, antihistamines, narcotics, anticholinergics. Preference towards Precedex for sedation over fentanyl and benzodiazepines/GABAa agonists.    · For dangerous behavior/aggression to self or others can consider Zyprexa or Seroquel if benefits outweigh risk  · For persistent insomnia can use melatonin four hours prior to bedtime or Seroquel 25 mg at bedtime        Subjective: Interval history:    Patient resting with eyes closed. Oriented x3. Follows commands. Endorses headache in frontal region, describes as dull, severity 3/10. Per nursing, received fiorcet prior to examination. He refused CPAP overnight. Na level 129 this morning. Wife at bedside, endorsed increased confusion earlier with word finding difficulties, however stated this has improved. Wife stated he has had poor fluid intake since hospitalization due to fears of vomiting. History:    Janeth Vasquez is a 80 y.o. male who is being seen for AMS, headache. Review of systems negative with exception of pertinent positives and negatives noted above.        Objective:     Vitals:    11/06/21 0758 11/06/21 0821 11/06/21 1200 11/06/21 1443   BP: (!) 148/81  135/83    Pulse: 65  83    Resp: 16  16    Temp: 97.9 °F (36.6 °C)  98 °F (36.7 °C)    SpO2: 100% 97% 95% 95%   Weight:       Height:              Current Facility-Administered Medications:     lactated Ringers infusion, 100 mL/hr, IntraVENous, CONTINUOUS, [place], Niki Escobar MD, Stopped at 11/05/21 0927    enoxaparin (LOVENOX) injection 40 mg, 40 mg, SubCUTAneous, Q24H, Chyna Hsu DO, 40 mg at 11/06/21 1404    pantoprazole (PROTONIX) tablet 40 mg, 40 mg, Oral, ACB&D, Mónica Woodward NP, 40 mg at 11/06/21 0445    HYDROcodone-acetaminophen (NORCO) 7.5-325 mg per tablet 1 Tablet, 1 Tablet, Oral, Q8H PRN, Chyna Hsu DO, 1 Tablet at 11/06/21 9469    predniSONE (DELTASONE) tablet 10 mg, 10 mg, Oral, DAILY WITH BREAKFAST, Arcelia Garcia NP, 10 mg at 11/06/21 5116    butalbital-acetaminophen-caffeine (FIORICET, ESGIC) -40 mg per tablet 1 Tablet, 1 Tablet, Oral, Q4H PRN, Lenore Means DO, 1 Tablet at 11/06/21 1404    potassium chloride (K-DUR, KLOR-CON) SR tablet 20 mEq, 20 mEq, Oral, DAILY, Barbara Stewart C, DO, 20 mEq at 11/06/21 3845    melatonin tablet 5 mg, 5 mg, Oral, QHS, DoverFlorencioDunia C, DO, 5 mg at 11/05/21 2153    albuterol (PROVENTIL HFA, VENTOLIN HFA, PROAIR HFA) inhaler 2 Puff, 2 Puff, Inhalation, Q6H RT, Fauzia Rizzo MD, 2 Puff at 11/06/21 1441    albuterol (PROVENTIL HFA, VENTOLIN HFA, PROAIR HFA) inhaler 2 Puff, 2 Puff, Inhalation, Q4H PRN, Fauzia Rizzo MD, 2 Puff at 10/31/21 0939    amLODIPine (NORVASC) tablet 5 mg, 5 mg, Oral, DAILY, DoverDunia C, DO, 5 mg at 11/06/21 1786    hydrALAZINE (APRESOLINE) 20 mg/mL injection 20 mg, 20 mg, IntraVENous, Q6H PRN, Dunia Dover, DO    tamsulosin (FLOMAX) capsule 0.4 mg, 0.4 mg, Oral, DAILY, DoverDunia C, DO, 0.4 mg at 11/06/21 9460    lip protectant (BLISTEX) ointment 1 Each, 1 Each, Topical, PRN, Britney Hurst MD    finasteride (PROSCAR) tablet 5 mg, 5 mg, Oral, QHS, Alfonso Franco MD, 5 mg at 11/05/21 2152    rosuvastatin (CRESTOR) tablet 20 mg, 20 mg, Oral, QHS, Alfonso Franco MD, 20 mg at 11/05/21 2152    sodium chloride (NS) flush 5-40 mL, 5-40 mL, IntraVENous, Q8H, Alfonso Franco MD, 10 mL at 11/06/21 1432    sodium chloride (NS) flush 5-40 mL, 5-40 mL, IntraVENous, PRN, Alfonso Franco MD    polyethylene glycol (MIRALAX) packet 17 g, 17 g, Oral, DAILY PRN, Britney Hurst MD, 17 g at 10/31/21 0546    ondansetron (ZOFRAN ODT) tablet 4 mg, 4 mg, Oral, Q8H PRN **OR** ondansetron (ZOFRAN) injection 4 mg, 4 mg, IntraVENous, Q6H PRN, Alfonso Franco MD    dextrose 40% (GLUTOSE) oral gel 1 Tube, 15 g, Oral, PRN, Alfonso Franco MD    glucagon (GLUCAGEN) injection 1 mg, 1 mg, IntraMUSCular, PRN, Alfonso Franco MD    dextrose (D50W) injection syrg 12.5-25 g, 25-50 mL, IntraVENous, PRN, Alfonso Franco MD    tiotropium-olodateroL (STIOLTO RESPIMAT) 2.5-2.5 mcg/actuation inhaler 2 Puff, 2 Puff, Inhalation, DAILY, Britney Hurst MD, 2 Puff at 11/06/21 0819    fluticasone (FLOVENT HFA) 110 mcg inhaler, 1 Puff, Inhalation, DAILY, Stephanie Franco MD, 1 Puff at 11/06/21 8398    Recent Results (from the past 12 hour(s))   CBC W/O DIFF    Collection Time: 11/06/21  5:38 AM   Result Value Ref Range    WBC 15.3 (H) 4.3 - 11.1 K/uL    RBC 4.89 4.23 - 5.6 M/uL    HGB 14.3 13.6 - 17.2 g/dL    HCT 43.8 41.1 - 50.3 %    MCV 89.6 79.6 - 97.8 FL    MCH 29.2 26.1 - 32.9 PG    MCHC 32.6 31.4 - 35.0 g/dL    RDW 13.4 11.9 - 14.6 %    PLATELET 550 018 - 711 K/uL    MPV 8.7 (L) 9.4 - 12.3 FL    ABSOLUTE NRBC 0.00 0.0 - 0.2 K/uL   METABOLIC PANEL, BASIC    Collection Time: 11/06/21  5:38 AM   Result Value Ref Range    Sodium 129 (L) 138 - 145 mmol/L    Potassium 3.9 3.5 - 5.1 mmol/L    Chloride 92 (L) 98 - 107 mmol/L    CO2 30 21 - 32 mmol/L    Anion gap 7 7 - 16 mmol/L    Glucose 114 (H) 65 - 100 mg/dL    BUN 16 8 - 23 MG/DL    Creatinine 0.59 (L) 0.8 - 1.5 MG/DL    GFR est AA >60 >60 ml/min/1.73m2    GFR est non-AA >60 >60 ml/min/1.73m2    Calcium 9.5 8.3 - 10.4 MG/DL     CT Results (most recent):  Results from Hospital Encounter encounter on 10/28/21    CT HEAD WO CONT    Narrative  HEAD CT WITHOUT CONTRAST  11/3/2021    HISTORY:   Fall with head trauma  Fall with head trauma    TECHNIQUE: Noncontrast axial images were obtained through the brain. All CT  scans at this facility used dose modulation, interactive reconstruction and/or  weight based dosing when appropriate to reduce radiation dose to as low as  reasonably achievable. COMPARISON: Brain MRI October 28, 2021    FINDINGS: There is no acute intracranial hemorrhage, significant mass effect or  CT evidence of acute large artery territorial infarction. Please note that a  hyperacute infarct or small vessel infarct may not be apparent on initial CT  imaging. Cerebral volume loss is present with ventriculomegaly and bilateral hippocampal  atrophy. There is no hydrocephalus , intra-axial mass or abnormal extra-axial fluid  collection. There are no displaced skull fractures. The mastoid air cells and  paranasal sinuses are clear where imaged. Impression  Cerebral volume loss. Hippocampal atrophy. No acute intracranial  hemorrhage. MRI Results (most recent):  Results from [place] encounter on 10/28/21    MRI BRAIN WO CONT    Narrative  MRI BRAIN WITHOUT CONTRAST. INDICATION:  Altered mental status. Extreme headache, nausea and vomiting. COMPARISON:  None. Study performed within 24 hours of admission. TECHNIQUE:  Sagittal T1, axial T1, T2, FLAIR, gradient echo, diffusion with ADC  map and coronal FLAIR. FINDINGS:  -Diffusion images: No any areas of restricted diffusion to suggest acute  infarction.  -No midline shift, mass or mass effect. -Gradient echo images: are unremarkable. -FLAIR sequence images: Leukoaraiosis.  -No evidence of acute hemorrhage.  -The lateral ventricles are: normal size.  -The pituitary and parasellar structures: unremarkable on the sagittal T1  images.  -Posterior fossa structures are unremarkable. -The basal ganglia: appear symmetric.  -Orbits: are grossly normal.  -Paranasal sinuses: are clear.  -Other: None. Impression  No acute abnormality. Mild degenerative change. Physical Exam:  General - Well developed, well nourished, in no apparent distress. Pleasant and conversent. HEENT - Normocephalic, atraumatic. Conjunctiva are clear. Ecchymosis to right eye. Neck - Supple without masses  Cardiovascular - Regular rate and rhythm. Normal S1, S2 without murmurs, rubs, or gallops. Lungs - Clear to auscultation. Abdomen - Soft, nontender with normal bowel sounds. Extremities - Peripheral pulses intact. No edema and no rashes. Neurological examination - somnolent, oriented to person, place, stated it was September. Follows 1 step commands. Comprehension, attention, memory and reasoning are impaired.  Language and speech are normal.   On cranial nerve examination, (II, III, IV, VI) pupils are equal, round, and reactive to light. Visual acuity is adequate. Visual fields are full to finger confrontation. Extraocular motility is normal. (V, VII) Face is symmetric and sensation is intact to light touch. (VIII) Hearing is intact. (IX, X) Palate and uvula elevate symmetrically. Voice is normal. (XI) Shoulder shrug is strong and equal bilaterally. (XII)Tongue is midline. Motor examination - There is normal muscle tone and bulk. Power is full throughout, moves all extremities spontaneously and to command against gravity. Muscle stretch reflexes are normoactive and there are no pathological reflexes present. Plantar response is flexor bilaterally. Sensation is intact to light touch, pinprick, vibration and proprioception in all extremities. RENETTA cerebellar examination or gait/stance due to AMS.      Signed By: LEONEL Savage     November 6, 2021

## 2021-11-06 NOTE — PROGRESS NOTES
Pt refusing CPAP at this time:       11/05/21 2055   Oxygen Therapy   O2 Sat (%) 97 %   Pulse via Oximetry 62 beats per minute   O2 Device Nasal cannula   O2 Flow Rate (L/min) 2 l/min

## 2021-11-07 NOTE — PROGRESS NOTES
Neurology Daily Progress Note     Assessment:     80year old male seen for headache and  acute metabolic encephalopathy secondary to hyponatremia, hypotension, bradycardia with delirium superimposed on underlying dementia. CT of head showed cerebral volume loss with bilateral hippocampal atrophy. MRI brain negative for acute infarct. Hx of CAITLIN on CPAP, however he has been refusing CPAP. Nocturnal hypoxemia is likely contributing to his increased confusion. Recommend avoiding the usage of fiorcet or opiates for treatment of headaches as these medications can lead to rebound headaches. Family updated on plan of care at bedside. No additional neurological workup is needed at this time. Neurology will sign off. Plan:     Continue to correct metabolic abnormalities. Discontinue fiorcet. Recommend avoiding the usage of fiorcet or opiates for treatment of headaches as these medications can lead to rebound headaches    Continue naproxen 250 mg po every 8  Hours as needed for headache. Patient can follow up outpatient for dementia workup in the outpatient clinic after discharge. Will sign off. Management of Delirium     Non-pharmacological intervention  · Reorient the patient throughout the day  · Window open and lights on during the day. Lights off, television off, noises down during the night. If able, decrease nursing checks at night  · Therapies as often as possible  · Avoid restraints to the best of your ability   · Avoid sensory deprivation by using glasses and hearing aids, if applicable       Pharmacological intervention  · Replete electrolyte abnormalities and correct metabolic abnormalities  · Limit benzodiazepines, antihistamines, narcotics, anticholinergics. Preference towards Precedex for sedation over fentanyl and benzodiazepines/GABAa agonists.    · For dangerous behavior/aggression to self or others can consider Zyprexa or Seroquel if benefits outweigh risk  · For persistent insomnia can use melatonin four hours prior to bedtime or Seroquel 25 mg at bedtime        Subjective: Interval history:    Sitting up on side of bed. Oriented x3. Follows commands. Endorses nausea, denies headache. Nursing at bedside to administer AM medications. Family updated on plan of care. History:    Doni Tenorio is a 80 y.o. male who is being seen for AMS, headache. Review of systems negative with exception of pertinent positives and negatives noted above.        Objective:     Vitals:    11/07/21 0352 11/07/21 0742 11/07/21 0804 11/07/21 0807   BP: 113/77 (!) 136/94  (!) 136/94   Pulse: 94 86     Resp: 18 18     Temp: 97.5 °F (36.4 °C) 97.7 °F (36.5 °C)     SpO2: 100% 97% 99%    Weight:       Height:              Current Facility-Administered Medications:     naproxen (NAPROSYN) tablet 250 mg, 250 mg, Oral, Q8H PRN, Rita Contreras APRN, 250 mg at 11/07/21 0906    lactated Ringers infusion, 100 mL/hr, IntraVENous, CONTINUOUS, Kate Meza MD, Stopped at 11/05/21 0927    enoxaparin (LOVENOX) injection 40 mg, 40 mg, SubCUTAneous, Q24H, Chyna Hsu, DO, 40 mg at 11/06/21 1404    pantoprazole (PROTONIX) tablet 40 mg, 40 mg, Oral, ACB&D, Frances Weinberg NP, 40 mg at 11/07/21 0907    HYDROcodone-acetaminophen (NORCO) 7.5-325 mg per tablet 1 Tablet, 1 Tablet, Oral, Q8H PRN, Chyna Hsu DO, 1 Tablet at 11/06/21 2058    predniSONE (DELTASONE) tablet 10 mg, 10 mg, Oral, DAILY WITH BREAKFAST, Arcelia De La Cruz NP, 10 mg at 11/07/21 0853    potassium chloride (K-DUR, KLOR-CON) SR tablet 20 mEq, 20 mEq, Oral, DAILY, Dunia Dover C, DO, 20 mEq at 11/07/21 0853    melatonin tablet 5 mg, 5 mg, Oral, QHS, Dunia Dover C, DO, 5 mg at 11/06/21 2102    albuterol (PROVENTIL HFA, VENTOLIN HFA, PROAIR HFA) inhaler 2 Puff, 2 Puff, Inhalation, Q6H RT, Alivia Alvarez MD, 2 Puff at 11/07/21 0801    albuterol (PROVENTIL HFA, VENTOLIN HFA, PROAIR HFA) inhaler 2 Puff, 2 Puff, Inhalation, Q4H PRN, Swati Alvarez MD, 2 Puff at 10/31/21 0939    amLODIPine (NORVASC) tablet 5 mg, 5 mg, Oral, DAILY, Dunia Dover C, DO, 5 mg at 11/07/21 0853    hydrALAZINE (APRESOLINE) 20 mg/mL injection 20 mg, 20 mg, IntraVENous, Q6H PRN, Florencio Doverison C, DO    tamsulosin (FLOMAX) capsule 0.4 mg, 0.4 mg, Oral, DAILY, Dunia Dover C, DO, 0.4 mg at 11/07/21 0853    lip protectant (BLISTEX) ointment 1 Each, 1 Each, Topical, PRN, Evangelista Franco MD    finasteride (PROSCAR) tablet 5 mg, 5 mg, Oral, QHS, Evangelista Franco MD, 5 mg at 11/06/21 2058    rosuvastatin (CRESTOR) tablet 20 mg, 20 mg, Oral, QHS, Evangelista Franco MD, 20 mg at 11/06/21 2102    sodium chloride (NS) flush 5-40 mL, 5-40 mL, IntraVENous, Q8H, Evangelista Franco MD, 10 mL at 11/07/21 2383    sodium chloride (NS) flush 5-40 mL, 5-40 mL, IntraVENous, PRN, Evangelista Franco MD    polyethylene glycol (MIRALAX) packet 17 g, 17 g, Oral, DAILY PRN, Ronald Quan MD, 17 g at 10/31/21 0546    ondansetron (ZOFRAN ODT) tablet 4 mg, 4 mg, Oral, Q8H PRN, 4 mg at 11/07/21 0906 **OR** ondansetron (ZOFRAN) injection 4 mg, 4 mg, IntraVENous, Q6H PRN, Ronald Quan MD, 4 mg at 11/06/21 2103    dextrose 40% (GLUTOSE) oral gel 1 Tube, 15 g, Oral, PRN, Evangelista Franco MD    glucagon (GLUCAGEN) injection 1 mg, 1 mg, IntraMUSCular, PRN, Evangelista Franco MD    dextrose (D50W) injection syrg 12.5-25 g, 25-50 mL, IntraVENous, PRN, Evangelista Franco MD    tiotropium-olodateroL (STIOLTO RESPIMAT) 2.5-2.5 mcg/actuation inhaler 2 Puff, 2 Puff, Inhalation, DAILY, Ronald Quan MD, 2 Puff at 11/07/21 0801    fluticasone (FLOVENT HFA) 110 mcg inhaler, 1 Puff, Inhalation, DAILY, Evangelista Franco MD, 1 Puff at 11/07/21 0801    Recent Results (from the past 12 hour(s))   CBC W/O DIFF    Collection Time: 11/07/21  6:29 AM   Result Value Ref Range    WBC 14.3 (H) 4.3 - 11.1 K/uL    RBC 4.99 4.23 - 5.6 M/uL    HGB 14.4 13.6 - 17.2 g/dL    HCT 45.0 41.1 - 50.3 %    MCV 90.2 79.6 - 97.8 FL    MCH 28.9 26.1 - 32.9 PG    MCHC 32.0 31.4 - 35.0 g/dL    RDW 13.6 11.9 - 14.6 %    PLATELET 131 145 - 851 K/uL    MPV 8.6 (L) 9.4 - 12.3 FL    ABSOLUTE NRBC 0.00 0.0 - 0.2 K/uL   METABOLIC PANEL, BASIC    Collection Time: 11/07/21  6:29 AM   Result Value Ref Range    Sodium 132 (L) 138 - 145 mmol/L    Potassium 4.0 3.5 - 5.1 mmol/L    Chloride 96 (L) 98 - 107 mmol/L    CO2 29 21 - 32 mmol/L    Anion gap 7 7 - 16 mmol/L    Glucose 107 (H) 65 - 100 mg/dL    BUN 19 8 - 23 MG/DL    Creatinine 0.78 (L) 0.8 - 1.5 MG/DL    GFR est AA >60 >60 ml/min/1.73m2    GFR est non-AA >60 >60 ml/min/1.73m2    Calcium 9.9 8.3 - 10.4 MG/DL     CT Results (most recent):  Results from Hospital Encounter encounter on 10/28/21    CT HEAD WO CONT    Narrative  HEAD CT WITHOUT CONTRAST  11/3/2021    HISTORY:   Fall with head trauma  Fall with head trauma    TECHNIQUE: Noncontrast axial images were obtained through the brain. All CT  scans at this facility used dose modulation, interactive reconstruction and/or  weight based dosing when appropriate to reduce radiation dose to as low as  reasonably achievable. COMPARISON: Brain MRI October 28, 2021    FINDINGS: There is no acute intracranial hemorrhage, significant mass effect or  CT evidence of acute large artery territorial infarction. Please note that a  hyperacute infarct or small vessel infarct may not be apparent on initial CT  imaging. Cerebral volume loss is present with ventriculomegaly and bilateral hippocampal  atrophy. There is no hydrocephalus , intra-axial mass or abnormal extra-axial fluid  collection. There are no displaced skull fractures. The mastoid air cells and  paranasal sinuses are clear where imaged. Impression  Cerebral volume loss. Hippocampal atrophy. No acute intracranial  hemorrhage.     MRI Results (most recent):  Results from [place] encounter on 10/28/21    MRI BRAIN WO CONT    Narrative  MRI BRAIN WITHOUT CONTRAST. INDICATION:  Altered mental status. Extreme headache, nausea and vomiting. COMPARISON:  None. Study performed within 24 hours of admission. TECHNIQUE:  Sagittal T1, axial T1, T2, FLAIR, gradient echo, diffusion with ADC  map and coronal FLAIR. FINDINGS:  -Diffusion images: No any areas of restricted diffusion to suggest acute  infarction.  -No midline shift, mass or mass effect. -Gradient echo images: are unremarkable. -FLAIR sequence images: Leukoaraiosis.  -No evidence of acute hemorrhage.  -The lateral ventricles are: normal size.  -The pituitary and parasellar structures: unremarkable on the sagittal T1  images.  -Posterior fossa structures are unremarkable. -The basal ganglia: appear symmetric.  -Orbits: are grossly normal.  -Paranasal sinuses: are clear.  -Other: None. Impression  No acute abnormality. Mild degenerative change. Physical Exam:  General - Well developed, well nourished, in apparent distress. Conversent. HEENT - Normocephalic, atraumatic. Conjunctiva are clear. Ecchymosis to right eye. Neck - Supple without masses  Cardiovascular - Regular rate and rhythm. Normal S1, S2 without murmurs, rubs, or gallops. Lungs - Clear to auscultation. Abdomen - Soft, nontender with normal bowel sounds. Extremities - Peripheral pulses intact. No edema and no rashes. Neurological examination - Alert,  oriented x2. Follows 1 step commands. Comprehension, attention, memory and reasoning are impaired. Language and speech are normal.   On cranial nerve examination, (II, III, IV, VI) pupils are equal, round, and reactive to light. Visual acuity is adequate. Visual fields are full to finger confrontation. Extraocular motility is normal. (V, VII) Face is symmetric and sensation is intact to light touch. (VIII) Hearing is intact. (IX, X) Palate and uvula elevate symmetrically.  Voice is normal. (XI) Shoulder shrug is strong and equal bilaterally. (XII)Tongue is midline. Motor examination - There is normal muscle tone and bulk. Power is full throughout, moves all extremities spontaneously and to command against gravity. Muscle stretch reflexes are normoactive and there are no pathological reflexes present. Plantar response is flexor bilaterally. Sensation is intact to light touch, pinprick, vibration and proprioception in all extremities.      Signed By: LEONEL No     November 7, 2021

## 2021-11-07 NOTE — PROGRESS NOTES
Hospitalist Progress Note   Admit Date:  10/28/2021 12:07 PM   Name:  Rosette Titus   Age:  80 y.o. Sex:  male  :  1940   MRN:  [id number]   Room:  [facility]/    Presenting Complaint: Slow Heart Rate    Reason(s) for Admission: Acute metabolic encephalopathy [E33.80]     Hospital Course & Interval History:     Patient with past medical history of  Sarcoidosis  Restrictive lung disease  Hypertension     Patient presented with headache and altered mental status. CT of the head, chest, abdomen and pelvis did not demonstrate acute process. It shows chronic cerebral atrophy, left sided maxillary mass and chronic parenchymal lung disease     On 2021, he had falls without loss of consciousness. He developed right black eye. 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 Patient continues to complain of headache. MRI brain was unremarkable. He complains of dysphagia. 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 Esophagus was dilated,     Subjective (21): 2021. Patient is sitting up in bed. Talking well. Answering questions well. Complaining of headache, at both temporal areas, and the back of his head. Afebrile    2021  Patient was seen by neurologist team yesterday. Recommendation is to stop Fioricet, and started on naproxen for headache. Patient is resting well this morning. No complaints of headache. Assessment & Plan: Active Problems:    Axillary mass, left (10/28/2021)  Status post biopsy  Showed small cell carcinoma  Oncology is following. Plan to follow-up as an outpatient, with PET scan.        Body mass index (BMI) of 25.0 to 25.9 in adult (10/28/2021)        Dyslipidemia (3/13/2006)  On rosuvastatin      Sarcoidosis (3/13/2006)        Benign prostatic hyperplasia (3/13/2006)  On tamsulosin      Restrictive lung disease (2015)  Patient is on 2 L/min of oxygen via cannula      Pulmonary hypertension ([facility] (1/10/2018)        Hyperglycemia, drug-induced (10/28/2021)  BS is in low to mid 100s. On Prednisone. cover with sliding scale with Humalog. Headache (10/30/2021)  Work-up is negative for intracerebral lesions  On naproxen as needed. Monitor closely for potential signs of side effects. COPD, moderate ([facility] (11/1/2021)  On oxygen cannula 2 L/min   not in exacerbation            Dispo/Discharge Planning:    to be determined   Patient is supposed to go to nursing home on November 8, 2021, depending on his clinical response. Diet:  ADULT ORAL NUTRITION SUPPLEMENT Breakfast, Lunch, Dinner; Standard High Calorie/High Protein  ADULT DIET Full Liquid  DVT PPx: Lovenox SC   Code status: Full Code    Hospital Problems as of 11/7/2021 Date Reviewed: 10/28/2021          Codes Class Noted - Resolved POA    Axillary mass, left ICD-10-CM: R22.32  ICD-9-CM: 782.2  10/28/2021 - Present Yes        RESOLVED: Nausea & vomiting ICD-10-CM: R11.2  ICD-9-CM: 787.01  10/28/2021 - 11/1/2021 Yes        Body mass index (BMI) of 25.0 to 25.9 in adult (Chronic) ICD-10-CM: U84.45  ICD-9-CM: V85.21  10/28/2021 - Present Yes        COPD, moderate (HCC) (Chronic) ICD-10-CM: J44.9  ICD-9-CM: 496  11/1/2021 - Present Unknown        Headache ICD-10-CM: R51.9  ICD-9-CM: 784.0  10/30/2021 - Present Unknown        Hyperglycemia, drug-induced ICD-10-CM: R73.9, T50.905A  ICD-9-CM: 790.29, E947.9  10/28/2021 - Present Yes        Pulmonary hypertension ([facility] ICD-10-CM: I27.20  ICD-9-CM: 416.8  1/10/2018 - Present Yes        Pulmonary hypertension due to hypoxia (Chronic) ICD-10-CM: I27.23  ICD-9-CM: 416.8, 799.02  6/20/2016 - Present     Overview Addendum 10/28/2021  3:11 PM by Trish Roosevelt, MD Dr. Hope Opitz  1.  6/6/2016 Echo- [id number]  2. Echo (11/23/20):  EF 55-60%. Mild LAE.  AVSC. Trace TR. Normal RV. Normal RA size. RVSP 35.               Restrictive lung disease (Chronic) ICD-10-CM: J98.4  ICD-9-CM: 518.89  4/1/2015 - Present Yes    Overview Signed 4/1/2015  9:30 AM by Raymond Isidro. Residual from Sarcoidosis             Dyslipidemia (Chronic) ICD-10-CM: E78.5  ICD-9-CM: 272.4  3/13/2006 - Present Yes    Overview Signed 3/21/2019  8:31 AM by Vidal Bassett MD     Lexiscan Cardiolite (3/19/19):  EF 70%. Normal perfusion.               Sarcoidosis (Chronic) ICD-10-CM: D86.9  ICD-9-CM: 135  3/13/2006 - Present Yes        Benign prostatic hyperplasia (Chronic) ICD-10-CM: N40.0  ICD-9-CM: 600.00  3/13/2006 - Present Yes        * (Principal) RESOLVED: Acute metabolic encephalopathy AMNA-25-MZ: G93.41  ICD-9-CM: 348.31  10/28/2021 - 11/1/2021 Yes        RESOLVED: Bradycardia ICD-10-CM: R00.1  ICD-9-CM: 427.89  10/28/2021 - 11/1/2021 No        RESOLVED: Hyponatremia ICD-10-CM: E87.1  ICD-9-CM: 276.1  10/28/2021 - 11/1/2021 Yes        RESOLVED: Electrolyte or fluid disorder ICD-10-CM: E87.8  ICD-9-CM: 276.9  10/28/2021 - 11/1/2021 Yes              Objective:     Patient Vitals for the past 24 hrs:   Temp Pulse Resp BP SpO2   11/07/21 0807 -- -- -- (!) 136/94 --   11/07/21 0804 -- -- -- -- 99 %   11/07/21 0742 97.7 °F (36.5 °C) 86 18 (!) 136/94 97 %   11/07/21 0352 97.5 °F (36.4 °C) 94 18 113/77 100 %   11/07/21 0123 -- -- -- -- 97 %   11/07/21 0013 97.9 °F (36.6 °C) 76 18 132/74 100 %   11/06/21 1918 98.7 °F (37.1 °C) 87 18 (!) 136/94 100 %   11/06/21 1624 97.9 °F (36.6 °C) (!) 109 16 (!) 132/94 97 %   11/06/21 1443 -- -- -- -- 95 %   11/06/21 1200 98 °F (36.7 °C) 83 16 135/83 95 %     Oxygen Therapy  O2 Sat (%): 99 % (11/07/21 0804)  Pulse via Oximetry: 75 beats per minute (11/07/21 0804)  O2 Device: Nasal cannula (11/07/21 0804)  Skin Assessment: Clean, dry, & intact (11/05/21 1006)  Skin Protection for O2 Device: No (11/05/21 1006)  O2 Flow Rate (L/min): 2 l/min (11/07/21 0804)  FIO2 (%): 28 % (11/07/21 0123)    Estimated body mass index is 25.2 kg/m² as calculated from the following:    Height as of this encounter: 5' 4\" (1.626 m). Weight as of this encounter: 66.6 kg (146 lb 13.2 oz). Intake/Output Summary (Last 24 hours) at 11/7/2021 0906  Last data filed at 11/7/2021 0800  Gross per 24 hour   Intake 280 ml   Output 150 ml   Net 130 ml         Physical Exam:     Blood pressure (!) 136/94, pulse 86, temperature 97.7 °F (36.5 °C), resp. rate 18, height 5' 4\" (1.626 m), weight 66.6 kg (146 lb 13.2 oz), SpO2 99 %. General:    Well nourished. No overt distress, patient is resting well. Head:  Normocephalic, atraumatic  Eyes:  Sclerae appear normal.  Pupils equally round. ENT:  Nares appear normal, no drainage. Moist oral mucosa  Neck:  No restricted ROM. Trachea midline   CV:   RRR. No m/r/g. No jugular venous distension. Lungs:   CTAB. No wheezing, rhonchi, or rales. Respirations even, unlabored  Abdomen: Bowel sounds present. Soft, nontender, nondistended. Extremities: No cyanosis or clubbing. No edema  Skin:     No rashes and normal coloration. Warm and dry. Neuro:  CN II-XII grossly intact. Sensation intact. A&Ox3  Psych:  Normal mood and affect.       I have reviewed ordered lab tests and independently visualized imaging below:    Recent Labs:  Recent Results (from the past 48 hour(s))   CBC W/O DIFF    Collection Time: 11/06/21  5:38 AM   Result Value Ref Range    WBC 15.3 (H) 4.3 - 11.1 K/uL    RBC 4.89 4.23 - 5.6 M/uL    HGB 14.3 13.6 - 17.2 g/dL    HCT 43.8 41.1 - 50.3 %    MCV 89.6 79.6 - 97.8 FL    MCH 29.2 26.1 - 32.9 PG    MCHC 32.6 31.4 - 35.0 g/dL    RDW 13.4 11.9 - 14.6 %    PLATELET 925 991 - 483 K/uL    MPV 8.7 (L) 9.4 - 12.3 FL    ABSOLUTE NRBC 0.00 0.0 - 0.2 K/uL   METABOLIC PANEL, BASIC    Collection Time: 11/06/21  5:38 AM   Result Value Ref Range    Sodium 129 (L) 138 - 145 mmol/L    Potassium 3.9 3.5 - 5.1 mmol/L    Chloride 92 (L) 98 - 107 mmol/L    CO2 30 21 - 32 mmol/L    Anion gap 7 7 - 16 mmol/L    Glucose 114 (H) 65 - 100 mg/dL    BUN 16 8 - 23 MG/DL Creatinine 0.59 (L) 0.8 - 1.5 MG/DL    GFR est AA >60 >60 ml/min/1.73m2    GFR est non-AA >60 >60 ml/min/1.73m2    Calcium 9.5 8.3 - 10.4 MG/DL   CBC W/O DIFF    Collection Time: 11/07/21  6:29 AM   Result Value Ref Range    WBC 14.3 (H) 4.3 - 11.1 K/uL    RBC 4.99 4.23 - 5.6 M/uL    HGB 14.4 13.6 - 17.2 g/dL    HCT 45.0 41.1 - 50.3 %    MCV 90.2 79.6 - 97.8 FL    MCH 28.9 26.1 - 32.9 PG    MCHC 32.0 31.4 - 35.0 g/dL    RDW 13.6 11.9 - 14.6 %    PLATELET 034 786 - 458 K/uL    MPV 8.6 (L) 9.4 - 12.3 FL    ABSOLUTE NRBC 0.00 0.0 - 0.2 K/uL   METABOLIC PANEL, BASIC    Collection Time: 11/07/21  6:29 AM   Result Value Ref Range    Sodium 132 (L) 138 - 145 mmol/L    Potassium 4.0 3.5 - 5.1 mmol/L    Chloride 96 (L) 98 - 107 mmol/L    CO2 29 21 - 32 mmol/L    Anion gap 7 7 - 16 mmol/L    Glucose 107 (H) 65 - 100 mg/dL    BUN 19 8 - 23 MG/DL    Creatinine 0.78 (L) 0.8 - 1.5 MG/DL    GFR est AA >60 >60 ml/min/1.73m2    GFR est non-AA >60 >60 ml/min/1.73m2    Calcium 9.9 8.3 - 10.4 MG/DL       All Micro Results     Procedure Component Value Units Date/Time    SARS-COV-2, PCR [[id number]] Collected: 11/04/21 1110    Order Status: Completed Specimen: Nasopharyngeal Updated: 11/04/21 1438     Specimen source Nasopharyngeal        SARS-CoV-2 Not detected        Comment:      The specimen is NEGATIVE for SARS-CoV-2, the novel coronavirus associated with COVID-19. This test has been authorized by the FDA under an Emergency Use Authorization (EUA) for use by authorized laboratories.         Fact sheet for Healthcare Providers: ConventionUpdate.co.nz       Fact sheet for Patients: ConventionUpdate.co.nz       Methodology: RT-PCR         CULTURE, BLOOD [[id number]] Collected: 10/28/21 2021    Order Status: Completed Specimen: Blood Updated: 11/02/21 0748     Special Requests: LEFT HAND        Culture result: NO GROWTH 5 DAYS       CULTURE, BLOOD [[id number]] Collected: 10/28/21 2021    Order Status: Completed Specimen: Blood Updated: 11/02/21 0748     Special Requests: RIGHT ANTECUBITAL        Culture result: NO GROWTH 5 DAYS       SARS-COV-2, PCR [[id number]] Collected: 10/28/21 2359    Order Status: Completed Specimen: Nasopharyngeal Updated: 10/29/21 0916     Specimen source Nasopharyngeal        SARS-CoV-2 Not detected        Comment:      The specimen is NEGATIVE for SARS-CoV-2, the novel coronavirus associated with COVID-19. This test has been authorized by the FDA under an Emergency Use Authorization (EUA) for use by authorized laboratories.         Fact sheet for Healthcare Providers: GrupHediye.nz       Fact sheet for Patients: GrupHediye.nz       Methodology: RT-PCR         RESPIRATORY VIRUS PANEL W/COVID-19, PCR [[id number]]  (Abnormal) Collected: 10/29/21 0220    Order Status: Completed Specimen: Nasopharyngeal Updated: 10/29/21 0423     Adenovirus NOT DETECTED        Coronavirus 229E NOT DETECTED        Coronavirus HKU1 NOT DETECTED        Coronavirus CVNL63 NOT DETECTED        Coronavirus OC43 NOT DETECTED        SARS-CoV-2, PCR NOT DETECTED        Metapneumovirus NOT DETECTED        Rhinovirus and Enterovirus Detected        Influenza A NOT DETECTED        Influenza B NOT DETECTED        Parainfluenza 1 NOT DETECTED        Parainfluenza 2 NOT DETECTED        Parainfluenza 3 NOT DETECTED        Parainfluenza virus 4 NOT DETECTED        RSV by PCR NOT DETECTED        B. parapertussis, PCR NOT DETECTED        Bordetella pertussis - PCR NOT DETECTED        Chlamydophila pneumoniae DNA, QL, PCR NOT DETECTED        Mycoplasma pneumoniae DNA, QL, PCR NOT DETECTED       COVID-19 RAPID TEST [[id number]] Collected: 10/28/21 2359    Order Status: Completed Specimen: Nasopharyngeal Updated: 10/29/21 0034     Specimen source NASAL        COVID-19 rapid test Not detected        Comment:      The specimen is NEGATIVE for SARS-CoV-2, the novel coronavirus associated with COVID-19. A negative result does not rule out COVID-19. This test has been authorized by the FDA under an Emergency Use Authorization (EUA) for use by authorized laboratories. Fact sheet for Healthcare Providers: ConventionUpdate.co.nz  Fact sheet for Patients: ConventionUpdate.co.nz       Methodology: Isothermal Nucleic Acid Amplification               Other Studies:  No results found.     Current Meds:  Current Facility-Administered Medications   Medication Dose Route Frequency    naproxen (NAPROSYN) tablet 250 mg  250 mg Oral Q8H PRN    lactated Ringers infusion  100 mL/hr IntraVENous CONTINUOUS    enoxaparin (LOVENOX) injection 40 mg  40 mg SubCUTAneous Q24H    pantoprazole (PROTONIX) tablet 40 mg  40 mg Oral ACB&D    HYDROcodone-acetaminophen (NORCO) 7.5-325 mg per tablet 1 Tablet  1 Tablet Oral Q8H PRN    predniSONE (DELTASONE) tablet 10 mg  10 mg Oral DAILY WITH BREAKFAST    potassium chloride (K-DUR, KLOR-CON) SR tablet 20 mEq  20 mEq Oral DAILY    melatonin tablet 5 mg  5 mg Oral QHS    albuterol (PROVENTIL HFA, VENTOLIN HFA, PROAIR HFA) inhaler 2 Puff  2 Puff Inhalation Q6H RT    albuterol (PROVENTIL HFA, VENTOLIN HFA, PROAIR HFA) inhaler 2 Puff  2 Puff Inhalation Q4H PRN    amLODIPine (NORVASC) tablet 5 mg  5 mg Oral DAILY    hydrALAZINE (APRESOLINE) 20 mg/mL injection 20 mg  20 mg IntraVENous Q6H PRN    tamsulosin (FLOMAX) capsule 0.4 mg  0.4 mg Oral DAILY    lip protectant (BLISTEX) ointment 1 Each  1 Each Topical PRN    finasteride (PROSCAR) tablet 5 mg  5 mg Oral QHS    rosuvastatin (CRESTOR) tablet 20 mg  20 mg Oral QHS    sodium chloride (NS) flush 5-40 mL  5-40 mL IntraVENous Q8H    sodium chloride (NS) flush 5-40 mL  5-40 mL IntraVENous PRN    polyethylene glycol (MIRALAX) packet 17 g  17 g Oral DAILY PRN    ondansetron (ZOFRAN ODT) tablet 4 mg  4 mg Oral Q8H PRN    Or    ondansetron (ZOFRAN) injection 4 mg  4 mg IntraVENous Q6H PRN    dextrose 40% (GLUTOSE) oral gel 1 Tube  15 g Oral PRN    glucagon (GLUCAGEN) injection 1 mg  1 mg IntraMUSCular PRN    dextrose (D50W) injection syrg 12.5-25 g  25-50 mL IntraVENous PRN    tiotropium-olodateroL (STIOLTO RESPIMAT) 2.5-2.5 mcg/actuation inhaler 2 Puff  2 Puff Inhalation DAILY    fluticasone (FLOVENT HFA) 110 mcg inhaler  1 Puff Inhalation DAILY       Signed:  Abel Bernheim, MD    Part of this note may have been written by using a voice dictation software. The note has been proof read but may still contain some grammatical/other typographical errors.

## 2021-11-07 NOTE — PROGRESS NOTES
Update 1:38pm- CM received call back from on call NADIRA Jefferson with [phone] [phone], who confirmed PET Scan will nee be rescheduled as PET SCAN cannot be completed while inpatient. CM will notify patient's spouse of update. CM remains available.

## 2021-11-07 NOTE — PROGRESS NOTES
Airway       Patient location during procedure: OR  Staff -        Performed By: anesthesiologist and SRNAIndications and Patient Condition       Indications for airway management: maye-procedural       Induction type:intravenous       Mask difficulty assessment: 1 - vent by mask    Final Airway Details       Final airway type: endotracheal airway       Successful airway: ETT - single  Endotracheal Airway Details        ETT size (mm): 7.0       Cuffed: yes       Successful intubation technique: direct laryngoscopy and video laryngoscopy (x1 by SRNA, no visual of cords)       VL Blade Size: Glidescope 3       Grade View of Cords: 2       Adjucts: stylet       Position: Right       Measured from: gums/teeth       Secured at (cm): 23       Bite block used: Oral Airway (emergence)    Post intubation assessment        Placement verified by: capnometry, equal breath sounds and chest rise        Number of attempts at approach: 2       Number of other approaches attempted: 0       Secured with: pink tape       Ease of procedure: difficult (sl difficult/anterior)       Dentition: Intact and Unchanged    Medication(s) Administered   Medication Administration Time: 5/12/2021 12:47 PM    Additional Comments       Sl difficult, anterior           CM faxed updated clinicals to [facility], as staff requested. Attending Nic confirmed, patient will not discharge 11/8 as initially planned. CM will attempt to reach staff member with [facility], to determine arrangements for PET Scan 11/8. CM left VM with patient's spouse, requesting call back. CM also left VM with  with [phone], requesting on call RN contact this [facility] this day. CM remains available.

## 2021-11-07 NOTE — PROGRESS NOTES
Hourly rounds performed through shift, pt denies needs at this time. Patient medicated for headache and nausea per MAR. Patient not eating or drinking much due to being afraid of throwing up and not being able to swallow. Bed in low position, locked, bed alarm set and call light/personal items within reach. Will continue to monitor and report to night shift nurse.

## 2021-11-08 NOTE — DISCHARGE SUMMARY
Hospitalist Discharge Summary     Patient ID:  Namita Monahan  [id number]  84 y.o.  1940  Admit date: 10/28/2021 12:07 PM  Discharge date and time: 11/8/2021  Attending: Hossein Polk DO  PCP:  Shilpa Fletcher MD  Treatment Team: Attending Provider: Hossein Polk DO; Care Manager: Cuca Lemus; Consulting Provider: Chris Brody MD; Charge Nurse: Marilee Rodas RN; Physical Therapist: Mich Rodrigues DPT; Speech Language Pathologist: Cm Swift, SLP; Occupational Therapist: Sasha Skinner OT    Principal Diagnosis Acute metabolic encephalopathy   Active Problems:    Axillary mass, left (10/28/2021)      Body mass index (BMI) of 25.0 to 25.9 in adult (10/28/2021)      Dyslipidemia (3/13/2006)      Overview: Killian De La Torre (3/19/19):  EF 70%. Normal perfusion. Sarcoidosis (3/13/2006)      Benign prostatic hyperplasia (3/13/2006)      Restrictive lung disease (4/1/2015)      Overview: Residual from Sarcoidosis      Pulmonary hypertension ([facility] [address] (1/10/2018)      Hyperglycemia, drug-induced (10/28/2021)      Headache (10/30/2021)      COPD, moderate ([address] (11/1/2021)       Namita Monahan is a 80 y.o. male with medical history of sarcoid, restrictive lung disease, hypertension who presented with headache, altered mental status. On initial presentation to the emergency department he was complaining of a headache and able to speak in short sentences. At the time of my interview he was sedated and not following command. History provided by wife. For approximately last 2 weeks been feeling ill with progressive nausea and vomiting. He reported intermittent nonsevere abdominal pain. His wife reports no fevers, chills or rigors. She said that he did not complain of vision changes, chest pain, changes in urinary output, changes in bowel movements (some loose stools), peripheral edema.   In the emergency department CT head chest abdomen pelvis did not demonstrate acute process, however did show chronic cerebral atrophy left-sided maxillary mass and chronic parenchymal lung disease. He was admitted 10/28 for further evaluation and management. Interval History (11/8): patient examined at bedside. No acute overnight events. Wife at bedside, says \"this is the most alert I've seen him. \" Still having some \"apprehension\" about swallowing. No fevers/chills, chest pain, or shortness of breath. No abdominal pain, nausea/vomiting, or diarrhea. Hospital Course:  Please refer to the admission H&P for details of presentation. In summary, the patient is admitted for worsening headaches and acute on chronic changes in mental status. CT and MRI imaging was unrevealing and patient was treated for metabolic causes (hyponatremia, hypoglycemia) as likely etiology. He was seen by neurology due to his frequent headaches with recommendations to discontinue Fiorocet and to use NSAIDs. Counseled wife that NSAID use can lead to gastric ulcers and adverse effects to the kidneys and cardiovascular system if taken long term, this medication should be used primarily on an as needed basis and weaned off once his headaches are stabilized. Recommend neurology follow-up as scheduled. His bisoprolol-HCTZ was discontinued and his hyponatremia has improved overall. Due to difficulty swallowing, GI was consulted and patient underwent diagnostic/therapeutic endoscopy with Schatzki ring identified, he underwent uncomplicated esophageal dilation. He should follow-up with GI as outpatient, he should remain on long-term PPI as well. 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 He was supposed to be scheduled for PET scan but this appointment was missed due to being hospitalized. Heme/Onc followed with recommendations for PET scan and follow-up as an outpatient. He is currently hemodynamically stable for hospital discharge to rehab.  Details of hospitalization has been discussed with patient's wife at bedside who understands and agrees with plan of care and discharge to rehab. Return precautions provided. All questions answered. Significant Diagnostic Studies:     HEAD CT WITHOUT CONTRAST  11/3/2021      HISTORY:   Fall with head trauma  Fall with head trauma     TECHNIQUE: Noncontrast axial images were obtained through the brain. All CT  scans at this facility used dose modulation, interactive reconstruction and/or  weight based dosing when appropriate to reduce radiation dose to as low as  reasonably achievable.     COMPARISON: Brain MRI October 28, 2021     FINDINGS: There is no acute intracranial hemorrhage, significant mass effect or  CT evidence of acute large artery territorial infarction. Please note that a  hyperacute infarct or small vessel infarct may not be apparent on initial CT  imaging.     Cerebral volume loss is present with ventriculomegaly and bilateral hippocampal  atrophy.     There is no hydrocephalus , intra-axial mass or abnormal extra-axial fluid  collection. There are no displaced skull fractures. The mastoid air cells and  paranasal sinuses are clear where imaged.     IMPRESSION  Cerebral volume loss. Hippocampal atrophy. No acute intracranial  Hemorrhage. MRI BRAIN WITHOUT CONTRAST.     INDICATION:  Altered mental status. Extreme headache, nausea and vomiting.     COMPARISON:  None. Study performed within 24 hours of admission.     TECHNIQUE:  Sagittal T1, axial T1, T2, FLAIR, gradient echo, diffusion with ADC  map and coronal FLAIR.       FINDINGS:   -Diffusion images: No any areas of restricted diffusion to suggest acute  infarction.    -No midline shift, mass or mass effect. -Gradient echo images: are unremarkable.     -FLAIR sequence images: Leukoaraiosis.  -No evidence of acute hemorrhage.    -The lateral ventricles are: normal size.    -The pituitary and parasellar structures: unremarkable on the sagittal T1  images.    -Posterior fossa structures are unremarkable. -The basal ganglia: appear symmetric.    -Orbits: are grossly normal.   -Paranasal sinuses: are clear.  -Other: None.     IMPRESSION  No acute abnormality. Mild degenerative change.       Labs: Results:       Chemistry Recent Labs     11/07/21 0629 11/06/21  0538   * 114*   * 129*   K 4.0 3.9   CL 96* 92*   CO2 29 30   BUN 19 16   CREA 0.78* 0.59*   CA 9.9 9.5   AGAP 7 7      CBC w/Diff Recent Labs     11/07/21 0629 11/06/21  0538   WBC 14.3* 15.3*   RBC 4.99 4.89   HGB 14.4 14.3   HCT 45.0 43.8    261      Cardiac Enzymes No results for input(s): CPK, CKND1, AYAH in the last 72 hours. No lab exists for component: CKRMB, TROIP   Coagulation No results for input(s): PTP, INR, APTT, INREXT in the last 72 hours. Lipid Panel Lab Results   Component Value Date/Time    Cholesterol, total 136 07/19/2021 09:40 AM    HDL Cholesterol 48 07/19/2021 09:40 AM    LDL, calculated 67 07/19/2021 09:40 AM    LDL, calculated 63 07/06/2020 09:41 AM    VLDL, calculated 21 07/19/2021 09:40 AM    VLDL, calculated 35 07/06/2020 09:41 AM    Triglyceride 114 07/19/2021 09:40 AM    CHOL/HDL Ratio 1.9 09/17/2018 10:01 AM      BNP No results for input(s): BNPP in the last 72 hours. Liver Enzymes No results for input(s): TP, ALB, TBIL, AP in the last 72 hours. No lab exists for component: SGOT, GPT, DBIL   Thyroid Studies Lab Results   Component Value Date/Time    TSH 0.583 10/28/2021 04:09 PM            Discharge Exam:  Visit Vitals  /75 (BP 1 Location: Right upper arm, BP Patient Position: At rest)   Pulse 88   Temp 97.6 °F (36.4 °C)   Resp 18   Ht 5' 4\" (1.626 m)   Wt 68.1 kg (150 lb 2.1 oz)   SpO2 92%   BMI 25.77 kg/m²     General appearance: alert but confused, lying in bed, no apparent distress, pleasant  Lungs: clear to auscultation bilaterally. Nonlabored. No wheezing  Heart: regular rate and rhythm, S1, S2 normal, no JVD  Abdomen: soft, non-tender.  Bowel sounds normal. No masses,  no organomegaly  Extremities: no cyanosis or edema  Neurologic: Grossly normal    Disposition: SNF  Discharge Condition: stable  Patient Instructions:   Current Discharge Medication List      START taking these medications    Details   amLODIPine (NORVASC) 5 mg tablet Take 1 Tablet by mouth daily for 30 days. Qty: 30 Tablet, Refills: 0  Start date: 11/8/2021, End date: 12/8/2021      naproxen (NAPROSYN) 250 mg tablet Take 1 Tablet by mouth every eight (8) hours as needed for Pain for up to 7 days. Indications: headache  Qty: 21 Tablet, Refills: 0  Start date: 11/8/2021, End date: 11/15/2021         CONTINUE these medications which have NOT CHANGED    Details   melatonin 5 mg tablet Take 10 mg by mouth nightly. rosuvastatin (CRESTOR) 20 mg tablet TAKE 1 TABLET NIGHTLY  Qty: 90 Tablet, Refills: 3    Associated Diagnoses: Dyslipidemia      azelastine (ASTELIN) 137 mcg (0.1 %) nasal spray 1 [place] by Both Nostrils route two (2) times a day. Use in each nostril as directed  Qty: 3 Bottle, Refills: 3    Associated Diagnoses: Chronic rhinitis      tamsulosin (Flomax) 0.4 mg capsule Take 1 Capsule by mouth daily. Qty: 90 Capsule, Refills: 3    Associated Diagnoses: Benign prostatic hyperplasia with urinary frequency      omeprazole (PRILOSEC) 40 mg capsule Take 1 Capsule by mouth daily. Qty: 90 Capsule, Refills: 3    Associated Diagnoses: Chronic GERD      tadalafiL, pulm. hypertension, (Adcirca) 20 mg tablet Take 2 Tablets by mouth daily. Qty: 60 Tablet, Refills: 11      fluticasone-umeclidinium-vilanterol (TRELEGY ELLIPTA) 100-62.5-25 mcg inhaler Take 1 Puff by inhalation daily. Qty: 3 Inhaler, Refills: 3      finasteride (Proscar) 5 mg tablet Take 1 Tab by mouth nightly.   Qty: 90 Tab, Refills: 3    Associated Diagnoses: Benign prostatic hyperplasia with urinary frequency      ascorbate calcium-bioflavonoid (DOROTHY-C WITH BIOFLAVONOIDS) 1,000-200 mg tab       predniSONE (DELTASONE) 10 mg tablet Take 10 mg by mouth daily. Qty: 90 Tab, Refills: 3    Associated Diagnoses: Sarcoidosis      cholecalciferol, vitamin D3, (VITAMIN D3) 2,000 unit tab Take  by mouth daily. Aspirin, Buffered 81 mg tab Take  by mouth daily. multivitamin (ONE A DAY) tablet Take 1 Tab by mouth daily. b complex vitamins tablet Take 1 Tab by mouth daily. ondansetron (ZOFRAN ODT) 4 mg disintegrating tablet Take 1 Tablet by mouth every eight (8) hours as needed for Nausea or Vomiting (diarrhea). Qty: 30 Tablet, Refills: 0    Associated Diagnoses: Diarrhea, unspecified type; COPD with asthma ([address] Sarcoidosis; Chronic respiratory failure with hypoxia (HCC)      OTHER Handicap placard  Dx: J44.9 COPD  Qty: 1 Each, Refills: 0      albuterol (PROVENTIL VENTOLIN) 2.5 mg /3 mL (0.083 %) nebu 3 mL by Nebulization route every six (6) hours as needed for Wheezing. Qty: 180 Each, Refills: 3      ferrous sulfate (Iron) 325 mg (65 mg iron) tablet Take  by mouth Daily (before breakfast). DISABLED PLACARD (DISABLED PLACARD) DMV Supply prescription to [facility] with completed form RG-007A. OXYGEN-AIR DELIVERY SYSTEMS by Does Not Apply route. 2 lpm QD      diclofenac (VOLTAREN) 1 % topical gel Apply 4 g to affected area four (4) times daily. prn         STOP taking these medications       temazepam (RESTORIL) 15 mg capsule Comments:   Reason for Stopping:         bisoprolol-hydroCHLOROthiazide (Ziac) 2.5-6.25 mg per tablet Comments:   Reason for Stopping:               Activity: Activity as tolerated  Diet: per speech therapy recs, advance as tolerated  Wound Care: As directed    Follow-up  ·   With PCP within 1 week  ·   With GI within 2-3 weeks or as previously scheduled  ·   With Heme/Onc as previously scheduled (needs rescheduled PET scan)  ·   With neurology as previously scheduled  Time spent to discharge patient 35 minutes  Signed:   Simran Peña DO  11/8/2021  8:22 AM

## 2021-11-08 NOTE — PROGRESS NOTES
Patient is medically stable for discharge. Patient to discharge to [address] [place] [address] for PET Scan scheduled at 1200. Lito Oar transport to arrive at [facility]. Spouse notified. Patient will obtain REHAB at [facility] following PET Scan. CM notified Kyler Mccracken with [phone], of update. CM also placed transport on will call, for patient to transfer from [facility] to [facility]. CM faxed d/c summary to admissions, as requested by Kyler Mccracken. Spouse to provide oxygen for outpatient procedure. No needs or concerns voiced at this time. CM remains available. Care Management Interventions  PCP Verified by CM: Yes  Mode of Transport at Discharge: [facility].)  Transition of Care Consult (CM Consult): Discharge Planning, Acute Rehab  Discharge Durable Medical Equipment: No  Physical Therapy Consult: Yes  Occupational Therapy Consult: Yes  Speech Therapy Consult: Yes  Support Systems: Spouse/Significant Other  Confirm Follow Up Transport: Family (Patient is able to drive. )  The Plan for Transition of Care is Related to the Following Treatment Goals : strengthen physical mobility. The Patient and/or Patient Representative was Provided with a Choice of Provider and Agrees with the Discharge Plan?: Yes  Name of the Patient Representative Who was Provided with a Choice of Provider and Agrees with the Discharge Plan: Patient/ Patient's Spouse.   Freedom of Choice List was Provided with Basic Dialogue that Supports the Patient's Individualized Plan of Care/Goals, Treatment Preferences and Shares the Quality Data Associated with the Providers?: Yes  [place] Resource Information Provided?: No  Discharge Location  Discharge Placement: Other: ([place]. )

## 2021-11-08 NOTE — PROGRESS NOTES
CM was informed by attending  [facility], the patient is medically stable for discharge. CM informed attending , PET Scan was cancelled 11/7, as CM was informed by attending MD Fatou Palacio, the patient would not be discharged this day. CM contacted [facility], to determine if PET Scan could be rescheduled. Per Reyna Ahoskie [phone] with Radiology Scheduling with [id number], patient can be rescheduled for PET Scan Thursday, 11/11 at 9:30am and a order is needed. CM contacted [facility] to determine if the patient can discharge to [facility] facility this day and if patient can complete PET Scan 11/11, while completing REHAB. CM awaiting update from Maribel Mclean [phone] with [facility].

## 2021-11-09 PROBLEM — C80.1 MALIGNANT SMALL CELL CANCER (HCC): Status: ACTIVE | Noted: 2021-01-01

## 2021-11-09 PROBLEM — J69.0 ASPIRATION PNEUMONIA (HCC): Status: ACTIVE | Noted: 2021-01-01

## 2021-11-09 PROBLEM — J96.21 ACUTE ON CHRONIC RESPIRATORY FAILURE WITH HYPOXIA (HCC): Status: ACTIVE | Noted: 2021-01-01

## 2021-11-09 NOTE — ED PROVIDER NOTES
17-year-old male patient with history of pulmonary artery hypertension, lung cancer presents to the emergency department with reports of significant shortness of breath and rapid heart rate. Patient was reportedly just admitted to the hospital and subsequently transferred to a rehab facility today. He was undergoing swallow evaluation when apparently he began coughing quite significantly. His heart rate jumped to 130 and he was found to be hypoxic at 70%. Apparently this was obtained on room air, patient is prescribed 2 L nasal cannula at all times. Patient reports some discomfort in her lower epigastrium that has been present for many days including during his hospitalization. He reports significant shortness of breath at this time and ongoing coughing. Coughing has been nonproductive thus far. EMS reports no fever in route. Wife at bedside reports history of pulmonary artery hypertension. She is unsure of patient's actual cancer diagnosis but states he recently underwent PET scanning and does not have these results back. She reports no history of blood thinner therapy or pulmonary emboli. Past Medical History:   Diagnosis Date    Agent orange exposure 1960s    Arthritis     Asthma     Body mass index (BMI) of 25.0 to 25.9 in adult 10/28/2021    Chronic obstructive pulmonary disease (HCC)     Chronic pain     GERD (gastroesophageal reflux disease)     Sarcoidosis 2001    Lung Biopsy was done in 2001;  Liver Biopsy--Negative for sarcidosis 5/06    Sleep apnea        Past Surgical History:   Procedure Laterality Date    HX HERNIA REPAIR Left 2004    Left inguinal hernia repair    HX HERNIA REPAIR Right 1990's    Right inguinal hernia    HX KNEE REPLACEMENT Left 2016    HX LAP CHOLECYSTECTOMY  2006    HX ORTHOPAEDIC Right 2014    ankle replacement @ [facility]    HX OTHER SURGICAL  2006    Liver biopsy--negative for sarcoidosis    HX TONSILLECTOMY  as a child    AL CHEST SURGERY PROCEDURE UNLISTED      Lung bx for sarcoidosis         Family History:   Problem Relation Age of Onset    Diabetes Mother     Hypertension Mother     Cancer Mother         Liver Cancer    Elevated Lipids Mother     Hypertension Father     Stroke Father     Cancer Sister     Headache Neg Hx        Social History     Socioeconomic History    Marital status:      Spouse name: Not on file    Number of children: Not on file    Years of education: Not on file    Highest education level: Not on file   Occupational History    Occupation: [employer]   Tobacco Use    Smoking status: Former Smoker     Packs/day: 1.50     Years: 5.00     Pack years: 7.50     Types: Cigarettes     Quit date: 1968     Years since quittin.8    Smokeless tobacco: Never Used   Vaping Use    Vaping Use: Never used   Substance and Sexual Activity    Alcohol use: Yes     Alcohol/week: 0.0 standard drinks     Comment: socially    Drug use: No    Sexual activity: Not on file   Other Topics Concern    Not on file   Social History Narrative    He is originally from [place], but retired to [place]. He is retired from [email or web address] He has worked in [email or web address] and has travelled extensively to [facility], [place], [facility], [place] and [facility], and [place]  and lives with wife. Social Determinants of Health     Financial Resource Strain:     Difficulty of Paying Living Expenses: Not on file   Food Insecurity:     Worried About Running Out of Food in the Last Year: Not on file    Larry of Food in the Last Year: Not on file   Transportation Needs:     Lack of Transportation (Medical): Not on file    Lack of Transportation (Non-Medical):  Not on file   Physical Activity:     Days of Exercise per Week: Not on file    Minutes of Exercise per Session: Not on file   Stress:     Feeling of Stress : Not on file   Social Connections:     Frequency of Communication with Friends and Family: Not on file    Frequency of Social Gatherings with Friends and Family: Not on file    Attends Sabianist Services: Not on file    Active Member of Clubs or Organizations: Not on file    Attends Club or Organization Meetings: Not on file    Marital Status: Not on file   Intimate Partner Violence:     Fear of Current or Ex-Partner: Not on file    Emotionally Abused: Not on file    Physically Abused: Not on file    Sexually Abused: Not on file   Housing Stability:     Unable to Pay for Housing in the Last Year: Not on file    Number of [place] in the Last Year: Not on file    Unstable Housing in the Last Year: Not on file         ALLERGIES: Lactose    Review of Systems   Constitutional: Negative for chills, diaphoresis and fever. HENT: Negative for congestion, sneezing and sore throat. Eyes: Negative for visual disturbance. Respiratory: Positive for cough and shortness of breath. Negative for chest tightness and wheezing. Cardiovascular: Positive for chest pain. Negative for leg swelling. Gastrointestinal: Negative for abdominal pain, blood in stool, diarrhea, nausea and vomiting. Endocrine: Negative for polyuria. Genitourinary: Negative for difficulty urinating, dysuria, flank pain, hematuria and urgency. Musculoskeletal: Negative for back pain, myalgias, neck pain and neck stiffness. Skin: Negative for color change and rash. Neurological: Negative for dizziness, syncope, speech difficulty, weakness, light-headedness, numbness and headaches. Psychiatric/Behavioral: Negative for behavioral problems. All other systems reviewed and are negative. Vitals:    11/09/21 1817 11/09/21 1841   BP: 132/88    Pulse: (!) 129    Resp: 26    Temp: 98.4 °F (36.9 °C)    SpO2:  90%            Physical Exam     MDM  ED Course as of 11/09/21 1937 Tue Nov 09, 2021 1925 Patient's mentation has decreased significantly since arrival.  He remains tachypneic with oxygen saturations of 80 to 85% on 5 L nasal cannula.   I have concerns for his ability to protect his airway and have talked with patient's wife at bedside extensively about the impending need to intubate. She is agreeable with proceeding with this procedure. [BR]      ED Course User Index  [BR] Svetlana Jean DO       Critical Care  Performed by: Svetlana Jean DO  Authorized by: Svetlana Jean DO     Critical care provider statement:     Critical care time (minutes):  60    Critical care was necessary to treat or prevent imminent or life-threatening deterioration of the following conditions:  CNS failure or compromise, sepsis and respiratory failure    Critical care was time spent personally by me on the following activities:  Blood draw for specimens, development of treatment plan with patient or surrogate, discussions with consultants, evaluation of patient's response to treatment, examination of patient, interpretation of cardiac output measurements, obtaining history from patient or surrogate, ventilator management, review of old charts, re-evaluation of patient's condition, pulse oximetry, ordering and review of radiographic studies, ordering and review of laboratory studies and ordering and performing treatments and interventions    I assumed direction of critical care for this patient from another provider in my specialty: no    Comments:      Critical care time of 60 minutes secondary to hypoxic respiratory failure, sepsis and altered mental status. Patient required intubation, expert consultation and extensive discussion with family regarding patient's status and plans for treatment.

## 2021-11-09 NOTE — ED TRIAGE NOTES
Pt arrives per EMS from [facility] on [address]. Pt found to be hypoxic RA 70%. Placed on NRB and 90-95%. PT with stage 4 lung CA which is new diagnosis.

## 2021-11-10 NOTE — PROGRESS NOTES
Spoke with patient's wife regarding pt home medication, adcirca. She is going to  medication and bring to hospital for verification by pharmacy. This med not supplied at this facility.     Also, updated at this time regarding status and POC

## 2021-11-10 NOTE — PROGRESS NOTES
Pt having marginal pressure with MAPs  55-60     NP made aware     New orders for NS bolus of 500 mL

## 2021-11-10 NOTE — PROGRESS NOTES
ICU RN and Outreach RN received patient in ER after telephone report. Placed on monitor and taken via stretcher with RNs above and Oriana ELDER VSS, uneventful transfer. Accompanied by Pt's wife, Ira Bowman. Placed in waiting room at this time.

## 2021-11-10 NOTE — PROGRESS NOTES
Chart reviewed and spoke with wife s/p re-admission for acute on chronic respiratory failure with hypoxia. Pt currently intubated/vent. Wife confirms demographics. Pt was admitted from [facility] [address] Discussed poss needs regarding d/c needs, POC when stable. Did discuss concern if pt would be able to tolerate [facility] level of care and wife understands pt would need to be re-eval by [address] Discussed at length different LOC and she is possibly interested in [facility]. Briefly discuss hospice, but not in detail, that pt could transition, if needed. CM will continue to follow for any assist and d/c POC as wife is process information given today by MD's. Encouraged wife to try and go home to rest, as she currently plans on staying here/waiting room. Care Management Interventions  PCP Verified by CM: Yes  Mode of Transport at Discharge:  Other (see comment)  Transition of Care Consult (CM Consult): Discharge Planning, Other (pt admitted from [facility] [address])  Discharge Durable Medical Equipment:  (ramp, O2 -[employer], w/c)  Support Systems: Spouse/Significant Other (lives with wife, Wilber)  Confirm Follow Up Transport: Family  Freedom of Choice List was Provided with Basic Dialogue that Supports the Patient's Individualized Plan of Care/Goals, Treatment Preferences and Shares the Quality Data Associated with the Providers?: Yes  [employer] Information Provided?:  (MCR/Vera)  Discharge Location  Discharge Placement: Unable to determine at this time

## 2021-11-10 NOTE — ED NOTES
Intubation    Date/Time: 11/9/2021 10:49 PM  Performed by: Frances Kline DO  Authorized by: Frances Kline DO     Consent:     Consent obtained:  Emergent situation  Pre-procedure details:     Patient status:  Altered mental status    Mallampati score:  II    Pretreatment meds: Etomidate. Paralytics:  Rocuronium  Procedure details:     Preoxygenation:  Bag valve mask    CPR in progress: no      Intubation method:  Oral    Oral intubation technique:  Video-assisted    Laryngoscope blade: Mac 4    Tube size (mm):  8.0    Tube type:  Cuffed    Number of attempts:  1    Ventilation between attempts: no      Cricoid pressure: no      Tube visualized through cords: yes    Placement assessment:     ETT to teeth:  23    Tube secured with:  ETT theodore    Breath sounds:  Equal    Placement verification: chest rise, condensation, CXR verification, direct visualization, equal breath sounds, ETCO2 detector and tube exhalation      CXR findings:  ETT in proper place  Post-procedure details:     Patient tolerance of procedure:   Tolerated well, no immediate complications  Comments:      Patient intubated at the request of Dr. Santy Bautista

## 2021-11-10 NOTE — H&P
HISTORY AND PHYSICAL  Bunny Sanchez  11/9/2021   Date of Admission:  11/9/2021    The patient's chart is reviewed and the patient is discussed with the staff. Subjective:     Patient is a 80 y.o. male presents with [address] and obtundation and hypoxia, apparently aspirated during a swallowing evaluation and deteriorated as the day went on   He was discharged yesterday from the hospital where he was admitted for a few days for evaluation of a headache. 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 Below is the history and physical from our consultation during that hospitalization:    Patient is a 81 y.o.  male presents with headache and bradycardia.     He has been followed by Dr Nakita Hedrick in our office. He has a history of sarcoidosis (mediastinal lymphadenopathy and calcification, VAT 2001) as well as mild pulmonary hypertension Group 1/Group 3, who has been followed at [facility] since Feb 2018. He does have CAITLIN but has not been able to use CPAP in the past. He underwent right heart catheterization at [place] by Dr. Lore Garcia on 2/12/18 with mPAP 26), and is currently treated with tadalafil 40mg daily. His RHC and echocardiogram are noted below.      He was last seen by us in July 2021 and was stable. He has been on prednisone 10mg for sarcoidosis/Stage III COPD as well as Trelegy inhaler daily. He rarely requires albuterol. Baseline O2 needs: with inogen POC he is at 1-2 at rest, and 2lpm with exertion. He began taking Daliresp but began having side effects (jittery, tearly eyes, headache) and medication was stopped.      His spirometry shows mixed restriction and severe obstruction and his last spirometry was worse.  His echocardiogram was repeated here on 10/28 showing worsening RVSP 54 mm hg up from 35 in 2018. Pt would like to know if [facility] follow up can be virtual. He had a chest CT in March 2020 showing chronic, stable changes with multiple hilar lymph nodes and scarlike bilateral hilar masses. Chest CT this admission showed multiple large masses in the left axilla. He had a biopsy today of left axilla. MRI was done showing no acute abnormality.     He is currently on 40 mg prednisone, albuterol and stiolto here. He has called for nasal spray refills and recently had more congestion. His main complaint is his headache. He is retired [employer]. His mental state deteriorated as he was in the ER and although he initially responded to oxygen supplementation he was eventually intubated for airway protection due to change in mental state. Chest x-ray revealed bilateral worsening infiltrates, his white count was elevated and he was started on Unasyn. Vancomycin is being added. He will be admitted to the intensive care unit for further care release. Patient he is fully vaccinated for  Covid  And as of now a full code  Review of Systems  Pertinent items are noted in HPI. Prior to Admission Medications   Prescriptions Last Dose Informant Patient Reported? Taking? Aspirin, Buffered 81 mg tab   Yes No   Sig: Take  by mouth daily. DISABLED PLACARD (DISABLED PLACARD) DMV   Yes No   Sig: Supply prescription to [facility] with completed form RG-007A. OTHER   No No   Sig: Handicap placard  Dx: J44.9 COPD   OXYGEN-AIR DELIVERY SYSTEMS   Yes No   Sig: by Does Not Apply route. 2 lpm QD   albuterol (PROVENTIL VENTOLIN) 2.5 mg /3 mL (0.083 %) nebu   No No   Sig: 3 mL by Nebulization route every six (6) hours as needed for Wheezing. amLODIPine (NORVASC) 5 mg tablet   No No   Sig: Take 1 Tablet by mouth daily for 30 days. ascorbate calcium-bioflavonoid (DOROTHY-C WITH BIOFLAVONOIDS) 1,000-200 mg tab   Yes No   azelastine (ASTELIN) 137 mcg (0.1 %) nasal spray   No No   [place] by Both Nostrils route two (2) times a day.  Use in each nostril as directed   b complex vitamins tablet   Yes No   Sig: Take 1 Tab by mouth daily.   cholecalciferol, vitamin D3, (VITAMIN D3) 2,000 unit tab   Yes No   Sig: Take  by mouth daily. diclofenac (VOLTAREN) 1 % topical gel   Yes No   Sig: Apply 4 g to affected area four (4) times daily. prn   ferrous sulfate (Iron) 325 mg (65 mg iron) tablet   Yes No   Sig: Take  by mouth Daily (before breakfast). finasteride (Proscar) 5 mg tablet   No No   Sig: Take 1 Tab by mouth nightly. fluticasone-umeclidinium-vilanterol (TRELEGY ELLIPTA) 100-62.5-25 mcg inhaler   No No   Sig: Take 1 Puff by inhalation daily. melatonin 5 mg tablet   Yes No   Sig: Take 10 mg by mouth nightly. multivitamin (ONE A DAY) tablet   Yes No   Sig: Take 1 Tab by mouth daily. naproxen (NAPROSYN) 250 mg tablet   No No   Sig: Take 1 Tablet by mouth every eight (8) hours as needed for Pain for up to 7 days. Indications: headache   omeprazole (PRILOSEC) 40 mg capsule   No No   Sig: Take 1 Capsule by mouth daily. ondansetron (ZOFRAN ODT) 4 mg disintegrating tablet   No No   Sig: Take 1 Tablet by mouth every eight (8) hours as needed for Nausea or Vomiting (diarrhea). predniSONE (DELTASONE) 10 mg tablet   No No   Sig: Take 10 mg by mouth daily. rosuvastatin (CRESTOR) 20 mg tablet   No No   Sig: TAKE 1 TABLET NIGHTLY   tadalafiL, pulm. hypertension, (Adcirca) 20 mg tablet   No No   Sig: Take 2 Tablets by mouth daily. tamsulosin (Flomax) 0.4 mg capsule   No No   Sig: Take 1 Capsule by mouth daily. Facility-Administered Medications: None     Past Medical History:   Diagnosis Date    Agent orange exposure 1960s    Arthritis     Asthma     Body mass index (BMI) of 25.0 to 25.9 in adult 10/28/2021    Chronic obstructive pulmonary disease (HCC)     Chronic pain     GERD (gastroesophageal reflux disease)     Sarcoidosis 2001    Lung Biopsy was done in 2001;  Liver Biopsy--Negative for sarcidosis 5/06    Sleep apnea      Past Surgical History:   Procedure Laterality Date    HX HERNIA REPAIR Left 2004    Left inguinal hernia repair    HX HERNIA REPAIR Right 's    Right inguinal hernia    HX KNEE REPLACEMENT Left 2016    HX LAP CHOLECYSTECTOMY  2006    HX ORTHOPAEDIC Right 2014    ankle replacement @ [place]    HX OTHER SURGICAL  2006    Liver biopsy--negative for sarcoidosis    HX TONSILLECTOMY  as a child    WA CHEST SURGERY PROCEDURE UNLISTED      Lung bx for sarcoidosis     Social History     Socioeconomic History    Marital status:      Spouse name: Not on file    Number of children: Not on file    Years of education: Not on file    Highest education level: Not on file   Occupational History    Occupation: [employer]   Tobacco Use    Smoking status: Former Smoker     Packs/day: 1.50     Years: 5.00     Pack years: 7.50     Types: Cigarettes     Quit date: 1968     Years since quittin.8    Smokeless tobacco: Never Used   Vaping Use    Vaping Use: Never used   Substance and Sexual Activity    Alcohol use: Yes     Alcohol/week: 0.0 standard drinks     Comment: socially    Drug use: No    Sexual activity: Not on file   Other Topics Concern    Not on file   Social History Narrative    He is originally from [place], but retired to [place]. He is retired from [employer]. He has worked in [place] and has travelled extensively to [place], [place], [facility], [place] and [facility], and [place] ([facility]).  and lives with wife. Social Determinants of Health     Financial Resource Strain:     Difficulty of Paying Living Expenses: Not on file   Food Insecurity:     Worried About Running Out of Food in the Last Year: Not on file    Larry of Food in the Last Year: Not on file   Transportation Needs:     Lack of Transportation (Medical): Not on file    Lack of Transportation (Non-Medical):  Not on file   Physical Activity:     Days of Exercise per Week: Not on file    Minutes of Exercise per Session: Not on file   Stress:     Feeling of Stress : Not on file   Social Connections:  Frequency of Communication with Friends and Family: Not on file    Frequency of Social Gatherings with Friends and Family: Not on file    Attends Mosque Services: Not on file    Active Member of Clubs or Organizations: Not on file    Attends Club or Organization Meetings: Not on file    Marital Status: Not on file   Intimate Partner Violence:     Fear of Current or Ex-Partner: Not on file    Emotionally Abused: Not on file    Physically Abused: Not on file    Sexually Abused: Not on file   Housing Stability:     Unable to Pay for Housing in the Last Year: Not on file    Number of Places Lived in the Last Year: Not on file    Unstable Housing in the Last Year: Not on file     Family History   Problem Relation Age of Onset    Diabetes Mother     Hypertension Mother     Cancer Mother         Liver Cancer    Elevated Lipids Mother     Hypertension Father     Stroke Father     Cancer Sister     Headache Neg Hx      Allergies   Allergen Reactions    Lactose Diarrhea     Current Facility-Administered Medications   Medication Dose Route Frequency    sodium chloride (NS) flush 5-10 mL  5-10 mL IntraVENous Q8H    sodium chloride (NS) flush 5-10 mL  5-10 mL IntraVENous PRN    sodium chloride 0.9 % bolus infusion 1,000 mL  1,000 mL IntraVENous ONCE    ampicillin-sulbactam (UNASYN) 3 g in 0.9% sodium chloride (MBP/ADV) 100 mL MBP  3 g IntraVENous Q6H    etomidate (AMIDATE) 2 mg/mL injection 20 mg  20 mg IntraVENous ONCE    rocuronium injection 80 mg  80 mg IntraVENous NOW    rocuronium 10 mg/mL injection        propofol (DIPRIVAN) 10 mg/mL infusion  0-50 mcg/kg/min IntraVENous TITRATE    sodium chloride 0.9 % bolus infusion 1,000 mL  1,000 mL IntraVENous ONCE    vancomycin (VANCOCIN) 1500 mg in  ml infusion  1,500 mg IntraVENous NOW     Current Outpatient Medications   Medication Sig    amLODIPine (NORVASC) 5 mg tablet Take 1 Tablet by mouth daily for 30 days.     naproxen (NAPROSYN) 250 mg tablet Take 1 Tablet by mouth every eight (8) hours as needed for Pain for up to 7 days. Indications: headache    melatonin 5 mg tablet Take 10 mg by mouth nightly.  ondansetron (ZOFRAN ODT) 4 mg disintegrating tablet Take 1 Tablet by mouth every eight (8) hours as needed for Nausea or Vomiting (diarrhea).  rosuvastatin (CRESTOR) 20 mg tablet TAKE 1 TABLET NIGHTLY    OTHER Handicap placard  Dx: J44.9 COPD    azelastine (ASTELIN) 137 mcg (0.1 %) nasal spray 1 [place] by Both Nostrils route two (2) times a day. Use in each nostril as directed    tamsulosin (Flomax) 0.4 mg capsule Take 1 Capsule by mouth daily.  omeprazole (PRILOSEC) 40 mg capsule Take 1 Capsule by mouth daily.  albuterol (PROVENTIL VENTOLIN) 2.5 mg /3 mL (0.083 %) nebu 3 mL by Nebulization route every six (6) hours as needed for Wheezing.  tadalafiL, pulm. hypertension, (Adcirca) 20 mg tablet Take 2 Tablets by mouth daily.  fluticasone-umeclidinium-vilanterol (TRELEGY ELLIPTA) 100-62.5-25 mcg inhaler Take 1 Puff by inhalation daily.  finasteride (Proscar) 5 mg tablet Take 1 Tab by mouth nightly.  ferrous sulfate (Iron) 325 mg (65 mg iron) tablet Take  by mouth Daily (before breakfast).  ascorbate calcium-bioflavonoid (DOROTHY-C WITH BIOFLAVONOIDS) 1,000-200 mg tab     predniSONE (DELTASONE) 10 mg tablet Take 10 mg by mouth daily.  cholecalciferol, vitamin D3, (VITAMIN D3) 2,000 unit tab Take  by mouth daily.  DISABLED PLACARD (DISABLED PLACARD) DMV Supply prescription to [facility] with completed form RG-007A.  OXYGEN-AIR DELIVERY SYSTEMS by Does Not Apply route. 2 lpm QD    diclofenac (VOLTAREN) 1 % topical gel Apply 4 g to affected area four (4) times daily. prn    Aspirin, Buffered 81 mg tab Take  by mouth daily.  multivitamin (ONE A DAY) tablet Take 1 Tab by mouth daily.  b complex vitamins tablet Take 1 Tab by mouth daily.      Objective:     Vitals:    11/09/21 1817 11/09/21 1841 11/09/21 1912 11/09/21 1928   BP: 132/88   116/82   Pulse: (!) 129      Resp: 26   (!) 40   Temp: 98.4 °F (36.9 °C)      SpO2:  90%  (!) 89%   Weight:   150 lb (68 kg)    Height:   5' 4\" (1.626 m)      PHYSICAL EXAM   Constitutional:  the patient is sick appearing, elderly intubated on mechanical ventilation and unresponsive. EENMT:  Sclera clear, pupils equal, oral mucosa moist  Respiratory: Bilateral rhonchi but no wheezing symmetric air entry on mechanical ventilation  Cardiovascular:  RRR without M,G,R  Gastrointestinal: soft and non-tender; with positive bowel sounds. Musculoskeletal: warm without cyanosis. There is no lower extremity edema. A left axillary mass is noted   skin:  no jaundice or rashes, no wounds   Neurologic: Unresponsive on the ventilator    CXR:       CXR Results  (Last 48 hours)               11/09/21 1836  XR CHEST PORT Final result    Impression:  Low lung volumes with bilateral perihilar and lower lobe   infiltrates. Narrative:  PORTABLE CHEST 1 VIEW       HISTORY: Stage IV lung cancer. Hypoxia. COMPARISON: 10/29/2021       FINDINGS: Diffuse bilateral infiltrates are present. Lung volumes are   diminished. There are multiple calcified mediastinal and hilar lymph nodes. This   appearance suggests prior granulomatous inflammation or sarcoidosis. EKG leads are present. IMPRESSION  1. Unfortunately, there are new apparent inflammatory changes involving the mid  to lower bilateral lungs which confound pulmonary changes. There is focal  appearing activity in the medial left lung likely representing the patient's  known tumor with abnormal soft tissue density seen at this level on the prior CT  study. Additional parenchymal activity is favored to be inflammatory.     2. Multiple enlarged left subpectoral and axillary masses consistent with the  patient's known haroon metastases. A mildly metabolic mesenteric celiac axis  lymph node is also seen in the abdomen.  Highly suspicious hypermetabolic soft  tissue lesions are seen in the right paraspinal musculature and left gluteal  soft tissues concerning for soft tissue metastases. Focal activity is seen in  the greater trochanter of the proximal left femur concerning for an osseous  metastasis. Lastly, multifocal activity projects over the superior right lobe of  the liver. No clear defined hepatic lesion is suggested on CT imaging. This  could represent misregistered activity from the appearing inflammatory changes  in the right lung base. This is actually favored. This can be further assessed  with a hepatic protocol CT or MRI if clinically indicated, however. MRI BRAIN WITHOUT CONTRAST.     INDICATION:  Altered mental status. Extreme headache, nausea and vomiting.     COMPARISON:  None. Study performed within 24 hours of admission.     TECHNIQUE:  Sagittal T1, axial T1, T2, FLAIR, gradient echo, diffusion with ADC  map and coronal FLAIR.       FINDINGS:   -Diffusion images: No any areas of restricted diffusion to suggest acute  infarction.    -No midline shift, mass or mass effect. -Gradient echo images: are unremarkable. -FLAIR sequence images: Leukoaraiosis.  -No evidence of acute hemorrhage.    -The lateral ventricles are: normal size.    -The pituitary and parasellar structures: unremarkable on the sagittal T1  images.    -Posterior fossa structures are unremarkable. -The basal ganglia: appear symmetric.    -Orbits: are grossly normal.   -Paranasal sinuses: are clear.  -Other: None.     IMPRESSION  No acute abnormality. Mild degenerative change.   LAB:  Recent Labs     11/09/21 1822 11/08/21  1002 11/07/21  0629   WBC 19.2* 21.3* 14.3*   HGB 16.1 15.2 14.4   HCT 50.1 47.0 45.0    309 278   PCT 2.55*  --   --    LAC 2.6*  --   --      Recent Labs     11/09/21 1822 11/08/21  1002 11/07/21  0629   * 133* 132*   K >10.0* 4.4 4.0   CL 99 97* 96*   CO2 25 28 29   GLU 83 123* 107*   BUN 39* 33* 19 CREA 1.24 1.36 0.78*   CA 10.6* 10.8* 9.9   ALB 2.4*  --   --    AST UNABLE TO PERFORM TEST DUE TO HEMOLYSIS  --   --    ALT UNABLE TO PERFORM TEST DUE TO HEMOLYSIS  --   --    AP 79  --   --      Recent Labs     11/09/21  1822   LAC 2.6*   TROPHS 42.2*     Recent Labs     11/09/21  1838   PHI 7.42   PCO2I 36.2   PO2I 55*   HCO3I 23.6     No results for input(s): SDES, CULT in the last 72 hours. 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 And 90cc implant healthy 61 developed but that the symptoms that brought was development both 64 now with aspiration pneumonia and respiratory failure intubated for airway protection. He was at the same time is very labs this is a second party remain 64 inches and potential development and I believe her adult things like moderate fiber etc. will be changed any look at when he came out single time with still 105minutes the ED positive work-up was positive for syncope spell may and need to be treated but the improvement continues and white made of breathing and is questionable but understands that is more than as possible that I did not mean excuse as it explained individually    Statistics for the problem why develop such that in the first place at one point change from one descending colon is simply a problem making the decision the problem here is that is not good enough to make such a decision it is simply not worth and in the decision  Completely ruptured 2028 you do not do that with her past 8 years  Recent diagnosis well known to mention that there are plans.  More moderate prognosis is overall poor and given extensive cancer and no plans for curative intervention as noted by oncology during his previous hospitalization, palliative and comfort care is likely in order. Active Problems:  Patient Active Problem List   Diagnosis Code    Dyslipidemia E78.5    Sarcoidosis D86.9    Benign prostatic hyperplasia N40.0    Elevated prostate specific antigen (PSA) R97.20    Chronic GERD K21.9    Restrictive lung disease J98.4    Nocturnal hypoxemia G47.34    Pulmonary hypertension due to hypoxia I27.23    CAITLIN on CPAP G47.33, Z99.89    Primary insomnia F51.01    COPD with asthma (HCC) J44.9    Osteoarthritis of spine M47.9    Open angle glaucoma suspect with borderline findings at low risk H40.019    Dry eye syndrome of bilateral lacrimal glands H04.123    Dry eyes H04.123    Lens replaced by other means Z96.1    Epiretinal membrane H35.379    Open angle with borderline findings, high risk, bilateral H40.023    Pulmonary hypertension (HCC) I27.20    Dysphagia R13.10    Acute metabolic encephalopathy E17.68    Body mass index (BMI) of 25.0 to 25.9 in adult Z68.25    Hyperglycemia, drug-induced R73.9, T50.905A    Axillary mass, left R22.32    Headache R51.9    COPD, moderate (HCC) J44.9    Aspiration pneumonia (HCC) J69.0    Acute on chronic respiratory failure with hypoxia (HCC) J96.21    Malignant small cell cancer [facility]) C80.1         Hospital Problems  Date Reviewed: 10/28/2021          Codes Class Noted POA    Aspiration pneumonia ([facility] [address] ICD-10-CM: J69.0  ICD-9-CM: 507.0  11/9/2021 Unknown    Started on Unasyn and vancomycin, he is in acute respiratory failure with hypoxia on mechanical ventilation in addition to metabolic encephalopathy. He is currently comatose and on no sedating medication. DVT prophylaxis, maintain MAP of 65 mm in above, peptic ulcer disease prophylaxis.  He will be admitted to the intensive care unit for further care, daily ABGs and x-rays will be performed and hopefully he will be able to recover to the extent after which she will be able to be extubated although with his general condition being the way it is an underlying metastatic cancer I have my doubts that that will be the case. Acute on chronic respiratory failure with hypoxia (HCC) ICD-10-CM: J 5 one was . 21  ICD-9-CM: 518.84, 799.02  11/9/2021 Unknown    Most likely due to pneumonia on top of pulmonary fibrosis from sarcoidosis in the past as well as metastatic small cell carcinoma    Malignant small cell cancer ([facility] [address] ICD-10-CM: C80.1  ICD-9-CM: 199.1  11/9/2021 Unknown    Extensive stage small cell cancer with no plans for curative treatment as the patient is not physically capable to withstand it. The situation was discussed with the wife at length. This is very new information to her, his PET scan was just performed yesterday. I recommended a DNR status to begin with and would like to engage palliative care to help her navigate through this new and devastating information. I explained to the wife that DNR does not mean withdrawal of care at this point. I also explained that there is no cure for metastatic small cell cancer especially with his underlying physical condition. She would like to discuss this matter with her family but as of now the patient is a full code    Acute metabolic encephalopathy XEM-03-LD: G93.41  ICD-9-CM: 348.31  10/28/2021 Yes    Due to underlying cancer and pneumonia. Plan  See discussion above   More than 50% of the time documented was spent in face-to-face contact with the patient and in the care of the patient on the floor/unit where the patient is located. Lizett Schmidt MD    Dictated using voice recognition software.   Proof read but unrecognized errors may exist.

## 2021-11-10 NOTE — PROGRESS NOTES
VANCO RENAL INSUFFICIENCY NOTE [address] [facility] Pharmacokinetic Monitoring Service - Vancomycin    Mynor Steven is a 80 y.o. male starting on vancomycin therapy for asp pna. Pharmacy consulted by [facility] for monitoring and adjustment. Target Concentration: Random level ? 15 mg/L  Additional Antimicrobials: -    Pertinent Laboratory Values: Wt Readings from Last 1 Encounters:   11/09/21 68 kg (150 lb)     Temp Readings from Last 1 Encounters:   11/09/21 98.4 °F (36.9 °C)     Recent Labs     11/09/21  2112 11/09/21  2111 11/09/21  1822 11/08/21  1002 11/07/21  0629 11/07/21  0629   BUN 39*  --  39* 33*   < > 19   CREA 1.31  --  1.24 1.36   < > 0.78*   WBC  --   --  19.2* 21.3*  --  14.3*   PCT  --   --  2.55*  --   --   --    LAC  --  3.0* 2.6*  --   --   --     < > = values in this interval not displayed. MRSA Nasal Swab: not ordered. Order placed by pharmacy. .    Plan:  Concentration-guided dosing due to renal impairment  Start vancomycin 1.5gm x1  Pharmacy will continue to monitor patient and adjust therapy as indicated    Thank you for the consult,  GABINO Uriarte

## 2021-11-10 NOTE — PROGRESS NOTES
Ventilator check complete; patient has a #8. 0 ET tube secured at the 23 at the teeth. Patient is sedated. Patient is not able to follow commands. Breath sounds are diminished. Trachea is midline, Negative for subcutaneous air, and chest excursion is symmetric. Patient is also Negative for cyanosis and is Negative for pitting edema. All alarms are set and audible. Resuscitation bag is at the head of the bed. Ventilator Settings  Mode FIO2 Rate Tidal Volume Pressure PEEP I:E Ratio   Assist control  50 %   18bpm 450 ml     10 cm H20  1:2.1      Peak airway pressure: 39 cm H2O   Minute ventilation: 10.3 l/min     ABG: No results for input(s): PH, PCO2, PO2, HCO3 in the last 72 hours.       Irwin Tillman, RT

## 2021-11-10 NOTE — ED NOTES
Assisting primary nurse. Kristie Jackson from Dr. Gianluca Hogan for bilateral upper soft restraints for agitation while intubated.

## 2021-11-10 NOTE — PROGRESS NOTES
Ventilator check complete; patient has a #8. 0 ET tube secured at the 23 at the lip. Patient is sedated. Patient is not able to follow commands. Breath sounds are coarse and expiratory wheezes. Trachea is midline, Negative for subcutaneous air, and chest excursion is symmetric. Patient is also Negative for cyanosis and is Negative for pitting edema. All alarms are set and audible. Resuscitation bag is at the head of the bed. Ventilator Settings  Mode FIO2 Rate Tidal Volume Pressure PEEP I:E Ratio   VC+, Assist control  50 %    450 ml     10 cm H20  1:2.1      Peak airway pressure: 42 cm H2O   Minute ventilation: 8.1 l/min     ABG: No results for input(s): PH, PCO2, PO2, HCO3 in the last 72 hours.       Jolene Santos

## 2021-11-10 NOTE — PROGRESS NOTES
Patient was intubated with a number 8.0 ET Tube. Tube placement verified by auscultation and ETCO2 monitor. ET Tube is secured at the 23 cm astrid at the teeth and on the right side. Patient was intubated by Dr Ramirez Ibarra on the 1 attempt. Breath sounds are coarse and diminished. Patient is Negative for subcutaneous air and chest excursion is symmetric. Trachea is midline. Patient is also Negative for cyanosis and is Negative for pitting edema. Patient placed on ventilator on documented settings. All alarms are set and audible. Resuscitation bag is at the head of the bed.       Ventilator Settings  Mode FIO2 Rate Tidal Volume Pressure PEEP I:E Ratio   Assist control, VC+  100 %   18 450 ml     10 cm H20  1:2.7      Peak airway pressure: 39 cm H2O   Minute ventilation: 8.69 l/min

## 2021-11-10 NOTE — ACP (ADVANCE CARE PLANNING)
Advance Care Planning     General Advance Care Planning (ACP) Conversation      Date of Conversation: 11/9/2021  Conducted with: Healthcare Decision Maker: Named in Advance Directive or Healthcare Power of  by MD.     Healthcare Decision Maker:     Primary Decision Maker: Lyndsey Sanchez - Spouse - [phone]  Click here to complete [address] including selection of the Healthcare Decision Maker Relationship (ie \"Primary\")      Today we documented Decision Maker(s) consistent with ACP documents on file. Content/Action Overview:   LW/HCPOA on file. Wife listed agent. Full code per MD orders at present.      Length of Voluntary ACP Conversation in minutes:  <16 minutes (Non-Billable)    Shira Rangel RN

## 2021-11-10 NOTE — PROGRESS NOTES
[facility] DAILY FOLLOW UP RENAL INSUFFICIENCY PATIENT   [address] [facility] Pharmacokinetic Monitoring Service - Vancomycin    Consulting Provider: Dr. Jasson Patricio   Indication: aspiration pneumonia  Target Concentration: Random level ? 15 mg/L  Day of Therapy: 2  Additional Antimicrobials: unasyn    Pertinent Laboratory Values: Wt Readings from Last 1 Encounters:   11/09/21 61.7 kg (136 lb)     Temp Readings from Last 1 Encounters:   11/10/21 98.3 °F (36.8 °C)     Recent Labs     11/10/21  0336 11/10/21  0001 11/09/21  2112 11/09/21  2111 11/09/21  1822 11/08/21  1002 11/08/21  1002   BUN 36*  --  39*  --  39*   < > 33*   CREA 1.16  --  1.31  --  1.24   < > 1.36   WBC 14.0*  --   --   --  19.2*  --  21.3*   PCT  --   --   --   --  2.55*  --   --    LAC  --  1.8  --  3.0* 2.6*  --   --     < > = values in this interval not displayed.        No results found for: Chantale Pineda    MRSA Nasal Swab: was negative on 11/10, ordered for respiratory indication    Assessment:  Date:  Dose/Freq Admin Times Level/Time:   11/9 1500mg x1 2240    11/10      11/11   Rd @ (0400) =                 Plan:  Concentration-guided dosing due to renal impairment  Vancomycin concentration ordered for 11/10 @ 0400  Pharmacy will continue to monitor patient and adjust therapy as indicated    Thank you for the consult,  GABINO Coyle

## 2021-11-10 NOTE — PROGRESS NOTES
Initial visit made to patient and a prayer was provided. A  card was left.         Charline Mckinley, [address] [facility], [facility]

## 2021-11-10 NOTE — PROGRESS NOTES
[facility] Critical Care Note[de-identified] 11/10/2021  Earlyne Liter Skipp  Admission Date: 11/9/2021     Length of Stay: 1 days    Background: Patient is a 80 y.o. male presents with [address] and obtundation and hypoxia, apparently aspirated during a swallowing evaluation and deteriorated as the day went on   He was discharged yesterday from the hospital where he was admitted for a few days for evaluation of a headache. 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 Below is the history and physical from our consultation during that hospitalization:     Patient is Z 89 y.o.  male presents with headache and bradycardia.     He has been followed by Dr Emma Parrish in our office. He has a history of sarcoidosis (mediastinal lymphadenopathy and calcification, VAT 2001) as well as mild pulmonary hypertension Group 1/Group 3, who has been followed at [facility] since Feb 2018. He does have CAITLIN but has not been able to use CPAP in the past. He underwent right heart catheterization at [place] by Dr. Butt Parents on 2/12/18 with mPAP 26), and is currently treated with tadalafil 40mg daily. His RHC and echocardiogram are noted below.      He was last seen by us in July 2021 and was stable. He has been on prednisone 10mg for sarcoidosis/Stage III COPD as well as Trelegy inhaler daily. He rarely requires albuterol. Baseline O2 needs: with inogen POC he is at 1-2 at rest, and 2lpm with exertion. He began taking Daliresp but began having side effects (jittery, tearly eyes, headache) and medication was stopped.      His spirometry shows mixed restriction and severe obstruction and his last spirometry was worse.  His echocardiogram was repeated here on 10/28 showing worsening RVSP 54 mm hg up from 35 in 2018. Pt would like to know if [facility] follow up can be virtual. He had a chest CT in March 2020 showing chronic, stable changes with multiple hilar lymph nodes and scarlike bilateral hilar masses. Chest CT this admission showed multiple large masses in the left axilla. He had a biopsy today of left axilla. MRI was done showing no acute abnormality.     He is currently on 40 mg prednisone, albuterol and stiolto here. He has called for nasal spray refills and recently had more congestion. His main complaint is his headache. He is retired [employer].   His mental state deteriorated as he was in the ER and although he initially responded to oxygen supplementation he was eventually intubated for airway protection due to change in mental state. Chest x-ray revealed bilateral worsening infiltrates, his white count was elevated and he was started on Unasyn. Vancomycin is being added. He will be admitted to the intensive care unit for further care release. Patient he is fully vaccinated for  Covid  And as of now a full code    Notable PMH:  has a past medical history of Agent orange exposure (1960s), Arthritis, Asthma, Body mass index (BMI) of 25.0 to 25.9 in adult (10/28/2021), Chronic obstructive pulmonary disease ([facility] [address] Chronic pain, GERD (gastroesophageal reflux disease), Sarcoidosis (2001), and Sleep apnea. 24 Hour events: admitted last pm , intubated in the ER     ROS: unable to obtain/negative except as listed elsewhere. Lines: (insertion date)   ETT: (11/9)  Chavez: (11/9)  OGT: (11/9)  peripheral    Drips: current dose (range)  Diprivan Dose (mcg/kg/min): 25 mcg/kg/min     Pertinent Exam:         Blood pressure 106/69, pulse (!) 103, temperature 98.3 °F (36.8 °C), resp. rate 18, height 5' 4\" (1.626 m), weight 136 lb (61.7 kg), SpO2 95 %. Intake/Output Summary (Last 24 hours) at 11/10/2021 1010  Last data filed at 11/10/2021 3590  Gross per 24 hour   Intake 2773 ml   Output 320 ml   Net 2453 ml     Constitutional:  intubated and mechanically ventilated.   EENMT:  Sclera clear, pupils equal, oral mucosa moist  Respiratory:  crackles   Cardiovascular: RRR  Gastrointestinal:  soft with no tenderness; positive bowel sounds present  Musculoskeletal:  warm with no cyanosis, no lower extremity edema  Skin:  no jaundice or ecchymosis  Neurologic:sedated  Psychiatric: sedated and paralyzed    CXR:      Recent Labs     11/10/21  0336 11/10/21  0001 11/09/21 2111 11/09/21 1822 11/08/21  1002   WBC 14.0*  --   --  19.2* 21.3*   HGB 13.1*  --   --  16.1 15.2   HCT 41.6  --   --  50.1 47.0     --   --  324 309   INR  --  1.3  --   --   --    PCT  --   --   --  2.55*  --    LAC  --  1.8 3.0* 2.6*  --      Recent Labs     11/10/21  0336 11/09/21  2112 11/09/21  1822    140 130*   K 4.2 4.7 >10.0*   * 103 99   CO2 24 25 25   GLU 89 82 83   BUN 36* 39* 39*   CREA 1.16 1.31 1.24   MG 2.0  --   --    CA 8.9 10.1 10.6*   PHOS 3.9*  --   --    ALB  --  2.4* 2.4*   AST  --  39* UNABLE TO PERFORM TEST DUE TO HEMOLYSIS   ALT  --  29 UNABLE TO PERFORM TEST DUE TO HEMOLYSIS   AP  --  74 79     Recent Labs     11/10/21  0006 11/10/21  0001 11/09/21 2111 11/09/21 2002 11/09/21 1915 11/09/21  1822   LAC  --  1.8 3.0*  --   --  2.6*   TROPHS 52.9*  --   --  67.6*  --  42.2*   BNPNT  --   --   --   --  2,558*  --      Recent Labs     11/08/21  1311   GLUCPOC 119*     ECHO: Results from Hospital Encounter encounter on 10/28/21    ECHO ADULT COMPLETE    Interpretation Summary  · TV: Mild tricuspid valve regurgitation is present. Right Ventricular Arterial Pressure (RVSP) is 54 mmHg. · Image quality for this study was technically difficult. · Echo study was technically difficulty. · LV: Estimated LVEF is 55 - 60%. Normal cavity size, systolic function (ejection fraction normal) and diastolic function. Mild concentric hypertrophy. Wall motion: normal.  · LA: Moderately dilated left atrium. · RA: Mildly dilated right atrium. · AV: Mild aortic valve regurgitation is present. · MV: Mild mitral valve regurgitation is present.   · PV: Mild pulmonic valve regurgitation is present. Results     Procedure Component Value Units Date/Time    CULTURE, RESPIRATORY/SPUTUM/BRONCH Pete Swan [[id number]] Collected: 11/10/21 0001    Order Status: Completed Specimen: Sputum,ET Suction Updated: 11/10/21 0918     Special Requests: NO SPECIAL REQUESTS        GRAM STAIN PENDING     Culture result:       NO GROWTH AFTER SHORT PERIOD OF INCUBATION. FURTHER RESULTS TO FOLLOW AFTER OVERNIGHT INCUBATION. MSSA/MRSA SC BY PCR, NASAL SWAB [[id number]]  (Abnormal) Collected: 11/10/21 0001    Order Status: Completed Specimen: Nasal swab Updated: 11/10/21 0204     Special Requests: NO SPECIAL REQUESTS        Culture result:       MRSA target DNA not detected, SA target DNA detected. A MRSA negative, SA positive test result does not preclude MRSA nasal colonization. CULTURE, BLOOD [[id number]]     Order Status: Canceled Specimen: Blood     CULTURE, BLOOD [[id number]]     Order Status: Canceled Specimen: Blood     COVID-19 RAPID TEST [[id number]] Collected: 11/09/21 2027    Order Status: Completed Specimen: Nasopharyngeal Updated: 11/09/21 2145     Specimen source NASAL        COVID-19 rapid test Not detected        Comment:      The specimen is NEGATIVE for SARS-CoV-2, the novel coronavirus associated with COVID-19. A negative result does not rule out COVID-19. This test has been authorized by the FDA under an Emergency Use Authorization (EUA) for use by authorized laboratories.         Fact sheet for Healthcare Providers: ConventionUpdate.co.nz  Fact sheet for Patients: ConventionUpdate.co.nz       Methodology: Isothermal Nucleic Acid Amplification         BLOOD CULTURE [[id number]] Collected: 11/09/21 1835    Order Status: Completed Specimen: Blood Updated: 11/10/21 0814     Special Requests: --        NO SPECIAL REQUESTS  LEFT  HAND       Culture result: NO GROWTH AFTER 13 HOURS       BLOOD CULTURE [[id number]] Collected: 11/09/21 1823    Order Status: Completed Specimen: Blood Updated: 11/10/21 0814     Special Requests: --        NO SPECIAL REQUESTS  RIGHT  Antecubital       Culture result: NO GROWTH AFTER 13 HOURS       SARS-COV-2, PCR [[id number]] Collected: 11/04/21 1110    Order Status: Completed Specimen: Nasopharyngeal Updated: 11/04/21 1438     Specimen source Nasopharyngeal        SARS-CoV-2 Not detected        Comment:      The specimen is NEGATIVE for SARS-CoV-2, the novel coronavirus associated with COVID-19. This test has been authorized by the FDA under an Emergency Use Authorization (EUA) for use by authorized laboratories.         Fact sheet for Healthcare Providers: MovieLaLa.co.nz       Fact sheet for Patients: WorldStateco.nz       Methodology: RT-PCR         RESPIRATORY VIRUS PANEL W/COVID-19, PCR [[id number]]  (Abnormal) Collected: 10/29/21 0220    Order Status: Completed Specimen: Nasopharyngeal Updated: 10/29/21 0423     Adenovirus NOT DETECTED        Coronavirus 229E NOT DETECTED        Coronavirus HKU1 NOT DETECTED        Coronavirus CVNL63 NOT DETECTED        Coronavirus OC43 NOT DETECTED        SARS-CoV-2, PCR NOT DETECTED        Metapneumovirus NOT DETECTED        Rhinovirus and Enterovirus Detected        Influenza A NOT DETECTED        Influenza B NOT DETECTED        Parainfluenza 1 NOT DETECTED        Parainfluenza 2 NOT DETECTED        Parainfluenza 3 NOT DETECTED        Parainfluenza virus 4 NOT DETECTED        RSV by PCR NOT DETECTED        B. parapertussis, PCR NOT DETECTED        Bordetella pertussis - PCR NOT DETECTED        Chlamydophila pneumoniae DNA, QL, PCR NOT DETECTED        Mycoplasma pneumoniae DNA, QL, PCR NOT DETECTED       COVID-19 RAPID TEST [[id number]] Collected: 10/28/21 1833    Order Status: Completed Specimen: Nasopharyngeal Updated: 10/29/21 0034     Specimen source NASAL        COVID-19 rapid test Not detected        Comment:      The specimen is NEGATIVE for SARS-CoV-2, the novel coronavirus associated with COVID-19. A negative result does not rule out COVID-19. This test has been authorized by the FDA under an Emergency Use Authorization (EUA) for use by authorized laboratories. Fact sheet for Healthcare Providers: ZoomForthnz  Fact sheet for Patients: ZoomForth       Methodology: Isothermal Nucleic Acid Amplification         SARS-COV-2, PCR [[id number]] Collected: 10/28/21 8860    Order Status: Completed Specimen: Nasopharyngeal Updated: 10/29/21 0916     Specimen source Nasopharyngeal        SARS-CoV-2 Not detected        Comment:      The specimen is NEGATIVE for SARS-CoV-2, the novel coronavirus associated with COVID-19. This test has been authorized by the FDA under an Emergency Use Authorization (EUA) for use by authorized laboratories.         Fact sheet for Healthcare Providers: ZoomForthnz       Fact sheet for Patients: ZoomForth       Methodology: RT-PCR         CULTURE, BLOOD [[id number]] Collected: 10/28/21 2021    Order Status: Completed Specimen: Blood Updated: 11/02/21 0748     Special Requests: RIGHT ANTECUBITAL        Culture result: NO GROWTH 5 DAYS       CULTURE, BLOOD [[id number]] Collected: 10/28/21 2021    Order Status: Completed Specimen: Blood Updated: 11/02/21 0748     Special Requests: LEFT HAND        Culture result: NO GROWTH 5 DAYS           Inpat Anti-Infectives (From admission, onward)     Start     Ordered Stop    11/09/21 2249  Vancomycin Intermittent dosing- pharmacy to dose  Other,   RX DOSING/MONITORING         11/09/21 2255 --    11/09/21 1850  ampicillin-sulbactam (UNASYN) 3 g in 0.9% sodium chloride (MBP/ADV) 100 mL MBP  3 g,   IntraVENous,   EVERY 6 HOURS        Note to Pharmacy: 4 hour extended infusion protocol    11/09/21 1849 --              Ventilator Settings:  Ideal body weight: 59.2 kg (130 lb 8.2 oz)   Mode FIO2 Rate Tidal Volume Pressure PEEP   VC+, Assist control  50 %    450 ml     10 cm H20    Peak airway pressure: 42 cm H2O       Minute ventilation: 8.1 l/min  ABG:  Recent Labs     11/10/21  0230 11/09/21 2049 11/09/21  1838   PHI 7.34* 7.39 7.42   PCO2I 41.4 37.6 36.2   PO2I 89 66* 55*   HCO3I 22.1 22.5 23.6     Assessment and Plan:  (Medical Decision Making)   Impression: 80 y.o. male with sarcoid, pulm hpt admitted 11/9 after aspirating following swallow study on vent support. NEURO:   Sedation: propofol 25  Analgesia: fentanyl 50  Paralytics: none  CV:   Volume Status: euvolemic  Septic shock: no pressors  NSTEMI:   PULM:   Acute hypoxemic/hypercapneic respiratory failure: On vent 50% fio2 critically ill  Probable pneumonia due to aspiration  RENAL:  PATRICIO: cr ok  Lactic acidosis: better  Electrolytes: ok  GI:   Nutrition:npo for now  HEME:   Anemia: hg 13.1  Thrombocytopenia: 254  Anticoagulation:  lovenox 40 q day  ID:   unasyn and vanc day 2  ENDO:   DM: monitor glucose  Skin: no decub, turns, preventive care  Prophy: lovenox and add pepcid    Family updated -wife    Full Code    The patient is critically ill with respiratory failure, circulatory failure and requires high complexity decision making for assessment and support including frequent ventilator adjustment , frequent evaluation and titration of therapies , application of advanced monitoring technologies and extensive interpretation of multiple databases    Cumulative time devoted to patient care services by me for day of service is 41  mins.     Morenita Kuo MD

## 2021-11-10 NOTE — PROGRESS NOTES
Pt's wife, Eliot Evangelista at bedside for visit. Discussed POC with RN. ICU consents/information signed and reviewed. No questions at this time.     Daniel Urrutia (wife) [phone]

## 2021-11-10 NOTE — CONSULTS
Comprehensive Nutrition Assessment    Type and Reason for Visit: Initial, Consult  Tube Feeding Management (pulmonary)    Nutrition Recommendations/Plan:   Enteral Nutrition:   Enteral Access: Orogastric  Formula: Peptide Base (Vital AF 1.2 Tae)  Delivery: Continuous feeding: Initiate at  25ml/hour, progress by 10ml/4 hours to goal rate of 55ml/hour. Water flush 90 ml every 4 hours  Modulars: Continue None   Enteral regimen at goal to provide:  1452 calories (94% estimated calorie needs), 91 grams protein (100% estimated protein needs) and 1521 ml free fluid (~1 ml/kcal) calculations based on 22 hour infusion. TF + propofol to provide 1721 kcal/day and meet 100% of estimated kcal needs. Nutritional Supplement Therapy:   Implement electrolyte replacement per nutrition support protocols  Replacement indicated:  None  Labs:   EN labs: BMP daily, Mg MWF and Phos MWF. Meals and Snacks:  Continue current diet. NPO  Bowel regimen:   Continue colace BID. Start daily miralax while on fentanyl. Malnutrition Assessment:  Malnutrition Status: At risk for malnutrition (specify) (NPO, vent, wt loss PTA pt per review of weight history)    Nutrition Assessment:   Nutrition History: Pt known to nutrition department from recent admission. At that time, pt was on an easy to chew, lactose controlled diet with ensure enlive TID with meals. Nutrition Background: Pt admitted with hypoxia from rehab facility. Noted recent admission to [facility]. PMH notable for HTN and new lung cancer diagnosis (probable metastatic cancer). Daily Update:  Pt intubated and sedated. OGT in place. Propofol providing ~269 kcal/d. Abdominal Status (last documented): Intact, Soft abdomen with Hypoactive  bowel sounds. Last BM  (PTA).   Pertinent Medications: unasyn, prednisone  Continuous: fentanyl, propofol  Bowel regimen: colace BID    Lab Results   Component Value Date/Time    Sodium 143 11/10/2021 03:36 AM    Potassium 4.2 11/10/2021 03:36 AM    Chloride 110 (H) 11/10/2021 03:36 AM    CO2 24 11/10/2021 03:36 AM    Anion gap 9 11/10/2021 03:36 AM    Glucose 89 11/10/2021 03:36 AM    BUN 36 (H) 11/10/2021 03:36 AM    Creatinine 1.16 11/10/2021 03:36 AM    Calcium 8.9 11/10/2021 03:36 AM    Albumin 2.4 (L) 11/09/2021 09:12 PM    Magnesium 2.0 11/10/2021 03:36 AM    Phosphorus 3.9 (H) 11/10/2021 03:36 AM     Nutrition Related Findings:   NFPE deferred d/t current medical condition. Vent. OGT in place since 11/9. Current Nutrition Therapies:  DIET NPO    Current Intake:   Average Meal Intake: NPO   Additional Caloric Sources: Propofol @ 10.2 ml/hr provides 269 kcal/d. Anthropometric Measures:  Height: 5' 4\" (162.6 cm)  Current Body Wt: 61.7 kg (136 lb 0.4 oz) (11/9), Weight source: Not specified  BMI: 23.3, Normal weight (BMI 22.0-24.9) age over 72  Admission Body Weight: 149 lb 14.6 oz (11/9, pt stated)  Ideal Body Weight (lbs) (Calculated): 130 lbs (59 kg), 104.6 %  Edema: No data recorded     WT / BMI 11/9/2021 11/8/2021 10/28/2021 9/27/2021 7/26/2021   WEIGHT 136 lb 150 lb 2.1 oz  149 lb 9.6 oz 148 lb     WT / BMI 7/8/2021 1/25/2021 10/15/2020   WEIGHT 150 lb 159 lb 158 lb 12.8 oz     Per weights listed in EMR, potential for a 23 lb, 14.4% clinically insignificant weight loss over ~11 months. Estimated Daily Nutrient Needs:  Energy (kcal/day): 7027-4777 (Kcal/kg (25-30), Weight Used: Current (61.7 kg))  Protein (g/day):  Weight Used: (Current (1.2-2))  Fluid (ml/day): 2263-7014 (1 ml/kcal (or per MD))    Nutrition Diagnosis:   · Inadequate oral intake related to impaired respiratory function as evidenced by NPO or clear liquid status due to medical condition, intubation, nutrition support-enteral nutrition    Nutrition Interventions:   Food and/or Nutrient Delivery: Continue NPO, Start tube feeding     Coordination of Nutrition Care: Continue to monitor while inpatient  Plan of Care discussed with Lamont Kendall RN. Goals:       Active Goal: Tolerate initiation of TF within 48 hrs. Nutrition Monitoring and Evaluation:      Food/Nutrient Intake Outcomes: Enteral nutrition intake/tolerance  Physical Signs/Symptoms Outcomes: Biochemical data, GI status, Hemodynamic status, Weight    Discharge Planning:     Too soon to determine    Rowdy Gray [address], [address], [address] [place], [facility] [address]

## 2021-11-10 NOTE — CONSULTS
Palliative Care    Patient: Reji Sanchez MRN: [id number]  SSN: [id number]    YOB: 1940  Age: 80 y.o. Sex: male       Date of Request: 11/10/2021  Date of Consult:  11/10/2021  Reason for Consult:  family support and education and goals of care  Requesting Physician: Dr. Teressa Arthur     Assessment/Plan:     Principal Diagnosis:    Dyspnea  R06.00    Additional Diagnoses:   · Altered Mental Status R41.82  · Debility, Unspecified  R53.81  · Frailty  R54  · Counseling, Encounter for Medical Advice  Z71.9  · Encounter for Palliative Care  Z51.5    Palliative Performance Scale (PPS):       Medical Decision Making:   Reviewed and summarized labs and imaging. Pt admitted overnight. Sedated on vent. Met with pt spouse outside of room. Reviewed findings since admission and recent pet scan completed prior to admission. 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 Pt has not had an opportunity to follow up with oncology. Spouse is tearful but expressed her understanding. She is hopeful for pt to improve and be extubated so that they may have some time together. I explained that any cancer directed therapy would be dependent upon pt improvement in overall performance status if he was able to come off the ventilator. I discussed code status and explained that given advanced cancer diagnosis cpr would not be beneficial for pt. She said she will consider. Will continue to follow. Will discuss findings with members of the interdisciplinary team.      Thank you for this referral.         Subjective:     History obtained from:  Family and Chart    Chief Complaint: dyspnea  History of Present Illness:  80 y. o. male presents with [address] and obtundation and hypoxia, apparently aspirated during a swallowing evaluation and deteriorated as the day went on   He was discharged yesterday from the hospital where he was admitted for a few days for evaluation of a headache. 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 Below is the history and physical from our consultation during that hospitalization:     Patient is G 15 y.o.  male presents with headache and bradycardia.     He has been followed by Dr Dale Crawford in our office. He has a history of sarcoidosis (mediastinal lymphadenopathy and calcification, VAT 2001) as well as mild pulmonary hypertension Group 1/Group 3, who has been followed at [facility] since Feb 2018. He does have CAITLIN but has not been able to use CPAP in the past. He underwent right heart catheterization at [facility] by Dr. Jak Bates on 2/12/18 with mPAP 26), and is currently treated with tadalafil 40mg daily. His RHC and echocardiogram are noted below.      He was last seen by us in July 2021 and was stable. He has been on prednisone 10mg for sarcoidosis/Stage III COPD as well as Trelegy inhaler daily. He rarely requires albuterol. Baseline O2 needs: with inogen POC he is at 1-2 at rest, and 2lpm with exertion. He began taking Daliresp but began having side effects (jittery, tearly eyes, headache) and medication was stopped. Advance Directive: Yes       Code Status:  Full Code            Health Care Power of : Yes - Copy of [address] on file. Spouse is decision maker    Past Medical History:   Diagnosis Date    Agent orange exposure 1960s    Arthritis     Asthma     Body mass index (BMI) of 25.0 to 25.9 in adult 10/28/2021    Chronic obstructive pulmonary disease (HCC)     Chronic pain     GERD (gastroesophageal reflux disease)     Sarcoidosis 2001    Lung Biopsy was done in 2001;  Liver Biopsy--Negative for sarcidosis 5/06    Sleep apnea       Past Surgical History:   Procedure Laterality Date    HX HERNIA REPAIR Left 2004    Left inguinal hernia repair    HX HERNIA REPAIR Right 1990's    Right inguinal hernia    HX KNEE REPLACEMENT Left 2016  HX LAP CHOLECYSTECTOMY  2006    HX ORTHOPAEDIC Right 2014    ankle replacement @ [facility]    HX OTHER SURGICAL  2006    Liver biopsy--negative for sarcoidosis    HX TONSILLECTOMY  as a child    AL CHEST SURGERY PROCEDURE UNLISTED      Lung bx for sarcoidosis     Family History   Problem Relation Age of Onset    Diabetes Mother     Hypertension Mother     Cancer Mother         Liver Cancer    Elevated Lipids Mother     Hypertension Father     Stroke Father     Cancer Sister     Headache Neg Hx       Social History     Tobacco Use    Smoking status: Former Smoker     Packs/day: 1.50     Years: 5.00     Pack years: 7.50     Types: Cigarettes     Quit date: 1968     Years since quittin.8    Smokeless tobacco: Never Used   Substance Use Topics    Alcohol use: Yes     Alcohol/week: 0.0 standard drinks     Comment: socially     Prior to Admission medications    Medication Sig Start Date End Date Taking? Authorizing Provider   naproxen (NAPROSYN) 250 mg tablet Take 1 Tablet by mouth every eight (8) hours as needed for Pain for up to 7 days. Indications: headache 11/8/21 11/15/21 Yes Francoise Rojas,    melatonin 5 mg tablet Take 10 mg by mouth nightly. Yes Provider, Historical   ondansetron (ZOFRAN ODT) 4 mg disintegrating tablet Take 1 Tablet by mouth every eight (8) hours as needed for Nausea or Vomiting (diarrhea). 10/1/21  Yes Olivia To MD   rosuvastatin (CRESTOR) 20 mg tablet TAKE 1 TABLET NIGHTLY 21  Yes Olivia To MD   OTHER Handicap placard  Dx: J44.9 COPD 21  Yes Kimberly Becker MD   azelastine (ASTELIN) 137 mcg (0.1 %) nasal spray 1 [place] by Both Nostrils route two (2) times a day. Use in each nostril as directed 21  Yes Denisse Garcia NP   tamsulosin (Flomax) 0.4 mg capsule Take 1 Capsule by mouth daily. 21  Yes Olivia To MD   omeprazole (PRILOSEC) 40 mg capsule Take 1 Capsule by mouth daily.  21  Yes Olivia To MD   albuterol (PROVENTIL VENTOLIN) 2.5 mg /3 mL (0.083 %) nebu 3 mL by Nebulization route every six (6) hours as needed for Wheezing. 7/8/21  Yes Swati Alvarez MD   tadalafiL, pulm. hypertension, (Adcirca) 20 mg tablet Take 2 Tablets by mouth daily. 7/8/21  Yes Swati Alvarez MD   fluticasone-umeclidinium-vilanterol (TRELEGY ELLIPTA) 100-62.5-25 mcg inhaler Take 1 Puff by inhalation daily. 6/17/21  Yes Swati Alvarez MD   finasteride (Proscar) 5 mg tablet Take 1 Tab by mouth nightly. 1/25/21  Yes Alicia Faust MD   ferrous sulfate (Iron) 325 mg (65 mg iron) tablet Take  by mouth Daily (before breakfast). Yes Provider, Historical   ascorbate calcium-bioflavonoid (DOROTHY-C WITH BIOFLAVONOIDS) 1,000-200 mg tab    Yes Provider, Historical   predniSONE (DELTASONE) 10 mg tablet Take 10 mg by mouth daily. 9/19/19  Yes Swati Alvarez MD   cholecalciferol, vitamin D3, (VITAMIN D3) 2,000 unit tab Take  by mouth daily. Yes Provider, Historical   DISABLED PLACARD (DISABLED PLACARD) DMV Supply prescription to [facility] with completed form RG-007A. 1/26/16  Yes Provider, Historical   Aspirin, Buffered 81 mg tab Take  by mouth daily. Yes Provider, Historical   multivitamin (ONE A DAY) tablet Take 1 Tab by mouth daily. Yes Provider, Historical   b complex vitamins tablet Take 1 Tab by mouth daily. Yes Provider, Historical   amLODIPine (NORVASC) 5 mg tablet Take 1 Tablet by mouth daily for 30 days. Patient not taking: Reported on 11/10/2021 11/8/21 12/8/21  CiEliel de la cruz, DO   OXYGEN-AIR DELIVERY SYSTEMS by Does Not Apply route. 2 lpm QD  Patient not taking: Reported on 11/10/2021    Provider, Historical   diclofenac (VOLTAREN) 1 % topical gel Apply 4 g to affected area four (4) times daily. prn  Patient not taking: Reported on 11/10/2021    Provider, Historical       Allergies   Allergen Reactions    Lactose Diarrhea        Review of systems unable to obtain due to mentation.       Objective:     Visit Vitals  /69   Pulse (!) 111   Temp 98.3 °F (36.8 °C)   Resp 30   Ht 5' 4\" (1.626 m)   Wt 136 lb (61.7 kg)   SpO2 96%   BMI 23.34 kg/m²        Physical Exam:    General:  sedated. Eyes:  Conjunctivae/corneas clear    Nose: Nares normal. Septum midline. Neck: Supple, symmetrical, trachea midline, no JVD   Lungs:   Coarse bilateral bs   Heart:  Regular rate and rhythm, no murmur    Abdomen:   Soft, non-tender, non-distended. Positive bowel sounds   Extremities: Normal, atraumatic, no cyanosis or edema   Skin: Skin color, texture, turgor normal. No rash or lesions.    Neurologic: sedated   Psych: sedated      Assessment:     Hospital Problems  Date Reviewed: 10/28/2021          Codes Class Noted POA    Aspiration pneumonia ([facility] ICD-10-CM: J69.0  ICD-9-CM: 507.0  11/9/2021 Unknown        Acute on chronic respiratory failure with hypoxia ([facility] ICD-10-CM: J96.21  ICD-9-CM: 518.84, 799.02  11/9/2021 Unknown        Malignant small cell cancer ([facility] ICD-10-CM: C80.1  ICD-9-CM: 199.1  11/9/2021 Unknown        Acute metabolic encephalopathy RSP-45-WW: G93.41  ICD-9-CM: 348.31  10/28/2021 Yes              Signed By: Morales Monge MD     November 10, 2021

## 2021-11-10 NOTE — ED NOTES
TRANSFER - OUT REPORT:    Verbal report given to Toma Lange RN on Ashley Jacobs  being transferred to Room # 2461 for routine progression of care       Report consisted of patients Situation, Background, Assessment and   Recommendations(SBAR). Information from the following report(s) SBAR, Kardex, ED Summary, [facility] and Recent Results was reviewed with the receiving nurse. Lines:   Peripheral IV 11/09/21 Right Antecubital (Active)        Opportunity for questions and clarification was provided.       Patient transported with:   O2 @ vent liters  Registered Nurse

## 2021-11-10 NOTE — PROGRESS NOTES
A follow up visit was made to the patient. Emotional support, spiritual presence and   prayer were provided for the patient and his wife, Brittany Nesbitt. [facility] I contacted her parish, [place]. She wanted the support of her  and she requested that I also contact Father Jimmy López and ask him to come and visit with her and her . I text Father Jimmy López and he responded that he received the text and would respond to this request.    Brittany Nesbitt was appreciative for the assistance and prayer. I was present when her  telephoned her.       Darby Schwarz, [address] [facility], [facility]

## 2021-11-10 NOTE — PROGRESS NOTES
Bedside and verbal shift change report received from  [address] [place] [address]  (offgoing nurse). Report included the following information SBAR, Kardex, ED Summary, Procedure Summary, Intake/Output, MAR, Recent Results, Med Rec Status, Cardiac Rhythm ST , Alarm Parameters  and Quality Measures. Dual skin assessment completed at bedside: R ELBOW AND R CALF  (list pertinent skin assessment findings)    Dual verification of gtts completed (name of gtts verified):  FENT AND PROP\

## 2021-11-11 NOTE — PROGRESS NOTES
Respiratory Mechanics completed and are as follows:  Weaning Parameters  Spontaneous Breathing Trial Complete: Yes (PS to CPAP )  Resp Rate Observed: 22  Ve: 10.9  VT: 415  RSBI: 49  NIF: -30  VC: 675  Patel Agitation Sedation Scale (RASS): Alert and calm  Patient extubated to a BIPAP 12/6 @ 45%. Patient is able to communicate and is negative for stridor. Breath sounds are coarse and rhonchi. Large oral secretions, yellow/white. No complications with extubation.      Yung Méndez

## 2021-11-11 NOTE — PROGRESS NOTES
A follow up visit was made to the patient. Emotional support, spiritual presence and   prayer were provided for the patient. He was awake.       Rossi Garcia, [address] [facility], [facility]

## 2021-11-11 NOTE — PROGRESS NOTES
Ventilator check complete; patient has a #8. 0 ET tube secured at the 23 at the lip. Patient is not sedated. Patient is not able to follow commands. Breath sounds are coarse. Trachea is midline, Negative for subcutaneous air, and chest excursion is symmetric. Patient is also Negative for cyanosis and is Negative for pitting edema. All alarms are set and audible. Resuscitation bag is at the head of the bed.       Ventilator Settings  Mode FIO2 Rate Tidal Volume Pressure PEEP I:E Ratio   Pressure support  50 %     20 cm H2O  10 cm H20  1:2.6      Peak airway pressure: 31 cm H2O   Minute ventilation: 9.6 l/min           Crow Thao RT

## 2021-11-11 NOTE — PROGRESS NOTES
Bedside and Verbal shift change report given to Rossy Orta RN (oncoming nurse) by Kayla Villegas RN (offgoing nurse). Report included the following information SBAR, Kardex, ED Summary, Intake/Output, MAR, Recent Results and Cardiac Rhythm Sinus tach. Pt resting in bed on vent - pressure support 45%. Pt alert and following commands. PERRLA 3 mm. OGT in place with TF infusing. Lung sounds coarse. Sinus tach on monitor  and /71. Stomach semi soft upon palpation with active bowel sounds. Chavez in place.      Fentanyl @ 50

## 2021-11-11 NOTE — PROGRESS NOTES
Ventilator check complete; patient has a #8. 0 ET tube secured at the 23 at the lip. Patient is not sedated. Patient is able to follow commands. Breath sounds are coarse. Trachea is midline, Negative for subcutaneous air, and chest excursion is symmetric. Patient is also Negative for cyanosis and is Negative for pitting edema. All alarms are set and audible. Resuscitation bag is at the head of the bed. Ventilator Settings  Mode FIO2 Rate Tidal Volume Pressure PEEP I:E Ratio   Pressure support  45 %    450 ml  20 cm H2O  10 cm H20  1:2.6      Peak airway pressure: 31 cm H2O   Minute ventilation: 8.61 l/min     ABG: No results for input(s): PH, PCO2, PO2, HCO3 in the last 72 hours.       Sol Estrella

## 2021-11-11 NOTE — PROGRESS NOTES
[facility] Critical Care Note[de-identified] 11/11/2021  Alfonso Sanchez  Admission Date: 11/9/2021     Length of Stay: 2 days    Background: Patient is a 81 y. o. male presents with [address] and obtundation and hypoxia, apparently aspirated during a swallowing evaluation and deteriorated as the day went on   He was discharged yesterday from the hospital where he was admitted for a few days for evaluation of a headache. 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 Below is the history and physical from our consultation during that hospitalization:     Patient is A 63 y.o.  male presents with headache and bradycardia.     He has been followed by Dr Sangita Mobley in our office. He has a history of sarcoidosis (mediastinal lymphadenopathy and calcification, VAT 2001) as well as mild pulmonary hypertension Group 1/Group 3, who has been followed at [facility] since Feb 2018. He does have CAITLIN but has not been able to use CPAP in the past. He underwent right heart catheterization at [place] by Dr. Yayo Ta on 2/12/18 with mPAP 26), and is currently treated with tadalafil 40mg daily. His RHC and echocardiogram are noted below.      He was last seen by us in July 2021 and was stable. He has been on prednisone 10mg for sarcoidosis/Stage III COPD as well as Trelegy inhaler daily. He rarely requires albuterol. Baseline O2 needs: with inogen POC he is at 1-2 at rest, and 2lpm with exertion. He began taking Daliresp but began having side effects (jittery, tearly eyes, headache) and medication was stopped.      His spirometry shows mixed restriction and severe obstruction and his last spirometry was worse.  His echocardiogram was repeated here on 10/28 showing worsening RVSP 54 mm hg up from 35 in 2018. Pt would like to know if [facility] follow up can be virtual. He had a chest CT in March 2020 showing chronic, stable changes with multiple hilar lymph nodes and scarlike bilateral hilar masses. Chest CT this admission showed multiple large masses in the left axilla. He had a biopsy today of left axilla. MRI was done showing no acute abnormality.     He is currently on 40 mg prednisone, albuterol and stiolto here. He has called for nasal spray refills and recently had more congestion. His main complaint is his headache. He is retired [employer].   His mental state deteriorated as he was in the ER and although he initially responded to oxygen supplementation he was eventually intubated for airway protection due to change in mental state.  Chest x-ray revealed bilateral worsening infiltrates, his white count was elevated and he was started on Unasyn.  Vancomycin is being added. Dev Mahan will be admitted to the intensive care unit for further care release. Patient he is fully vaccinated for [facility]  And as of now a full code    Notable PMH:  has a past medical history of Agent orange exposure (1960s), Arthritis, Asthma, Body mass index (BMI) of 25.0 to 25.9 in adult (10/28/2021), Chronic obstructive pulmonary disease ([facility] [address] Chronic pain, GERD (gastroesophageal reflux disease), Sarcoidosis (2001), and Sleep apnea. 24 Hour events: now off dipirvan and is on pressure support- awake and looks comfortable    ROS: unable to obtain/negative except as listed elsewhere. Lines: (insertion date)     ETT: (11/9)  Chavez: (11/9)  OGT: (11/9)  peripheral  Drips: current dose (range)  Diprivan Dose (mcg/kg/min): *0 mcg/kg/min (There are multiple administrations at this time. Please see the MAR for detailed information.)     Pertinent Exam:         Blood pressure 114/73, pulse (!) 118, temperature 98.9 °F (37.2 °C), resp. rate 29, height 5' 4\" (1.626 m), weight 136 lb (61.7 kg), SpO2 97 %.      Intake/Output Summary (Last 24 hours) at 11/11/2021 1020  Last data filed at 11/11/2021 0530  Gross per 24 hour   Intake 1327.63 ml   Output 900 ml   Net 427.63 ml     Constitutional: intubated and mechanically ventilated. EENMT:  Sclera clear, pupils equal, oral mucosa moist  Respiratory:crackles bilateral  Cardiovascular:  RRR  Gastrointestinal:  soft with no tenderness; positive bowel sounds present  Musculoskeletal:  warm with no cyanosis, soft  lower extremity edema  Skin:  no jaundice or ecchymosis  Neurologic:alert   Psychiatric: sedated and paralyzed    CXR:       Recent Labs     11/10/21  0336 11/10/21  0001 11/09/21 2111 11/09/21  1822   WBC 14.0*  --   --  19.2*   HGB 13.1*  --   --  16.1   HCT 41.6  --   --  50.1     --   --  324   INR  --  1.3  --   --    PCT  --   --   --  2.55*   LAC  --  1.8 3.0* 2.6*     Recent Labs     11/10/21  0336 11/09/21  2112 11/09/21  1822    140 130*   K 4.2 4.7 >10.0*   * 103 99   CO2 24 25 25   GLU 89 82 83   BUN 36* 39* 39*   CREA 1.16 1.31 1.24   MG 2.0  --   --    CA 8.9 10.1 10.6*   PHOS 3.9*  --   --    ALB  --  2.4* 2.4*   AST  --  39* UNABLE TO PERFORM TEST DUE TO HEMOLYSIS   ALT  --  29 UNABLE TO PERFORM TEST DUE TO HEMOLYSIS   AP  --  74 79     Recent Labs     11/10/21  0006 11/10/21  0001 11/09/21 2111 11/09/21 2002 11/09/21  1915 11/09/21  1822   LAC  --  1.8 3.0*  --   --  2.6*   TROPHS 52.9*  --   --  67.6*  --  42.2*   BNPNT  --   --   --   --  2,558*  --      Recent Labs     11/08/21  1311   GLUCPOC 119*     ECHO: Results from Hospital Encounter encounter on 10/28/21    ECHO ADULT COMPLETE    Interpretation Summary  · TV: Mild tricuspid valve regurgitation is present. Right Ventricular Arterial Pressure (RVSP) is 54 mmHg. · Image quality for this study was technically difficult. · Echo study was technically difficulty. · LV: Estimated LVEF is 55 - 60%. Normal cavity size, systolic function (ejection fraction normal) and diastolic function. Mild concentric hypertrophy. Wall motion: normal.  · LA: Moderately dilated left atrium. · RA: Mildly dilated right atrium.   · AV: Mild aortic valve regurgitation is present. · MV: Mild mitral valve regurgitation is present. · PV: Mild pulmonic valve regurgitation is present. Results     Procedure Component Value Units Date/Time    CULTURE, RESPIRATORY/SPUTUM/BRONCH Robert Flores [[id number]] Collected: 11/10/21 0001    Order Status: Completed Specimen: Sputum,ET Suction Updated: 11/11/21 0843     Special Requests: NO SPECIAL REQUESTS        GRAM STAIN 0 TO 33 WBCS PER OIF      0 TO 2 EPITHELIAL CELLS SEEN      MANY GRAM POSITIVE COCCI         FEW GRAM POSITIVE RODS         FEW YEAST         4+ MUCUS PRESENT        Culture result:       MODERATE NORMAL RESPIRATORY AZRA                  CULTURE IN PROGRESS,FURTHER UPDATES TO FOLLOW          MSSA/MRSA SC BY PCR, NASAL SWAB [[id number]]  (Abnormal) Collected: 11/10/21 0001    Order Status: Completed Specimen: Nasal swab Updated: 11/10/21 0204     Special Requests: NO SPECIAL REQUESTS        Culture result:       MRSA target DNA not detected, SA target DNA detected. A MRSA negative, SA positive test result does not preclude MRSA nasal colonization. CULTURE, BLOOD [[id number]]     Order Status: Canceled Specimen: Blood     CULTURE, BLOOD [[id number]]     Order Status: Canceled Specimen: Blood     COVID-19 RAPID TEST [[id number]] Collected: 11/09/21 2027    Order Status: Completed Specimen: Nasopharyngeal Updated: 11/09/21 2145     Specimen source NASAL        COVID-19 rapid test Not detected        Comment:      The specimen is NEGATIVE for SARS-CoV-2, the novel coronavirus associated with COVID-19. A negative result does not rule out COVID-19. This test has been authorized by the FDA under an Emergency Use Authorization (EUA) for use by authorized laboratories.         Fact sheet for Healthcare Providers: ConventionUpdate.co.nz  Fact sheet for Patients: ConventionUpdate.co.nz       Methodology: Isothermal Nucleic Acid Amplification         BLOOD CULTURE [[id number]] Collected: 11/09/21 1835    Order Status: Completed Specimen: Blood Updated: 11/11/21 0731     Special Requests: --        NO SPECIAL REQUESTS  LEFT  HAND       Culture result: NO GROWTH 2 DAYS       BLOOD CULTURE [[id number]] Collected: 11/09/21 1823    Order Status: Completed Specimen: Blood Updated: 11/11/21 0731     Special Requests: --        NO SPECIAL REQUESTS  RIGHT  Antecubital       Culture result: NO GROWTH 2 DAYS       SARS-COV-2, PCR [[id number]] Collected: 11/04/21 1110    Order Status: Completed Specimen: Nasopharyngeal Updated: 11/04/21 1438     Specimen source Nasopharyngeal        SARS-CoV-2 Not detected        Comment:      The specimen is NEGATIVE for SARS-CoV-2, the novel coronavirus associated with COVID-19. This test has been authorized by the FDA under an Emergency Use Authorization (EUA) for use by authorized laboratories.         Fact sheet for Healthcare Providers: [employer].co.nz       Fact sheet for Patients: BookBub.co.nz       Methodology: RT-PCR         RESPIRATORY VIRUS PANEL W/COVID-19, PCR [[id number]]  (Abnormal) Collected: 10/29/21 0220    Order Status: Completed Specimen: Nasopharyngeal Updated: 10/29/21 0423     Adenovirus NOT DETECTED        Coronavirus 229E NOT DETECTED        Coronavirus HKU1 NOT DETECTED        Coronavirus CVNL63 NOT DETECTED        Coronavirus OC43 NOT DETECTED        SARS-CoV-2, PCR NOT DETECTED        Metapneumovirus NOT DETECTED        Rhinovirus and Enterovirus Detected        Influenza A NOT DETECTED        Influenza B NOT DETECTED        Parainfluenza 1 NOT DETECTED        Parainfluenza 2 NOT DETECTED        Parainfluenza 3 NOT DETECTED        Parainfluenza virus 4 NOT DETECTED        RSV by PCR NOT DETECTED        B. parapertussis, PCR NOT DETECTED        Bordetella pertussis - PCR NOT DETECTED        Chlamydophila pneumoniae DNA, QL, PCR NOT DETECTED        Mycoplasma pneumoniae DNA, QL, PCR NOT DETECTED COVID-19 RAPID TEST [[id number]] Collected: 10/28/21 2359    Order Status: Completed Specimen: Nasopharyngeal Updated: 10/29/21 0034     Specimen source NASAL        COVID-19 rapid test Not detected        Comment:      The specimen is NEGATIVE for SARS-CoV-2, the novel coronavirus associated with COVID-19. A negative result does not rule out COVID-19. This test has been authorized by the FDA under an Emergency Use Authorization (EUA) for use by authorized laboratories. Fact sheet for Healthcare Providers: [employer].nz  Fact sheet for Patients: [employer]       Methodology: Isothermal Nucleic Acid Amplification         SARS-COV-2, PCR [[id number]] Collected: 10/28/21 2359    Order Status: Completed Specimen: Nasopharyngeal Updated: 10/29/21 0916     Specimen source Nasopharyngeal        SARS-CoV-2 Not detected        Comment:      The specimen is NEGATIVE for SARS-CoV-2, the novel coronavirus associated with COVID-19. This test has been authorized by the FDA under an Emergency Use Authorization (EUA) for use by authorized laboratories.         Fact sheet for Healthcare Providers: [employer]Avexxin       Fact sheet for Patients: [employer]in       Methodology: RT-PCR         CULTURE, BLOOD [[id number]] Collected: 10/28/21 2021    Order Status: Completed Specimen: Blood Updated: 11/02/21 0748     Special Requests: RIGHT ANTECUBITAL        Culture result: NO GROWTH 5 DAYS       CULTURE, BLOOD [[id number]] Collected: 10/28/21 2021    Order Status: Completed Specimen: Blood Updated: 11/02/21 0748     Special Requests: LEFT HAND        Culture result: NO GROWTH 5 DAYS           Inpat Anti-Infectives (From admission, onward)     Start     Ordered Stop    11/09/21 2249  Vancomycin Intermittent dosing- pharmacy to dose  Other,   RX DOSING/MONITORING         11/09/21 2255 --    11/09/21 1232 ampicillin-sulbactam (UNASYN) 3 g in 0.9% sodium chloride (MBP/ADV) 100 mL MBP  3 g,   IntraVENous,   EVERY 6 HOURS        Note to Pharmacy: 4 hour extended infusion protocol    11/09/21 1849 --              Ventilator Settings:  Ideal body weight: 59.2 kg (130 lb 8.2 oz)   Mode FIO2 Rate Tidal Volume Pressure PEEP   Pressure support  45 %    450 ml  8 cm H2O  8 cm H20    Peak airway pressure: 18 cm H2O       Minute ventilation: 10.9 l/min  ABG:  Recent Labs     11/11/21  0401 11/10/21  0230 11/09/21 2049   PHI 7.42 7.34* 7.39   PCO2I 42.5 41.4 37.6   PO2I 73* 89 66*   HCO3I 27.3* 22.1 22.5     Assessment and Plan:  (Medical Decision Making)     Impression: 80 y.o. male with sarcoid, pulm hpt admitted 11/9 after aspirating following swallow study on vent support.     NEURO:   Sedation: propofol stopped  Analgesia: fentanyl   Paralytics: none  CV:   Volume Status: + 3 L since admit will give lasix  Septic shock: no pressors  NSTEMI:   PULM:   Acute hypoxemic/hypercapneic respiratory failure: On vent 50% fio2 critically ill  Probable pneumonia due to aspiration cultures pending -mrsa screen neg  RENAL:  PATRICIO: cr ok  Lactic acidosis: better  Electrolytes: ok  GI:   Nutrition:npo for now  HEME:   Anemia: hg 13.1  Thrombocytopenia: 254  Anticoagulation:  lovenox 40 q day  ID:   unasyn and vanc day 2  ENDO:   DM: monitor glucose  Skin: no decub, turns, preventive care  Prophy: lovenox and add pepcid    Full Code    The patient is critically ill with respiratory failure, circulatory failure and requires high complexity decision making for assessment and support including frequent ventilator adjustment , frequent evaluation and titration of therapies , application of advanced monitoring technologies and extensive interpretation of multiple databases    Cumulative time devoted to patient care services by me for day of service is 33 mins.     Pura Portillo MD

## 2021-11-11 NOTE — PROGRESS NOTES
Palliative Care Progress Note    Patient: Laureen Lizarraga MRN: [id number]  SSN: [id number]    YOB: 1940  Age: 80 y.o. Sex: male       Assessment/Plan:     Chief Complaint/Interval History: pt alert on vent. No distress. Spouse not present currentlyh    Principal Diagnosis:    · Dyspnea  R06.00    Additional Diagnoses:   · Frailty  R54  · Counseling, Encounter for Medical Advice  Z71.9  · Encounter for Palliative Care  Z51.5    Palliative Performance Scale (PPS)       Medical Decision Making:   Reviewed and summarized labs and imaging over past 24 hours. Pt alert on vent. Pt nods head no to pain. Wishes for extubation. Pt spouse not present this am.  Will follow up with further discussion pending potential extubation. Will discuss findings with members of the interdisciplinary team.      More than 50% of this 25 minute visit was spent counseling and coordination of care as outlined above. Subjective:     Review of Systems unable to obtain due to intubation. Objective:     Visit Vitals  /73   Pulse (!) 118   Temp 98.9 °F (37.2 °C)   Resp 29   Ht 5' 4\" (1.626 m)   Wt 136 lb (61.7 kg)   SpO2 97%   BMI 23.34 kg/m²       Physical Exam:    General:  Cooperative. No acute distress. Eyes:  Conjunctivae/corneas clear    Nose: Nares normal. Septum midline. Neck: Supple, symmetrical, trachea midline, no JVD   Lungs:   Coarse bilateral bs   Heart:  Regular rate and rhythm, no murmur    Abdomen:   Soft, non-tender, non-distended   Extremities: Normal, atraumatic, no cyanosis or edema   Skin: Skin color, texture, turgor normal. No rash or lesions.    Neurologic: Nonfocal   Psych: Alert, calm     Signed By: Charlotte Akbar MD     November 11, 2021

## 2021-11-12 NOTE — PROGRESS NOTES
Comprehensive Nutrition Assessment    Type and Reason for Visit: Reassess  Tube Feeding Management (pulmonary)    Nutrition Recommendations/Plan:    Continue NPO. Diet advancement per SLP, MD.   Vane Webster If diet is unable to be resumed within 3-5 days, replace FT and consult nutrition services for management of TF.    TF orders d/c'd     Malnutrition Assessment:  Malnutrition Status: At risk for malnutrition (specify) (NPO, vent, wt loss PTA pt per review of weight history)    Nutrition Assessment:   Nutrition History: Pt known to nutrition department from recent admission. At that time, pt was on an easy to chew, lactose controlled diet with ensure enlive TID with meals. Nutrition Background: Pt admitted with hypoxia from rehab facility. Noted recent admission to [facility]. PMH notable for HTN and new lung cancer diagnosis (probable metastatic cancer). Daily Update:  Pt seen reclined in bed. Wife at bedside. Airvo. NPO while awaiting SLP evaluation. Nutrition Related Findings:   NFPE deferred d/t current medical condition. Vent. OGT in place since 11/9. Extubated 11/11. OGT removed. SLP consult pending. Current Nutrition Therapies:  DIET NPO  ADULT TUBE FEEDING Orogastric; Peptide Based; Delivery Method: Continuous; Continuous Initial Rate (mL/hr): 25; Continuous Advance Tube Feeding: Yes; Advancement Volume (mL/hr): 10; Advancement Frequency: Q 4 hours; Continuous Goal Rate (mL/hr): 5... Current Intake:   Average Meal Intake: NPO   Additional Caloric Sources: Propofol d/c'd. Anthropometric Measures:  Height: 5' 4\" (162.6 cm)  Current Body Wt: 62.6 kg (138 lb 0.1 oz) (11/12), Weight source: Bed scale  BMI: 23.7, Normal weight (BMI 18.5-24. 9)  Admission Body Weight: 149 lb 14.6 oz (11/9, pt stated)  Ideal Body Weight (lbs) (Calculated): 130 lbs (59 kg), 104.6 %  Edema: No data recorded   Estimated Daily Nutrient Needs:  Energy (kcal/day): 5530-6022 (Kcal/kg (25-30), Weight Used: Current (61.7 kg))  Protein (g/day):  Weight Used: (Current (1.2-2))  Fluid (ml/day): 8022-6076 (1 ml/kcal (or per MD))    Nutrition Diagnosis:   · Inadequate oral intake related to  (post extubation) as evidenced by NPO or clear liquid status due to medical condition    Nutrition Interventions:   Food and/or Nutrient Delivery: Continue NPO     Coordination of Nutrition Care: Continue to monitor while inpatient  Plan of Care discussed with Rossy Orta RN and SANDY Kohler    Goals:   Previous Goal Met: No progress toward goal(s)  Active Goal: Diet advancement vs resumption of diet within 3 days. Nutrition Monitoring and Evaluation:      Food/Nutrient Intake Outcomes: Diet advancement/tolerance  Physical Signs/Symptoms Outcomes: Biochemical data, Chewing or swallowing, Hemodynamic status, Weight    Discharge Planning:     Too soon to determine    Rowdy Nazario [address], [address] [place] [address], [address] [place] [address], [address] [place] [address] Ave.

## 2021-11-12 NOTE — PROGRESS NOTES
ACUTE OT GOALS:  (Developed with and agreed upon by patient and/or caregiver.)  1. Patient will complete lower body bathing and dressing with SBA and adaptive equipment as needed. 2. Patient will complete toileting with min A.   3. Patient will tolerate 30 minutes of OT treatment with 1-2 rest breaks to increase activity tolerance for ADLs. 4. Patient will complete functional transfers with CGA and adaptive equipment as needed. 5. Patient will complete functional activity while seated edge of bed with SBA and adaptive equipment as needed. 6. Patient will tolerate 15 mintues unsupported sitting balance with SBA in preparation for ADL performance. 7. Patient will tolerate 30 minutes BUE therapeutic activities to increase use of BUE during ADL performance.      Timeframe: 7 visits         OCCUPATIONAL THERAPY ASSESSMENT: Initial Assessment and Daily Note OT Treatment Day # 1    Caroline Rojas is a 80 y.o. male   PRIMARY DIAGNOSIS: <principal problem not specified>  Acute on chronic respiratory failure with hypoxia (HCC) [J96.21]       Reason for Referral:   ICD-10: Treatment Diagnosis: Generalized Muscle Weakness (M62.81)  [place]PAT[place] / [place] B / Product Type: Medicare /   ASSESSMENT:     REHAB RECOMMENDATIONS:   Recommendation to date pending progress:  Setting:   Inpatient Rehab Facility  Equipment:    To Be Determined     PRIOR LEVEL OF FUNCTION:  (Prior to Hospitalization)  INITIAL/CURRENT LEVEL OF FUNCTION:  (Based on today's evaluation)   Bathing:   Independent  Dressing:   Independent  Feeding/Grooming:   Independent  Toileting:   Independent  Functional Mobility:   Independent Bathing:   Maximal Assistance  Dressing:   Maximal Assistance  Feeding/Grooming:   Moderate Assistance  Toileting:   Maximal Assistance  Functional Mobility:   Moderate Assistance x 2 SPT to chair     ASSESSMENT:  Mr. Ronald Warren presents with deficits in overall strength, activity tolerance, ADL performance and functional mobility. Presents in ICU for aspiration pnuemonia and acute respiratory failure; extubated yesterday to Airvo at 45L/40%. Lethargic but following commands. Mod A x 2  for functional bed mobility/transfers; fair EOB sitting balance with forward trunk and head flexion d/t weakness. Mod A x 2 for SPT to bedside chair. Sats stable during session but fatigues quickly. At this time, Xin Dominguez is functioning below baseline for ADLs and functional mobility. Pt would benefit from skilled OT services to address OT goals and and plan of care. .     SUBJECTIVE:   Mr. Shannan Quintero states, \"Yes. \" (does not verbalize much w/ very low volume d/t just being extubated yesterday)    SOCIAL HISTORY/LIVING ENVIRONMENT: Lives with wife in a 2-story home with living accommodations placed on the 1st level. Independent at baseline for ADLs and functional mobility.    Support Systems: Spouse/Significant Other (lives with wife, Brend)    OBJECTIVE:     PAIN: VITAL SIGNS: LINES/DRAINS:   Pre Treatment: Pain Screen  Pain Scale 1: Numeric (0 - 10)  Pain Intensity 1: 0  Post Treatment: 0   Chavez Catheter  O2 Device: Heated, Hi flow nasal cannula     GROSS EVALUATION:  BUEs Within Functional Limits Abnormal/ Functional Abnormal/ Non-Functional (see comments) Not Tested Comments:   AROM [] [] [] []    PROM [] [] [] []    Strength [] [x] [] [] Generally weak; (3-/5)   Balance [] [] [] [] Fair EOB sitting balance; poor standing balance   Posture [] [x] [] [] Forward trunk and neck flexion   Sensation [] [] [] []    Coordination [] [x] [] []    Tone [] [x] [] []    Edema [x] [] [] []    Activity Tolerance [] [x] [] [] Airvo 45L/40%    [] [] [] []      COGNITION/  PERCEPTION: Intact Impaired   (see comments) Comments:   Orientation [] [] Unable to assess    Vision [x] []    Hearing [x] []    Judgment/ Insight [] []    Attention [] [x] lethargic   Memory [] []    Command Following [x] []    Emotional Regulation [x] []     [] []      ACTIVITIES OF DAILY LIVING: I Mod I S SBA CGA Min Mod Max Total NT Comments   BASIC ADLs:              Bathing/ Showering [] [] [] [] [] [] [] [] [] [x]    Toileting [] [] [] [] [] [] [] [] [] [x]    Dressing [] [] [] [] [] [] [] [] [] [x]    Feeding [] [] [] [] [] [] [] [] [] [x]    Grooming [] [] [] [] [] [] [] [] [] [x]    Personal Device Care [] [] [] [] [] [] [] [] [] [x]    Functional Mobility [] [] [] [] [] [] [x] [] [] [] X 2 for SPT to chair   I=Independent, Mod I=Modified Independent, S=Supervision, SBA=Standby Assistance, CGA=Contact Guard Assistance,   Min=Minimal Assistance, Mod=Moderate Assistance, Max=Maximal Assistance, Total=Total Assistance, NT=Not Tested    MOBILITY: I Mod I S SBA CGA Min Mod Max Total  NT x2 Comments:   Supine to sit [] [] [] [] [] [] [x] [] [] [] [x]    Sit to supine [] [] [] [] [] [] [] [] [] [x] []    Sit to stand [] [] [] [] [] [] [x] [] [] [] [x]    Bed to chair [] [] [] [] [] [] [x] [] [] [] [x]    I=Independent, Mod I=Modified Independent, S=Supervision, SBA=Standby Assistance, CGA=Contact Guard Assistance,   Min=Minimal Assistance, Mod=Moderate Assistance, Max=Maximal Assistance, Total=Total Assistance, NT=Not Tested    [facility] AM-PAC 6 Clicks   Daily Activity Inpatient Short Form        How much help from another person does the patient currently need. .. Total A Lot A Little None   1. Putting on and taking off regular lower body clothing? [] 1   [x] 2   [] 3   [] 4   2. Bathing (including washing, rinsing, drying)? [] 1   [x] 2   [] 3   [] 4   3. Toileting, which includes using toilet, bedpan or urinal?   [] 1   [x] 2   [] 3   [] 4   4. Putting on and taking off regular upper body clothing? [] 1   [x] 2   [] 3   [] 4   5. Taking care of personal grooming such as brushing teeth? [] 1   [] 2   [x] 3   [] 4   6. Eating meals? [] 1   [] 2   [x] 3   [] 4   © 2007, Trustees of [facility], under license to [employer]. All rights reserved     Score:  Initial: 14 Most Recent: X (Date: -- )   Interpretation of Tool:  Represents activities that are increasingly more difficult (i.e. Bed mobility, Transfers, Gait). PLAN:   FREQUENCY/DURATION: OT Plan of Care: 3 times/week for duration of hospital stay or until stated goals are met, whichever comes first.    PROBLEM LIST:   (Skilled intervention is medically necessary to address:)  1. Decreased ADL/Functional Activities  2. Decreased Activity Tolerance  3. Decreased AROM/PROM  4. Decreased Balance  5. Decreased Coordination  6. Decreased Gait Ability  7. Decreased Strength  8. Decreased Transfer Abilities   INTERVENTIONS PLANNED:   (Benefits and precautions of occupational therapy have been discussed with the patient.)  1. Self Care Training  2. Therapeutic Activity  3. Therapeutic Exercise/HEP  4. Neuromuscular Re-education  5. Education     TREATMENT:     EVALUATION: Low Complexity : (Untimed Charge)    TREATMENT:   ( $$ Therapeutic Activity: 23-37 mins   )  Therapeutic Activity (23 Minutes): Therapeutic activity included Rolling, Supine to Sit, Sit to Supine, Scooting, Transfer Training, Ambulation on level ground, Sitting balance  and Standing balance to improve functional Mobility, Strength and Activity tolerance.     TREATMENT GRID:  N/A    AFTER TREATMENT POSITION/PRECAUTIONS:  Chair, Needs within reach, RN notified and Visitors at bedside    INTERDISCIPLINARY COLLABORATION:  RN/PCT and OT/ACUÑA    TOTAL TREATMENT DURATION:  OT Patient Time In/Time Out  Time In: 6110  Time Out: [address] [facility],

## 2021-11-12 NOTE — PROGRESS NOTES
Bedside and verbal shift change report received from  Rafaela Linton, [address] [place] [address] (offgoing nurse). Report included the following information SBAR, Kardex, Intake/Output, MAR and Cardiac Rhythm Sinus Tach.      Dual skin assessment completed at bedside:   Scattered ecchymosis on all extremities   (list pertinent skin assessment findings)    Dual verification of gtts completed (name of gtts verified): N/A

## 2021-11-12 NOTE — PROGRESS NOTES
Bedside and Verbal shift change report given to Raj Borrego RN (oncoming nurse) by Tacos Yates RN (offgoing nurse). Report included the following information SBAR, Kardex, ED Summary, Intake/Output, MAR, Recent Results and Cardiac Rhythm NSR. Pt resting in bed alert with intermittent confusion. On airvo 45L and 45%. Lung sounds coarse. NSR on monitor. Stomach soft upon palpation with active bowel sounds. Chavez in place. Pt denies pain at this time.     Potassium infusing

## 2021-11-12 NOTE — CONSULTS
Comprehensive Nutrition Assessment    Type and Reason for Visit: Reassess, Consult  Tube Feeding Management (pulmonary)    Nutrition Recommendations/Plan:   Enteral Nutrition:   Enteral Access: Nasogastric  Formula: Peptide Based (Vital AF 1.2 Tae)  Delivery: Continuous feeding: Initiate at  20ml/hour, progress by 10ml/4 hours to goal rate of 70ml/hour. Water flush 90 ml every 4 hours  Modulars: Continue None   Enteral regimen at goal to provide:  1848 calories (100% estimated calorie needs), 116 grams protein (100% estimated protein needs) and 1788 ml free fluid (~1 ml/kcal) calculations based on 22 hour infusion. Nutritional Supplement Therapy:   Active electrolyte replacement per nutrition support protocols  Replacement indicated:  Completed  Labs:   EN labs: BMP daily, Mg MWF and Phos MWF. Meals and Snacks:  Continue current diet. NPO     Malnutrition Assessment:  Malnutrition Status: At risk for malnutrition (specify) (NPO, NGT for short term nutrition)    Nutrition Assessment:   Nutrition History: Pt known to nutrition department from recent admission. At that time, pt was on an easy to chew, lactose controlled diet with ensure enlive TID with meals. Nutrition Background: Pt admitted with hypoxia from rehab facility. Noted recent admission to [facility]. PMH notable for HTN and new lung cancer diagnosis (probable metastatic cancer). Daily Update: Failed SLP evaluation. TF to be resumed today. Abdominal Status (last documented): Intact, Soft abdomen with Active  bowel sounds. Last BM  (pta).   Pertinent Medications: unasyn, prednisone  Bowel regimen: colace BID (held x 1 dose 11/11 and this AM d/t NPO, no enteral access), daily miralax (held today d/t NPO status, no enteral access)    Lab Results   Component Value Date/Time    Sodium 149 (H) 11/12/2021 03:23 AM    Potassium 3.2 (L) 11/12/2021 03:23 AM    Chloride 108 (H) 11/12/2021 03:23 AM    CO2 31 11/12/2021 03:23 AM    Anion gap 10 11/12/2021 03:23 AM    Glucose 110 (H) 11/12/2021 03:23 AM    BUN 36 (H) 11/12/2021 03:23 AM    Creatinine 1.01 11/12/2021 03:23 AM    Calcium 10.3 11/12/2021 03:23 AM    Albumin 2.4 (L) 11/09/2021 09:12 PM    Magnesium 2.3 11/12/2021 03:23 AM    Phosphorus 3.5 11/12/2021 03:23 AM     Na trending up - TF off since 11/11. Noted hypokalemia - receiving 20 meq KCl x 2 doses 11/12. Lab Results   Component Value Date/Time    Glucose 110 (H) 11/12/2021 03:23 AM    Glucose (POC) 119 (H) 11/08/2021 01:11 PM    Glucose (POC) 187 (H) 11/03/2021 10:44 PM    Glucose (POC) 162 (H) 10/30/2021 05:27 PM    Glucose (POC) 148 (H) 10/30/2021 10:38 AM    Glucose (POC) 140 (H) 10/30/2021 05:54 AM    Glucose (POC) 136 (H) 10/29/2021 11:16 PM     Nutrition Related Findings:   NFPE deferred d/t current medical condition. Vent. OGT in place since 11/9. Extubated 11/11. OGT removed. SLP consult pending. NPO per SLP. NGT to be replaced and TF resumed. Current Nutrition Therapies:  DIET NPO    Current Intake:   Average Meal Intake: NPO   Additional Caloric Sources: Propofol d/c'd. Anthropometric Measures:  Height: 5' 4\" (162.6 cm)  Current Body Wt: 62.6 kg (138 lb 0.1 oz) (11/12), Weight source: Bed scale  BMI: 23.7, Normal weight (BMI 18.5-24. 9)  Admission Body Weight: 149 lb 14.6 oz (11/9, pt stated)  Ideal Body Weight (lbs) (Calculated): 130 lbs (59 kg), 104.6 %  Edema: No data recorded     Estimated Daily Nutrient Needs:  Energy (kcal/day): 6774-7769 (Kcal/kg (25-30), Weight Used: Current (61.7 kg))  Protein (g/day):  Weight Used: (Current (1.2-2))  Fluid (ml/day): 7783-8129 (1 ml/kcal (or per MD))    Nutrition Diagnosis:   · Inadequate oral intake related to cognitive or neurological impairment, swallowing difficulty as evidenced by nutrition support-enteral nutrition (NPO per SLP)    Nutrition Interventions:   Food and/or Nutrient Delivery: Continue NPO, Start tube feeding     Coordination of Nutrition Care: Continue to monitor while inpatient  Plan of Care discussed with Tracie Gitelman, RN    Goals:   Previous Goal Met: Goal(s) achieved  Active Goal: Tolerate TF at goal within 3-5 days. Nutrition Monitoring and Evaluation:      Food/Nutrient Intake Outcomes: Enteral nutrition intake/tolerance  Physical Signs/Symptoms Outcomes: Biochemical data, Chewing or swallowing, GI status, Hemodynamic status, Weight    Discharge Planning:     Too soon to determine    Rowdy Thayer [address], [address], [address] [place], [address] [place] [address] Ave.

## 2021-11-12 NOTE — PROGRESS NOTES
[facility] Critical Care Note[de-identified] 11/12/2021  Torin Sanchez  Admission Date: 11/9/2021     Length of Stay: 3 days    Background: Patient is a 81 y. o. male presents with [address] and obtundation and hypoxia, apparently aspirated during a swallowing evaluation and deteriorated as the day went on   He was discharged yesterday from the hospital where he was admitted for a few days for evaluation of a headache. 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 Below is the history and physical from our consultation during that hospitalization:     Patient is Q 50 y.o.  male presents with headache and bradycardia.     He has been followed by Dr Triston Johnson in our office. He has a history of sarcoidosis (mediastinal lymphadenopathy and calcification, VAT 2001) as well as mild pulmonary hypertension Group 1/Group 3, who has been followed at [facility] since Feb 2018. He does have CAITLIN but has not been able to use CPAP in the past. He underwent right heart catheterization at [place] by Dr. Yaz Dallas on 2/12/18 with mPAP 26), and is currently treated with tadalafil 40mg daily. His RHC and echocardiogram are noted below.      He was last seen by us in July 2021 and was stable. He has been on prednisone 10mg for sarcoidosis/Stage III COPD as well as Trelegy inhaler daily. He rarely requires albuterol. Baseline O2 needs: with inogen POC he is at 1-2 at rest, and 2lpm with exertion. He began taking Daliresp but began having side effects (jittery, tearly eyes, headache) and medication was stopped.      His spirometry shows mixed restriction and severe obstruction and his last spirometry was worse.  His echocardiogram was repeated here on 10/28 showing worsening RVSP 54 mm hg up from 35 in 2018. Pt would like to know if [facility] follow up can be virtual. He had a chest CT in March 2020 showing chronic, stable changes with multiple hilar lymph nodes and scarlike bilateral hilar masses. Chest CT this admission showed multiple large masses in the left axilla. He had a biopsy today of left axilla. MRI was done showing no acute abnormality.     He is currently on 40 mg prednisone, albuterol and stiolto here. He has called for nasal spray refills and recently had more congestion. His main complaint is his headache. He is retired [employer].   His mental state deteriorated as he was in the ER and although he initially responded to oxygen supplementation he was eventually intubated for airway protection due to change in mental state.  Chest x-ray revealed bilateral worsening infiltrates, his white count was elevated and he was started on Unasyn.  Vancomycin is being added. Amna Meneses will be admitted to the intensive care unit for further care release. Patient he is fully vaccinated for [facility]  And as of now a full code    Notable PMH:  has a past medical history of Agent orange exposure (1960s), Arthritis, Asthma, Body mass index (BMI) of 25.0 to 25.9 in adult (10/28/2021), Chronic obstructive pulmonary disease ([facility] [address] Chronic pain, GERD (gastroesophageal reflux disease), Sarcoidosis (2001), and Sleep apnea. 24 Hour events: extubated yesterday, was removing PAP last night and didn't wear very much. Wore AirvO. Was wheezing yesterday, but better today. Good cough. ROS: unable to obtain/negative except as listed elsewhere. Lines: (insertion date)   ETT: (11/9)  Chavez: (11/9)  OGT: (11/9)  Peripheral    Drips: current dose (range)    Pertinent Exam:         Blood pressure 105/71, pulse 94, temperature 97.9 °F (36.6 °C), resp. rate 19, height 5' 4\" (1.626 m), weight 138 lb (62.6 kg), SpO2 95 %. Intake/Output Summary (Last 24 hours) at 11/12/2021 0756  Last data filed at 11/12/2021 0529  Gross per 24 hour   Intake 511 ml   Output 3025 ml   Net -2514 ml     Constitutional:  intubated and mechanically ventilated.   EENMT:  Sclera clear, pupils equal, oral mucosa moist  Respiratory:crackles bilateral  Cardiovascular:  RRR  Gastrointestinal:  soft with no tenderness; positive bowel sounds present  Musculoskeletal:  warm with no cyanosis, soft  lower extremity edema  Skin:  no jaundice or ecchymosis  Neurologic:alert   Psychiatric: sedated and paralyzed    CXR: 11/11: stable infiltrates      Recent Labs     11/11/21  2155 11/10/21  0336 11/10/21  0001 11/09/21  2111 11/09/21  1822   WBC 18.6* 14.0*  --   --  19.2*   HGB 13.8 13.1*  --   --  16.1   HCT 43.8 41.6  --   --  50.1    254  --   --  324   INR  --   --  1.3  --   --    PCT  --   --   --   --  2.55*   LAC  --   --  1.8 3.0* 2.6*     Recent Labs     11/12/21  0323 11/10/21  0336 11/09/21  2112 11/09/21  1822 11/09/21  1822   * 143 140   < > 130*   K 3.2* 4.2 4.7   < > >10.0*   * 110* 103   < > 99   CO2 31 24 25   < > 25   * 89 82   < > 83   BUN 36* 36* 39*   < > 39*   CREA 1.01 1.16 1.31   < > 1.24   MG 2.3 2.0  --   --   --    CA 10.3 8.9 10.1   < > 10.6*   PHOS 3.5 3.9*  --   --   --    ALB  --   --  2.4*  --  2.4*   AST  --   --  39*  --  UNABLE TO PERFORM TEST DUE TO HEMOLYSIS   ALT  --   --  29  --  UNABLE TO PERFORM TEST DUE TO HEMOLYSIS   AP  --   --  74  --  79    < > = values in this interval not displayed. Recent Labs     11/12/21  0323 11/10/21  0006 11/10/21  0001 11/09/21  2111 11/09/21  2002 11/09/21  1915 11/09/21  1822   LAC  --   --  1.8 3.0*  --   --  2.6*   TROPHS  --  52.9*  --   --  67.6*  --  42.2*   BNPNT 1,186*  --   --   --   --  2,558*  --      No results for input(s): GLUCPOC in the last 72 hours. No lab exists for component: A1C  ECHO: Results from Hospital Encounter encounter on 10/28/21    ECHO ADULT COMPLETE    Interpretation Summary  · TV: Mild tricuspid valve regurgitation is present. Right Ventricular Arterial Pressure (RVSP) is 54 mmHg. · Image quality for this study was technically difficult. · Echo study was technically difficulty.   · LV: Estimated LVEF is 55 - 60%. Normal cavity size, systolic function (ejection fraction normal) and diastolic function. Mild concentric hypertrophy. Wall motion: normal.  · LA: Moderately dilated left atrium. · RA: Mildly dilated right atrium. · AV: Mild aortic valve regurgitation is present. · MV: Mild mitral valve regurgitation is present. · PV: Mild pulmonic valve regurgitation is present. Results     Procedure Component Value Units Date/Time    CULTURE, RESPIRATORY/SPUTUM/BRONCH Roosvelt Tenisha STAIN [[id number]]  (Abnormal) Collected: 11/10/21 0001    Order Status: Completed Specimen: Sputum,ET Suction Updated: 11/12/21 0751     Special Requests: NO SPECIAL REQUESTS        GRAM STAIN 0 TO 33 WBCS PER OIF      0 TO 2 EPITHELIAL CELLS SEEN      MANY GRAM POSITIVE COCCI         FEW GRAM POSITIVE RODS         FEW YEAST         4+ MUCUS PRESENT        Culture result:       HEAVY STAPHYLOCOCCUS AUREUS SENSITIVITY TO FOLLOW                  MODERATE NORMAL RESPIRATORY AZRA                  CULTURE IN PROGRESS,FURTHER UPDATES TO FOLLOW          MSSA/MRSA SC BY PCR, NASAL SWAB [[id number]]  (Abnormal) Collected: 11/10/21 0001    Order Status: Completed Specimen: Nasal swab Updated: 11/10/21 0204     Special Requests: NO SPECIAL REQUESTS        Culture result:       MRSA target DNA not detected, SA target DNA detected. A MRSA negative, SA positive test result does not preclude MRSA nasal colonization. CULTURE, BLOOD [[id number]]     Order Status: Canceled Specimen: Blood     CULTURE, BLOOD [[id number]]     Order Status: Canceled Specimen: Blood     COVID-19 RAPID TEST [[id number]] Collected: 11/09/21 2027    Order Status: Completed Specimen: Nasopharyngeal Updated: 11/09/21 2145     Specimen source NASAL        COVID-19 rapid test Not detected        Comment:      The specimen is NEGATIVE for SARS-CoV-2, the novel coronavirus associated with COVID-19. A negative result does not rule out COVID-19.        This test has been authorized by the FDA under an Emergency Use Authorization (EUA) for use by authorized laboratories. Fact sheet for Healthcare Providers: [employer]  Fact sheet for Patients: [employer]       Methodology: Isothermal Nucleic Acid Amplification         BLOOD CULTURE [[id number]] Collected: 11/09/21 1835    Order Status: Completed Specimen: Blood Updated: 11/12/21 0650     Special Requests: --        NO SPECIAL REQUESTS  LEFT  HAND       Culture result: NO GROWTH 3 DAYS       BLOOD CULTURE [[id number]] Collected: 11/09/21 1823    Order Status: Completed Specimen: Blood Updated: 11/12/21 0650     Special Requests: --        NO SPECIAL REQUESTS  RIGHT  Antecubital       Culture result: NO GROWTH 3 DAYS       SARS-COV-2, PCR [[id number]] Collected: 11/04/21 1110    Order Status: Completed Specimen: Nasopharyngeal Updated: 11/04/21 1438     Specimen source Nasopharyngeal        SARS-CoV-2 Not detected        Comment:      The specimen is NEGATIVE for SARS-CoV-2, the novel coronavirus associated with COVID-19. This test has been authorized by the FDA under an Emergency Use Authorization (EUA) for use by authorized laboratories.         Fact sheet for Healthcare Providers: [employer]       Fact sheet for Patients: [employer]       Methodology: RT-PCR             Inpat Anti-Infectives (From admission, onward)     Start     Ordered Stop    11/09/21 2249  Vancomycin Intermittent dosing- pharmacy to dose  Other,   RX DOSING/MONITORING         11/09/21 2255 --    11/09/21 1850  ampicillin-sulbactam (UNASYN) 3 g in 0.9% sodium chloride (MBP/ADV) 100 mL MBP  3 g,   IntraVENous,   EVERY 6 HOURS        Note to Pharmacy: 4 hour extended infusion protocol    11/09/21 1849 --              Ventilator Settings:  Ideal body weight: 59.2 kg (130 lb 8.2 oz)   Mode FIO2 Rate Tidal Volume Pressure PEEP   Pressure support  45 %    450 ml  8 cm H2O  8 cm H20    Peak airway pressure: 18 cm H2O       Minute ventilation: 13 l/min  ABG:  Recent Labs     11/11/21  0401 11/10/21  0230 11/09/21 2049   PHI 7.42 7.34* 7.39   PCO2I 42.5 41.4 37.6   PO2I 73* 89 66*   HCO3I 27.3* 22.1 22.5     Assessment and Plan:  (Medical Decision Making)   Impression: 80 y.o. male with sarcoid, pulm hpt admitted 11/9 after aspirating following swallow study on vent support.     NEURO:   Poor Sleep: concern for development of delirium, but certainly at risk  CV:   Volume Status: -2.5L yesterday  PULM:   Acute hypoxemic/hypercapneic respiratory failure: On AirVo 45%, continue CPAP QHS with sleep if can tolerate  Probable pneumonia due to aspiration cultures pending -mrsa screen neg  RENAL:  PATRICIO: cr ok   Electrolytes: ok  GI:   Nutrition: npo for now, get speech to see today  HEME:   Anemia: hg 13.1  Thrombocytopenia: 254  Anticoagulation:  lovenox 40 q day  ID:   Pneumonia: unasyn and vanc day 2  ENDO:   DM: monitor glucose  Skin: no decub, turns, preventive care  Prophy: lovenox and pepcid    Full Code; still high risk for decompensation and will keep in ICU likely today and out tomorrow if stable pending speech and respiratory status. 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      Efrain Castellano MD

## 2021-11-12 NOTE — PROGRESS NOTES
Pt placed on BIPAP - pt is in no distress at this time      11/11/21 1957   Oxygen Therapy   O2 Sat (%) 97 %   Pulse via Oximetry 117 beats per minute   O2 Device BIPAP   FIO2 (%) 45 %   Respiratory   Respiratory (WDL) X   Respiratory Pattern Regular   Breath Sounds Bilateral Coarse; Scattered wheezing   Cough Congested; Productive   CPAP/BIPAP   CPAP/BIPAP Start/Stop On   Device Mode BIPAP   $$ Bipap Daily Yes   Mask Type and Size Full face; Medium   Skin Condition intact   PIP Observed 13 cm H20   IPAP (cm H2O) 12 cm H2O   EPAP (cm H2O) 6 cm H2O   Inspiratory Time (sec) 0.9 seconds   Vt Spont (ml) 589 ml   Ve Observed (l/min) 13 l/min   Backup Rate 16   Total RR (Spontaneous) 21 breaths per minute   Insp Rise Time (sec) 4   Leak (Estimated) 26 L/min   Pt's Home Machine No   Biomedical Check Performed Yes   Settings Verified Yes   Alarm Settings   High Pressure 30   Low Pressure 5   Apnea 20   Low Ve 3   High Rate 50   Low Rate 8   Pulmonary Toilet   Pulmonary Toilet H. O.B elevated; Cough and deep breath

## 2021-11-12 NOTE — PROGRESS NOTES
Problem: Dysphagia (Adult)  Speech Goal:   Outcome: Progressing Towards Goal  LTG: Patient will tolerate least restrictive diet without overt signs or symptoms of airway compromise by discharge. STG: Patient will perform laryngeal strengthening exercises x10 each with 70% accuracy to improve strength and coordination of swallowing function. STG: Patient will participate in modified barium swallow study as clinically indicated. SPEECH LANGUAGE PATHOLOGY: DYSPHAGIA- Initial Assessment    NAME/AGE/GENDER: Nancy Tucker is a 80 y.o. male  DATE: 11/12/2021  PRIMARY DIAGNOSIS: Acute on chronic respiratory failure with hypoxia ([facility] [address] [J96.21]      ICD-10: Treatment Diagnosis: R13.12 Dysphagia, Oropharyngeal Phase    RECOMMENDATIONS   DIET:   NPO with alternative Nutrition/Hydration/Medication    MEDICATIONS: Non-oral     PRECAUTIONS, MODIFICATIONS, AND STRATEGIES  Oral care every 3 hours  Upright positioning during tube feedings  None     RECOMMENDATIONS FOR CONTINUED SPEECH THERAPY:   YES: Anticipate need for ongoing speech therapy during this hospitalization and at next level of care. ASSESSMENT   Patient presents with severe oropharyngeal dysphagia characterized by no hyolaryngeal movement with trials of ice chips and single tsp of water with delayed weak and ineffective cough response after swallow of water. No further trials attempted. Recommend strict NPO with alternate method for nutrition/hydration/meds. EDUCATION:  Recommendations discussed with Patient, Family and RN    REHABILITATION POTENTIAL FOR STATED GOALS: Fair    PLAN    FREQUENCY/DURATION:   Continue to follow patient 3 times a week for duration of hospital stay to address above goals.  Recommendations for next treatment session: Next treatment session will address dysphagia exercises and swallow stim techniques    CONTINUATION OF SKILLED SERVICES/MEDICAL NECESSITY:  Patient is expected to demonstrate progress in  swallow strength, swallow timeliness and swallow function in order to  improve swallow safety, work toward diet advancement and decrease aspiration risk. Patient continues to require skilled intervention due to severe oropharyngeal dysphagia. SUBJECTIVE   Patient sitting upright in chair with wife present. Wife reports SLP at rehab facility could not palpate swallow and attempted FEES during which patient aspirated resulting in this hospitalization. Oxygen Device: AirVo  Pain: Pain Scale 1: Numeric (0 - 10)  Pain Intensity 1: 0  Pain Location 1: Generalized; Abdomen  Pain Intervention(s) 1: Medication (see MAR)    History of Present Injury/Illness: Mr. Vini Harrison  has a past medical history of Agent orange exposure (1960s), Arthritis, Asthma, Body mass index (BMI) of 25.0 to 25.9 in adult (10/28/2021), Chronic obstructive pulmonary disease ([facility] [address] Chronic pain, GERD (gastroesophageal reflux disease), Sarcoidosis (2001), and Sleep apnea. . He also  has a past surgical history that includes hx tonsillectomy (as a child); hx other surgical (2006); pr chest surgery procedure unlisted (2001); hx hernia repair (Left, 2004); hx lap cholecystectomy (2006); hx hernia repair (Right, 1990's); hx orthopaedic (Right, 2014); and hx knee replacement (Left, 2016). PRECAUTIONS/ALLERGIES: Lactose     Problem List:  (Impairments causing functional limitations):  dysphagia    Previous Dysphagia: YES esophageal dysmotility on barium swallow during previous admission performed 11/4 with results as follows: \"IMPRESSION: Mildly dilated esophagus without constricting or obstructing mass. Severe esophageal dysmotility throughout the intrathoracic esophagus with probable gastroesophageal reflux. Small sliding-type hiatal hernia is present. \"    Bedside swallow assessment performed during previous admission on 11/2 with results as follows: \"Patient presents with s/sx and complaints concerning for esophageal dysphagia.   Throat clearing intermittently with both solids and liquids and reports sticking sensation that improves slightly when alternating solids/liquids. Reduced mastication efficiency after recent dental work, but functional with soft solids. Patient endorses sticking sensation, food coming back up, poor appetite, nausea/vomiting. Reports would lay down at night at home then vomiting significant amount of food. Recommend continue easy to chew diet and thin liquids. Will follow up x1, but likely will benefit from GI workup to rule out esophageal component. Reportedly was scheduled for outpatient EGD this Thursday but wife cancelled due to current hospitalization. Diet Prior to Evaluation: NPO    Orientation:  Person    Cognitive-Linguistic Screening:  Alertness  Alert  Speech Production:   Unintelligible  and aphonic  Expressive Language:  Unable to adequately assess  Receptive Language: Follows commands  Cognition:   Needs further assessment  Prior Level of Function: independent prior to previous hospitalization  Recommendations: Given results of screening, further evaluation is indicated to assess cognitive function. OBJECTIVE   Oral Motor:   Labial: slow  Dentition: Natural  Oral Hygiene: Adequate  Lingual: Decreased rate and Impaired coordination    Dysphagia evaluation:   Patient consumed trials of ice chips and tsp of thin liquid with no swallow elicited. No further trials attempted at this time, as patient is not safe for further PO trials.     Tool Used: Dysphagia Outcome and Severity Scale (JESSICA)    Score Comments   Normal Diet  [] 7 With no strategies or extra time needed   Functional Swallow  [] 6 May have mild oral or pharyngeal delay   Mild Dysphagia  [] 5 Which may require one diet consistency restricted    Mild-Moderate Dysphagia  [] 4 With 1-2 diet consistencies restricted   Moderate Dysphagia  [] 3 With 2 or more diet consistencies restricted   Moderate-Severe Dysphagia  [] 2 With partial PO strategies (trials with ST only)   Severe Dysphagia  [x] 1 With inability to tolerate any PO safely      Score:  Initial: 1 Most Recent: 1 (Date 11/12/21 )   Interpretation of Tool: The Dysphagia Outcome and Severity Scale (JESSICA) is a simple, easy-to-use, 7-point scale developed to systematically rate the functional severity of dysphagia based on objective assessment and make recommendations for diet level, independence level, and type of nutrition. Current Medications:   No current facility-administered medications on file prior to encounter. Current Outpatient Medications on File Prior to Encounter   Medication Sig Dispense Refill    naproxen (NAPROSYN) 250 mg tablet Take 1 Tablet by mouth every eight (8) hours as needed for Pain for up to 7 days. Indications: headache 21 Tablet 0    melatonin 5 mg tablet Take 10 mg by mouth nightly.  ondansetron (ZOFRAN ODT) 4 mg disintegrating tablet Take 1 Tablet by mouth every eight (8) hours as needed for Nausea or Vomiting (diarrhea). 30 Tablet 0    rosuvastatin (CRESTOR) 20 mg tablet TAKE 1 TABLET NIGHTLY 90 Tablet 3    OTHER Handicap placard  Dx: J44.9 COPD 1 Each 0    azelastine (ASTELIN) 137 mcg (0.1 %) nasal spray 1 [place] by Both Nostrils route two (2) times a day. Use in each nostril as directed 3 Bottle 3    tamsulosin (Flomax) 0.4 mg capsule Take 1 Capsule by mouth daily. 90 Capsule 3    omeprazole (PRILOSEC) 40 mg capsule Take 1 Capsule by mouth daily. 90 Capsule 3    albuterol (PROVENTIL VENTOLIN) 2.5 mg /3 mL (0.083 %) nebu 3 mL by Nebulization route every six (6) hours as needed for Wheezing. 180 Each 3    tadalafiL, pulm. hypertension, (Adcirca) 20 mg tablet Take 2 Tablets by mouth daily. 60 Tablet 11    fluticasone-umeclidinium-vilanterol (TRELEGY ELLIPTA) 100-62.5-25 mcg inhaler Take 1 Puff by inhalation daily. 3 Inhaler 3    finasteride (Proscar) 5 mg tablet Take 1 Tab by mouth nightly.  90 Tab 3    ferrous sulfate (Iron) 325 mg (65 mg iron) tablet Take  by mouth Daily (before breakfast).  ascorbate calcium-bioflavonoid (DOROTHY-C WITH BIOFLAVONOIDS) 1,000-200 mg tab       predniSONE (DELTASONE) 10 mg tablet Take 10 mg by mouth daily. 90 Tab 3    cholecalciferol, vitamin D3, (VITAMIN D3) 2,000 unit tab Take  by mouth daily.  DISABLED PLACARD (DISABLED PLACARD) DMV Supply prescription to [facility] with completed form [id number].  Aspirin, Buffered 81 mg tab Take  by mouth daily.  multivitamin (ONE A DAY) tablet Take 1 Tab by mouth daily.  b complex vitamins tablet Take 1 Tab by mouth daily.  amLODIPine (NORVASC) 5 mg tablet Take 1 Tablet by mouth daily for 30 days. (Patient not taking: Reported on 11/10/2021) 30 Tablet 0    OXYGEN-AIR DELIVERY SYSTEMS by Does Not Apply route. 2 lpm QD (Patient not taking: Reported on 11/10/2021)      diclofenac (VOLTAREN) 1 % topical gel Apply 4 g to affected area four (4) times daily.  prn (Patient not taking: Reported on 11/10/2021)          INTERDISCIPLINARY COLLABORATION: RN    After treatment position/precautions:  Upright in chair  Wife at bedside  RN notified    Total Treatment Duration:   Time In: 3149  Time Out: [address], [address] [place] [address] [place], CCC-SLP

## 2021-11-12 NOTE — PROGRESS NOTES
Chart reviewed as pt remains ICU. Extubated yesterday, currently Airvo 45% fio2. CPAP at HS per MD. No gtts currently. Palliative care to follow. CM following for assist with d/c needs/POC as admitted from [facility]. PPD for poss STR needs. Most likely home with [facility] per spouse discussion, pending PT/OT. LOS 3 days.

## 2021-11-13 NOTE — PROGRESS NOTES
[facility] Critical Care Note[de-identified] 11/13/2021  Catina Shell Skipp  Admission Date: 11/9/2021     Length of Stay: 4 days    Background: Patient is a 81 y. o. male presents with [address] and obtundation and hypoxia, apparently aspirated during a swallowing evaluation and deteriorated as the day went on   He was discharged yesterday from the hospital where he was admitted for a few days for evaluation of a headache. 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 Below is the history and physical from our consultation during that hospitalization:     Patient is S 71 y.o.  male presents with headache and bradycardia.     He has been followed by Dr Michelle Trimble in our office. He has a history of sarcoidosis (mediastinal lymphadenopathy and calcification, VAT 2001) as well as mild pulmonary hypertension Group 1/Group 3, who has been followed at [facility] since Feb 2018. He does have CAITLIN but has not been able to use CPAP in the past. He underwent right heart catheterization at [facility] by Dr. Mariel Hernandez on 2/12/18 with mPAP 26), and is currently treated with tadalafil 40mg daily. His RHC and echocardiogram are noted below.      He was last seen by us in July 2021 and was stable. He has been on prednisone 10mg for sarcoidosis/Stage III COPD as well as Trelegy inhaler daily. He rarely requires albuterol. Baseline O2 needs: with inogen POC he is at 1-2 at rest, and 2lpm with exertion. He began taking Daliresp but began having side effects (jittery, tearly eyes, headache) and medication was stopped.      His spirometry shows mixed restriction and severe obstruction and his last spirometry was worse.  His echocardiogram was repeated here on 10/28 showing worsening RVSP 54 mm hg up from 35 in 2018. Pt would like to know if [facility] follow up can be virtual. He had a chest CT in March 2020 showing chronic, stable changes with multiple hilar lymph nodes and scarlike bilateral hilar masses. Chest CT this admission showed multiple large masses in the left axilla. He had a biopsy today of left axilla. MRI was done showing no acute abnormality.     He is currently on 40 mg prednisone, albuterol and stiolto here. He has called for nasal spray refills and recently had more congestion. His main complaint is his headache. He is retired [employer].   His mental state deteriorated as he was in the ER and although he initially responded to oxygen supplementation he was eventually intubated for airway protection due to change in mental state.  Chest x-ray revealed bilateral worsening infiltrates, his white count was elevated and he was started on Unasyn.  Vancomycin is being added. [facility] will be admitted to the intensive care unit for further care release. Patient he is fully vaccinated for [facility]  And as of now a full code    Notable PMH:  has a past medical history of Agent orange exposure (1960s), Arthritis, Asthma, Body mass index (BMI) of 25.0 to 25.9 in adult (10/28/2021), Chronic obstructive pulmonary disease ([facility] [address] Chronic pain, GERD (gastroesophageal reflux disease), Sarcoidosis (2001), and Sleep apnea. 24 Hour events: extubated 11/12/21  Awake and alert states he does not feel good coughing and rhonchi noted  ROS: unable to obtain/negative except as listed elsewhere. Lines: (insertion date)   ETT: (11/9)  Chavez: (11/9)  OGT: (11/9)  Peripheral    Drips: current dose (range)    Pertinent Exam:         Blood pressure 123/76, pulse 98, temperature 97.7 °F (36.5 °C), resp. rate 12, height 5' 4\" (1.626 m), weight 138 lb (62.6 kg), SpO2 91 %.      Intake/Output Summary (Last 24 hours) at 11/13/2021 0824  Last data filed at 11/13/2021 9964  Gross per 24 hour   Intake 1333 ml   Output 850 ml   Net 483 ml     Constitutional: awake and alert on Airvo  EENMT:  Sclera clear, pupils equal, oral mucosa moist  Respiratory:bilateral rhonchi and coughing  Cardiovascular: RRR  Gastrointestinal:  soft with no tenderness; positive bowel sounds present  Musculoskeletal:  warm with no cyanosis, soft  lower extremity edema  Skin:  no jaundice or ecchymosis  Neurologic:alert   Psychiatric: depressed mood    CXR: 11/11: stable infiltrates      Recent Labs     11/13/21  0401 11/11/21  2155   WBC 12.9* 18.6*   HGB 12.9* 13.8   HCT 40.6* 43.8    285     Recent Labs     11/13/21  0401 11/12/21  0323   * 149*   K 3.5 3.2*   * 108*   CO2 34* 31   * 110*   BUN 43* 36*   CREA 0.92 1.01   MG  --  2.3   CA 10.3 10.3   PHOS  --  3.5     Recent Labs     11/12/21  0323   BNPNT 1,186*     No results for input(s): GLUCPOC in the last 72 hours. No lab exists for component: A1C  ECHO: Results from Hospital Encounter encounter on 10/28/21    ECHO ADULT COMPLETE    Interpretation Summary  · TV: Mild tricuspid valve regurgitation is present. Right Ventricular Arterial Pressure (RVSP) is 54 mmHg. · Image quality for this study was technically difficult. · Echo study was technically difficulty. · LV: Estimated LVEF is 55 - 60%. Normal cavity size, systolic function (ejection fraction normal) and diastolic function. Mild concentric hypertrophy. Wall motion: normal.  · LA: Moderately dilated left atrium. · RA: Mildly dilated right atrium. · AV: Mild aortic valve regurgitation is present. · MV: Mild mitral valve regurgitation is present. · PV: Mild pulmonic valve regurgitation is present.      Results     Procedure Component Value Units Date/Time    CULTURE, RESPIRATORY/SPUTUM/BRONCH Ardean Sayer STAIN [[id number]]  (Abnormal) Collected: 11/10/21 0001    Order Status: Completed Specimen: Sputum,ET Suction Updated: 11/12/21 0754     Special Requests: NO SPECIAL REQUESTS        GRAM STAIN 0 TO 33 WBCS PER OIF      0 TO 2 EPITHELIAL CELLS SEEN      MANY GRAM POSITIVE COCCI         FEW GRAM POSITIVE RODS         FEW YEAST         4+ MUCUS PRESENT        Culture result:       HEAVY STAPHYLOCOCCUS AUREUS SENSITIVITY TO FOLLOW                  MODERATE NORMAL RESPIRATORY AZRA                  CULTURE IN PROGRESS,FURTHER UPDATES TO FOLLOW          MSSA/MRSA SC BY PCR, NASAL SWAB [[id number]]  (Abnormal) Collected: 11/10/21 0001    Order Status: Completed Specimen: Nasal swab Updated: 11/10/21 0204     Special Requests: NO SPECIAL REQUESTS        Culture result:       MRSA target DNA not detected, SA target DNA detected. A MRSA negative, SA positive test result does not preclude MRSA nasal colonization. CULTURE, BLOOD [[id number]]     Order Status: Canceled Specimen: Blood     CULTURE, BLOOD [[id number]]     Order Status: Canceled Specimen: Blood     COVID-19 RAPID TEST [[id number]] Collected: 11/09/21 2027    Order Status: Completed Specimen: Nasopharyngeal Updated: 11/09/21 2145     Specimen source NASAL        COVID-19 rapid test Not detected        Comment:      The specimen is NEGATIVE for SARS-CoV-2, the novel coronavirus associated with COVID-19. A negative result does not rule out COVID-19. This test has been authorized by the FDA under an Emergency Use Authorization (EUA) for use by authorized laboratories.         Fact sheet for Healthcare Providers: ConventionUpdate.co.nz  Fact sheet for Patients: ConventionUpdate.co.nz       Methodology: Isothermal Nucleic Acid Amplification         BLOOD CULTURE [[id number]] Collected: 11/09/21 1835    Order Status: Completed Specimen: Blood Updated: 11/13/21 0758     Special Requests: --        NO SPECIAL REQUESTS  LEFT  HAND       Culture result: NO GROWTH 4 DAYS       BLOOD CULTURE [[id number]] Collected: 11/09/21 1823    Order Status: Completed Specimen: Blood Updated: 11/13/21 0758     Special Requests: --        NO SPECIAL REQUESTS  RIGHT  Antecubital       Culture result: NO GROWTH 4 DAYS       SARS-COV-2, PCR [[id number]] Collected: 11/04/21 1110    Order Status: Completed Specimen: Nasopharyngeal Updated: 11/04/21 1438     Specimen source Nasopharyngeal        SARS-CoV-2 Not detected        Comment:      The specimen is NEGATIVE for SARS-CoV-2, the novel coronavirus associated with COVID-19. This test has been authorized by the FDA under an Emergency Use Authorization (EUA) for use by authorized laboratories. Fact sheet for Healthcare Providers: ConventionMagnetecsdate.co.nz       Fact sheet for Patients: Adjug.co.nz       Methodology: RT-PCR             Inpat Anti-Infectives (From admission, onward)     Start     Ordered Stop    11/09/21 2249  Vancomycin Intermittent dosing- pharmacy to dose  Other,   RX DOSING/MONITORING         11/09/21 2255 --    11/09/21 1850  ampicillin-sulbactam (UNASYN) 3 g in 0.9% sodium chloride (MBP/ADV) 100 mL MBP  3 g,   IntraVENous,   EVERY 6 HOURS        Note to Pharmacy: 4 hour extended infusion protocol    11/09/21 1849 --              Ventilator Settings:  Ideal body weight: 59.2 kg (130 lb 8.2 oz)   Mode FIO2 Rate Tidal Volume Pressure PEEP   Pressure support  50 %    450 ml  8 cm H2O  8 cm H20    Peak airway pressure: 18 cm H2O       Minute ventilation: 10.2 l/min  ABG:  Recent Labs     11/11/21  0401   PHI 7.42   PCO2I 42.5   PO2I 73*   HCO3I 27.3*     Assessment and Plan:  (Medical Decision Making)   Impression: 80 y.o. male with sarcoid, pulm hpt admitted 11/9 after aspirating following swallow study on vent support.     NEURO:   Poor Sleep: concern for development of delirium, but certainly at risk  CV:   Volume Status: +485 past 24h  PULM:   Acute hypoxemic/hypercapneic respiratory failure:   On AirVo 45%, continue CPAP QHS with sleep if can tolerate  Probable pneumonia due to aspiration cultures pending -mrsa screen neg- heavy growth staph aureus- MSSA- on unasyn - continue will also cover anaerobes with aspiration  RENAL:  PATRICIO: cr ok   Electrolytes: ok  GI:   Nutrition: - failed Swallow eval on DHT feeds  HEME:   Anemia: hg 13.1  Thrombocytopenia: 254  Anticoagulation:  lovenox 40 q day  ID:   Pneumonia: unasyn and vanc day 3 MSSA in sputum- stop vancomycin- continue unasyn  ENDO:   DM: monitor glucose  Skin: no decub, turns, preventive care  Prophy: lovenox and pepcid    Full Code; still high risk for decompensation and will keep in ICU likely today . 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      Ro Munguia MD

## 2021-11-13 NOTE — PROGRESS NOTES
Pt became confused and removed NG tube. O2 saturation recovered to 93% and a new NGT was placed, KUB ordered. Will hold NGT meds until KUB is completed. Pt sat holding at 97%.

## 2021-11-13 NOTE — PROGRESS NOTES
Bedside, Verbal and Written shift change report given to [facility] AT hospitals (oncoming nurse) by Tex Brown RN (offgoing nurse). Report included the following information SBAR, Kardex, Intake/Output, MAR, Accordion, Recent Results and Cardiac Rhythm NSR with frequent PVCs. Patient sleeping but opens eyes to stimulus, A&O to person and time but not place or situation. Pt on bipap from the night, Wife at bedside after being called in overnight to help with delirium. Bowel sounds active, lung sounds coarse bilaterally, heart sounds clear w/ no murmur noted. Dual skin assessment performed, R elbow skin tear, scattered ecchymosis, and RLE excoriation noted. No gtts running.

## 2021-11-13 NOTE — PROGRESS NOTES
Pt placed on BIPAP - pt is in no distress at this time      11/12/21 2249   Oxygen Therapy   O2 Sat (%) 95 %   Pulse via Oximetry 94 beats per minute   O2 Device BIPAP   FIO2 (%) 50 %   Respiratory   Respiratory (WDL) X   Respiratory Pattern Regular   Breath Sounds Bilateral Coarse   Cough Congested; Productive   CPAP/BIPAP   CPAP/BIPAP Start/Stop On   Device Mode BIPAP   $$ Bipap Daily Yes   Mask Type and Size Full face; Medium   Skin Condition intact   PIP Observed 10 cm H20   IPAP (cm H2O) 12 cm H2O   EPAP (cm H2O) 8 cm H2O   Inspiratory Time (sec) 1 seconds   Vt Spont (ml) 371 ml   Ve Observed (l/min) 10.2 l/min   Backup Rate 16   Total RR (Spontaneous) 27 breaths per minute   Insp Rise Time (sec) 4   Leak (Estimated) 30 L/min   Pt's Home Machine No   Biomedical Check Performed Yes   Settings Verified Yes   Alarm Settings   High Pressure 30   Low Pressure 5   Apnea 20   Low Ve 3   High Rate 50   Low Rate 8   Pulmonary Toilet   Pulmonary Toilet H. O.B elevated; Cough and deep breath

## 2021-11-13 NOTE — PROGRESS NOTES
Pt taken to MRI with nurse and tech. Mri unsuccessful due to coughing when laying flat, pt returned to floor at 13:05.

## 2021-11-14 NOTE — PROGRESS NOTES
Called by staff for support of wife in end of life care    Spoke with staff prior to visiting    Per staff PT PROGNOSIS IS VERY POOR    Wife jordyn was in the room with patient    She was very anxious - extreme anxiety    Her and Gaby Schmidt have been  39 years and are very close    Initially it was very hard to talk with wife (in protective mode) everything  said was interpreted as meaning death even my appearance. Built rapport with wife over the next hour. She was able to call family members and her  on face time. After an extended time  encouraged wife to (look beyond her needs) and look at her  without speaking. Wife seemed to be able to finally express to her  \" that you are going to be with Fredo Maria soon\"    With the help of their wonderful nurse we were able to move into the transition of spending quality time together as a  and wife. Wife was able to say some beautiful things to her  which appeared to help him in being calm      Wife is laying with her  in bed and this is a great change on where we started 90 minutes ago    Have provided a brief update to Dr Andrea Forrest as the visit continued    There is family on the way to be with wife.     Staff will call  as needed

## 2021-11-14 NOTE — PROGRESS NOTES
ACUTE PHYSICAL THERAPY GOALS:  (Developed with and agreed upon by patient and/or caregiver.)  STG:  (1.)Mr. Sanchez will move from supine to sit and sit to supine , scoot up and down and roll side to side with MODERATE ASSIST within 4 treatment day(s). (2.)Mr. Sanchez will transfer from bed to chair and chair to bed with MODERATE ASSIST using the least restrictive device within 4 treatment day(s). (3.)Mr. Sanchez will ambulate with MODERATE ASSIST for 25 feet with the least restrictive device within 4 treatment day(s). LTG:  (1.)Mr. Sanchez will move from supine to sit and sit to supine , scoot up and down and roll side to side in bed with MINIMAL ASSIST within 7 treatment day(s). (2.)Mr. Sanchez will transfer from bed to chair and chair to bed with MINIMAL ASSIST using the least restrictive device within 7 treatment day(s). (3.)Mr. Sanchez will ambulate with MINIMAL ASSIST for 50 feet with the least restrictive device within 7 treatment day(s).   ________________________________________________________________________________________________      PHYSICAL THERAPY ASSESSMENT: Initial Assessment and PM PT Treatment Day # 1      Liban Babin is a 80 y.o. male   PRIMARY DIAGNOSIS: Acute on chronic respiratory failure with hypoxia (HCC)  Acute on chronic respiratory failure with hypoxia (HCC) [J96.21]       Reason for Referral:    ICD-10: Treatment Diagnosis: Generalized Muscle Weakness (M62.81)  Other lack of cordination (R27.8)  Difficulty in walking, Not elsewhere classified (R26.2)  Other abnormalities of gait and mobility (R26.89)  [place]PAT[place] / [place] / Product Type: Medicare /     ASSESSMENT:     REHAB RECOMMENDATIONS:   Recommendation to date pending progress:  Setting:   Short-term Rehab  Equipment:    To Be Determined     PRIOR LEVEL OF FUNCTION:  (Prior to Hospitalization) INITIAL/CURRENT LEVEL OF FUNCTION:  (Most Recently Demonstrated)   Bed Mobility:   Independent  Sit to Stand:   Independent  Transfers:   Independent  Gait/Mobility:   Independent Bed Mobility:   Maximal Assistance  Sit to Stand:   Not tested  Transfers:   Maximal Assistance  Gait/Mobility:   Not tested     ASSESSMENT:  Mr. Candance Bottcher is a pleasant frail elderly male with above diagnosis who demonstrates with decreased transfers, ambulation and mobility below his prior functional baseline. All transfers are currently limited at MAX A x 1 out of bed to sit with poor to fair sitting balance. Sharla Sanchez then is returned to supine with MAX A x 1. Skilled PT is indicated for this patient's functional mobility deficits. SUBJECTIVE:   Mr. Candance Bottcher states, \"I am tired. \"    SOCIAL HISTORY/LIVING ENVIRONMENT:   Support Systems: Spouse/Significant Other (lives with wife, Brend)  OBJECTIVE:     PAIN: VITAL SIGNS: LINES/DRAINS:   Pre Treatment: Pain Screen  Pain Scale 1: Numeric (0 - 10)  Pain Intensity 1: 0  Post Treatment: 0/10 no pain reported.        IV  O2 Device: Heated, Hi flow nasal cannula     GROSS EVALUATION:   Within Functional Limits Abnormal/ Functional Abnormal/ Non-Functional (see comments) Not Tested Comments:   AROM [] [x] [] []    PROM [] [x] [] []    Strength [] [x] [] []    Balance [] [x] [] []    Posture [] [x] [] []    Sensation [] [x] [] []    Coordination [] [x] [] []    Tone [] [x] [] []    Edema [] [x] [] []    Activity Tolerance [] [x] [] []     [] [] [] []      COGNITION/  PERCEPTION: Intact Impaired   (see comments) Comments:   Orientation [] [x]    Vision [] [x]    Hearing [] [x]    Command Following [] [x]    Safety Awareness [] [x]     [] []      MOBILITY: I Mod I S SBA CGA Min Mod Max Total  NT x2 Comments:   Bed Mobility    Rolling [] [] [] [] [] [] [] [x] [] [] []    Supine to Sit [] [] [] [] [] [] [] [x] [] [] []    Scooting [] [] [] [] [] [] [] [x] [] [] []    Sit to Supine [] [] [] [] [] [] [] [x] [] [] []    Transfers    Sit to Stand [] [] [] [] [] [] [] [] [] [x] []    Bed to Chair [] [] [] [] [] [] [] [] [] [x] []    Stand to Sit [] [] [] [] [] [] [] [] [] [x] []    I=Independent, Mod I=Modified Independent, S=Supervision, SBA=Standby Assistance, CGA=Contact Guard Assistance,   Min=Minimal Assistance, Mod=Moderate Assistance, Max=Maximal Assistance, Total=Total Assistance, NT=Not Tested  GAIT: I Mod I S SBA CGA Min Mod Max Total  NT x2 Comments:   Level of Assistance [] [] [] [] [] [] [] [] [] [x] []    Distance ____    DME None    Gait Quality N/A     Weightbearing Status N/A     I=Independent, Mod I=Modified Independent, S=Supervision, SBA=Standby Assistance, CGA=Contact Guard Assistance,   Min=Minimal Assistance, Mod=Moderate Assistance, Max=Maximal Assistance, Total=Total Assistance, NT=Not Tested    [address] [place] [address] AM-[facility]       How much difficulty does the patient currently have. .. Unable A Lot A Little None   1. Turning over in bed (including adjusting bedclothes, sheets and blankets)? [] 1   [x] 2   [] 3   [] 4   2. Sitting down on and standing up from a chair with arms ( e.g., wheelchair, bedside commode, etc.)   [x] 1   [] 2   [] 3   [] 4   3. Moving from lying on back to sitting on the side of the bed? [] 1   [x] 2   [] 3   [] 4   How much help from another person does the patient currently need. .. Total A Lot A Little None   4. Moving to and from a bed to a chair (including a wheelchair)? [x] 1   [] 2   [] 3   [] 4   5. Need to walk in hospital room? [x] 1   [] 2   [] 3   [] 4   6. Climbing 3-5 steps with a railing? [x] 1   [] 2   [] 3   [] 4   © 2007, Trustees of [address] [place] [address], under license to [employer]. All rights reserved     Score:  Initial: 8 Most Recent: X (Date: -- )    Interpretation of Tool:  Represents activities that are increasingly more difficult (i.e. Bed mobility, Transfers, Gait).     PLAN:   FREQUENCY/DURATION: PT Plan of Care: 3 times/week for duration of hospital stay or until stated goals are met, whichever comes first.    PROBLEM LIST:   (Skilled intervention is medically necessary to address:)  1. Decreased Activity Tolerance  2. Decreased AROM/PROM  3. Decreased Balance  4. Decreased Cognition  5. Decreased Coordination  6. Decreased Gait Ability  7. Decreased Strength  8. Decreased Transfer Abilities   INTERVENTIONS PLANNED:   (Benefits and precautions of physical therapy have been discussed with the patient.)  1. Therapeutic Activity  2. Therapeutic Exercise/HEP  3. Neuromuscular Re-education  4. Gait Training  5. Manual Therapy  6. Education     TREATMENT:     EVALUATION: High Complexity : (Untimed Charge)    TREATMENT:   ($$ Therapeutic Activity: 23-37 mins    )  Therapeutic Activity (23 Minutes): Therapeutic activity included Rolling, Supine to Sit, Sit to Supine, Scooting, Lateral Scooting, Transfer Training and Sitting balance  to improve functional Mobility, Strength, ROM and Activity tolerance.     TREATMENT GRID:   Date:   Date:   Date:     Activity/Exercise Parameters Parameters Parameters                                                   AFTER TREATMENT POSITION/PRECAUTIONS:  Bed, Needs within reach, RN notified and Visitors at bedside    INTERDISCIPLINARY COLLABORATION:  RN/PCT and PT/PTA    TOTAL TREATMENT DURATION:  PT Patient Time In/Time Out  Time In: 1350  Time Out: [address] [place], [place]

## 2021-11-14 NOTE — PROGRESS NOTES
[facility] Critical Care Note[de-identified] 11/14/2021  Tori Sanchez  Admission Date: 11/9/2021     Length of Stay: 5 days    Background: Patient is a 81 y. o. male presents with [address] and obtundation and hypoxia, apparently aspirated during a swallowing evaluation and deteriorated as the day went on   He was discharged yesterday from the hospital where he was admitted for a few days for evaluation of a headache. 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 Below is the history and physical from our consultation during that hospitalization:     Patient is F 10 y.o.  male presents with headache and bradycardia.     He has been followed by Dr Kimo Sebastian in our office. He has a history of sarcoidosis (mediastinal lymphadenopathy and calcification, VAT 2001) as well as mild pulmonary hypertension Group 1/Group 3, who has been followed at [facility] since Feb 2018. He does have CAITLIN but has not been able to use CPAP in the past. He underwent right heart catheterization at [facility] by Dr. Naima Fitzpatrick on 2/12/18 with mPAP 26), and is currently treated with tadalafil 40mg daily. His RHC and echocardiogram are noted below.      He was last seen by us in July 2021 and was stable. He has been on prednisone 10mg for sarcoidosis/Stage III COPD as well as Trelegy inhaler daily. He rarely requires albuterol. Baseline O2 needs: with inogen POC he is at 1-2 at rest, and 2lpm with exertion. He began taking Daliresp but began having side effects (jittery, tearly eyes, headache) and medication was stopped.     His spirometry shows mixed restriction and severe obstruction and his last spirometry was worse.  His echocardiogram was repeated here on 10/28 showing worsening RVSP 54 mm hg up from 35 in 2018. Pt would like to know if [facility] follow up can be virtual. He had a chest CT in March 2020 showing chronic, stable changes with multiple hilar lymph nodes and scarlike bilateral hilar masses. Chest CT this admission showed multiple large masses in the left axilla. He had a biopsy today of left axilla. MRI was done showing no acute abnormality.     He is currently on 40 mg prednisone, albuterol and stiolto here. He has called for nasal spray refills and recently had more congestion. His main complaint is his headache. He is retired [employer].   His mental state deteriorated as he was in the ER and although he initially responded to oxygen supplementation he was eventually intubated for airway protection due to change in mental state.  Chest x-ray revealed bilateral worsening infiltrates, his white count was elevated and he was started on Unasyn.  Vancomycin is being added. Amna Meneses will be admitted to the intensive care unit for further care release. Patient he is fully vaccinated for [facility]  And as of now a full code    Notable PMH:  has a past medical history of Agent orange exposure (1960s), Arthritis, Asthma, Body mass index (BMI) of 25.0 to 25.9 in adult (10/28/2021), Chronic obstructive pulmonary disease ([facility] [address] Chronic pain, GERD (gastroesophageal reflux disease), Sarcoidosis (2001), and Sleep apnea. 24 Hour events: extubated 11/12/21. I was called to his room due to decrease alertness and tachypnea. He did wake up and follow commands with additional stimulus. He is on BIPAP with low Vt 250, RR 35. He was unable to get MRI due to coughing with laying flat. ROS: unable to obtain/negative except as listed elsewhere. Lines: (insertion date)   ETT: (11/9-11)  Chavez: (11/9)  NGT: (11/13)  Peripheral    Drips: current dose (range)    Pertinent Exam:         Blood pressure 106/73, pulse (!) 107, temperature 98.4 °F (36.9 °C), resp. rate (!) 34, height 5' 4\" (1.626 m), weight 135 lb 9.6 oz (61.5 kg), SpO2 94 %.      Intake/Output Summary (Last 24 hours) at 11/14/2021 0857  Last data filed at 11/14/2021 0600  Gross per 24 hour   Intake 1855 ml   Output 800 ml Net 1055 ml     Constitutional: awake and alert on Airvo  EENMT:  Sclera clear, pupils equal, oral mucosa moist  Respiratory: very coarse breath sounds bilaterally, BIPAP, tachypneic  Cardiovascular:  RRR  Gastrointestinal:  soft with no tenderness; positive bowel sounds present  Musculoskeletal:  warm with no cyanosis, soft  lower extremity edema  Skin:  no jaundice or ecchymosis  Neurologic:alert   Psychiatric: depressed mood    CXR: 11/11: stable infiltrates      Recent Labs     11/14/21  0334 11/13/21  0401 11/11/21  2155   WBC 12.7* 12.9* 18.6*   HGB 12.5* 12.9* 13.8   HCT 40.4* 40.6* 43.8    297 285     Recent Labs     11/14/21  0334 11/13/21  0401 11/12/21  0323   * 152* 149*   K 3.3* 3.5 3.2*   * 113* 108*   CO2 38* 34* 31   * 145* 110*   BUN 47* 43* 36*   CREA 1.01 0.92 1.01   MG  --   --  2.3   CA 10.4 10.3 10.3   PHOS  --   --  3.5     Recent Labs     11/12/21  0323   BNPNT 1,186*     No results for input(s): GLUCPOC in the last 72 hours. No lab exists for component: A1C  ECHO: Results from Hospital Encounter encounter on 10/28/21    ECHO ADULT COMPLETE    Interpretation Summary  · TV: Mild tricuspid valve regurgitation is present. Right Ventricular Arterial Pressure (RVSP) is 54 mmHg. · Image quality for this study was technically difficult. · Echo study was technically difficulty. · LV: Estimated LVEF is 55 - 60%. Normal cavity size, systolic function (ejection fraction normal) and diastolic function. Mild concentric hypertrophy. Wall motion: normal.  · LA: Moderately dilated left atrium. · RA: Mildly dilated right atrium. · AV: Mild aortic valve regurgitation is present. · MV: Mild mitral valve regurgitation is present. · PV: Mild pulmonic valve regurgitation is present.      Results     Procedure Component Value Units Date/Time    CULTURE, RESPIRATORY/SPUTUM/BRONCH Hayder Mode STAIN [[id number]]  (Abnormal)  (Susceptibility) Collected: 11/10/21 0001    Order Status: Completed Specimen: Sputum,ET Suction Updated: 11/13/21 0831     Special Requests: NO SPECIAL REQUESTS        GRAM STAIN 0 TO 33 WBCS PER OIF      0 TO 2 EPITHELIAL CELLS SEEN      MANY GRAM POSITIVE COCCI         FEW GRAM POSITIVE RODS         FEW YEAST         4+ MUCUS PRESENT        Culture result:       HEAVY STAPHYLOCOCCUS AUREUS SENSITIVITY TO FOLLOW                  HEAVY NORMAL RESPIRATORY AZRA          Susceptibility      Staphylococcus aureus     SHAUNNA     Cefazolin ($) Susceptible     Clindamycin ($) Susceptible     Oxacillin Susceptible     Rifampin ($$$$) Susceptible  [1]      Tetracycline Susceptible     Trimeth-Sulfamethoxa Susceptible     Vancomycin ($) Susceptible                 [1]  Rifampin is not to be used for mono-therapy. Linear View                   MSSA/MRSA SC BY PCR, NASAL SWAB [[id number]]  (Abnormal) Collected: 11/10/21 0001    Order Status: Completed Specimen: Nasal swab Updated: 11/10/21 0204     Special Requests: NO SPECIAL REQUESTS        Culture result:       MRSA target DNA not detected, SA target DNA detected. A MRSA negative, SA positive test result does not preclude MRSA nasal colonization. CULTURE, BLOOD [[id number]]     Order Status: Canceled Specimen: Blood     CULTURE, BLOOD [[id number]]     Order Status: Canceled Specimen: Blood     COVID-19 RAPID TEST [[id number]] Collected: 11/09/21 2027    Order Status: Completed Specimen: Nasopharyngeal Updated: 11/09/21 2145     Specimen source NASAL        COVID-19 rapid test Not detected        Comment:      The specimen is NEGATIVE for SARS-CoV-2, the novel coronavirus associated with COVID-19. A negative result does not rule out COVID-19. This test has been authorized by the FDA under an Emergency Use Authorization (EUA) for use by authorized laboratories.         Fact sheet for Healthcare Providers: ConventionUpdate.co.nz  Fact sheet for Patients: lark.co.nz       Methodology: Isothermal Nucleic Acid Amplification         BLOOD CULTURE [[id number]] Collected: 11/09/21 1835    Order Status: Completed Specimen: Blood Updated: 11/14/21 0614     Special Requests: --        NO SPECIAL REQUESTS  LEFT  HAND       Culture result: NO GROWTH 5 DAYS       BLOOD CULTURE [[id number]] Collected: 11/09/21 1823    Order Status: Completed Specimen: Blood Updated: 11/14/21 0614     Special Requests: --        NO SPECIAL REQUESTS  RIGHT  Antecubital       Culture result: NO GROWTH 5 DAYS       SARS-COV-2, PCR [[id number]] Collected: 11/04/21 1110    Order Status: Completed Specimen: Nasopharyngeal Updated: 11/04/21 1438     Specimen source Nasopharyngeal        SARS-CoV-2 Not detected        Comment:      The specimen is NEGATIVE for SARS-CoV-2, the novel coronavirus associated with COVID-19. This test has been authorized by the FDA under an Emergency Use Authorization (EUA) for use by authorized laboratories.         Fact sheet for Healthcare Providers: lark.co.tera       Fact sheet for Patients: Altruikco.nz       Methodology: RT-PCR             Inpat Anti-Infectives (From admission, onward)     Start     Ordered Stop    11/09/21 2249  Vancomycin Intermittent dosing- pharmacy to dose  Other,   RX DOSING/MONITORING         11/09/21 2255 --    11/09/21 1850  ampicillin-sulbactam (UNASYN) 3 g in 0.9% sodium chloride (MBP/ADV) 100 mL MBP  3 g,   IntraVENous,   EVERY 6 HOURS        Note to Pharmacy: 4 hour extended infusion protocol    11/09/21 1849 --              Ventilator Settings:  Ideal body weight: 59.2 kg (130 lb 8.2 oz)   Mode FIO2 Rate Tidal Volume Pressure PEEP   Pressure support  50 %    450 ml  8 cm H2O  8 cm H20    Peak airway pressure: 18 cm H2O       Minute ventilation: 10.6 l/min  ABG:  No results for input(s): PH, PCO2, PO2, HCO3, PHI, PCO2I, PO2I, HCO3I in the last 72 hours.  Assessment and Plan:  (Medical Decision Making)   Impression: 80 y.o. male with sarcoid, pulm hpt admitted 11/9 after aspirating following swallow study on vent support.     NEURO:   Delirium:  Worse today clearly less alert, CO2 80s today  CV:   Volume Status: +485 past 24h  PULM:   Acute on chronic hypoxemic/hypercapneic respiratory failure:  Now on BIPAP with hyeprcarbia. Add IV steroids with coarse breath sounds, hypercarbia. CXR looks stable. Change to AVAPS, check ABG in 1 hour  Probable pneumonia due to aspiration cultures pending -mrsa screen neg- heavy growth staph aureus- MSSA- on unasyn - continue will also cover anaerobes with aspiration  RENAL:  PATRICIO: cr ok   Hypernatremia: add D5 at 100cc/hr  GI:   Nutrition: hold TF for now with likely intubation and hypernatremia. HEME:   Anemia: hg 13.1  Thrombocytopenia: 254  Anticoagulation:  lovenox 40 q day  ID:   Pneumonia: unasyn day 4 MSSA in sputum  ENDO:   DM: monitor glucose  Skin: no decub, turns, preventive care  Prophy: lovenox and pepcid    Full Code; worse and now on AVAPS with hypercarbia. Spoke with wife at bedside and explained worsening status. She was very emotional and asked everything to be done.  Will check ABG in 1 hour and if not improving will need intubation    The patient is critically ill with respiratory failure, circulatory failure and requires high complexity decision making for assessment and support including frequent ventilator adjustment , frequent evaluation and titration of therapies , application of advanced monitoring technologies and extensive interpretation of multiple databases    Cumulative time devoted to patient care services by me for day of service -35 min     Tricia Garcias MD

## 2021-11-15 NOTE — PROGRESS NOTES
SPEECH PATHOLOGY NOTE:    Patient with a decline in medical status since last speech therapy session. He is currently intubated and not appropriate for ongoing speech therapy at this time. Will sign off. Please consider re-consult if/when medically appropriate.      JOSE Sales, CCC-SLP  Speech Language Pathologist  Acute Rehabilitation Services  Contact: Alexandrea

## 2021-11-15 NOTE — PROGRESS NOTES
Physical Therapy Note:    Therapist is discontinuing acute PT services secondary to decline in medical status. Please re-consult acute PT services when medically appropriate.  Thank you,    Caleb Smith, PT, DPT

## 2021-11-15 NOTE — PROGRESS NOTES
Pt.'s HR dropped to 47, patient no longer responding to pain and no corneal reflex noted. Sedation medications paused and patient assessed reassessed for responsiveness. Corneal reflex and withdrawal from pain resumed. HR recovered to 60's. Will continue to monitor.

## 2021-11-15 NOTE — PROGRESS NOTES
Katiana Abdul of [facility] [employer] was notified of the patient's current status and GCS score. He is ruled out for any potential donation due to his age and cancer diagnosis. They would still like to be notified of cardiac TOD to close the case.

## 2021-11-15 NOTE — PROGRESS NOTES
Pt intubated by Dr. Marimar Tay using [place] 7.5 ETT, secure at 22 with a tube fixation device. Placement confirmed via proper color change of CO2 detector from purple to yellow and auscultation. CXR pending. Pt placed on vent with settings as listed, ivana well. No complications noted.

## 2021-11-15 NOTE — PROGRESS NOTES
Bedside and verbal shift change report received from  [facility] (offgoing nurse). Report included the following information SBAR, Kardex, ED Summary, Intake/Output, MAR, Recent Results and Cardiac Rhythm NSR.      Dual skin assessment completed at bedside: scattered ecchymosis; wound to R upper arm; wound on the R lateral pretibial leg, bruising to R eye (list pertinent skin assessment findings)    Dual verification of gtts completed (name of gtts verified): D5 at 100 D: Called to check on pt following his discharge, over the weekend. Per pt's wife, he is doing well. His weight at discharge was 155lbs. Today, it was 153lbs. He was 161lbs yesterday, but wife realized they forgot to take his batteries off when they took it. She states his affect has been very flat since this most recently heart failure exacerbation. He has been able to make connections with his LVAD successfully at home. She uses a baby monitor to assure she can hear him make the connection.   I: Offered support and reassurance. Pt's wife instructed to call on-call VAD Coordinator if weight continues to drop, or if he gets dizzy/lighteheaded.   A: Pt wife verbalized understanding.   P: Will continue to monitor. Pt has follow-up appointment this Friday.

## 2021-11-15 NOTE — PROGRESS NOTES
Ventilator check complete; patient has a #7.5 ET tube secured at the 22 at the lip. Patient is sedated. Patient is not able to follow commands. Breath sounds are diminished. Trachea is midline, Negative for subcutaneous air, and chest excursion is symmetric. Patient is also Negative for cyanosis and is Negative for pitting edema. All alarms are set and audible. Resuscitation bag is at the head of the bed.       Ventilator Settings  Mode FIO2 Rate Tidal Volume Pressure PEEP I:E Ratio   VC+  70 %   24 bpm 400 ml   5 cm H20  1:2.6      Peak airway pressure: 35 cm H2O   Minute ventilation: 8.35 l/min           Debbie Schwartz, RT

## 2021-11-15 NOTE — PROCEDURES
Procedure: Endotracheal intubation    Indication: Acute respiratory failure 518.81    Medications:     Etomidate 20 mg     Succinylcholine 150 mg    After assessing the airway, the patient underwent preoxygenation with 100% FiO2 for 5 min. Etomidate and succinylcholine were given sequentially in rapid IV push. The Sellick maneuver was performed throughout the entire sequence. After adequate sedation and paralysis intubation was performed. A [id number] laryngoscope was used to visualize the epiglottis and vocal cords. After positive identification of epiglottis and the vocal cords, a size 7.5 ET tube was placed into the trachea with direct visualization. Confirmation of endotracheal positionin. The ET tube was directly visualized passing through the vocal cords. 2.  CO2 colorimetry was employed immediately to verify tube in airway with appropriate color change indicating detection/lack of CO2.   3.  Water vapor was seen within the ET tube, and auscultation of the abdomen revealed no bubbling sounds. 4.  Auscultation  And inspection of the chest after intubation showed symmetric chest excursion and symmetric air entry bilaterally. 5.  Chest X-ray has been ordered and is pending. The tube was secured at 22 cm at the teeth with a tube theodore. The patient has been placed on a mechanical ventilator. There were no complications.     Martha Clark MD

## 2021-11-15 NOTE — PROGRESS NOTES
At approximately 2029, the pt began to vomit with the BiPAP mask on. The GR obtained with the 1930 assessment were only 50. The patient was placed on a NRB and nasotracheal suction was performed. Brown colored contents were removed. MD was called to bedside, orders were placed for a STAT ABG and CXR as well as continuous suction to the NGT. An occult gastric blood order was placed too. Additionally, Morphine 1mg was ordered and given due to the patient's labored breathing and elevated RR. Pt became hypertensive but remained NSR with occasional PVCs and T wave inversion. BP came down after all the interventions were completed. Wife at the bedside.

## 2021-11-15 NOTE — PROCEDURES
Procedure:   US GUIDED CENTRAL LINE PLACEMENT    Indication:   Respiratory failure  Summary:  Informed consent was obtained. A time-out was performed. Using the Sonosite ultrasound with the 5-10 MHz vascular probe transducer and sterile technique, the right IJ vein was identified and under direct real time visualization was cannulated. The CVC kit guide wire was then inserted and its position within the right IJ vein verified in short and long axis views on vascular probe US. Using Seldinger technique, a 8.5 Western Chloe quad lumen catheter was then placed in the right IJ vein, after dilation of entry port without difficulty. There was no significant bleeding during the procedure and good flush and drawback was noted. The CVC was then affixed with supplied 2.0 silk sutures via the proximal and distal limiters, with the insertion point affixed at 17 cm. A biostatic disc was applied to the entry port followed by a transparent dressing. A chest x-ray is ordered to confirm proper placement. There were no immediate complications.     Andrés Musa MD

## 2021-11-15 NOTE — PROGRESS NOTES
Comprehensive Nutrition Assessment    Type and Reason for Visit: Reassess  Tube Feeding Management (pulmonary)    Nutrition Recommendations/Plan:    TF orders d/c'd.  Continue NPO.  Please consult nutrition services for initiation of TF and/or TPN as warranted. Malnutrition Assessment:  Malnutrition Status: At risk for malnutrition (specify) (NPO, vent, no source of nutrition)    Nutrition Assessment:   Nutrition History: Pt known to nutrition department from recent admission. At that time, pt was on an easy to chew, lactose controlled diet with ensure enlive TID with meals. Nutrition Background: Pt admitted with hypoxia from rehab facility. Noted recent admission to [facility]. PMH notable for HTN and new lung cancer diagnosis (probable metastatic cancer). Daily Update:  Pt re-intubated overnight. NGT to LIS. Propofol and IVF providing a combined ~700 non-protein kcal/day. Abdominal Status (last documented): Intact, Semi-soft (pt complains of abdominal pain) abdomen with Active  bowel sounds. Last BM 11/14/21. BM x 4 11/14.   Pertinent Medications: unasyn, solu-medrol (40 mg q8)  Continuous: propofol, levophed @ 8 mcg/min at time of assessment, fentanyl  IVF: D5 @ 100 ml/hr  Bowel regimen: colace BID (held this AM), daily miralax (held this AM)    Lab Results   Component Value Date/Time    Sodium 153 (H) 11/15/2021 01:57 AM    Potassium 3.8 11/15/2021 01:57 AM    Chloride 111 (H) 11/15/2021 01:57 AM    CO2 38 (H) 11/15/2021 01:57 AM    Anion gap 4 (L) 11/15/2021 01:57 AM    Glucose 211 (H) 11/15/2021 01:57 AM    BUN 43 (H) 11/15/2021 01:57 AM    Creatinine 1.03 11/15/2021 01:57 AM    Calcium 9.6 11/15/2021 01:57 AM    Albumin 1.5 (L) 11/15/2021 01:57 AM    Magnesium 2.4 11/15/2021 01:57 AM    Phosphorus 3.2 11/15/2021 01:57 AM     Lab Results   Component Value Date/Time    Glucose 211 (H) 11/15/2021 01:57 AM    Glucose (POC) 119 (H) 11/08/2021 01:11 PM    Glucose (POC) 187 (H) 11/03/2021 10:44 PM Glucose (POC) 162 (H) 10/30/2021 05:27 PM    Glucose (POC) 148 (H) 10/30/2021 10:38 AM    Glucose (POC) 140 (H) 10/30/2021 05:54 AM    Glucose (POC) 136 (H) 10/29/2021 11:16 PM       Nutrition Related Findings:   NFPE deferred d/t current medical condition. Vent. OGT in place since 11/9. Extubated 11/11. OGT removed. SLP consult pending. NPO per SLP. NGT to be replaced and TF resumed. TF held for potential intubation and emesis w/ BIPAP. NGT to LIS. Reintubated 11/15. Current Nutrition Therapies:  DIET NPO    Current Intake:   Average Meal Intake: NPO   Additional Caloric Sources: Propofol @ 11.8 ml/hr provides ~312 kcal/d. D5 @ 100 ml/hr provides ~408 kcal/d at current rate. Anthropometric Measures:  Height: 5' 4\" (162.6 cm)  Current Body Wt: 65.7 kg (144 lb 13.5 oz) (11/14), Weight source: Not specified  BMI: 24.8, Normal weight (BMI 18.5-24. 9)  Admission Body Weight: 149 lb 14.6 oz (11/9, pt stated)  Ideal Body Weight (lbs) (Calculated): 130 lbs (59 kg), 104.6 %  Edema: No data recorded     Estimated Daily Nutrient Needs:  Energy (kcal/day): 2485-7761 (Kcal/kg (25-30), Weight Used: Current (61.7 kg))  Protein (g/day):  Weight Used: (Current (1.2-2))  Fluid (ml/day): 5550-4755 (1 ml/kcal (or per MD))    Nutrition Diagnosis:   · Inadequate oral intake related to altered GI function, impaired respiratory function as evidenced by NPO or clear liquid status due to medical condition, intubation (NGT to LIS following emesis)    Nutrition Interventions:   Food and/or Nutrient Delivery: Continue NPO     Coordination of Nutrition Care: Continue to monitor while inpatient  Plan of Care discussed with NADIRA Ag    Goals:   Previous Goal Met: Progress towards goal(s) declining  Active Goal: Nutrition plan of care to be determined within 3 days - TF via TPN    Nutrition Monitoring and Evaluation:      Food/Nutrient Intake Outcomes:  (TF vs TPN while intubated)  Physical Signs/Symptoms Outcomes: Biochemical data, GI status, Nausea/vomiting, Hemodynamic status, Weight    Discharge Planning:     Too soon to determine    [place] [address], [address], [address] [place], [address] [place] [address] Ave.

## 2021-11-15 NOTE — PROGRESS NOTES
[facility] Critical Care Note[de-identified] 11/15/2021  Jagruti Sanchez  Admission Date: 11/9/2021     Length of Stay: 6 days    Background: Patient is a 81 y. o. male presented to the ER on 11/9/21 with mental status change and obtundation and hypoxia after an aspiration event during a swallowing evaluation and deteriorated as the day went on. He was intubated in the ER and transferred to the ICU. He was discharged 11/8/21 from the hospital where he was admitted for a few days for evaluation of a headache. 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 He was extubated 11/12. Pt was on BiPAP and had an episode of emesis. Pt was taken off of BiPAP and required intubation 11/14. Below is the history and physical from our consultation during that hospitalization:     Patient is B 7 y.o.  male presents with headache and bradycardia.     He has been followed by Dr Jamil Francisco in our office. He has a history of sarcoidosis (mediastinal lymphadenopathy and calcification, VAT 2001) as well as mild pulmonary hypertension Group 1/Group 3, who has been followed at [facility] since Feb 2018. He does have CAITLIN but has not been able to use CPAP in the past. He underwent right heart catheterization at [place] by Dr. Gumaro Key on 2/12/18 with mPAP 26), and is currently treated with tadalafil 40mg daily. His RHC and echocardiogram are noted below.      He was last seen by us in July 2021 and was stable. He has been on prednisone 10mg for sarcoidosis/Stage III COPD as well as Trelegy inhaler daily. He rarely requires albuterol. Baseline O2 needs: with inogen POC he is at 1-2 at rest, and 2lpm with exertion. He began taking Daliresp but began having side effects (jittery, tearly eyes, headache) and medication was stopped.     His spirometry shows mixed restriction and severe obstruction and his last spirometry was worse.  His echocardiogram was repeated here on 10/28 showing worsening RVSP 54 mm hg up from 35 in 2018. Pt would like to know if [facility] follow up can be virtual. He had a chest CT in March 2020 showing chronic, stable changes with multiple hilar lymph nodes and scarlike bilateral hilar masses. Chest CT this admission showed multiple large masses in the left axilla. He had a biopsy today of left axilla. MRI was done showing no acute abnormality.     He is currently on 40 mg prednisone, albuterol and stiolto here. He has called for nasal spray refills and recently had more congestion. His main complaint is his headache. He is retired [employer].   His mental state deteriorated as he was in the ER and although he initially responded to oxygen supplementation he was eventually intubated for airway protection due to change in mental state.  Chest x-ray revealed bilateral worsening infiltrates, his white count was elevated and he was started on Unasyn.  Vancomycin is being added. Az Thao will be admitted to the intensive care unit for further care release. Patient he is fully vaccinated for [facility]  And as of now a full code    Notable PMH:  has a past medical history of Agent orange exposure (1960s), Arthritis, Asthma, Body mass index (BMI) of 25.0 to 25.9 in adult (10/28/2021), Chronic obstructive pulmonary disease ([facility] [address] Chronic pain, GERD (gastroesophageal reflux disease), Sarcoidosis (2001), and Sleep apnea. 24 Hour events: Pt required reintubation overnight after episode of emesis while on BiPAP. ROS: unable to obtain/negative except as listed elsewhere. Lines: (insertion date)   ETT: (11/9-11) (11/14-)  Chavez: (11/9)  NGT: (11/13)  Peripheral    Drips: current dose (range)  Levophed 8mcg/hr  Propofol  Fentanyl gtt   Pertinent Exam:         Blood pressure 124/60, pulse 62, temperature 97.8 °F (36.6 °C), resp. rate 24, height 5' 4\" (1.626 m), weight 144 lb 14.4 oz (65.7 kg), SpO2 100 %.      Intake/Output Summary (Last 24 hours) at 11/15/2021 1000  Last data filed at 11/15/2021 0542  Gross per 24 hour   Intake 4167.96 ml   Output 1095 ml   Net 3072.96 ml     Constitutional: sedated, on AC 70%   EENMT:  Sclera clear, pupils equal, oral mucosa moist  Respiratory: very coarse breath sounds bilaterally,   Cardiovascular:  RRR  Gastrointestinal:  soft with no tenderness; positive bowel sounds present  Musculoskeletal:  warm with no cyanosis, soft  lower extremity edema  Skin:  no jaundice or ecchymosis  Neurologic:sedated  Psychiatric: sedated    CXR:           11/11: stable infiltrates      Recent Labs     11/15/21  0157 11/14/21  0334 11/13/21  0401   WBC 24.7* 12.7* 12.9*   HGB 11.2* 12.5* 12.9*   HCT 36.7* 40.4* 40.6*    320 297     Recent Labs     11/15/21  0157 11/14/21  0334 11/13/21  0401   * 157* 152*   K 3.8 3.3* 3.5   * 114* 113*   CO2 38* 38* 34*   * 156* 145*   BUN 43* 47* 43*   CREA 1.03 1.01 0.92   MG 2.4  --   --    CA 9.6 10.4 10.3   PHOS 3.2  --   --    ALB 1.5*  --   --    AST 28  --   --    ALT 22  --   --    AP 69  --   --      Recent Labs     11/15/21  0447   TROPHS 29.4*     No results for input(s): GLUCPOC in the last 72 hours. No lab exists for component: A1C  ECHO: Results from Hospital Encounter encounter on 10/28/21    ECHO ADULT COMPLETE    Interpretation Summary  · TV: Mild tricuspid valve regurgitation is present. Right Ventricular Arterial Pressure (RVSP) is 54 mmHg. · Image quality for this study was technically difficult. · Echo study was technically difficulty. · LV: Estimated LVEF is 55 - 60%. Normal cavity size, systolic function (ejection fraction normal) and diastolic function. Mild concentric hypertrophy. Wall motion: normal.  · LA: Moderately dilated left atrium. · RA: Mildly dilated right atrium. · AV: Mild aortic valve regurgitation is present. · MV: Mild mitral valve regurgitation is present. · PV: Mild pulmonic valve regurgitation is present.      Results     Procedure Component Value Units Date/Time    CULTURE, RESPIRATORY/SPUTUM/BRONCH Maximusus Kimi [[id number]] Collected: 11/15/21 0447    Order Status: Completed Specimen: Sputum from Endotracheal aspirate Updated: 11/15/21 0503    CULTURE, RESPIRATORY/SPUTUM/BRONCH Lavada Gambles STAIN [[id number]]  (Abnormal)  (Susceptibility) Collected: 11/10/21 0001    Order Status: Completed Specimen: Sputum,ET Suction Updated: 11/13/21 0831     Special Requests: NO SPECIAL REQUESTS        GRAM STAIN 0 TO 33 WBCS PER OIF      0 TO 2 EPITHELIAL CELLS SEEN      MANY GRAM POSITIVE COCCI         FEW GRAM POSITIVE RODS         FEW YEAST         4+ MUCUS PRESENT        Culture result:       HEAVY STAPHYLOCOCCUS AUREUS SENSITIVITY TO FOLLOW                  HEAVY NORMAL RESPIRATORY AZRA          Susceptibility      Staphylococcus aureus     SHAUNNA     Cefazolin ($) Susceptible     Clindamycin ($) Susceptible     Oxacillin Susceptible     Rifampin ($$$$) Susceptible  [1]      Tetracycline Susceptible     Trimeth-Sulfamethoxa Susceptible     Vancomycin ($) Susceptible                 [1]  Rifampin is not to be used for mono-therapy. Linear View                   MSSA/MRSA SC BY PCR, NASAL SWAB [[id number]]  (Abnormal) Collected: 11/10/21 0001    Order Status: Completed Specimen: Nasal swab Updated: 11/10/21 0204     Special Requests: NO SPECIAL REQUESTS        Culture result:       MRSA target DNA not detected, SA target DNA detected. A MRSA negative, SA positive test result does not preclude MRSA nasal colonization.           CULTURE, BLOOD [[id number]]     Order Status: Canceled Specimen: Blood     CULTURE, BLOOD [[id number]]     Order Status: Canceled Specimen: Blood     COVID-19 RAPID TEST [[id number]] Collected: 11/09/21 2027    Order Status: Completed Specimen: Nasopharyngeal Updated: 11/09/21 2145     Specimen source NASAL        COVID-19 rapid test Not detected        Comment:      The specimen is NEGATIVE for SARS-CoV-2, the novel coronavirus associated with COVID-19. A negative result does not rule out COVID-19. This test has been authorized by the FDA under an Emergency Use Authorization (EUA) for use by authorized laboratories. Fact sheet for Healthcare Providers: AuditFiledate.co.nz  Fact sheet for Patients: AuditFiledate.co.nz       Methodology: Isothermal Nucleic Acid Amplification         BLOOD CULTURE [[id number]] Collected: 11/09/21 1835    Order Status: Completed Specimen: Blood Updated: 11/14/21 0614     Special Requests: --        NO SPECIAL REQUESTS  LEFT  HAND       Culture result: NO GROWTH 5 DAYS       BLOOD CULTURE [[id number]] Collected: 11/09/21 1823    Order Status: Completed Specimen: Blood Updated: 11/14/21 0614     Special Requests: --        NO SPECIAL REQUESTS  RIGHT  Antecubital       Culture result: NO GROWTH 5 DAYS       SARS-COV-2, PCR [[id number]] Collected: 11/04/21 1110    Order Status: Completed Specimen: Nasopharyngeal Updated: 11/04/21 1438     Specimen source Nasopharyngeal        SARS-CoV-2 Not detected        Comment:      The specimen is NEGATIVE for SARS-CoV-2, the novel coronavirus associated with COVID-19. This test has been authorized by the FDA under an Emergency Use Authorization (EUA) for use by authorized laboratories.         Fact sheet for Healthcare Providers: AuditFiledate.co.nz       Fact sheet for Patients: AuditFiledaFlexiroam.co.nz       Methodology: RT-PCR             Inpat Anti-Infectives (From admission, onward)     Start     Ordered Stop    11/09/21 2249  Vancomycin Intermittent dosing- pharmacy to dose  Other,   RX DOSING/MONITORING         11/09/21 2255 --    11/09/21 1850  ampicillin-sulbactam (UNASYN) 3 g in 0.9% sodium chloride (MBP/ADV) 100 mL MBP  3 g,   IntraVENous,   EVERY 6 HOURS        Note to Pharmacy: 4 hour extended infusion protocol    11/09/21 1849 --              Ventilator Settings: Ideal body weight: 59.2 kg (130 lb 8.2 oz)   Mode FIO2 Rate Tidal Volume Pressure PEEP   VC+  70 %    400 ml  8 cm H2O  5 cm H20    Peak airway pressure: 35 cm H2O       Minute ventilation: 8.35 l/min  ABG:  Recent Labs     11/15/21  0331 11/15/21  0054 11/14/21 2032   PHI 7.45 7.38 7.31*   PCO2I 50.5* 62.2* 84.6*   PO2I 80 66* 63*   HCO3I 35.4* 37.1* 42.5*     Assessment and Plan:  (Medical Decision Making)   Impression: 80 y.o. male with sarcoid, pulm HTN admitted 11/9 after aspirating following swallow study on vent support.     NEURO:   Delirium:  Now intubated  CV:   Volume Status: +2.7L in last 24*, +4.6L since admission  PULM:   Acute on chronic hypoxemic/hypercapneic respiratory failure:  pt required reintubation 11/14  Probable pneumonia due to aspiration cultures pending -mrsa screen neg- heavy growth staph aureus- MSSA- on unasyn - continue will also cover anaerobes with aspiration  RENAL:  PATRICIO: cr ok   Hypernatremia: add free water   GI:   Nutrition: hold TF for now   HEME:   Anemia: hg 11.2  Thrombocytopenia: 314  Anticoagulation:  lovenox 40 q day  ID:   Pneumonia: unasyn day 5 MSSA in sputum  ENDO:   DM: monitor glucose  Skin: no decub, turns, preventive care  Prophy: lovenox and pepcid    Full Code;     Pt has had a few bursts of tachycardia but HR remains in the 60s. We will monitor for now and avoid BBs. Pt's wife, Maki Rios, [phone], was updated on pt's current condition. The patient is critically ill with respiratory failure, circulatory failure and requires high complexity decision making for assessment and support including frequent ventilator adjustment , frequent evaluation and titration of therapies , application of advanced monitoring technologies and extensive interpretation of multiple databases    Cumulative time devoted to patient care services by me for day of service -35 min     GLYNN Nugent     I have spoken with and examined the patient.  I agree with the above assessment and plan as documented. Constitutional: sedated, on AC 70%   EENMT:  Sclera clear, pupils equal, oral mucosa moist  Respiratory: very coarse breath sounds bilaterally,   Cardiovascular:  RRR  Gastrointestinal:  soft with no tenderness; positive bowel sounds present  Musculoskeletal:  warm with no cyanosis, soft  lower extremity edema  Skin:  no jaundice or ecchymosis  Neurologic:sedated  Psychiatric: sedated      The overall condition is much worse and he required reintubation last night. Currently he is on FiO2 70% and  PEEP 5 cm. We will increase PEEP to 8 cm and wean FiO2 to 60% and continue to wean FiO2 if possible. He is currently on Levophed drip. We will add albumin to help with his hypoalbuminemia and hopefully mobilizing his fluids to allow to wean his Levophed drip. He has ongoing hyponatremia and likely need to increase his free water replacement. Currently off the tube feeding due to vomiting and intolerance to the tube feeding. We will continue to use D5W and increase the rate to 150 an hour and monitor his sodium level closely. We will ask palliative care to continue discussions of goals of care with his wife. Discussed with all providers and nursing. He did have episode of frequent PACs but no atrial fibrillation. Due to his hemodynamic instability we will not use any beta-blocker at this point and will continue to monitor very closely. Total time spent with this patient today is 35 minutes.     Vincent Sears MD

## 2021-11-15 NOTE — PROGRESS NOTES
Staff requested [address] [place] [address] for support for Spouse, Dallin Alvarado. Pt.'s condition was deteriorating. Spouse was also supported by Friends. Pt and Spouse have been  41 years. Spouse was tearful and experiencing emotive grief. [address] [place] [address] provided comforting spiritual presence and therapeutic communication. [address] [place] [address] and Spouse prayed for Pt. Pt. was intubated and [address] [place] [address] and Friends provided support for Spouse.  also provided prayer and hospitality. Spouse and Friends engaged in life review. [address] [place] [address] escorted Spouse back to room and helped her settle for the night. [address] [place] [address] let Staff know she is available should additional spiritual support be needed tonight.

## 2021-11-15 NOTE — PROGRESS NOTES
MD made aware of persistent T wave inversion. A 12 lead EKG was obtained that revealed ischemia in multiple leads per MD. Troponin ordered.

## 2021-11-15 NOTE — PROGRESS NOTES
Occupational Therapy Note:  Therapist is discharging patient from OT at this time due to decline in medical status (now re-intubated). Please reconsult OT when MD deems patient appropriate for continued services. Thank you.   Piter Newton, OTR/L Offered and patient declined

## 2021-11-15 NOTE — PROGRESS NOTES
Chart reviewed as pt remains ICU. Re-intubated/vent -70%. Propofol, fentanyl, and levo gtts. Wife wants continued full code at present. CM will continue to follow for any assist and d/c needs/POC when stable. LOS 6 days.

## 2021-11-15 NOTE — PROGRESS NOTES
A follow up visit was made to the patient. Emotional support, spiritual presence and   prayer were provided for the patient. I visited with the patient's wife in the ICU waiting room. We discussed how she was doing and her 's condition.       Zaheer Knox, [address] [facility], [facility]

## 2021-11-15 NOTE — PROGRESS NOTES
Pt having frequent short bradycardic episodes. O2 saturations will drop following; but he when his HR increases he recovers. Fentanyl gtt was decreased and labs were pulled early.

## 2021-11-15 NOTE — PROGRESS NOTES
A follow up visit was made to the patient. Emotional support, spiritual presence and   prayer were provided for the patient. I talked to Rosa Purvis, his wife, she was going to the cafeteria for coffee.       Cassandra Clay, [address] [facility], [facility]

## 2021-11-15 NOTE — PROGRESS NOTES
Bedside, Verbal and Written shift change report given to [facility] AT [facility] (oncoming nurse) by Hudson Leon RN (offgoing nurse). Report included the following information SBAR, Kardex, Intake/Output, MAR, Accordion, Recent Results and Cardiac Rhythm NSR with episodes of Yakov. Pt intubated and sedated, does not follow commands. Pt at 70% FiO2 on the ventilator, NSR on monitor. Lung sounds coarse bilaterally, sat 100%. Gtts dual verified; Fentanyl (25), Levo (10), D5 (100), Prop (30). Skin dual verified, Wound noted to R elbow and RLE. Scattered bruising to upper extremities and bruise to R eye.

## 2021-11-15 NOTE — PROGRESS NOTES
MD made aware of declining respiratory status despite being placed on BiPAP. He is coming to intubate now.

## 2021-11-15 NOTE — PROCEDURES
PROCEDURE NOTE  ARTERIAL LINE PLACEMENT  11/15/21  2:46 AM    Indication: shock in need of hemodynamic monitoring    A time-out was completed verifying correct patient, procedure, site, positioning, and special equipment if applicable. Ejs test was performed to ensure adequate perfusion. The patients left wrist was prepped and draped in sterile fashion. A 20G Arrow arterial line was introduced into the left radial artery. The catheter was threaded over the guide wire and the needle was removed with appropriate pulsatile blood return. The catheter was then sutured in place to the skin and a sterile dressing applied. Perfusion to the extremity distal to the point of catheter insertion was checked and found to be adequate. The patient tolerated the procedure well and there were no complications.     Gabi Jain MD

## 2021-11-15 NOTE — PROGRESS NOTES
Palliative Care Progress Note    Patient: Vaibhav Hooper MRN: [id number]  SSN: [id number]    YOB: 1940  Age: 80 y.o. Sex: male       Assessment/Plan:     Chief Complaint/Interval History: pt reintubated yesterday. Sedated on vent with pressors started    Principal Diagnosis:    · Dyspnea  R06.00    Additional Diagnoses:   · Frailty  R54  · Counseling, Encounter for Medical Advice  Z71.9  · Encounter for Palliative Care  Z51.5    Palliative Performance Scale (PPS)       Medical Decision Making:   Reviewed and summarized labs and imaging over past 24 hours. Met with spouse in waiting room. Reviewed ongoing care and concerns given recurrent intubations and aspiration. Wife with questions regarding potential to take pt home. I explained pt would need to be stable on less oxygen off the vent prior to any transfer. She is interested in potential home palliative care. Currently we need to see if pt will stabilize  in hospital.  Advised to take things one day at a time. Will follow and assist with family updates, goals. Will discuss findings with members of the interdisciplinary team.      More than 50% of this 25 minute visit was spent counseling and coordination of care as outlined above. Subjective:     Review of Systems unable to obtain due to intubation. Objective:     Visit Vitals  /60 (BP 1 Location: Left lower arm, BP Patient Position: At rest;Lying right side)   Pulse 73   Temp 97.8 °F (36.6 °C)   Resp 22   Ht 5' 4\" (1.626 m)   Wt 144 lb 14.4 oz (65.7 kg)   SpO2 98%   BMI 24.87 kg/m²       Physical Exam:    General:  sedated   Eyes:  Conjunctivae/corneas clear    Nose: Nares normal. Septum midline.    Neck: Supple, symmetrical, trachea midline, no JVD   Lungs:   Coarse bilateral bs   Heart:  Regular rate and rhythm, no murmur    Abdomen:   Soft, non-tender, non-distended   Extremities: Normal, atraumatic, no cyanosis or edema   Skin: Skin color, texture, turgor normal. No rash or lesions.    Neurologic: sedated   Psych: sedated     Signed By: Graciela Maya MD     November 15, 2021

## 2021-11-15 NOTE — PROGRESS NOTES
Called to bedside by wife due to \"gurgling sound\" by pt. Upon arrival pt vomiting. BIPAP removed, pt SXN orally with verona, nurses called to bedside. Pt placed on NRB at flush. Dr. Krystyna Chadwick called to bedside. ABG drawn per Dr. Krystyna Chadwick. Will monitor.

## 2021-11-16 NOTE — PROGRESS NOTES
Problem: Patient Education: Go to Patient Education Activity  Goal: Patient/Family Education  Outcome: Progressing Towards Goal     Problem: Non-Violent Restraints  Goal: No harm/injury to patient while restraints in use  Outcome: Progressing Towards Goal  Goal: Patient's dignity will be maintained  Outcome: Progressing Towards Goal  Goal: Patient Interventions  Outcome: Progressing Towards Goal

## 2021-11-16 NOTE — PROGRESS NOTES
Bedside and verbal shift change report received from  [facility] (offgoing nurse). Report included the following information SBAR, Kardex, Intake/Output, MAR, Recent Results and Cardiac Rhythm Sinus Darl Karlos. Dual skin assessment completed at bedside:   Scattered bruising and swelling on upper extremities, extremities elevated   (list pertinent skin assessment findings)    Dual verification of gtts completed (name of gtts verified):   Fentanyl gtt dual verified, see EMAR.

## 2021-11-16 NOTE — PROGRESS NOTES
Ventilator check complete; patient has a #7.5 ET tube secured at the 22 at the lip. Patient is not sedated. Patient is able to follow commands. Breath sounds are diminished. Trachea is midline, Negative for subcutaneous air, and chest excursion is symmetric. Patient is also Negative for cyanosis and is Negative for pitting edema. All alarms are set and audible. Resuscitation bag is at the head of the bed.       Ventilator Settings  Mode FIO2 Rate Tidal Volume Pressure PEEP I:E Ratio   VC+  55 %   22 bpm 400 ml  8 cm H2O  8 cm H20  1:1.7      Peak airway pressure: 41 cm H2O   Minute ventilation: 7.74 l/min     Inga Chung, RT

## 2021-11-16 NOTE — PROGRESS NOTES
A follow up visit was made to the patient. Emotional support, spiritual presence and   prayer were provided for the patient. His wife talked about her 's health and some of her hopes for him. She expressed interest in his palliative care and possibly home hospice care. I referred to the ICU .       Wilbert Camacho, [address] [facility], [facility]

## 2021-11-16 NOTE — CONSULTS
Comprehensive Nutrition Assessment    Type and Reason for Visit: Reassess, Consult  Tube Feeding Management (pulmonary)    Nutrition Recommendations/Plan:   Enteral Nutrition:   Enteral Access: Nasogastric  Formula: Peptide Based (Vital AF 1.2 Tae)  Delivery: Continuous feeding: Initiate at  20ml/hour, progress by 10ml/4 hours to goal rate of 70ml/hour. Water flush 50 ml every 4 hours  Modulars: Continue None   Enteral regimen at goal to provide:  1848 calories (100% estimated calorie needs), 115 grams protein (100% estimated protein needs) and 1548 ml free fluid (~0.8 ml/kcal) calculations based on 22 hour infusion. Nutritional Supplement Therapy:   Active electrolyte replacement per nutrition support protocols  Replacement indicated:  Completed  Labs:   EN labs: BMP daily, Mg MWF and Phos MWF. Meals and Snacks:  Continue current diet. NPO. Malnutrition Assessment:  Malnutrition Status: At risk for malnutrition (specify) (NPO, vent)    Nutrition Assessment:   Nutrition History: Pt known to nutrition department from recent admission. At that time, pt was on an easy to chew, lactose controlled diet with ensure enlive TID with meals. Nutrition Background: Pt admitted with hypoxia from rehab facility. Noted recent admission to [facility]. PMH notable for HTN and new lung cancer diagnosis (probable metastatic cancer). Daily Update:  Pt remains intubated and sedated. NGT in place. Plans to resume TF today. Abdominal Status (last documented): Intact, Semi-soft abdomen with Active  bowel sounds. Last BM 11/15/21.   Pertinent Medications: SSI, ancef, solu-medrol, reglan, protonix  Continuous: fentanyl  Bowel regimen: colace BID, daily miralax  IVF: d/c'd this AM.     Lab Results   Component Value Date/Time    Sodium 139 11/16/2021 03:25 AM    Potassium 3.0 (L) 11/16/2021 03:25 AM    Chloride 98 11/16/2021 03:25 AM    CO2 35 (H) 11/16/2021 03:25 AM    Anion gap 6 (L) 11/16/2021 03:25 AM    Glucose 173 (H) 11/16/2021 03:25 AM    BUN 26 (H) 11/16/2021 03:25 AM    Creatinine 0.81 11/16/2021 03:25 AM    Calcium 9.4 11/16/2021 03:25 AM    Albumin 1.5 (L) 11/15/2021 01:57 AM    Magnesium 2.4 11/15/2021 01:57 AM    Phosphorus 3.2 11/15/2021 01:57 AM     Na trended down from 153 to WNL of 139. K+ replaced with 20 meq KCl x 2 doses    Lab Results   Component Value Date/Time    Glucose 173 (H) 11/16/2021 03:25 AM    Glucose (POC) 158 (H) 11/16/2021 06:51 AM    Glucose (POC) 163 (H) 11/15/2021 11:08 PM    Glucose (POC) 216 (H) 11/15/2021 06:52 PM    Glucose (POC) 169 (H) 11/15/2021 11:49 AM    Glucose (POC) 119 (H) 11/08/2021 01:11 PM    Glucose (POC) 187 (H) 11/03/2021 10:44 PM     Nutrition Related Findings:   NFPE deferred d/t current medical condition. Vent. OGT in place since 11/9. Extubated 11/11. OGT removed. SLP consult pending. NPO per SLP. NGT to be replaced and TF resumed. TF held for potential intubation and emesis w/ BIPAP. NGT to LIS. Reintubated 11/15. TF to resume 11/16. Current Nutrition Therapies:  DIET NPO    Current Intake:   Average Meal Intake: NPO   Additional Caloric Sources: Propofol d/c'd    Anthropometric Measures:  Height: 5' 4\" (162.6 cm)  Current Body Wt: 71.4 kg (157 lb 6.5 oz) (11/15), Weight source: Not specified  BMI: 27, Normal weight (BMI 18.5-24. 9)  Admission Body Weight: 149 lb 14.6 oz (11/9, pt stated)  Ideal Body Weight (lbs) (Calculated): 130 lbs (59 kg), 104.6 %  Edema: No data recorded     Estimated Daily Nutrient Needs:  Energy (kcal/day): 4310-8983 (Kcal/kg (25-30), Weight Used: Current (61.7 kg))  Protein (g/day):  Weight Used: (Current (1.2-2))  Fluid (ml/day): 5457-4893 (1 ml/kcal (or per MD))    Nutrition Diagnosis:   · Inadequate oral intake related to impaired respiratory function as evidenced by NPO or clear liquid status due to medical condition, intubation, nutrition support-enteral nutrition    Nutrition Interventions:   Food and/or Nutrient Delivery: Continue NPO, Start tube feeding     Coordination of Nutrition Care: Continue to monitor while inpatient    Goals:   Previous Goal Met: Goal(s) achieved  Active Goal: Tolerate initiation of TF within 48 hours. Nutrition Monitoring and Evaluation:      Food/Nutrient Intake Outcomes: Enteral nutrition intake/tolerance  Physical Signs/Symptoms Outcomes: Biochemical data, GI status, Hemodynamic status, Weight    Discharge Planning:     Too soon to determine    Debra Higgins [place], [address], [address], [address]

## 2021-11-16 NOTE — PROGRESS NOTES
Formerly [facility], later [facility] Critical Care Note[de-identified] 11/16/2021  Flynn Sanchez  Admission Date: 11/9/2021     Length of Stay: 7 days    Background: Patient is a 80 y.o. male presented to the ER on 11/9/21 with mental status change and obtundation and hypoxia after an aspiration event during a swallowing evaluation and deteriorated as the day went on. He was intubated in the ER and transferred to the ICU. He was discharged 11/8/21 from the hospital where he was admitted for a few days for evaluation of a headache. 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 He was extubated 11/12. Pt was on BiPAP and had an episode of emesis. Pt was taken off of BiPAP and required intubation 11/14. Below is the history and physical from our consultation during that hospitalization:     Patient is a 80 y.o.  male presents with headache and bradycardia. He has been followed by Dr Bhakti Lam in our office. He has a history of sarcoidosis (mediastinal lymphadenopathy and calcification, VAT 2001) as well as mild pulmonary hypertension Group 1/Group 3, who has been followed at [facility] since Feb 2018. He does have CAITLIN but has not been able to use CPAP in the past. He underwent right heart catheterization at [place] by Dr. Winter Aldana on 2/12/18 with mPAP 26), and is currently treated with tadalafil 40mg daily. His RHC and echocardiogram are noted below. He was last seen by us in July 2021 and was stable. He has been on prednisone 10mg for sarcoidosis/Stage III COPD as well as Trelegy inhaler daily. He rarely requires albuterol. Baseline O2 needs: with inogen POC he is at 1-2 at rest, and 2lpm with exertion. He has a recent diagnosis of extensive stage small cell cancer after mass discovered in left axilla. He is now admitted with pneumonia, respiratory failure and weakness.     Notable PMH:  has a past medical history of Agent orange exposure (1960s), Arthritis, Asthma, Body mass index (BMI) of 25.0 to 25.9 in adult (10/28/2021), Chronic obstructive pulmonary disease ([facility] [address] Chronic pain, GERD (gastroesophageal reflux disease), Sarcoidosis (2001), and Sleep apnea. 24 Hour events: Leukocytosis remains elevated. H/H down from 11.2 to 9.9g/dl. P:F ratio is 127    ROS: unable to obtain/negative except as listed elsewhere. Lines: (insertion date)   ETT: (11/9-11) (11/14-)  Chavez: (11/9)  NGT: (11/13)  Peripheral    Drips: current dose (range)  D5W @ 150  Fentanyl @100  Levo off  Propofol off    Pertinent Exam:         Blood pressure (!) 102/98, pulse (!) 55, temperature 97.8 °F (36.6 °C), resp. rate 22, height 5' 4\" (1.626 m), weight 157 lb 4.8 oz (71.4 kg), SpO2 100 %.      Intake/Output Summary (Last 24 hours) at 11/16/2021 0747  Last data filed at 11/16/2021 5637  Gross per 24 hour   Intake 4261.14 ml   Output 1250 ml   Net 3011.14 ml     Constitutional: sedated, on AC 70%   EENMT:  Sclera clear, pupils equal, oral mucosa moist  Respiratory: very coarse breath sounds bilaterally,   Cardiovascular:  RRR  Gastrointestinal:  soft with no tenderness; positive bowel sounds present  Musculoskeletal:  warm with no cyanosis, soft  lower extremity edema  Skin:  no jaundice or ecchymosis  Neurologic:sedated  Psychiatric: sedated    CXR:              Recent Labs     11/16/21  0325 11/15/21  0157 11/14/21  0334   WBC 23.1* 24.7* 12.7*   HGB 9.9* 11.2* 12.5*   HCT 30.4* 36.7* 40.4*    314 320     Recent Labs     11/16/21  0325 11/15/21  0157 11/14/21  0334    153* 157*   K 3.0* 3.8 3.3*   CL 98 111* 114*   CO2 35* 38* 38*   * 211* 156*   BUN 26* 43* 47*   CREA 0.81 1.03 1.01   MG  --  2.4  --    CA 9.4 9.6 10.4   PHOS  --  3.2  --    ALB  --  1.5*  --    AST  --  28  --    ALT  --  22  --    AP  --  69  --      Recent Labs     11/15/21  0447   TROPHS 29.4*     Recent Labs     11/16/21  0651 11/15/21  7606 11/15/21   Gilda López 158* 163* 216*     ECHO: Results from Hospital Encounter encounter on 10/28/21    ECHO ADULT COMPLETE    Interpretation Summary  · TV: Mild tricuspid valve regurgitation is present. Right Ventricular Arterial Pressure (RVSP) is 54 mmHg. · Image quality for this study was technically difficult. · Echo study was technically difficulty. · LV: Estimated LVEF is 55 - 60%. Normal cavity size, systolic function (ejection fraction normal) and diastolic function. Mild concentric hypertrophy. Wall motion: normal.  · LA: Moderately dilated left atrium. · RA: Mildly dilated right atrium. · AV: Mild aortic valve regurgitation is present. · MV: Mild mitral valve regurgitation is present. · PV: Mild pulmonic valve regurgitation is present. Ventilator Settings:  Ideal body weight: 59.2 kg (130 lb 8.2 oz)   Mode FIO2 Rate Tidal Volume Pressure PEEP   VC+  55 %    400 ml  8 cm H2O  8 cm H20    Peak airway pressure: 38 cm H2O       Minute ventilation: 7.77 l/min  ABG:  Recent Labs     11/16/21  0356 11/15/21  1635 11/15/21  0331   PHI 7.52* 7.46* 7.45   PCO2I 42.9 48.8* 50.5*   PO2I 70* 65* 80   HCO3I 35.2* 35.0* 35.4*     MICRO  Date Source Result   11/9 blood    11/9 blood    11/10 sputum    11/10 MSSA Heavy staph   11/15 sputum Moderate yeast       Assessment and Plan:  (Medical Decision Making)   Impression: 80 y.o. male with sarcoid, pulm HTN, extensive stage SCLC admitted with aspiration pneumonia and respiratory failure. NEURO:   Delirium:  Now intubated  CV:   Volume Status: +2.9L yesterday  PULM:   Acute on chronic hypoxemic/hypercapneic respiratory failure:  pt required reintubation 11/14. Repeat CXR today  Probable pneumonia : on ancef. Also has yeast on sputum cultures and at risk for invasive fungal infection with chronic steroids. Ck BD glucan and monitor closely.   RENAL:  PATRICIO: cr ok   Hypernatremia: add free water   GI:   Nutrition: hold TF for now   HEME:   Anemia: PPI bid  Thrombocytopenia: 314  Anticoagulation:  lovenox 40 q day  ID:   Pneumonia: unasyn day 5 MSSA in sputum  ENDO:   DM: monitor glucose  Skin: no decub, turns, preventive care  Prophy: lovenox and pepcid    Full Code;     Pt has had a few bursts of tachycardia but HR remains in the 60s. We will monitor for now and avoid BBs. Pt's wife, Javier Gaucher, [phone], was updated on pt's current condition. The patient is critically ill with respiratory failure, circulatory failure and requires high complexity decision making for assessment and support including frequent ventilator adjustment , frequent evaluation and titration of therapies , application of advanced monitoring technologies and extensive interpretation of multiple databases    Cumulative time devoted to patient care services by me for day of service -35 min     Юлия Gar MD     I have spoken with and examined the patient. I agree with the above assessment and plan as documented. Constitutional: sedated, on AC 70%   EENMT:  Sclera clear, pupils equal, oral mucosa moist  Respiratory: very coarse breath sounds bilaterally,   Cardiovascular:  RRR  Gastrointestinal:  soft with no tenderness; positive bowel sounds present  Musculoskeletal:  warm with no cyanosis, soft  lower extremity edema  Skin:  no jaundice or ecchymosis  Neurologic:sedated  Psychiatric: sedated    Total time spent with this patient today is 35 minutes.     Юлия Gar MD

## 2021-11-16 NOTE — PROGRESS NOTES
Problem: Ventilator Management  Goal: *Adequate oxygenation and ventilation  Outcome: Progressing Towards Goal     Problem: Non-Violent Restraints  Goal: Removal from restraints as soon as assessed to be safe  Outcome: Progressing Towards Goal  Goal: No harm/injury to patient while restraints in use  Outcome: Progressing Towards Goal  Goal: Patient's dignity will be maintained  Outcome: Progressing Towards Goal  Goal: Patient Interventions  Outcome: Progressing Towards Goal     Problem: Delirium Treatment  Goal: *Sensory perception restored to baseline  Outcome: Progressing Towards Goal  Goal: *Absence of falls  Outcome: Progressing Towards Goal     Problem: Patient Education: Go to Patient Education Activity  Goal: Patient/Family Education  Outcome: Progressing Towards Goal     Problem: Falls - Risk of  Goal: *Absence of Falls  Description: Document Carlos Salinas Fall Risk and appropriate interventions in the flowsheet. Outcome: Progressing Towards Goal  Note: Fall Risk Interventions:  Mobility Interventions: Bed/chair exit alarm, Strengthening exercises (ROM-active/passive)    Mentation Interventions: Adequate sleep, hydration, pain control, Bed/chair exit alarm, Door open when patient unattended, Family/sitter at bedside, Increase mobility, More frequent rounding, Reorient patient, Room close to nurse's station, Update white board    Medication Interventions: Bed/chair exit alarm, Evaluate medications/consider consulting pharmacy    Elimination Interventions: Bed/chair exit alarm, Call light in reach, Patient to call for help with toileting needs              Problem: Pressure Injury - Risk of  Goal: *Prevention of pressure injury  Description: Document Salvador Scale and appropriate interventions in the flowsheet.   Outcome: Progressing Towards Goal  Note: Pressure Injury Interventions:  Sensory Interventions: Assess changes in LOC, Assess need for specialty bed, Avoid rigorous massage over bony prominences, Check visual cues for pain, Float heels, Keep linens dry and wrinkle-free, Maintain/enhance activity level, Minimize linen layers, Monitor skin under medical devices, Pad between skin to skin, Pressure redistribution bed/mattress (bed type), Suspension boots, Turn and reposition approx. every two hours (pillows and wedges if needed)    Moisture Interventions: Absorbent underpads, Apply protective barrier, creams and emollients, Assess need for specialty bed, Check for incontinence Q2 hours and as needed, Contain wound drainage, Internal/External fecal devices, Internal/External urinary devices, Limit adult briefs, Maintain skin hydration (lotion/cream), Minimize layers    Activity Interventions: Assess need for specialty bed, Pressure redistribution bed/mattress(bed type)    Mobility Interventions: Assess need for specialty bed, Float heels, Pressure redistribution bed/mattress (bed type), Suspension boots, Turn and reposition approx.  every two hours(pillow and wedges)    Nutrition Interventions: Document food/fluid/supplement intake, Discuss nutritional consult with provider    Friction and Shear Interventions: Feet elevated on foot rest, Foam dressings/transparent film/skin sealants, Lift sheet, Lift team/patient mobility team, Minimize layers

## 2021-11-16 NOTE — ROUTINE PROCESS
Charge RN Miguel Gaspar and primary RN voiced concerns regarding patient's spouse Brielle Head who is at the bedside. Patient's son was here today and informed the day shift RN that Brielle Head mentioned suicidal thoughts if something happened to her . Staff did not hear these statements nor have had any concerns. For the safety of the family member, I personally sat down with Brielle Head and discussed the critical nature of her 's health. I informed her that her son was concerned previously today with some statements she made to him. Brielle Head states she is not suicidal nor has any plans of hurting herself. She states she is on an emotional roller coaster and can not imagine life without her . She stated if something happened to him she would be an \"emotional wreck\" but would not harm herself. Charge and primary RN updated on conversation.

## 2021-11-16 NOTE — PROGRESS NOTES
Ventilator check complete; patient has a #7.5 ET tube secured at the 22 at the lip. Patient is sedated. Patient is not able to follow commands. Breath sounds are expiratory wheezes. Trachea is midline, Negative for subcutaneous air, and chest excursion is symmetric. Patient is also Negative for cyanosis and is Positive for pitting edema. All alarms are set and audible. Resuscitation bag is at the head of the bed.       Ventilator Settings  Mode FIO2 Rate Tidal Volume Pressure PEEP I:E Ratio   VC+  55 %   22 bpm  400 ml   8 cm H20  1:1.7      Peak airway pressure: 36 cm H2O   Minute ventilation: 7.6 l/min           Deonaelie Montaño, RT

## 2021-11-16 NOTE — PROGRESS NOTES
Palliative Care Progress Note    Patient: Tevin Vu MRN: [id number]  SSN: [id number]    YOB: 1940  Age: 80 y.o. Sex: male       Assessment/Plan:     Chief Complaint/Interval History: pt remains intubated. Decreased FIO2 to 55%    Principal Diagnosis:    · Dyspnea  R06.00    Additional Diagnoses:   · Frailty  R54  · Counseling, Encounter for Medical Advice  Z71.9  · Encounter for Palliative Care  Z51.5    Palliative Performance Scale (PPS)       Medical Decision Making:   Reviewed and summarized labs and imaging over past 24 hours. Met with spouse and son at bedside. Updated on current changes as pt with some improvement in respiratory status today. They are interested in potentially taking pt home should he stabilize off vent. Encouraged them to take things one day at a time. Will continue to follow. Will discuss findings with members of the interdisciplinary team.      More than 50% of this 25 minute visit was spent counseling and coordination of care as outlined above. Subjective:     Review of Systems unable to obtain due to intubation. Objective:     Visit Vitals  BP (!) (P) 126/47 (BP 1 Location: Right lower arm, BP Patient Position: At rest)   Pulse (!) 52   Temp (P) 97.6 °F (36.4 °C)   Resp 22   Ht 5' 4\" (1.626 m)   Wt 157 lb 4.8 oz (71.4 kg)   SpO2 98%   BMI 27.00 kg/m²       Physical Exam:    General:  sedated   Eyes:  Conjunctivae/corneas clear    Nose: Nares normal. Septum midline. Neck: Supple, symmetrical, trachea midline, no JVD   Lungs:   Coarse bilateral bs   Heart:  Regular rate and rhythm, no murmur    Abdomen:   Soft, non-tender, non-distended   Extremities: Normal, atraumatic, no cyanosis or edema   Skin: Skin color, texture, turgor normal. No rash or lesions.    Neurologic: sedated   Psych: sedated     Signed By: Ryanne Lopez MD     November 16, 2021

## 2021-11-17 NOTE — PROGRESS NOTES
Chart reviewed and pt discussed in am IDR. Wife wanting to discuss hospice care yesterday, but discussed with RN. Pt still intubated/vent -99% fio2. Fentanyl gtt, and currently off levo. Pt needs to be extubated and be able to be off pressors and stable for transport, prior to any hospice discussion. Palliative care is following for these discussions, as well. Will follow and speak with wife. CM following. LOS 8 days. Spoke with wife at bedside. Assured her of our assist with d/c needs/POC when pt was stable. Encouraged her to focus on patient and the now.

## 2021-11-17 NOTE — PROGRESS NOTES
Bedside and verbal shift change report received from  [facility] (offgoing nurse). Report included the following information SBAR, Kardex, Intake/Output, Recent Results and Cardiac Rhythm NSR/Sinus Bearl Hoist. Dual skin assessment completed at bedside:   Wound R lateral elbow; gauze dressing intact  Wound R posterior leg; foam dressing intact (list pertinent skin assessment findings)  Swelling and weeping R upper extremity; extremity elevated. Dual verification of gtts completed (name of gtts verified):   Fentanyl gtt dual verified, see EMAR.

## 2021-11-17 NOTE — PROGRESS NOTES
[facility] Critical Care Note[de-identified] 11/17/2021  Ramonita Sanchez  Admission Date: 11/9/2021     Length of Stay: 8 days    Background: Patient is a 80 y.o. male presented to the ER on 11/9/21 with mental status change and obtundation and hypoxia after an aspiration event during a swallowing evaluation and deteriorated as the day went on. He was intubated in the ER and transferred to the ICU. He was discharged 11/8/21 from the hospital where he was admitted for a few days for evaluation of a headache. 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 He was extubated 11/12. Pt was on BiPAP and had an episode of emesis. Pt was taken off of BiPAP and required intubation 11/14. He has been followed by Dr Emily Gay in our office. He has a history of sarcoidosis (mediastinal lymphadenopathy and calcification, VAT 2001) as well as mild pulmonary hypertension Group 1/Group 3, who has been followed at [facility] since Feb 2018. He was last seen by us in July 2021 and was stable. He has been on prednisone 10mg for sarcoidosis/Stage III COPD as well as Trelegy inhaler daily. He rarely requires albuterol. Baseline O2 needs: with inogen POC he is at 1-2 at rest, and 2lpm with exertion. He has a recent diagnosis of extensive stage small cell cancer after mass discovered in left axilla. He is now admitted with pneumonia, respiratory failure and weakness. Notable PMH:  has a past medical history of Agent orange exposure (1960s), Arthritis, Asthma, Body mass index (BMI) of 25.0 to 25.9 in adult (10/28/2021), Chronic obstructive pulmonary disease ([facility] [address] Chronic pain, GERD (gastroesophageal reflux disease), Sarcoidosis (2001), and Sleep apnea. 24 Hour events: Leukocytosis improved. P:F ratio up to 162. ROS: unable to obtain/negative except as listed elsewhere.      Lines: (insertion date)   ETT: (11/9-11) (11/14-)  Chavez: (11/9)  NGT: (11/13)  Peripheral    Drips: current dose (range)  Fentanyl @75    Pertinent Exam:         Blood pressure 110/61, pulse (!) 59, temperature 98 °F (36.7 °C), resp. rate 18, height 5' 4\" (1.626 m), weight 157 lb 4.8 oz (71.4 kg), SpO2 95 %. Intake/Output Summary (Last 24 hours) at 11/17/2021 0746  Last data filed at 11/17/2021 0547  Gross per 24 hour   Intake 622.27 ml   Output 675 ml   Net -52.73 ml     Constitutional: sedated, on AC 70%   EENMT:  Sclera clear, pupils equal, oral mucosa moist  Respiratory: very coarse breath sounds bilaterally,   Cardiovascular:  RRR  Gastrointestinal:  soft with no tenderness; positive bowel sounds present  Musculoskeletal:  warm with no cyanosis, soft  lower extremity edema  Skin:  no jaundice or ecchymosis  Neurologic:sedated  Psychiatric: sedated    CXR:              Recent Labs     11/17/21  0333 11/16/21  0325 11/15/21  0157   WBC 15.3* 23.1* 24.7*   HGB 10.3* 9.9* 11.2*   HCT 31.6* 30.4* 36.7*    281 314     Recent Labs     11/17/21 0333 11/16/21  1619 11/16/21  0325 11/15/21  0157 11/15/21  0157   *  --  139  --  153*   K 3.9 3.6 3.0*   < > 3.8   CL 99  --  98  --  111*   CO2 33*  --  35*  --  38*   *  --  173*  --  211*   BUN 30*  --  26*  --  43*   CREA 0.82  --  0.81  --  1.03   MG 2.4  --   --   --  2.4   CA 9.0  --  9.4  --  9.6   PHOS 2.9  --   --   --  3.2   ALB  --   --   --   --  1.5*   AST  --   --   --   --  28   ALT  --   --   --   --  22   AP  --   --   --   --  69    < > = values in this interval not displayed. Recent Labs     11/15/21  0447   TROPHS 29.4*     Recent Labs     11/17/21  0542 11/16/21  2330 11/16/21  1837   GLUCPOC 142* 130* 101*     ECHO: Results from [place] encounter on 10/28/21    ECHO ADULT COMPLETE    Interpretation Summary  · TV: Mild tricuspid valve regurgitation is present. Right Ventricular Arterial Pressure (RVSP) is 54 mmHg.   · Image quality for this study was technically difficult. · Echo study was technically difficulty. · LV: Estimated LVEF is 55 - 60%. Normal cavity size, systolic function (ejection fraction normal) and diastolic function. Mild concentric hypertrophy. Wall motion: normal.  · LA: Moderately dilated left atrium. · RA: Mildly dilated right atrium. · AV: Mild aortic valve regurgitation is present. · MV: Mild mitral valve regurgitation is present. · PV: Mild pulmonic valve regurgitation is present. Ventilator Settings:  Ideal body weight: 59.2 kg (130 lb 8.2 oz)   Mode FIO2 Rate Tidal Volume Pressure PEEP   Assist control  40 %    0.4 ml  8 cm H2O  8 cm H20    Peak airway pressure: 37 cm H2O       Minute ventilation: 6.97 l/min  ABG:  Recent Labs     11/17/21  0347 11/16/21  0356 11/15/21  1635   PHI 7.50* 7.52* 7.46*   PCO2I 42.3 42.9 48.8*   PO2I 81 70* 65*   HCO3I 32.5* 35.2* 35.0*     MICRO  Date Source Result   11/9 blood    11/9 blood    11/10 sputum    11/10 MSSA Heavy staph   11/15 sputum Moderate yeast       Assessment and Plan:  (Medical Decision Making)   Impression: 80 y.o. male with sarcoid, pulm HTN, extensive stage SCLC admitted with aspiration pneumonia and respiratory failure. NEURO:   Delirium:  Now intubated  CV:   Volume Status: fluid balance slightly net negative yesterday. PULM:   Acute on chronic hypoxemic/hypercapneic respiratory failure:  pt required reintubation 11/14. Probable pneumonia : on ancef. Also has yeast on sputum cultures and at risk for invasive fungal infection with chronic steroids. RENAL:  PATRICIO: cr ok   Hypernatremia: resolved  GI:   Nutrition:TF resumed  HEME:   Anemia: PPI bid  Thrombocytopenia: stable  Anticoagulation:  lovenox 40 q day  ID:   Pneumonia: abx day 6/7 MSSA in sputum  ENDO:   DM: monitor glucose  Skin: no decub, turns, preventive care  Prophy: lovenox and pepcid    Full Code;     Pt's wife, Kim Berry, [phone], was updated on pt's current condition.      The patient is critically ill with respiratory failure, circulatory failure and requires high complexity decision making for assessment and support including frequent ventilator adjustment , frequent evaluation and titration of therapies , application of advanced monitoring technologies and extensive interpretation of multiple databases    Cumulative time devoted to patient care services by me for day of service -34 min     Phillip Hernandez MD

## 2021-11-17 NOTE — PROGRESS NOTES
This is a follow-up visit to the patient, providing a spiritual presence, emotional support and prayer. The patient appears to be resting. His wife, [facility] was present.     Tristan Delaney, [address] [facility], [facility]

## 2021-11-17 NOTE — PROGRESS NOTES
Ventilator check complete; patient has a #7.5 ET tube secured at the 22 at the lip. Patient is sedated. Patient is able to follow commands. Breath sounds are diminished. Trachea is midline, Negative for subcutaneous air, and chest excursion is symmetric. Patient is also Negative for cyanosis. All alarms are set and audible. Resuscitation bag is at the head of the bed.       Ventilator Settings  Mode FIO2 Rate Tidal Volume Pressure PEEP I:E Ratio   VC+  100 %   18 0.4 ml  8 cm H2O  8 cm H20  1:2.51       Peak airway pressure: 38 cm H2O   Minute ventilation: 6.72 l/min       VINI Mobley, RRT

## 2021-11-17 NOTE — PROGRESS NOTES
Bedside and verbal shift change report received from  Sherri Smith (offgoing nurse). Report included the following information SBAR, Kardex, Intake/Output, MAR, Recent Results and Cardiac Rhythm NSR.      Dual skin assessment completed at bedside: excoriation blake area, weeping BUE, ulceration RUE (list pertinent skin assessment findings)    Dual verification of gtts completed (name of gtts verified): fentanyl

## 2021-11-17 NOTE — PROGRESS NOTES
Ventilator check complete; patient has a #7.5 ET tube secured at the 21 at the gums. Patient is sedated. Patient is not able to follow commands. Breath sounds are diminished. Trachea is midline, Negative for subcutaneous air, and chest excursion is symmetric. Patient is also Negative for cyanosis and is Negative for pitting edema. All alarms are set and audible. Resuscitation bag is at the head of the bed.       Ventilator Settings  Mode FIO2 Rate Tidal Volume Pressure PEEP I:E Ratio   Assist control  50 %   20 0.4 ml  8 cm H2O  8 cm H20  1:1.7      Peak airway pressure: 39 cm H2O   Minute ventilation: 7.03 l/min         Abena Valenzuela, RT

## 2021-11-17 NOTE — PROGRESS NOTES
Ventilator check complete; patient has a #7.5 ET tube secured at the 22 at the lip. Patient is sedated. Patient is able to follow commands. Breath sounds are diminished. Trachea is midline, Negative for subcutaneous air, and chest excursion is symmetric. Patient is also Negative for cyanosis. All alarms are set and audible. Resuscitation bag is at the head of the bed. Ventilator Settings  Mode FIO2 Rate Tidal Volume Pressure PEEP I:E Ratio   VC+  99 %   18 0.4 ml  8 cm H2O  8 cm H20  1:2.51      Peak airway pressure: 38 cm H2O   Minute ventilation: 6.72 l/min     ABG: No results for input(s): PH, PCO2, PO2, HCO3 in the last 72 hours.       Kermit Arrow

## 2021-11-18 PROBLEM — K22.4 ESOPHAGEAL DYSMOTILITY: Status: ACTIVE | Noted: 2021-01-01

## 2021-11-18 NOTE — CONSULTS
Comprehensive Nutrition Assessment    Type and Reason for Visit: Reassess  Tube Feeding Management (pulmonary)    Nutrition Recommendations/Plan:   Enteral Nutrition:   TF held due to potential extubation today. Nutritional Supplement Therapy:   Active electrolyte replacement per nutrition support protocols  Replacement indicated:  Completed  Labs:   EN labs: BMP daily, Mg MWF and Phos MWF. Meals and Snacks:  Diet per SLP s/p extubation. Pending possible extubation, if unable to advance diet per SLP within 48hrs, would recommend resuming TF per order. Malnutrition Assessment:  Malnutrition Status: At risk for malnutrition (specify) (NPO, vent)    Nutrition Assessment:   Nutrition History: Pt known to nutrition department from recent admission. At that time, pt was on an easy to chew, lactose controlled diet with ensure enlive TID with meals. Nutrition Background: Pt admitted with hypoxia from rehab facility. Noted recent admission to [facility]. PMH notable for HTN and new lung cancer diagnosis (probable metastatic cancer). Daily Update:  TF off at time of assessment in preparation of extubation today. RN states pt was tolerating well prior to holding. Noted GI workup for esophageal dysmotility. GI recommending SLP evaluation after extubation. Pending goals of care and SLP eval, GI note states \"we can talk to them about option for PEG placement\". SLP recommending waiting >24hrs s/p extubation before reassessment of swallow in light of multiple intubations this hospitalization and patient with NO SWALLOW ELICITED during initial swallow assessment performed this admission 11/12/21. Oncology consulted to discussed recent PET/CT and possible treatment options along with goals of care with wife. Per oncology note, pt is not currently a candidate for chemotherapy d/t performance status and chemo risks could outweigh benefits. Abdominal Status (last documented): Soft, Intact abdomen with Active  bowel sounds. Last BM 11/18/21. Pertinent Medications: SSI, ancef, solu-medrol, reglan, protonix, scopolamine patch  Bowel regimen: colace BID, daily miralax    Lab Results   Component Value Date/Time    Sodium 139 11/18/2021 03:32 AM    Potassium 4.1 11/18/2021 03:32 AM    Chloride 101 11/18/2021 03:32 AM    CO2 34 (H) 11/18/2021 03:32 AM    Anion gap 4 (L) 11/18/2021 03:32 AM    Glucose 138 (H) 11/18/2021 03:32 AM    BUN 35 (H) 11/18/2021 03:32 AM    Creatinine 0.76 (L) 11/18/2021 03:32 AM    Calcium 9.5 11/18/2021 03:32 AM    Albumin 1.5 (L) 11/15/2021 01:57 AM    Magnesium 2.4 11/17/2021 03:33 AM    Phosphorus 2.9 11/17/2021 03:33 AM     Labs notable for stable sodium. K WNL. Lab Results   Component Value Date/Time    Glucose 138 (H) 11/18/2021 03:32 AM    Glucose (POC) 121 (H) 11/18/2021 11:32 AM    Glucose (POC) 143 (H) 11/18/2021 05:56 AM    Glucose (POC) 148 (H) 11/17/2021 11:52 PM    Glucose (POC) 141 (H) 11/17/2021 06:03 PM    Glucose (POC) 120 (H) 11/17/2021 12:00 PM    Glucose (POC) 142 (H) 11/17/2021 05:42 AM     Nutrition Related Findings:   NFPE deferred d/t current medical condition. Vent. OGT in place since 11/9. Extubated 11/11. OGT removed. SLP consult pending. NPO per SLP. NGT to be replaced and TF resumed. TF held for potential intubation and emesis w/ BIPAP. NGT to LIS. Reintubated 11/15. TF to resume 11/16. (11/18) TF off for pending extubation      Current Nutrition Therapies:  DIET NPO  ADULT TUBE FEEDING Nasogastric; Peptide Based; Delivery Method: Continuous; Continuous Initial Rate (mL/hr): 20; Continuous Advance Tube Feeding: Yes; Advancement Volume (mL/hr): 10; Advancement Frequency: Q 4 hours; Continuous Goal Rate (mL/hr): . .. Current Intake:   Average Meal Intake: NPO   Additional Caloric Sources:      Anthropometric Measures:  Height: 5' 4\" (162.6 cm)  Current Body Wt: 70.8 kg (156 lb 1.4 oz) (11/17), Weight source: Not specified  BMI: 26.8, Normal weight (BMI 18.5-24. 9)  Admission Body Weight: 149 lb 14.6 oz (11/9, pt stated)  Ideal Body Weight (lbs) (Calculated): 130 lbs (59 kg), 104.6 %  Edema: No data recorded     Estimated Daily Nutrient Needs:  Energy (kcal/day): 7853-7294 (Kcal/kg (25-30), Weight Used: Current (61.7 kg))  Protein (g/day):  Weight Used: (Current (1.2-2))  Fluid (ml/day): 4760-3454 (1 ml/kcal (or per MD))    Nutrition Diagnosis:   · Inadequate oral intake related to impaired respiratory function as evidenced by NPO or clear liquid status due to medical condition, intubation    Nutrition Interventions:   Food and/or Nutrient Delivery: Continue NPO     Coordination of Nutrition Care: Continue to monitor while inpatient    Goals:   Previous Goal Met: Progressing toward goal(s)  Active Goal: Tolerate nutrition regimen by RD follow up. Nutrition Monitoring and Evaluation:      Food/Nutrient Intake Outcomes: Diet advancement/tolerance (Initiate/resume nutrition support)  Physical Signs/Symptoms Outcomes: Biochemical data, GI status, Fluid status or edema, Weight    Discharge Planning:     Too soon to determine    Electronically signed by Meaghan Lynn MS, RDN, LD 11/18/2021 at 4:44 PM  Contact: [phone]

## 2021-11-18 NOTE — PROGRESS NOTES
Patient extubated to 60LPM/60% Airvo at this time per VORB received from Dr. Mercedez Mohamud. No stridor or complications noted.      Kate Burkett, RRT

## 2021-11-18 NOTE — PROGRESS NOTES
Spoke with wife again at her request regarding hospice. States she is a planner and wants to have everything ready. Discussed home vs hospice house and meeting criteria. Again, discussed that pt needs to be extubated and see how he will do for transport. Verbalized understanding. States awaiting oncology consult, as well. List of hospice agency, sitters, and [facility] info given written to pt. [facility] brochure given for review. Answered questions regarding equipment for home.   Aware CM following to assist with d/c needs when stable per MD.

## 2021-11-18 NOTE — PROGRESS NOTES
Ventilator check complete; patient has a #7.5 ET tube secured at the 22 at the lip. Patient is sedated. Patient is able to follow commands. Breath sounds are coarse and diminished. Trachea is midline, Negative for subcutaneous air, and chest excursion is symmetric. Patient is also Negative for cyanosis and is Negative for pitting edema. All alarms are set and audible. Resuscitation bag is at the head of the bed.       Ventilator Settings  Mode FIO2 Rate Tidal Volume Pressure PEEP I:E Ratio   VC+  35 %   18 400 ml  8 cm H2O  8 cm H20  1:2.51       Peak airway pressure: 30 cm H2O   Minute ventilation: 8.07 l/min     VINI Watson, RRT

## 2021-11-18 NOTE — PROGRESS NOTES
Critical access hospital/[facility] Critical Care Note[de-identified] 11/18/2021  Catina Shell Skipp  Admission Date: 11/9/2021     Length of Stay: 9 days    Background: Patient is a 80 y.o. male presented to the ER on 11/9/21 with mental status change and obtundation and hypoxia after an aspiration event during a swallowing evaluation and deteriorated as the day went on. He was intubated in the ER and transferred to the ICU. He was discharged 11/8/21 from the hospital where he was admitted for a few days for evaluation of a headache. 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 He was extubated 11/12. Pt was on BiPAP and had an episode of emesis. Pt was taken off of BiPAP and required intubation 11/14. He has been followed by Dr Michelle Trimble in our office. He has a history of sarcoidosis (mediastinal lymphadenopathy and calcification, VAT 2001) as well as mild pulmonary hypertension Group 1/Group 3, who has been followed at [facility] since Feb 2018. He was last seen by us in July 2021 and was stable. He has been on prednisone 10mg for sarcoidosis/Stage III COPD as well as Trelegy inhaler daily. He rarely requires albuterol. Baseline O2 needs: with inogen POC he is at 1-2 at rest, and 2lpm with exertion. He has a recent diagnosis of extensive stage small cell cancer after mass discovered in left axilla. He is now admitted with pneumonia, respiratory failure and weakness. Notable PMH:  has a past medical history of Agent orange exposure (1960s), Arthritis, Asthma, Body mass index (BMI) of 25.0 to 25.9 in adult (10/28/2021), Chronic obstructive pulmonary disease ([facility] [address] Chronic pain, GERD (gastroesophageal reflux disease), Sarcoidosis (2001), and Sleep apnea. 24 Hour events: Leukocytosis stable, P:F ratio up to 191. Afebrile. ROS: unable to obtain/negative except as listed elsewhere.      Lines: (insertion date)   ETT: (11/9-11) (11/14-)  Chavez: (11/9)  NGT: (11/13)  Peripheral    Drips: current dose (range)  none    Pertinent Exam:         Blood pressure (!) 90/39, pulse 67, temperature 98.8 °F (37.1 °C), resp. rate 20, height 5' 4\" (1.626 m), weight 156 lb (70.8 kg), SpO2 98 %. Intake/Output Summary (Last 24 hours) at 11/18/2021 0733  Last data filed at 11/18/2021 1575  Gross per 24 hour   Intake 555.47 ml   Output 1735 ml   Net -1179.53 ml     Constitutional: sedated, on PSV 45%  EENMT:  Sclera clear, pupils equal, oral mucosa moist  Respiratory: very coarse breath sounds bilaterally,   Cardiovascular:  RRR  Gastrointestinal:  soft with no tenderness; positive bowel sounds present  Musculoskeletal:  warm with no cyanosis, soft  lower extremity edema  Skin:  no jaundice or ecchymosis  Neurologic:sedated  Psychiatric: sedated    CXR:      Recent Labs     11/18/21  0332 11/17/21  0333 11/16/21  0325   WBC 15.5* 15.3* 23.1*   HGB 10.7* 10.3* 9.9*   HCT 33.1* 31.6* 30.4*    302 281     Recent Labs     11/18/21  0332 11/17/21  0333 11/16/21  1619 11/16/21  0325 11/16/21  0325    136*  --   --  139   K 4.1 3.9 3.6   < > 3.0*    99  --   --  98   CO2 34* 33*  --   --  35*   * 157*  --   --  173*   BUN 35* 30*  --   --  26*   CREA 0.76* 0.82  --   --  0.81   MG  --  2.4  --   --   --    CA 9.5 9.0  --   --  9.4   PHOS  --  2.9  --   --   --     < > = values in this interval not displayed. No results for input(s): LAC, TROPHS, BNPNT, CRP in the last 72 hours. No lab exists for component: ESR  Recent Labs     11/18/21  0556 11/17/21  2352 11/17/21  1803   GLUCPO 143* 148* 141*     ECHO: Results from [place] encounter on 10/28/21    ECHO ADULT COMPLETE    Interpretation Summary  · TV: Mild tricuspid valve regurgitation is present. Right Ventricular Arterial Pressure (RVSP) is 54 mmHg. · Image quality for this study was technically difficult. · Echo study was technically difficulty.   · LV: Estimated LVEF is 55 - 60%. Normal cavity size, systolic function (ejection fraction normal) and diastolic function. Mild concentric hypertrophy. Wall motion: normal.  · LA: Moderately dilated left atrium. · RA: Mildly dilated right atrium. · AV: Mild aortic valve regurgitation is present. · MV: Mild mitral valve regurgitation is present. · PV: Mild pulmonic valve regurgitation is present. Ventilator Settings:  Ideal body weight: 59.2 kg (130 lb 8.2 oz)   Mode FIO2 Rate Tidal Volume Pressure PEEP   VC+  35 %    400 ml  8 cm H2O  8 cm H20    Peak airway pressure: 30 cm H2O       Minute ventilation: 8.07 l/min  ABG:  Recent Labs     11/18/21  0446 11/17/21  0347 11/16/21  0356   PHI 7.46* 7.50* 7.52*   PCO2I 45.1* 42.3 42.9   PO2I 67* 81 70*   HCO3I 32.0* 32.5* 35.2*     MICRO  Date Source Result   11/9 blood    11/9 blood    11/10 sputum    11/10 MSSA Heavy staph   11/15 sputum Moderate yeast     Assessment and Plan:  (Medical Decision Making)   Impression: 80 y.o. male with sarcoid, pulm HTN, extensive stage SCLC admitted with recurrent aspiration pneumonia and respiratory failure. NEURO:   Delirium:  Now intubated  CV:   Volume Status: fluid balance slightly net negative yesterday. PULM:   Acute on chronic hypoxemic/hypercapneic respiratory failure:  pt required reintubation 11/14 for second time. Probable pneumonia : on ancef for MSSA  RENAL:  PATRICIO: cr ok   Hypernatremia: resolved  GI:   Esophageal dysmotility:  ? Paraneoplastic. Will ask GI to assist in workup/ management as this has been a likely contributor to recurrent aspiration and respiratory failure. Nutrition: holding TF for extubation  HEME:   Anemia: PPI bid  Thrombocytopenia: stable  Anticoagulation:  lovenox 40 q day  ID:   Pneumonia: abx day 7/7 MSSA in sputum  ENDO:   DM: monitor glucose  Skin: no decub, turns, preventive care  Prophy: lovenox and pepcid    Spoke with patient. He agrees to NO REINTUBATION.     Pt's wife, [facility], [phone], was updated on pt's current condition.       The patient is critically ill with respiratory failure, circulatory failure and requires high complexity decision making for assessment and support including frequent ventilator adjustment , frequent evaluation and titration of therapies , application of advanced monitoring technologies and extensive interpretation of multiple databases    Cumulative time devoted to patient care services by me for day of service -35 min     Laurie Bravo MD

## 2021-11-18 NOTE — PROCEDURES
PROCEDURE NOTE  ARTERIAL LINE PLACEMENT  11/18/21  12:19 AM    Indication: shock in need of hemodynamic monitoring    A time-out was completed verifying correct patient, procedure, site, positioning, and special equipment if applicable. Ejs test was performed to ensure adequate perfusion. The patients right wrist was prepped and draped in sterile fashion. A 20G Arrow arterial line was introduced into the  right radial artery. The catheter was threaded over the guide wire and the needle was removed with appropriate pulsatile blood return. The catheter was then sutured in place to the skin and a sterile dressing applied. Perfusion to the extremity distal to the point of catheter insertion was checked and found to be adequate. The patient tolerated the procedure well and there were no complications.     Gloria Hermosillo MD

## 2021-11-18 NOTE — MANAGEMENT PLAN
[facility]        Inpatient Hematology / Oncology Plan of Care    Reason for Consult:  Acute on chronic respiratory failure with hypoxia [facility]) [L28.00]  Referring Physician:  Candice Sumner MD    History of Present Illness:  Mr. Kaya Masterson is a 80 y.o. male admitted on 11/9/2021 with a primary diagnosis of acute on chronic respiratory failure with hypoxia. His PMH includes sarcoidosis (mediastinal lymphadenopathy and calcification, VAT 2001), COPD, and pulmonary hypertension. He presented to ED 10/28/2021 with complaint of HA, AMS, vision changes, nausea and vomiting. CT head: Mild ventriculomegaly may represent centrally predominant volume loss versus normal pressure hydrocephalus. Otherwise unremarkable unenhanced CT scan of the brain. 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 MRI brain: No acute abnormality. Mild degenerative change. PET/CT on 11/8 with new apparent inflammatory changes involving the mid  to lower bilateral lungs which confound pulmonary changes. There is focal appearing activity in the medial left lung likely representing the patient's known tumor with abnormal soft tissue density seen at this level on the prior CT study. Additional parenchymal activity is favored to be inflammatory. Multiple enlarged left subpectoral and axillary masses consistent with the patient's known haroon metastases. A mildly metabolic mesenteric celiac axis lymph node is also seen in the abdomen. Highly suspicious hypermetabolic soft tissue lesions are seen in the right paraspinal musculature and left gluteal soft tissues concerning for soft tissue metastases. Focal activity is seen in the greater trochanter of the proximal left femur concerning for an osseous metastasis. Lastly, multifocal activity projects over the superior right lobe of the liver. No clear defined hepatic lesion is suggested on CT imaging.  This could represent misregistered activity from the appearing inflammatory changes in the right lung base. This is actually favored. He returned to ED on 11/9/21 with mental status change and obtundation and hypoxia after an aspiration event during a swallowing evaluation and deteriorated as the day went on. He was intubated in the ED and transferred to the ICU. He was extubated 11/12. Pt was on BiPAP and had an episode of emesis. Pt was taken off of BiPAP and required intubation 11/14. Plans for extubation today. He was treated with Ancef for MSSA pneumonia. GI consulted for esophageal dysmotility ?paraneoplastic. We were consulted to discuss recent PET/CT as well as possible treatment options along with goals of care. Allergies   Allergen Reactions    Lactose Diarrhea     Past Medical History:   Diagnosis Date    Agent orange exposure 1960s    Arthritis     Asthma     Body mass index (BMI) of 25.0 to 25.9 in adult 10/28/2021    Chronic obstructive pulmonary disease (HCC)     Chronic pain     GERD (gastroesophageal reflux disease)     Sarcoidosis 2001    Lung Biopsy was done in 2001;  Liver Biopsy--Negative for sarcidosis 5/06    Sleep apnea      Past Surgical History:   Procedure Laterality Date    HX HERNIA REPAIR Left 2004    Left inguinal hernia repair    HX HERNIA REPAIR Right 1990's    Right inguinal hernia    HX KNEE REPLACEMENT Left 2016    HX LAP CHOLECYSTECTOMY  2006    HX ORTHOPAEDIC Right 2014    ankle replacement @ [facility]    HX OTHER SURGICAL  2006    Liver biopsy--negative for sarcoidosis    HX TONSILLECTOMY  as a child    IN CHEST SURGERY PROCEDURE UNLISTED  2001    Lung bx for sarcoidosis     Family History   Problem Relation Age of Onset    Diabetes Mother     Hypertension Mother     Cancer Mother         Liver Cancer    Elevated Lipids Mother     Hypertension Father     Stroke Father     Cancer Sister     Headache Neg Hx      Social History     Socioeconomic History    Marital status:      Spouse name: Not on file    Number of children: Not on file    Years of education: Not on file    Highest education level: Not on file   Occupational History    Occupation: [employer]   Tobacco Use    Smoking status: Former Smoker     Packs/day: 1.50     Years: 5.00     Pack years: 7.50     Types: Cigarettes     Quit date: 1968     Years since quittin.9    Smokeless tobacco: Never Used   Vaping Use    Vaping Use: Never used   Substance and Sexual Activity    Alcohol use: Yes     Alcohol/week: 0.0 standard drinks     Comment: socially    Drug use: No    Sexual activity: Not on file   Other Topics Concern    Not on file   Social History Narrative    He is originally from [place], but retired to [place]. He is retired from [employer]. He has worked in [employer] and has travelled extensively to [facility], [place], [facility], [place], and [place] ([facility]  and lives with wife. Social Determinants of Health     Financial Resource Strain:     Difficulty of Paying Living Expenses: Not on file   Food Insecurity:     Worried About Running Out of Food in the Last Year: Not on file    Larry of Food in the Last Year: Not on file   Transportation Needs:     Lack of Transportation (Medical): Not on file    Lack of Transportation (Non-Medical):  Not on file   Physical Activity:     Days of Exercise per Week: Not on file    Minutes of Exercise per Session: Not on file   Stress:     Feeling of Stress : Not on file   Social Connections:     Frequency of Communication with Friends and Family: Not on file    Frequency of Social Gatherings with Friends and Family: Not on file    Attends Sikhism Services: Not on file    Active Member of Clubs or Organizations: Not on file    Attends Club or Organization Meetings: Not on file    Marital Status: Not on file   Intimate Partner Violence:     Fear of Current or Ex-Partner: Not on file    Emotionally Abused: Not on file   [employer] Physically Abused: Not on file    Sexually Abused: Not on file   Housing Stability:     Unable to Pay for Housing in the Last Year: Not on file    Number of Places Lived in the Last Year: Not on file    Unstable Housing in the Last Year: Not on file     Current Facility-Administered Medications   Medication Dose Route Frequency Provider Last Rate Last Admin    metoclopramide HCl (REGLAN) injection 10 mg  10 mg IntraVENous Q8H Fiordaliza Fortune MD        methylPREDNISolone (PF) (SOLU-MEDROL) injection 20 mg  20 mg IntraVENous Q12H Fiordaliza Fortune MD   20 mg at 11/18/21 8906    fentaNYL citrate (PF) injection 50 mcg  50 mcg IntraVENous Q1H PRN Fiordaliza Fortune MD        pantoprazole (PROTONIX) 40 mg in 0.9% sodium chloride 10 mL injection  40 mg IntraVENous DAILY Fiordaliza Fortune MD   40 mg at 11/18/21 0818    NOREPINephrine (LEVOPHED) 4 mg in 5% dextrose 250 mL infusion  0.5-30 mcg/min IntraVENous TITRATE Dana Hutchins MD   Stopped at 11/17/21 0546    chlorhexidine (PERIDEX) 0.12 % mouthwash 15 mL  15 mL Oral Q12H Eva Kerr MD   15 mL at 11/18/21 0819    insulin lispro (HUMALOG) injection   SubCUTAneous Q6H Tejinder Acosta MD   2 Units at 11/16/21 1368    fentaNYL in normal saline (pf) 25 mcg/mL infusion  0-200 mcg/hr IntraVENous TITRATE Eva Kerr MD   Stopped at 11/17/21 1212    LORazepam (ATIVAN) injection 0.5 mg  0.5 mg IntraVENous Q6H PRN Padmini Hewitt MD   0.5 mg at 11/17/21 1228    [Held by provider] amLODIPine (NORVASC) tablet 5 mg  5 mg Per NG tube DAILY Marquis Ortez MD   5 mg at 11/15/21 0947    aspirin chewable tablet 81 mg  81 mg Per NG tube DAILY Marquis Ortez MD   81 mg at 11/18/21 0818    acetaminophen (TYLENOL) solution 650 mg  650 mg Per NG tube Q4H PRN Marquis Ortez MD   650 mg at 11/12/21 2036    docusate (COLACE) 50 mg/5 mL oral liquid 100 mg  100 mg Per NG tube BID Marquis Ortez MD   100 mg at 11/17/21 3131    NUTRITIONAL SUPPORT ELECTROLYTE PRN ORDERS   Does Not Apply PRN Madan Hatch MD        polyethylene glycol [facility]) packet 17 g  17 g Per NG tube DAILY Madan Hatch MD   17 g at 11/17/21 0856    sodium chloride (NS) flush 5-10 mL  5-10 mL IntraVENous Q8H Albino [place] MD   10 mL at 11/18/21 0558    tadalafiL (pulm. hypertension) (ADCIRCA) tablet 40 mg- patient supplied (Patient Supplied)  40 mg Per G Tube DAILY Albino [place] MD   40 mg at 11/18/21 0816    [Held by provider] rosuvastatin (CRESTOR) tablet 20 mg  20 mg Per NG tube QHS Mau Ortez MD   20 mg at 11/13/21 2305    sodium chloride (NS) flush 5-40 mL  5-40 mL IntraVENous Q8H Albino [place] MD   10 mL at 11/18/21 0558    sodium chloride (NS) flush 5-40 mL  5-40 mL IntraVENous PRN Marquis Ortez MD        HYDROmorphone (DILAUDID) injection 0.5 mg  0.5 mg IntraVENous Q4H PRN Marquis Ortez MD   0.5 mg at 11/13/21 2321    enoxaparin (LOVENOX) injection 40 mg  40 mg SubCUTAneous Q24H Marquis Ortez MD   40 mg at 11/18/21 0818    naloxone (NARCAN) injection 0.4 mg  0.4 mg IntraVENous PRN Albino [place] MD           OBJECTIVE:  Patient Vitals for the past 8 hrs:   BP Temp Pulse Resp SpO2   11/18/21 0930   73 24 95 %   11/18/21 0915   79 25 95 %   11/18/21 0900   79 26 95 %   11/18/21 0845   82 29 96 %   11/18/21 0830   78 25 95 %   11/18/21 0815   80 25 95 %   11/18/21 0801   76 24 95 %   11/18/21 0800 124/60 97.7 °F (36.5 °C) 80 26 95 %   11/18/21 0745   78 26 96 %   11/18/21 0730   80 26 97 %   11/18/21 0715   75 23 96 %   11/18/21 0700   77 26 96 %   11/18/21 0645   69 20 98 %   11/18/21 0630   67 20 98 %   11/18/21 0615   72 23 96 %   11/18/21 0600   77 27 95 %   11/18/21 0545   79 23 95 %   11/18/21 0530   77 23 94 %   11/18/21 0515   80 25 95 %   11/18/21 0500   81 25 95 %   11/18/21 0445   75 22 95 %   11/18/21 0430   70 19 96 %   11/18/21 0415   71 22 96 %   11/18/21 0400   72 21 95 % 21 0345   75 21 95 %   21 0330   80 25 93 %   21 0315   72 25 98 %   21 0300 (!) 90/39 98.8 °F (37.1 °C) 75 22 95 %   21 0245   78 24 95 %   21 0230   81 23 96 %     Temp (24hrs), Av.3 °F (36.8 °C), Min:97.7 °F (36.5 °C), Max:98.8 °F (37.1 °C)    701 - 1900  In: -   Out: 800 [Urine:800]    Physical Exam:  Constitutional: Ill-appearing male in no acute distress, lying in the hospital bed. Wife at bedside. HEENT: Normocephalic and atraumatic. Oropharynx is clear, mucous membranes are moist. Extraocular muscles are intact. Sclerae anicteric. Neck supple without JVD. No thyromegaly present. Skin Warm and dry. Bruising to UE and no rash noted. No erythema. No pallor. Respiratory Lungs are coarse to auscultation bilaterally. On Airvo. CVS Normal rate, regular rhythm and normal S1 and S2. No murmurs, gallops, or rubs. Abdomen Soft, nontender and nondistended, normoactive bowel sounds. No palpable mass. No hepatosplenomegaly. Neuro Grossly nonfocal with no obvious sensory or motor deficits. MSK Normal range of motion in general.  No edema and no tenderness. Psych Appropriate mood and affect. Labs:    Recent Results (from the past 24 hour(s))   GLUCOSE, POC    Collection Time: 21 12:00 PM   Result Value Ref Range    Glucose (POC) 120 (H) 65 - 100 mg/dL    Performed by [facility]    EKG, 12 LEAD, SUBSEQUENT    Collection Time: 21  4:47 PM   Result Value Ref Range    Ventricular Rate 67 BPM    Atrial Rate 67 BPM    P-R Interval 148 ms    QRS Duration 74 ms    Q-T Interval 396 ms    QTC Calculation (Bezet) 418 ms    Calculated P Axis 32 degrees    Calculated R Axis -6 degrees    Calculated T Axis -6 degrees    Diagnosis       Normal sinus rhythm  Nonspecific T wave abnormality  Abnormal ECG  When compared with ECG of 2021 18:21,  Vent.  rate has decreased BY  63 BPM  Confirmed by Giselle Finley MD (), DO LASSITER ([id number]) on 11/18/2021 7:41:18 AM     GLUCOSE, POC    Collection Time: 11/17/21  6:03 PM   Result Value Ref Range    Glucose (POC) 141 (H) 65 - 100 mg/dL    Performed by Esmer    GLUCOSE, POC    Collection Time: 11/17/21 11:52 PM   Result Value Ref Range    Glucose (POC) 148 (H) 65 - 100 mg/dL    Performed by Yazmin Guerra    METABOLIC PANEL, BASIC    Collection Time: 11/18/21  3:32 AM   Result Value Ref Range    Sodium 139 138 - 145 mmol/L    Potassium 4.1 3.5 - 5.1 mmol/L    Chloride 101 98 - 107 mmol/L    CO2 34 (H) 21 - 32 mmol/L    Anion gap 4 (L) 7 - 16 mmol/L    Glucose 138 (H) 65 - 100 mg/dL    BUN 35 (H) 8 - 23 MG/DL    Creatinine 0.76 (L) 0.8 - 1.5 MG/DL    GFR est AA >60 >60 ml/min/1.73m2    GFR est non-AA >60 >60 ml/min/1.73m2    Calcium 9.5 8.3 - 10.4 MG/DL   CBC W/O DIFF    Collection Time: 11/18/21  3:32 AM   Result Value Ref Range    WBC 15.5 (H) 4.3 - 11.1 K/uL    RBC 3.63 (L) 4.23 - 5.6 M/uL    HGB 10.7 (L) 13.6 - 17.2 g/dL    HCT 33.1 (L) 41.1 - 50.3 %    MCV 91.2 79.6 - 97.8 FL    MCH 29.5 26.1 - 32.9 PG    MCHC 32.3 31.4 - 35.0 g/dL    RDW 13.9 11.9 - 14.6 %    PLATELET 496 011 - 731 K/uL    MPV 10.3 9.4 - 12.3 FL    ABSOLUTE NRBC 0.03 0.0 - 0.2 K/uL   BLOOD GAS, ARTERIAL POC    Collection Time: 11/18/21  4:46 AM   Result Value Ref Range    Device: ADULT VENT      FIO2 (POC) 35 %    pH (POC) 7.46 (H) 7.35 - 7.45      pCO2 (POC) 45.1 (H) 35 - 45 MMHG    pO2 (POC) 67 (L) 75 - 100 MMHG    HCO3 (POC) 32.0 (H) 22 - 26 MMOL/L    sO2 (POC) 93.8 (L) 95 - 98 %    Base excess (POC) 7.2 mmol/L    Mode ASSIST CONTROL      Tidal volume 366 ml    PEEP/CPAP (POC) 8 cmH2O    Allens test (POC) NOT APPLICABLE      Site DRAWN FROM ARTERIAL LINE      Specimen type (POC) ARTERIAL      Performed by Jean-Paul     Respiratory comment: VE9.3    GLUCOSE, POC    Collection Time: 11/18/21  5:56 AM   Result Value Ref Range    Glucose (POC) 143 (H) 65 - 100 mg/dL    Performed by Yazmin Guerra    EKG, 12 LEAD, SUBSEQUENT    Collection Time: 11/18/21  9:34 AM   Result Value Ref Range    Ventricular Rate 78 BPM    Atrial Rate 78 BPM    P-R Interval 150 ms    QRS Duration 70 ms    Q-T Interval 370 ms    QTC Calculation (Bezet) 421 ms    Calculated P Axis 38 degrees    Calculated R Axis -14 degrees    Calculated T Axis 0 degrees    Diagnosis       Normal sinus rhythm  Nonspecific T wave abnormality  Abnormal ECG  When compared with ECG of 17-NOV-2021 16:47,  No significant change was found         Imaging:  XR CHEST Mary Malcolm [[id number]] Collected: 11/17/21 0840   Order Status: Completed Updated: 11/17/21 0843   Narrative:     Chest X-ray     INDICATION: Follow-up pulmonary infiltrates. COMPARISON: Chest x-ray 11/14/2021. A portable AP view of the chest was obtained. FINDINGS: Bilateral pulmonary infiltrates are present, right greater than left   but minimally changed. No evidence of pneumothorax or significant pleural fluid.    The heart size is normal.  The bony thorax is intact. Right IJ central venous   line, endotracheal tube and nasogastric tube are again noted and unchanged. Impression:     Stable bilateral pulmonary infiltrates. XR CHEST Mary Malcolm [[id number]]    Order Status: No result    MRI BRAIN W WO CONT [[id number]] Resulted: 11/16/21 1657   Order Status: No result Updated: 11/16/21 1657   XR CHEST Mary Malcolm [[id number]] Collected: 11/14/21 2334   Order Status: Completed Updated: 11/14/21 2337   Narrative:      Portable view of the chest     COMPARISON: November 14, 2021 earlier today. CLINICAL HISTORY: ET tube and central line placement     FINDINGS:     Endotracheal tube tip is in 5 cm above the shakila. Right IJ line tip is in the   area of SVC. No evidence of pneumothorax. There are bilateral airspace   densities. There are bilateral pleural effusions. Heart appears enlarged. Mediastinal contour is within normal limits. Impression:       1. ET tube tip is in good position.      2. Right IJ line tip is in the area of SVC. Negative for pneumothorax. 3. Bilateral pleural effusions and airspace disease, similar to prior. XR CHEST Radha Ramoso [[id number]] Collected: 11/14/21 2102   Order Status: Completed Updated: 11/14/21 2105   Narrative:      Portable view of the chest     COMPARISON: Number 14 2021     CLINICAL HISTORY: Possible aspiration. FINDINGS:     There are bilateral pleural effusions. There are bilateral airspace densities. There is no pneumothorax. Heart appears enlarged. Mediastinal contour is within   normal limits. Enteric tube courses below the diaphragm. Impression:       1. Bilateral pleural effusions and bilateral airspace densities. Differential   diagnosis includes pulmonary edema or pneumonia. 2. No significant change compared to prior exam.   XR CHEST Radha Lingo [[id number]] Collected: 11/14/21 1021   Order Status: Completed Updated: 11/14/21 1024   Narrative:     Chest X-ray     INDICATION: Unresponsive, shortness of breath     A portable AP view of the chest was obtained. FINDINGS: The patient has been extubated. Suann Pillar are stable bilateral pulmonary   infiltrates. Suann Pillar is stable cardiomegaly.  The bony thorax is intact.      Impression:     Stable bilateral infiltrates    XR ABD (KUB) [[id number]] Collected: 11/13/21 1055   Order Status: Completed Updated: 11/13/21 1058   Narrative:     Abdominal film for feeding tube placement, 11/13/2021     History: Feeding tube placement. Comparison: KUB 11/12/2021. Technique: Single AP view of the abdomen. Findings: The prior enteric feeding tube has been removed and replaced with a   gastric feeding tube. The tip and side-port are within the stomach with the tip   overlying the most distal stomach grossly good position. The visualized bowel   gas pattern is nonspecific. Grossly stable extensive infiltrates are seen in the   lower lung fields. Additional calcified densities are seen.     Impression:     1. Good position of feeding tube with the tip overlying the most distal stomach. XR ABD (KUB) [[id number]] Collected: 11/12/21 1416   Order Status: Completed Updated: 11/12/21 1419   Narrative:     Exam: XR ABD (KUB) on 11/12/2021 2:16 PM     Clinical History: The Male patient is 80years old  presenting for NGT   insertion. Comparison:  None     Findings:       Dobbhoff feeding tube in place with tip in mid body of stomach      Impression:       1. Feeding tube as described. CPT code(s) 92086            XR CHEST Prabhjot Ramires [[id number]] Collected: 11/11/21 0418   Order Status: Completed Updated: 11/11/21 0531   Narrative:     EXAM: Chest x-ray. INDICATION: Dyspnea. COMPARISON: Yesterday's study. TECHNIQUE: Single frontal view. FINDINGS: Bilateral lung edema or infiltrates and small pleural effusions are   unchanged. The heart remains upper normal in size. No pneumothorax is seen. Endotracheal tube tip lies just above the shakila. The enteric tube projects   below the diaphragms. Impression:     1. Unchanged bilateral lung edema or infiltrates and small pleural effusions. 2. The endotracheal tube tip lies just above the shakila, and could be retracted   by 4 cm. Nursing staff verbally notified at 4:20 AM.    XR CHEST SINGLE VIEW [[id number]] Collected: 11/10/21 0401   Order Status: Completed Updated: 11/10/21 0535   Narrative:     EXAM: Chest x-ray. INDICATION: Dyspnea. COMPARISON: Yesterday's chest x-ray. TECHNIQUE: Frontal view chest x-ray. FINDINGS: Bilateral lung edema or infiltrates have mildly progressed,   particularly in the lung bases. Small pleural effusions are unchanged. The heart   is upper normal in size. No pneumothorax is seen. Endotracheal and enteric tubes   appear unchanged. Impression:     Mildly progressed bilateral lung edema or infiltrates.    XR CHEST PORT [[id number]] Collected: 11/09/21 2053   Order Status: Completed Updated: 11/09/21 2057   Narrative:     CHEST X-RAY, one view. HISTORY:  Respiratory failure requiring intubation.       TECHNIQUE:  AP supine portable view. COMPARISON: Film earlier today     FINDINGS:   Lungs: Patchy densities with decreased edema. Costophrenic angles: Blunted. Heart size: is normal.   Pulmonary vasculature: is unremarkable. Aorta: Unremarkable. Included portion of the upper abdomen: is unremarkable. Bones: No gross bony lesions. Other: ET tube tip pointed towards the right mainstem bronchus, at the shakila. This could be withdrawn 1 to 2 cm. Impression:     ET tube tip towards the right main bronchus at the level shakila   this could be withdrawn 1 to 2 cm. XR ABD (KUB) [[id number]] Collected: 11/09/21 2053   Order Status: Completed Updated: 11/09/21 2057   Narrative:     CHEST X-RAY, one view. HISTORY:  Respiratory failure requiring intubation.       TECHNIQUE:  AP supine portable view. COMPARISON: Film earlier today     FINDINGS:   Lungs: Patchy densities with decreased edema. Costophrenic angles: Blunted. Heart size: is normal.   Pulmonary vasculature: is unremarkable. Aorta: Unremarkable. Included portion of the upper abdomen: is unremarkable. Bones: No gross bony lesions. Other: ET tube tip pointed towards the right mainstem bronchus, at the shakila. This could be withdrawn 1 to 2 cm. Impression:     ET tube tip towards the right main bronchus at the level shakila   this could be withdrawn 1 to 2 cm. XR CHEST PORT [[id number]] Collected: 11/09/21 1856   Order Status: Completed Updated: 11/09/21 1859   Narrative:     PORTABLE CHEST 1 VIEW     HISTORY: Stage IV lung cancer. Hypoxia. COMPARISON: 10/29/2021     FINDINGS: Diffuse bilateral infiltrates are present. Lung volumes are   diminished. There are multiple calcified mediastinal and hilar lymph nodes. This   appearance suggests prior granulomatous inflammation or sarcoidosis. EKG leads are present. Impression:     Low lung volumes with bilateral perihilar and lower lobe   infiltrates.          ASSESSMENT:  Problem List  Date Reviewed: 10/28/2021          Codes Class Noted    Axillary mass, left ICD-10-CM: R22.32  ICD-9-CM: 782.2  10/28/2021        Body mass index (BMI) of 25.0 to 25.9 in adult (Chronic) ICD-10-CM: T11.89  ICD-9-CM: V85.21  10/28/2021        Esophageal dysmotility ICD-10-CM: K22.4  ICD-9-CM: 530.5  11/18/2021        Aspiration pneumonia ([facility] [address] ICD-10-CM: J69.0  ICD-9-CM: 507.0  11/9/2021        * (Principal) Acute on chronic respiratory failure with hypoxia (HCC) ICD-10-CM: J96.21  ICD-9-CM: 518.84, 799.02  11/9/2021        Malignant small cell cancer (HCC) ICD-10-CM: C80.1  ICD-9-CM: 199.1  11/9/2021        COPD, moderate (HCC) (Chronic) ICD-10-CM: J44.9  ICD-9-CM: 496  11/1/2021        Headache ICD-10-CM: R51.9  ICD-9-CM: 784.0  10/30/2021        Acute metabolic encephalopathy HZT-49-SO: G93.41  ICD-9-CM: 348.31  10/28/2021        Hyperglycemia, drug-induced ICD-10-CM: R73.9, T50.905A  ICD-9-CM: 790.29, E947.9  10/28/2021        Dysphagia ICD-10-CM: R13.10  ICD-9-CM: 787.20  7/26/2021        Dry eye syndrome of bilateral lacrimal glands ICD-10-CM: P31.203  ICD-9-CM: 375.15  2/28/2020        Lens replaced by other means ICD-10-CM: Z96.1  ICD-9-CM: V43.1  2/28/2020        Open angle with borderline findings, high risk, bilateral ICD-10-CM: H40.023  ICD-9-CM: 365.05  2/28/2020        Dry eyes ICD-10-CM: L94.498  ICD-9-CM: 375.15  10/14/2019        Osteoarthritis of spine ICD-10-CM: M47.9  ICD-9-CM: 721.90  9/24/2018        Pulmonary hypertension ([facility] ICD-10-CM: I27.20  ICD-9-CM: 416.8  1/10/2018        COPD with asthma ([facility] ICD-10-CM: J44.9  ICD-9-CM: 493.20  11/28/2017        Open angle glaucoma suspect with borderline findings at low risk ICD-10-CM: H40.019  ICD-9-CM: 365.01  10/3/2017        Primary insomnia ICD-10-CM: F51.01  ICD-9-CM: 307.42  3/14/2017        CAITLIN on CPAP (Chronic) ICD-10-CM: H21.68, Z99.89  ICD-9-CM: 327.23, V46.8  8/23/2016    Overview Addendum 7/26/2021  2:45 PM by Lee Brooks MD     CPAP 13 cm    Formatting of this note might be different from the original.  Overview:   CPAP 13 cm             Nocturnal hypoxemia (Chronic) ICD-10-CM: G47.34  ICD-9-CM: 327.24  6/20/2016        Pulmonary hypertension due to hypoxia (Chronic) ICD-10-CM: K44.29  ICD-9-CM: 416.8, 799.02  6/20/2016    Overview Addendum 10/28/2021  3:11 PM by MD Dr. Trent Feliz  1.  6/6/2016 Echo- [id number]  2. Echo (11/23/20):  EF 55-60%. Mild LAE.  AVSC. Trace TR. Normal RV. Normal RA size. RVSP 35. Restrictive lung disease (Chronic) ICD-10-CM: J98.4  ICD-9-CM: 518.89  4/1/2015    Overview Signed 4/1/2015  9:30 AM by Araseli Wagner. Residual from Sarcoidosis             Chronic GERD ICD-10-CM: K21.9  ICD-9-CM: 530.81  2/24/2014        Epiretinal membrane ICD-10-CM: H35.379  ICD-9-CM: 362.56  1/14/2014        Elevated prostate specific antigen (PSA) (Chronic) ICD-10-CM: R97.20  ICD-9-CM: 790.93  11/4/2013        Dyslipidemia (Chronic) ICD-10-CM: E78.5  ICD-9-CM: 272.4  3/13/2006    Overview Signed 3/21/2019  8:31 AM by Zoey Dimas MD     Lexiscan Cardiolite (3/19/19):  EF 70%. Normal perfusion. Sarcoidosis (Chronic) ICD-10-CM: D86.9  ICD-9-CM: 135  3/13/2006        Benign prostatic hyperplasia (Chronic) ICD-10-CM: N40.0  ICD-9-CM: 600.00  3/13/2006                RECOMMENDATIONS:  Metastatic SCLC  - s/p L axillary mass bx on 11/1  - s/p PET/CT on 11/8 shows disease in lungs, LNs, muscle, bone, and liver  - Dr. Silvester Romberg discussed aggressive nature of SCLC and that his disease has metastasized which means it is not curable. He discussed possible treatment options including chemotherapy, however patient is not currently a candidate d/t performance status and chemo risks could outweigh benefit, as it could prolong suffering.   Other options include focusing on comfort / hospice. Wife states she would talk it over with patient and may lean toward chemotherapy if his PS improves. Acute on chronic resp failure with hypoxia  - mgmt per primary team  - extubation today    Lab studies and imaging studies were personally reviewed. Thank you for allowing us to participate in the care of Mr. Cande Hill. Formal consult note by Dr. Isidro Beasley to follow. We will sign off; please do not hesitate to call with any questions. Our office will arrange f/u with Dr. Tye Goodson upon discharge.          Theron Howe NP   [facility]  [id number] [place] [address]  Office : [phone]  Fax : [phone]

## 2021-11-18 NOTE — INTERDISCIPLINARY ROUNDS
Interdisciplinary team rounds were held 11/18/2021 with the following team members:Care Management, Nursing, Nurse Practitioner, Occupational Therapy, Pastoral Care, Pharmacy, Physician, Respiratory Therapy and Clinical Coordinator and the patient. Plan of care discussed. See clinical pathway and/or care plan for interventions and desired outcomes.

## 2021-11-18 NOTE — PROGRESS NOTES
Physical Therapy Note:    Participated in interdisciplinary rounds in ICU/CCU and chart reviewed. Patient is experiencing decrease in function from baseline. Patient would benefit from skilled acute therapy to increase independence with self care/ADLs, strength, endurance, and functional mobility. Recommend PT/OT consult when medically stable and MD agrees.     Thank you for your consideration,  Vivek Rincon, PT, DPT

## 2021-11-18 NOTE — PROGRESS NOTES
Ventilator check complete; patient has a #7.5 ET tube secured at the 22 at the lip. Patient is sedated. Patient is able to follow commands. Breath sounds are coarse and diminished. Trachea is midline, Negative for subcutaneous air, and chest excursion is symmetric. Patient is also Negative for cyanosis and is Negative for pitting edema. All alarms are set and audible. Resuscitation bag is at the head of the bed. Ventilator Settings  Mode FIO2 Rate Tidal Volume Pressure PEEP I:E Ratio   VC+  35 %   18 400 ml  8 cm H2O  8 cm H20  1:2.51      Peak airway pressure: 33 cm H2O   Minute ventilation: 7.09 l/min     ABG: No results for input(s): PH, PCO2, PO2, HCO3 in the last 72 hours.       Mlaaika Bojorquez, RT

## 2021-11-18 NOTE — PROGRESS NOTES
Bedside and verbal shift change report received from  Aki Gregory [facility] (offgoing nurse). Report included the following information SBAR, Kardex, Intake/Output, MAR, Recent Results and Cardiac Rhythm NSR with T wave inversion.      Dual skin assessment completed at bedside: Wound to R lateral tibia, skin tear to right upper arm, excoriation to sacrum with zinc cream applied, scattered ecchymosis, weeping from R upper arm (list pertinent skin assessment findings)    Dual verification of gtts completed (name of gtts verified): N/A

## 2021-11-18 NOTE — CONSULTS
[facility] Consult Note       Primary GI Physician: Dr Elsy Madison    Referring Provider:  Dr Selina Liu Date:  11/18/2021    Admit Date:  11/9/2021    Chief Complaint:  Swallowing difficulty with aspiration    Subjective:     History of Present Illness:  Patient is a 80 y.o. 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 Charanjit Harry for evaluation of swallowing difficulty. He was admitted 11/9 after presenting to the ED with AMS and hypoxia following an aspiration episode during a speech evaluation at rehab. He was admitted to the ICU with bilateral infiltrates and leukocytosis, started on Unasyn and Vancomycin, and ventilated. Of note, the patient had been discharged the day prior after admission for headaches/delirium. Patient underwent barium swallow and EGD during that recent admission on 11/5 for dysphagia and weight loss. Ba swallow noted mildly dilated esophagus with no evidence of stricture or mass. Small hiatal hernia was present. EGD performed by Dr Elsy Madison with Schatzki's ring found and dilated. He has remained ventilated but is now awake. He was last seen by speech therapy 11/15 but not felt appropriate for therapy at that time secondary to mental decline. There are plans for extubation today. Wife is at bedside and reports they are waiting to talk with oncology to see what treatment options for his cancer may be. She understands that chemotherapy may not be an option and that goals of care will then need to be discussed. She questions if Reglan is an option for dysphagia. Barium swallow 11/4/21  IMPRESSION  Mildly dilated esophagus without constricting or obstructing mass. Severe esophageal dysmotility throughout the intrathoracic esophagus with  probable gastroesophageal reflux.  Small sliding-type hiatal hernia is present. EGD 11/5/21  Findings:   Esophagus- Schatzki ring. Dilated with 15-18 mm balloon. Mild heme resulted. Stomach- Normal.  Duodenum- Normal.   Therapies: Dilation   Specimens: None   Estimated Blood Loss: 0 cc         Complications:   None; patient tolerated the procedure well. Attending Attestation:  I performed the procedure.    Impression:    Schatzki ring. Dilated with 15-18 mm balloon.   Recommendations:  Repeat dilation prn  Will sign off for now; please call back if further issues  Will see as outpt in office in 1 mo      Electronically signed by Terence Justin MD at 11/05/21 0924     PET Scan 11/8/21  IMPRESSION  1. Unfortunately, there are new apparent inflammatory changes involving the mid  to lower bilateral lungs which confound pulmonary changes. There is focal  appearing activity in the medial left lung likely representing the patient's  known tumor with abnormal soft tissue density seen at this level on the prior CT  study. Additional parenchymal activity is favored to be inflammatory.   2. Multiple enlarged left subpectoral and axillary masses consistent with the  patient's known haroon metastases. A mildly metabolic mesenteric celiac axis  lymph node is also seen in the abdomen. Highly suspicious hypermetabolic soft  tissue lesions are seen in the right paraspinal musculature and left gluteal  soft tissues concerning for soft tissue metastases. Focal activity is seen in  the greater trochanter of the proximal left femur concerning for an osseous  metastasis. Lastly, multifocal activity projects over the superior right lobe of  the liver. No clear defined hepatic lesion is suggested on CT imaging. This  could represent misregistered activity from the appearing inflammatory changes  in the right lung base. This is actually favored. This can be further assessed  with a hepatic protocol CT or MRI if clinically indicated, however.     PMH:  Past Medical History:   Diagnosis Date  Agent orange exposure 1960s    Arthritis     Asthma     Body mass index (BMI) of 25.0 to 25.9 in adult 10/28/2021    Chronic obstructive pulmonary disease (HCC)     Chronic pain     GERD (gastroesophageal reflux disease)     Sarcoidosis 2001    Lung Biopsy was done in 2001; Liver Biopsy--Negative for sarcidosis 5/06    Sleep apnea        PSH:  Past Surgical History:   Procedure Laterality Date    HX HERNIA REPAIR Left 2004    Left inguinal hernia repair    HX HERNIA REPAIR Right 1990's    Right inguinal hernia    HX KNEE REPLACEMENT Left 2016    HX LAP CHOLECYSTECTOMY  2006    HX ORTHOPAEDIC Right 2014    ankle replacement @ [facility]    HX OTHER SURGICAL  2006    Liver biopsy--negative for sarcoidosis    HX TONSILLECTOMY  as a child    MS CHEST SURGERY PROCEDURE UNLISTED  2001    Lung bx for sarcoidosis       Allergies: Allergies   Allergen Reactions    Lactose Diarrhea       Home Medications:  Prior to Admission medications    Medication Sig Start Date End Date Taking? Authorizing Provider   melatonin 5 mg tablet Take 10 mg by mouth nightly. Yes Provider, Historical   ondansetron (ZOFRAN ODT) 4 mg disintegrating tablet Take 1 Tablet by mouth every eight (8) hours as needed for Nausea or Vomiting (diarrhea). 10/1/21  Yes Dawit Mchugh MD   rosuvastatin (CRESTOR) 20 mg tablet TAKE 1 TABLET NIGHTLY 9/13/21  Yes Dawit Mchugh MD   OTHER Handicap placard  Dx: J44.9 COPD 8/2/21  Yes Alton Bellamy MD   azelastine (ASTELIN) 137 mcg (0.1 %) nasal spray 1 [place] by Both Nostrils route two (2) times a day. Use in each nostril as directed 7/27/21  Yes Anca Garcia, JANAY   tamsulosin (Flomax) 0.4 mg capsule Take 1 Capsule by mouth daily. 7/26/21  Yes Dawit Mchugh MD   omeprazole (PRILOSEC) 40 mg capsule Take 1 Capsule by mouth daily.  7/26/21  Yes Dawit Mchugh MD   albuterol (PROVENTIL VENTOLIN) 2.5 mg /3 mL (0.083 %) nebu 3 mL by Nebulization route every six (6) hours as needed for Wheezing. 7/8/21  Yes Scot Farrell MD   tadalafiL, pulm. hypertension, (Adcirca) 20 mg tablet Take 2 Tablets by mouth daily. 7/8/21  Yes Scot Farrell MD   fluticasone-umeclidinium-vilanterol (TRELEGY ELLIPTA) 100-62.5-25 mcg inhaler Take 1 Puff by inhalation daily. 6/17/21  Yes Scot Farrell MD   finasteride (Proscar) 5 mg tablet Take 1 Tab by mouth nightly. 1/25/21  Yes Vanessa Booker MD   ferrous sulfate (Iron) 325 mg (65 mg iron) tablet Take  by mouth Daily (before breakfast). Yes Provider, Historical   ascorbate calcium-bioflavonoid (DOROTHY-C WITH BIOFLAVONOIDS) 1,000-200 mg tab    Yes Provider, Historical   predniSONE (DELTASONE) 10 mg tablet Take 10 mg by mouth daily. 9/19/19  Yes Scot Farrell MD   cholecalciferol, vitamin D3, (VITAMIN D3) 2,000 unit tab Take  by mouth daily. Yes Provider, Historical   DISABLED PLACARD (DISABLED PLACARD) DMV Supply prescription to [facility] with completed form [id number]. 1/26/16  Yes Provider, Historical   Aspirin, Buffered 81 mg tab Take  by mouth daily. Yes Provider, Historical   multivitamin (ONE A DAY) tablet Take 1 Tab by mouth daily. Yes Provider, Historical   b complex vitamins tablet Take 1 Tab by mouth daily. Yes Provider, Historical   amLODIPine (NORVASC) 5 mg tablet Take 1 Tablet by mouth daily for 30 days. Patient not taking: Reported on 11/10/2021 11/8/21 12/8/21  Juany Park, DO   OXYGEN-AIR DELIVERY SYSTEMS by Does Not Apply route. 2 lpm QD  Patient not taking: Reported on 11/10/2021    Provider, Historical   diclofenac (VOLTAREN) 1 % topical gel Apply 4 g to affected area four (4) times daily.  prn  Patient not taking: Reported on 11/10/2021    Provider, Historical       Hospital Medications:  Current Facility-Administered Medications   Medication Dose Route Frequency    metoclopramide HCl (REGLAN) injection 10 mg  10 mg IntraVENous Q8H    methylPREDNISolone (PF) (SOLU-MEDROL) injection 20 mg  20 mg IntraVENous Q12H    fentaNYL citrate (PF) injection 50 mcg  50 mcg IntraVENous Q1H PRN    pantoprazole (PROTONIX) 40 mg in 0.9% sodium chloride 10 mL injection  40 mg IntraVENous DAILY    NOREPINephrine (LEVOPHED) 4 mg in 5% dextrose 250 mL infusion  0.5-30 mcg/min IntraVENous TITRATE    chlorhexidine (PERIDEX) 0.12 % mouthwash 15 mL  15 mL Oral Q12H    insulin lispro (HUMALOG) injection   SubCUTAneous Q6H    fentaNYL in normal saline (pf) 25 mcg/mL infusion  0-200 mcg/hr IntraVENous TITRATE    LORazepam (ATIVAN) injection 0.5 mg  0.5 mg IntraVENous Q6H PRN    [Held by provider] amLODIPine (NORVASC) tablet 5 mg  5 mg Per NG tube DAILY    aspirin chewable tablet 81 mg  81 mg Per NG tube DAILY    acetaminophen (TYLENOL) solution 650 mg  650 mg Per NG tube Q4H PRN    docusate (COLACE) 50 mg/5 mL oral liquid 100 mg  100 mg Per NG tube BID    NUTRITIONAL SUPPORT ELECTROLYTE PRN ORDERS   Does Not Apply PRN    polyethylene glycol (MIRALAX) packet 17 g  17 g Per NG tube DAILY    sodium chloride (NS) flush 5-10 mL  5-10 mL IntraVENous Q8H    tadalafiL (pulm. hypertension) (ADCIRCA) tablet 40 mg- patient supplied (Patient Supplied)  40 mg Per G Tube DAILY    [Held by provider] rosuvastatin (CRESTOR) tablet 20 mg  20 mg Per NG tube QHS    sodium chloride (NS) flush 5-40 mL  5-40 mL IntraVENous Q8H    sodium chloride (NS) flush 5-40 mL  5-40 mL IntraVENous PRN    HYDROmorphone (DILAUDID) injection 0.5 mg  0.5 mg IntraVENous Q4H PRN    enoxaparin (LOVENOX) injection 40 mg  40 mg SubCUTAneous Q24H    naloxone (NARCAN) injection 0.4 mg  0.4 mg IntraVENous PRN       Social History:  Social History     Tobacco Use    Smoking status: Former Smoker     Packs/day: 1.50     Years: 5.00     Pack years: 7.50     Types: Cigarettes     Quit date: 1968     Years since quittin.9    Smokeless tobacco: Never Used   Substance Use Topics    Alcohol use:  Yes     Alcohol/week: 0.0 standard drinks     Comment: socially       Pt denies any history of drug use, blood transfusions, or tattoos. Family History:  Family History   Problem Relation Age of Onset    Diabetes Mother     Hypertension Mother     Cancer Mother         Liver Cancer    Elevated Lipids Mother     Hypertension Father     Stroke Father     Cancer Sister     Headache Neg Hx        Review of Systems:  A detailed 10 system ROS is obtained, with pertinent positives as listed above. All others are negative. Diet:      Objective:     Physical Exam:  Vitals:  Visit Vitals  BP (!) 90/39 (BP 1 Location: Right lower arm, BP Patient Position: At rest;Lying left side) Comment: arterial line arm was being manipulated   Pulse 76   Temp 98.8 °F (37.1 °C)   Resp 24   Ht 5' 4\" (1.626 m)   Wt 70.8 kg (156 lb)   SpO2 95%   BMI 26.78 kg/m²     Gen:  Pt is alert, no acute distress  HEENT: Sclerae anicteric. Extra-occular muscles are intact. No oral ulcers. No abnormal pigmentation of the lips. The neck is supple. Cardiovascular: Regular rate and rhythm. No murmurs, gallops, or rubs. Respiratory:  Comfortable breathing with intubation. Coarse breath sounds anteriorly with no wheezes, rales, or rhonchi. GI:  Abdomen nondistended, soft, and nontender. Normal active bowel sounds. No enlargement of the liver or spleen. No masses palpable. Rectal:  Deferred  Musculoskeletal:  No pitting edema of the lower legs.         Laboratory:    Recent Labs     11/18/21  0332 11/17/21  0333 11/16/21  1619 11/16/21  0325   WBC 15.5* 15.3*  --  23.1*   HGB 10.7* 10.3*  --  9.9*   HCT 33.1* 31.6*  --  30.4*    302  --  281   MCV 91.2 90.0  --  88.6    136*  --  139   K 4.1 3.9 3.6 3.0*    99  --  98   CO2 34* 33*  --  35*   BUN 35* 30*  --  26*   CREA 0.76* 0.82  --  0.81   CA 9.5 9.0  --  9.4   MG  --  2.4  --   --    * 157*  --  173*          Assessment:     Principal Problem:    Acute on chronic respiratory failure with hypoxia (HCC) (11/9/2021)    Active Problems:    Acute metabolic encephalopathy (10/28/2021)      Aspiration pneumonia ([facility] [address] (11/9/2021)      Malignant small cell cancer ([facility] [address] (11/9/2021)     Swallowing difficulty    Plan:     81 yo male with newly diagnosed small cell lung cancer with metastasis is seen today for concerns of aspiration. He was admitted 11/8 with pneumonia felt secondary to aspiration. He is now intubated, hoping for extubation today. Recent barium swallow this month noted mild esophageal dilation with subsequent EGD 11/5 with dilation of a Schatzki's ring. He has not been evaluated by speech therapy this admission secondary to intubation/mental decline. On exam today he is awake with wife at bedside. Wife reports they are awaiting discussion of recent PET scan to help determine goals of care. She understands the PEG placement will not be a life-saving measure and may not be indicated. 1.  Pulmonary plans for extubation today; recommend repeat speech therapy evaluation after extubation to evaluate swallowing  2. Spouse has questions about Reglan; it is not likely to be helpful based on evaluation to this point  3. Patient and spouse awaiting discussion with oncology to discuss options for newly diagnosed cancer treatment; wife voices understanding that pending this discussion, palliative care may be recommended  4. We will follow    Patient is seen and examined in collaboration with Dr. Yonatan Tijerina. Assessment and plan as per Dr. Yonatan Tijerina.   Gilberto Tompkins NP

## 2021-11-18 NOTE — PROGRESS NOTES
PSV initiated at this time. No complications noted. Will monitor. ABG orders placed.      Leslie Gurrola, RRT

## 2021-11-18 NOTE — PROGRESS NOTES
Bedside and verbal shift change report received from [facility] (offgoing nurse). Report included the following information SBAR, Kardex, Intake/Output, MAR, Recent Results and Cardiac Rhythm NSR.      Dual skin assessment completed at bedside: Scattered ecchymoses, wound R upper arm, wound R tibia, excoriation blake area (list pertinent skin assessment findings)    Dual verification of gtts completed (name of gtts verified): none

## 2021-11-18 NOTE — PROGRESS NOTES
SPEECH PATHOLOGY NOTE:  Reconsult received and chart reviewed. Patient is currently still intubated, but with extubation planned today. Would wait >24hours post extubation before reassessment of swallow in light of multiple intubations this hospitalization and patient with NO SWALLOW ELICITED during initial swallow assessment performed this admission 11/12/21. MRI still pending, but highly recommended as there has been a definite change in oropharyngeal swallow function from previous admission earlier this month.   Fior Echols MA, CCC-SLP

## 2021-11-18 NOTE — PROGRESS NOTES
Problem: Ventilator Management  Goal: *Adequate oxygenation and ventilation  Outcome: Progressing Towards Goal     Problem: Ventilator Management  Goal: *Patient maintains clear airway/free of aspiration  Outcome: Progressing Towards Goal     Problem: Ventilator Management  Goal: *Absence of infection signs and symptoms  Outcome: Progressing Towards Goal     Problem: Ventilator Management  Goal: *Normal spontaneous ventilation  Outcome: Progressing Towards Goal     Problem: Patient Education: Go to Patient Education Activity  Goal: Patient/Family Education  Outcome: Progressing Towards Goal     Problem: Delirium Treatment  Goal: *Level of consciousness restored to baseline  Outcome: Progressing Towards Goal     Problem: Delirium Treatment  Goal: *Level of environmental perceptions restored to baseline  Outcome: Progressing Towards Goal     Problem: Delirium Treatment  Goal: *Sensory perception restored to baseline  Outcome: Progressing Towards Goal     Problem: Delirium Treatment  Goal: *Emotional stability restored to baseline  Outcome: Progressing Towards Goal     Problem: Delirium Treatment  Goal: *Functional assessment restored to baseline  Outcome: Progressing Towards Goal     Problem: Delirium Treatment  Goal: *Absence of falls  Outcome: Progressing Towards Goal     Problem: Delirium Treatment  Goal: *Will remain free of delirium, CAM Score negative  Outcome: Progressing Towards Goal     Problem: Delirium Treatment  Goal: *Cognitive status will be restored to baseline  Outcome: Progressing Towards Goal     Problem: Delirium Treatment  Goal: Interventions  Outcome: Progressing Towards Goal  Note:     Review current medications and IV   fluids    Review labs and diagnostics to assess for   infection    Assess for electrolyte imbalance & toxicity   screen    Pain   assessment/management    Vital signs   assessment     Problem: Patient Education: Go to Patient Education Activity  Goal: Patient/Family Education  Outcome: Progressing Towards Goal     Problem: Patient Education: Go to Patient Education Activity  Goal: Patient/Family Education  Outcome: Progressing Towards Goal     Problem: Non-Violent Restraints  Goal: Removal from restraints as soon as assessed to be safe  Outcome: Resolved/Met  Goal: No harm/injury to patient while restraints in use  Outcome: Resolved/Met  Goal: Patient's dignity will be maintained  Outcome: Resolved/Met  Goal: Patient Interventions  Outcome: Resolved/Met

## 2021-11-19 NOTE — CONSULTS
Inpatient Hematology / Oncology Consult Note    Reason for Consult:  Acute on chronic respiratory failure with hypoxia [facility]) [Z28.98]  Referring Physician:  George Dejesus MD    History of Present Illness:  Mr. Mima Woods is a 80 y.o. male admitted on 11/9/2021 with a primary diagnosis of The primary encounter diagnosis was Acute respiratory failure with hypoxia ([address] Diagnoses of Sepsis, due to unspecified organism, unspecified whether acute organ dysfunction present [facility]), Acute metabolic encephalopathy, Acute on chronic respiratory failure with hypoxia ([address] Aspiration pneumonia of both lower lobes, unspecified aspiration pneumonia type ([address] Malignant small cell cancer ([address] Pulmonary hypertension ([address] Restrictive lung disease, Dyspnea, unspecified type, Frailty, Encounter for palliative care, CAITLIN on CPAP, Aspiration pneumonia of both lower lobes due to anesthesia during puerperium, Oropharyngeal dysphagia, Pulmonary hypertension due to hypoxia ([address] COPD with asthma ([address] Nocturnal hypoxemia, Sarcoidosis, COPD, moderate ([address] and Small cell lung cancer ([address] were also pertinent to this visit. .      Elderly gentleman history of sarcoidosis (mediastinal lymphadenopathy, status post VATS 2001), COPD, pulmonary hypertension, recently hospitalized and found to have multiple large masses in the left axilla. Biopsy has since returned consistent with small cell carcinoma. Brain MRI without metastasis. PET scan 11/8/2021 consistent with widespread metastatic process including medial left lung, axillary and subpectoral masses, abdominal lymph nodes, as well as likely osseous disease. He is now readmitted with altered mental status, hypoxia in setting of aspiration event, currently in ICU, and extubated earlier today. GI has been consulted for esophageal dysmotility. Oncology consulted given metastatic process. Review of Systems:  As mentioned above.  All other systems reviewed in full and are unremarkable. Allergies   Allergen Reactions    Lactose Diarrhea     Past Medical History:   Diagnosis Date    Agent orange exposure 1960s    Arthritis     Asthma     Body mass index (BMI) of 25.0 to 25.9 in adult 10/28/2021    Chronic obstructive pulmonary disease (HCC)     Chronic pain     GERD (gastroesophageal reflux disease)     Sarcoidosis     Lung Biopsy was done in ; Liver Biopsy--Negative for sarcidosis     Sleep apnea      Past Surgical History:   Procedure Laterality Date    HX HERNIA REPAIR Left     Left inguinal hernia repair    HX HERNIA REPAIR Right 's    Right inguinal hernia    HX KNEE REPLACEMENT Left 2016    HX LAP CHOLECYSTECTOMY  2006    HX ORTHOPAEDIC Right 2014    ankle replacement @ [place]    HX OTHER SURGICAL  2006    Liver biopsy--negative for sarcoidosis    HX TONSILLECTOMY  as a child    IN CHEST SURGERY PROCEDURE UNLISTED      Lung bx for sarcoidosis     Family History   Problem Relation Age of Onset    Diabetes Mother     Hypertension Mother     Cancer Mother         Liver Cancer    Elevated Lipids Mother     Hypertension Father     Stroke Father     Cancer Sister     Headache Neg Hx      Social History     Socioeconomic History    Marital status:      Spouse name: Not on file    Number of children: Not on file    Years of education: Not on file    Highest education level: Not on file   Occupational History    Occupation: [employer]   Tobacco Use    Smoking status: Former Smoker     Packs/day: 1.50     Years: 5.00     Pack years: 7.50     Types: Cigarettes     Quit date: 1968     Years since quittin.9    Smokeless tobacco: Never Used   Vaping Use    Vaping Use: Never used   Substance and Sexual Activity    Alcohol use:  Yes     Alcohol/week: 0.0 standard drinks     Comment: socially    Drug use: No    Sexual activity: Not on file   Other Topics Concern    Not on file   Social History Narrative    He is originally from [place], but retired to [employer]. He is retired from [employer]. He has worked in [employer] and has travelled extensively to [place], [place], [place], [place], and [place] ([facility]).  and lives with wife. Social Determinants of Health     Financial Resource Strain:     Difficulty of Paying Living Expenses: Not on file   Food Insecurity:     Worried About Running Out of Food in the Last Year: Not on file    Larry of Food in the Last Year: Not on file   Transportation Needs:     Lack of Transportation (Medical): Not on file    Lack of Transportation (Non-Medical):  Not on file   Physical Activity:     Days of Exercise per Week: Not on file    Minutes of Exercise per Session: Not on file   Stress:     Feeling of Stress : Not on file   Social Connections:     Frequency of Communication with Friends and Family: Not on file    Frequency of Social Gatherings with Friends and Family: Not on file    Attends [employer] Services: Not on file    Active Member of Clubs or Organizations: Not on file    Attends Club or Organization Meetings: Not on file    Marital Status: Not on file   Intimate Partner Violence:     Fear of Current or Ex-Partner: Not on file    Emotionally Abused: Not on file    Physically Abused: Not on file    Sexually Abused: Not on file   Housing Stability:     Unable to Pay for Housing in the Last Year: Not on file    Number of [place] in the Last Year: Not on file    Unstable Housing in the Last Year: Not on file     Current Facility-Administered Medications   Medication Dose Route Frequency Provider Last Rate Last Admin    metoclopramide HCl (REGLAN) injection 10 mg  10 mg IntraVENous Q8H Dexter Rodas MD   10 mg at 11/18/21 1543    methylPREDNISolone (PF) (SOLU-MEDROL) injection 20 mg  20 mg IntraVENous Q12H Dexter Rodas MD   20 mg at 11/18/21 0822    scopolamine (TRANSDERM-SCOP) 1 mg over 3 days 1 Patch  1 Patch TransDERmal Q72H Alivia Alvarez MD   1 Patch at 11/18/21 1757    fentaNYL citrate (PF) injection 50 mcg  50 mcg IntraVENous Q1H PRN Alivia Alvarez MD        pantoprazole (PROTONIX) 40 mg in 0.9% sodium chloride 10 mL injection  40 mg IntraVENous DAILY Alivia Alvarez MD   40 mg at 11/18/21 0818    NOREPINephrine (LEVOPHED) 4 mg in 5% dextrose 250 mL infusion  0.5-30 mcg/min IntraVENous TITRATE January Cannon MD   Stopped at 11/17/21 0546    chlorhexidine (PERIDEX) 0.12 % mouthwash 15 mL  15 mL Oral Q12H Alena Souza MD   15 mL at 11/18/21 0819    insulin lispro (HUMALOG) injection   SubCUTAneous Q6H Mariama Peralta MD   2 Units at 11/16/21 6986    fentaNYL in normal saline (pf) 25 mcg/mL infusion  0-200 mcg/hr IntraVENous TITRATE Alena Souza MD   Stopped at 11/17/21 1212    LORazepam (ATIVAN) injection 0.5 mg  0.5 mg IntraVENous Q6H PRN Otilia Humphreys MD   0.5 mg at 11/17/21 1228    [Held by provider] amLODIPine (NORVASC) tablet 5 mg  5 mg Per NG tube DAILY Marquis Ortez MD   5 mg at 11/15/21 0947    aspirin chewable tablet 81 mg  81 mg Per NG tube DAILY Marquis Ortez MD   81 mg at 11/18/21 0818    acetaminophen (TYLENOL) solution 650 mg  650 mg Per NG tube Q4H PRN Marquis Ortez MD   650 mg at 11/18/21 1609    docusate (COLACE) 50 mg/5 mL oral liquid 100 mg  100 mg Per NG tube BID Marquis Ortez MD   100 mg at 11/17/21 0857    NUTRITIONAL SUPPORT ELECTROLYTE PRN ORDERS   Does Not Apply PRN Otilia Humphreys MD        polyethylene glycol [facility]) packet 17 g  17 g Per NG tube DAILY Otilia Humphreys MD   17 g at 11/17/21 0856    sodium chloride (NS) flush 5-10 mL  5-10 mL IntraVENous Q8H Libby Parrish MD   10 mL at 11/18/21 1542    tadalafiL (pulm. hypertension) (ADCIRCA) tablet 40 mg- patient supplied (Patient Supplied)  40 mg Per G Tube DAILY Lowell-Marquis Mariee MD   40 mg at 11/18/21 0816    [Held by provider] rosuvastatin (CRESTOR) tablet 20 mg  20 mg Per NG tube QHS Candice Sumner MD   20 mg at 21 2305    sodium chloride (NS) flush 5-40 mL  5-40 mL IntraVENous Q8H Liliana Ortez MD   10 mL at 21 1542    sodium chloride (NS) flush 5-40 mL  5-40 mL IntraVENous PRN Marquis Ortez MD        HYDROmorphone (DILAUDID) injection 0.5 mg  0.5 mg IntraVENous Q4H PRN Marquis Ortez MD   0.5 mg at 21 2321    enoxaparin (LOVENOX) injection 40 mg  40 mg SubCUTAneous Q24H Marquis Ortez MD   40 mg at 21 0818    naloxone (NARCAN) injection 0.4 mg  0.4 mg IntraVENous PRN Candice Sumner MD           OBJECTIVE:  Patient Vitals for the past 8 hrs:   Temp Pulse Resp SpO2 Weight   21 1930  (!) 112  99 %    21 1915  98 28 98 %    21 1900  (!) 106  99 % 146 lb 9.6 oz (66.5 kg)   21 1625  (!) 107 (!) 31 97 %    21 1622 98.9 °F (37.2 °C)   97 %    21 1610  (!) 103 28 96 %    21 1555  (!) 108 29 100 %    21 1540  99 25 97 %    21 1525  97 18 98 %    21 1510  96 27 99 %    21 1455  96 23 98 %    21 1440  93 25 98 %    21 1425  94 (!) 31 99 %    21 1410  91 (!) 31 100 %    21 1355  92 25 99 %    21 1340  89 26 98 %    21 1325  90 26 97 %    21 1310  88 23 98 %    21 1255  87 25 97 %    21 1240  85 24 99 %    21 1225  83 27 98 %    21 1210  81 21 96 %    21 1155  83 24 98 %      Temp (24hrs), Av.4 °F (36.9 °C), Min:97.7 °F (36.5 °C), Max:98.9 °F (37.2 °C)    No intake/output data recorded. Physical Exam:  Constitutional: Weak male in no acute distress, sitting comfortably in the hospital bed. HEENT: Normocephalic and atraumatic. Oropharynx is clear, mucous membranes are moist.  Pupils are equal, round, and reactive to light. Extraocular muscles are intact. Sclerae anicteric. Neck supple without JVD. No thyromegaly present.     Lymph node   No palpable submandibular, cervical, supraclavicular, axillary or inguinal lymph nodes. Skin Warm and dry. No bruising and no rash noted. No erythema. No pallor. Respiratory Lungs are clear to auscultation bilaterally without wheezes, rales or rhonchi, normal air exchange without accessory muscle use. CVS Normal rate, regular rhythm and normal S1 and S2. No murmurs, gallops, or rubs. Abdomen Soft, nontender and nondistended, normoactive bowel sounds. No palpable mass. No hepatosplenomegaly. Neuro Grossly nonfocal with no obvious sensory or motor deficits. MSK Normal range of motion in general.  2+ edema and no tenderness. Psych Appropriate mood and affect.         Labs:    Recent Results (from the past 24 hour(s))   GLUCOSE, POC    Collection Time: 11/17/21 11:52 PM   Result Value Ref Range    Glucose (POC) 148 (H) 65 - 100 mg/dL    Performed by [facility]    METABOLIC PANEL, BASIC    Collection Time: 11/18/21  3:32 AM   Result Value Ref Range    Sodium 139 138 - 145 mmol/L    Potassium 4.1 3.5 - 5.1 mmol/L    Chloride 101 98 - 107 mmol/L    CO2 34 (H) 21 - 32 mmol/L    Anion gap 4 (L) 7 - 16 mmol/L    Glucose 138 (H) 65 - 100 mg/dL    BUN 35 (H) 8 - 23 MG/DL    Creatinine 0.76 (L) 0.8 - 1.5 MG/DL    GFR est AA >60 >60 ml/min/1.73m2    GFR est non-AA >60 >60 ml/min/1.73m2    Calcium 9.5 8.3 - 10.4 MG/DL   CBC W/O DIFF    Collection Time: 11/18/21  3:32 AM   Result Value Ref Range    WBC 15.5 (H) 4.3 - 11.1 K/uL    RBC 3.63 (L) 4.23 - 5.6 M/uL    HGB 10.7 (L) 13.6 - 17.2 g/dL    HCT 33.1 (L) 41.1 - 50.3 %    MCV 91.2 79.6 - 97.8 FL    MCH 29.5 26.1 - 32.9 PG    MCHC 32.3 31.4 - 35.0 g/dL    RDW 13.9 11.9 - 14.6 %    PLATELET 706 834 - 921 K/uL    MPV 10.3 9.4 - 12.3 FL    ABSOLUTE NRBC 0.03 0.0 - 0.2 K/uL   BLOOD GAS, ARTERIAL POC    Collection Time: 11/18/21  4:46 AM   Result Value Ref Range    Device: ADULT VENT      FIO2 (POC) 35 %    pH (POC) 7.46 (H) 7.35 - 7.45      pCO2 (POC) 45.1 (H) 35 - 45 MMHG pO2 (POC) 67 (L) 75 - 100 MMHG    HCO3 (POC) 32.0 (H) 22 - 26 MMOL/L    sO2 (POC) 93.8 (L) 95 - 98 %    Base excess (POC) 7.2 mmol/L    Mode ASSIST CONTROL      Tidal volume 366 ml    PEEP/CPAP (POC) 8 cmH2O    Allens test (POC) NOT APPLICABLE      Site DRAWN FROM ARTERIAL LINE      Specimen type (POC) ARTERIAL      Performed by Jean-Paul     Respiratory comment: VE9.3    GLUCOSE, POC    Collection Time: 11/18/21  5:56 AM   Result Value Ref Range    Glucose (POC) 143 (H) 65 - 100 mg/dL    Performed by [facility]    EKG, 12 LEAD, SUBSEQUENT    Collection Time: 11/18/21  9:34 AM   Result Value Ref Range    Ventricular Rate 78 BPM    Atrial Rate 78 BPM    P-R Interval 150 ms    QRS Duration 70 ms    Q-T Interval 370 ms    QTC Calculation (Bezet) 421 ms    Calculated P Axis 38 degrees    Calculated R Axis -14 degrees    Calculated T Axis 0 degrees    Diagnosis       Normal sinus rhythm  Nonspecific T wave abnormality  When compared with ECG of 17-NOV-2021 16:47,  No significant change was found  Confirmed by Diane King MD, Dez Martinez ([id number]) on 11/18/2021 11:51:34 AM     BLOOD GAS, ARTERIAL POC    Collection Time: 11/18/21 11:21 AM   Result Value Ref Range    Device: ADULT VENT      FIO2 (POC) 35 %    pH (POC) 7.44 7.35 - 7.45      pCO2 (POC) 45.8 (H) 35 - 45 MMHG    pO2 (POC) 69 (L) 75 - 100 MMHG    HCO3 (POC) 31.3 (H) 22 - 26 MMOL/L    sO2 (POC) 94.0 (L) 95 - 98 %    Base excess (POC) 6.3 mmol/L    Mode Pressure Control/Pressure Support      PEEP/CPAP (POC) 8 cmH2O    Pressure support 12 cmH2O    Allens test (POC) NOT APPLICABLE      Site DRAWN FROM ARTERIAL LINE      Specimen type (POC) ARTERIAL      Performed by [facility]    GLUCOSE, POC    Collection Time: 11/18/21 11:32 AM   Result Value Ref Range    Glucose (POC) 121 (H) 65 - 100 mg/dL    Performed by Esmer    GLUCOSE, POC    Collection Time: 11/18/21  5:58 PM   Result Value Ref Range    Glucose (POC) 133 (H) 65 - 100 mg/dL    Performed by [facility]        Imaging:              ASSESSMENT & PLAN:  Problem List  Date Reviewed: 10/28/2021          Codes Class Noted    Axillary mass, left ICD-10-CM: R22.32  ICD-9-CM: 782.2  10/28/2021        Body mass index (BMI) of 25.0 to 25.9 in adult (Chronic) ICD-10-CM: F03.31  ICD-9-CM: V85.21  10/28/2021        Esophageal dysmotility ICD-10-CM: K22.4  ICD-9-CM: 530.5  11/18/2021        Aspiration pneumonia ([facility] ICD-10-CM: J69.0  ICD-9-CM: 507.0  11/9/2021        * (Principal) Acute on chronic respiratory failure with hypoxia (HCC) ICD-10-CM: J96.21  ICD-9-CM: 518.84, 799.02  11/9/2021        Malignant small cell cancer (HCC) ICD-10-CM: C80.1  ICD-9-CM: 199.1  11/9/2021        COPD, moderate (HCC) (Chronic) ICD-10-CM: J44.9  ICD-9-CM: 496  11/1/2021        Headache ICD-10-CM: R51.9  ICD-9-CM: 784.0  10/30/2021        Acute metabolic encephalopathy ZXG-99-MW: G93.41  ICD-9-CM: 348.31  10/28/2021        Hyperglycemia, drug-induced ICD-10-CM: R73.9, T50.905A  ICD-9-CM: 790.29, E947.9  10/28/2021        Dysphagia ICD-10-CM: R13.10  ICD-9-CM: 787.20  7/26/2021        Dry eye syndrome of bilateral lacrimal glands ICD-10-CM: B05.375  ICD-9-CM: 375.15  2/28/2020        Lens replaced by other means ICD-10-CM: Z96.1  ICD-9-CM: V43.1  2/28/2020        Open angle with borderline findings, high risk, bilateral ICD-10-CM: H40.023  ICD-9-CM: 365.05  2/28/2020        Dry eyes ICD-10-CM: J56.534  ICD-9-CM: 375.15  10/14/2019        Osteoarthritis of spine ICD-10-CM: M47.9  ICD-9-CM: 721.90  9/24/2018        Pulmonary hypertension ([facility] ICD-10-CM: I27.20  ICD-9-CM: 416.8  1/10/2018        COPD with asthma ([facility] ICD-10-CM: J44.9  ICD-9-CM: 493.20  11/28/2017        Open angle glaucoma suspect with borderline findings at low risk ICD-10-CM: H40.019  ICD-9-CM: 365.01  10/3/2017        Primary insomnia ICD-10-CM: F51.01  ICD-9-CM: 307.42  3/14/2017        CAITLIN on CPAP (Chronic) ICD-10-CM: G47.33, Z99.89  ICD-9-CM: 327.23, V46.8 8/23/2016    Overview Addendum 7/26/2021  2:45 PM by Danelle Perez MD     CPAP 13 cm    Formatting of this note might be different from the original.  Overview:   CPAP 13 cm             Nocturnal hypoxemia (Chronic) ICD-10-CM: G47.34  ICD-9-CM: 327.24  6/20/2016        Pulmonary hypertension due to hypoxia (Chronic) ICD-10-CM: P77.74  ICD-9-CM: 416.8, 799.02  6/20/2016    Overview Addendum 10/28/2021  3:11 PM by MD Dr. Kimo Tomas  1.  6/6/2016 Echo- [id number]  2. Echo (11/23/20):  EF 55-60%. Mild LAE.  AVSC. Trace TR. Normal RV. Normal RA size. RVSP 35. Restrictive lung disease (Chronic) ICD-10-CM: J98.4  ICD-9-CM: 518.89  4/1/2015    Overview Signed 4/1/2015  9:30 AM by Claudene Shores. Residual from Sarcoidosis             Chronic GERD ICD-10-CM: K21.9  ICD-9-CM: 530.81  2/24/2014        Epiretinal membrane ICD-10-CM: H35.379  ICD-9-CM: 362.56  1/14/2014        Elevated prostate specific antigen (PSA) (Chronic) ICD-10-CM: R97.20  ICD-9-CM: 790.93  11/4/2013        Dyslipidemia (Chronic) ICD-10-CM: E78.5  ICD-9-CM: 272.4  3/13/2006    Overview Signed 3/21/2019  8:31 AM by Christen Nolasco MD     Lexiscan Cardiolite (3/19/19):  EF 70%. Normal perfusion. Sarcoidosis (Chronic) ICD-10-CM: D86.9  ICD-9-CM: 135  3/13/2006        Benign prostatic hyperplasia (Chronic) ICD-10-CM: N40.0  ICD-9-CM: 600.00  3/13/2006            Elderly gentleman history of sarcoidosis (mediastinal lymphadenopathy, status post VATS 2001), COPD, pulmonary hypertension, recently hospitalized and found to have multiple large masses in the left axilla. Biopsy has since returned consistent with small cell carcinoma. Brain MRI without metastasis. PET scan 11/8/2021 consistent with widespread metastatic process including medial left lung, axillary and subpectoral masses, abdominal lymph nodes, as well as likely osseous disease.   He is now readmitted with altered mental status, hypoxia in setting of aspiration event, currently in ICU, and extubated earlier today. GI has been consulted for esophageal dysmotility. Oncology consulted given metastatic process. Detailed discussion today with patient in company of his wife. Patient remains severely debilitated, and only earlier today was extubated. He will need to clinically improve for us to consider treating his underlying malignancy with systemic chemotherapy in palliative fashion. Other options including palliative/hospice care also discussed. They are currently interested in seeing if patient rebounds from current hospitalization, in which case their preference would be to pursue systemic therapy down the line. Patient to follow-up in outpatient oncology within a week of discharge. Lab studies and imaging studies were personally reviewed. Pertinent old records were reviewed. Thank you for allowing us to participate in the care of Mr. Chris Lucero.               Genesis Hunt MD  [address] [place] [address] Hematology Oncology  [address] [place] [address]  Office : [phone]  Fax : [phone]

## 2021-11-19 NOTE — PROGRESS NOTES
ACUTE OT GOALS:  (Developed with and agreed upon by patient and/or caregiver.)  GOALS UPDATED PER REEVALUATION 21  1. Patient will bathe/dress upper body with moderate assistance and adaptive equipment as needed. 2. Patient will demonstrate improved sitting balance for ADLs with contact guard assistance and adaptive equipment as needed. 3. Patient will tolerate 30 minutes of OT treatment with up to 3 rest breaks to increase activity tolerance for ADLs. 4. Patient will complete bed mobility with moderate assistance in preparation for functional transfers. 5. Patient will complete functional transfers with maximal assistance adaptive equipment as needed. 6. Patient will participate in therapeutic activity to improve BUE strength/coordination to [facility] for functional tasks.    Timeframe: 7 visits     OCCUPATIONAL THERAPY ASSESSMENT: Daily Note and Re-evaluation OT Treatment Day # 1    Marisabel Steinberg is a 80 y.o. male   PRIMARY DIAGNOSIS: Acute on chronic respiratory failure with hypoxia (HCC)  Acute on chronic respiratory failure with hypoxia ([facility] [address] [J96.21]       Reason for Referral:   ICD-10: Treatment Diagnosis: Generalized Muscle Weakness (M62.81)  Other lack of cordination (R27.8)  Difficulty in walking, Not elsewhere classified (R26.2)  [place] / [place] / Product Type: Medicare /   ASSESSMENT:     REHAB RECOMMENDATIONS:   Recommendation to date pending progress:  [address] [place] [address]  Equipment:    To Be Determined     PRIOR LEVEL OF FUNCTION:  (Prior to Hospitalization)  INITIAL/CURRENT LEVEL OF FUNCTION:  (Based on today's evaluation)   Bathing:   Independent  Dressing:   Independent  Feeding/Grooming:   Independent  Toileting:   Independent  Functional Mobility:   Independent Bathing:   Total Assistance  Dressing:   Maximal Assistance  Feeding/Grooming:   Total Assistance  Toileting:   Total Assistance  Functional Mobility:   Maximal Assistance x 2     ASSESSMENT:  Mr. Mark Brown presents with respiratory failure, sepsis, metabolic encephalopathy, aspiration pna. Hx sarcoidosis, COPD. 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 Extubated 11/18. At baseline pt lives with wife, independent with ambulation/ADLs. Pt seen today for re-evaluation following intubation and d/c from OT caseload. Pt with significant deficits in strength, balance, and endurance impacting mobility and ADLs. Pt practiced bed mobility with MaxAx2-total assist. Pt with poor sitting balance, significant posterior/L trunk lean/push despite cues for midline. Practiced sitting balance/tolerance, weight shifting on RUE, finding/maintaining midline. Cues throughout for cervical extension to neutral, practiced scapular retraction, cervical AROM. Attempted sit > stand with MaxAx2, put unable to come fully upright into standing. Left in chair position with pillows supporting L side/pt positioned in midline. Pt is functioning far below baseline and would benefit from continued OT to increase safety and independence. SUBJECTIVE:   Mr. Mark Brown states, \"The right. \"     SOCIAL HISTORY/LIVING ENVIRONMENT: Lives with wife in a 2-story home with living accommodations placed on the 1st level. Independent at baseline for ADLs and functional mobility. Home Environment: Private residence  One/Two Story Residence: Two story  Living Alone: No  Support Systems: Spouse/Significant Other    OBJECTIVE:     PAIN: VITAL SIGNS: LINES/DRAINS:   Pre Treatment: Pain Screen  Pain Scale 1: Numeric (0 - 10)  Pain Intensity 1:  (c/o RLE pain, does not quantify )  Pain Location 1: Leg  Pain Orientation 1: Right  Pain Intervention(s) 1: Ambulation/Increased Activity; Repositioned  Post Treatment: 0 Vital Signs  O2 Sat (%): 97 %  O2 Device: Heated;  Hi flow nasal cannula Arterial Line, Continuous Pulse Oximetry, Chavez Catheter, IV, Nasogastric Tube and Rectal Tube  O2 Device: Heated, Hi flow nasal cannula     GROSS EVALUATION:  BUEs Within Functional Limits Abnormal/ Functional Abnormal/ Non-Functional (see comments) Not Tested Comments:   AROM [] [x] [] []    PROM [] [] [] [x]    Strength [] [x] [x] []     Balance [] [] [x] [] Poor balance, significant L lean   Posture [] [x] [] [] Rounded shoulders, hangs head/neck flexion   Sensation [] [] [] [x]    Coordination [] [] [x] []    Tone [] [] [] [x]    Edema [] [x] [] [] + edema   Activity Tolerance [] [] [x] [] Poor tolerance on airvo    [] [] [] []      COGNITION/  PERCEPTION: Intact Impaired   (see comments) Comments:   Orientation [] [] Oriented to person and SFD   Vision [] []    Hearing [] []    Judgment/ Insight [] [x] Poor midline awareness   Attention [] []     Memory [] []    Command Following [] [x]    Emotional Regulation [x] []     [] []      ACTIVITIES OF DAILY LIVING: I Mod I S SBA CGA Min Mod Max Total NT Comments   BASIC ADLs:              Bathing/ Showering [] [] [] [] [] [] [] [] [] [x]    Toileting [] [] [] [] [] [] [] [] [] [x]    Dressing [] [] [] [] [] [] [] [] [x] [] TA socks   Feeding [] [] [] [] [] [] [] [] [] [x] NPO   Grooming [] [] [] [] [] [] [] [] [] [x]    Personal Device Care [] [] [] [] [] [] [] [] [] [x]    Functional Mobility [] [] [] [] [] [] [] [x] [x] [] X 2    I=Independent, Mod I=Modified Independent, S=Supervision, SBA=Standby Assistance, CGA=Contact Guard Assistance,   Min=Minimal Assistance, Mod=Moderate Assistance, Max=Maximal Assistance, Total=Total Assistance, NT=Not Tested    MOBILITY: I Mod I S SBA CGA Min Mod Max Total  NT x2 Comments:   Supine to sit [] [] [] [] [] [] [] [x] [] [] [x]    Sit to supine [] [] [] [] [] [] [] [x] [x] [] [x]    Sit to stand [] [] [] [] [] [] [] [x] [] [] [x] Unable to fully stand   Bed to chair [] [] [] [] [] [] [] [] [] [x] []    I=Independent, Mod I=Modified Independent, S=Supervision, SBA=Standby Assistance, CGA=Contact Guard Assistance,   Min=Minimal Assistance, Mod=Moderate Assistance, Max=Maximal Assistance, Total=Total Assistance, NT=Not [facility] 6 Clicks   Daily Activity Inpatient Short Form        How much help from another person does the patient currently need. .. Total A Lot A Little None   1. Putting on and taking off regular lower body clothing? [x] 1   [] 2   [] 3   [] 4   2. Bathing (including washing, rinsing, drying)? [x] 1   [] 2   [] 3   [] 4   3. Toileting, which includes using toilet, bedpan or urinal?   [x] 1   [] 2   [] 3   [] 4   4. Putting on and taking off regular upper body clothing? [] 1   [x] 2   [] 3   [] 4   5. Taking care of personal grooming such as brushing teeth? [x] 1   [] 2   [] 3   [] 4   6. Eating meals? [x] 1   [] 2   [] 3   [] 4   © 2007, Trustees of [address] [place] [address] [address] [id number], under license to [employer]. All rights reserved     Score:  Initial: 14 Most Recent: 7 (Date: 11/19/21 )   Interpretation of Tool:  Represents activities that are increasingly more difficult (i.e. Bed mobility, Transfers, Gait). PLAN:   FREQUENCY/DURATION: OT Plan of Care: 3 times/week for duration of hospital stay or until stated goals are met, whichever comes first.    PROBLEM LIST:   (Skilled intervention is medically necessary to address:)  1. Decreased ADL/Functional Activities  2. Decreased Activity Tolerance  3. Decreased AROM/PROM  4. Decreased Balance  5. Decreased Cognition  6. Decreased Coordination  7. Decreased Gait Ability  8. Decreased Strength  9. Decreased Transfer Abilities  10. Increased Pain   INTERVENTIONS PLANNED:   (Benefits and precautions of occupational therapy have been discussed with the patient.)  1. Self Care Training  2. Therapeutic Activity  3. Therapeutic Exercise/HEP  4. Neuromuscular Re-education  5.  Education     TREATMENT:     EVALUATION:  Re-Evaluation 11/19/21 (Untimed Charge)     TREATMENT:   ( $$ Neuromuscular Re-Education: 23-37 mins )  Co-Treatment PT/OT necessary due to patient's decreased overall endurance/tolerance levels, as well as need for high level skilled assistance to complete functional transfers/mobility and functional tasks  Neuromuscular Re-education (23 Minutes): Neuromuscular Re-education included Balance Training, Coordination training, Functional mobility with facilitation, Postural training, Sitting balance training and Standing balance training to improve Balance, Coordination, Functional Mobility, Postural Control and Proprioception.     TREATMENT GRID:  N/A    AFTER TREATMENT POSITION/PRECAUTIONS:  Bed, Needs within reach, RN notified and Visitors at bedside    INTERDISCIPLINARY COLLABORATION:  RN/PCT, PT/PTA and OT/ACUÑA    TOTAL TREATMENT DURATION:  OT Patient Time In/Time Out  Time In: 0853  Time Out: 1521 [facility], OTR/L

## 2021-11-19 NOTE — PROGRESS NOTES
Palliative Care Progress Note    Patient: Rahel Feliciano MRN: [id number]  SSN: [id number]    YOB: 1940  Age: 80 y.o. Sex: male       Assessment/Plan:     Chief Complaint/Interval History: pt extubated and alert, calm. Wife at bedside    Principal Diagnosis:    · Dyspnea  R06.00    Additional Diagnoses:   · Frailty  R54  · Counseling, Encounter for Medical Advice  Z71.9  · Encounter for Palliative Care  Z51.5    Palliative Performance Scale (PPS)       Medical Decision Making:   Reviewed and summarized labs and imaging over past 24 hours. Met with spouse and pt at bedside with Dr. Olga Tan. Reviewed ongoing care and direction of care explaining that all care going forward is palliative as pt has a non curable cancer. Efforts currently are to improve breathing and overall strength. Pt is a DNI. He also has failed his swallow evaluation. Pt wife wishes to discuss more with her . Will follow    Will discuss findings with members of the interdisciplinary team.      More than 50% of this 25 minute visit was spent counseling and coordination of care as outlined above. Subjective:     Comprehensive review of systems completed and negative except for as documented above. Objective:     Visit Vitals  BP (!) 116/55 (BP 1 Location: Right lower arm, BP Patient Position: At rest;Lying left side)   Pulse 86   Temp 98.4 °F (36.9 °C)   Resp 28   Ht 5' 4\" (1.626 m)   Wt 146 lb 9.6 oz (66.5 kg)   SpO2 98%   BMI 25.16 kg/m²       Physical Exam:    General:  Alert and calm   Eyes:  Conjunctivae/corneas clear    Nose: Nares normal. Septum midline. Neck: Supple, symmetrical, trachea midline, no JVD   Lungs:   Coarse bilateral bs   Heart:  Regular rate and rhythm, no murmur    Abdomen:   Soft, non-tender, non-distended   Extremities: Normal, atraumatic, no cyanosis or edema   Skin: Skin color, texture, turgor normal. No rash or lesions.    Neurologic: Moves all extremities   Psych: Alert and oriented Signed By: Johanna Ferrari MD     November 19, 2021

## 2021-11-19 NOTE — PROGRESS NOTES
Problem: Dysphagia (Adult)  Speech Goal:   Outcome: Progressing Towards Goal  LTG: Patient will tolerate least restrictive diet without overt signs or symptoms of airway compromise by discharge. STG: Patient will perform laryngeal strengthening exercises x10 each with 70% accuracy to improve strength and coordination of swallowing function. STG: Patient will participate in modified barium swallow study as clinically indicated. SPEECH LANGUAGE PATHOLOGY: DYSPHAGIA- Re-evaluation     NAME/AGE/GENDER: Oni Antoine is a 80 y.o. male  DATE: 11/19/2021  PRIMARY DIAGNOSIS: Acute on chronic respiratory failure with hypoxia (HCC) [J96.21]      ICD-10: Treatment Diagnosis: R13.12 Dysphagia, Oropharyngeal Phase    RECOMMENDATIONS   DIET:   NPO with alternative Nutrition/Hydration/Medication    MEDICATIONS: Non-oral     PRECAUTIONS, MODIFICATIONS, AND STRATEGIES  Oral care every 3 hours  Upright positioning during tube feedings  None     RECOMMENDATIONS FOR CONTINUED SPEECH THERAPY:   YES: Anticipate need for ongoing speech therapy during this hospitalization and at next level of care. ASSESSMENT   Patient continues to present with severe oropharyngeal dysphagia. Very limited hyolaryngeal elevation upon palpation, but remains unable to trigger full swallow. Unable to elicit gag reflex as well. Additionally, patient is dysphonic and requires significant effort to vocalized at an intelligible level. Breath support is also impaired. Overt s/sx of airway compromise with tiny ice chip presentations as evidenced by + coughing and wet vocal quality. Cough is non-productive, and he requires suctioning after very minimal po trials. Breathing also became more labored after ice chips. Long discussion with patient and wife regarding dysphagia and treatment options. Unfortunately dysphagia prognosis is poor given severity of deficits and limited ability to complete dysphagia exercises.  Additional treatment options (including e-stim) are contraindicated given cancer diagnosis. He will ultimately require alternative nutrition/hydration/medication if this is in line with his wishes/goals of care; however, this will not eliminate aspiration as he is not fully managing secretions and high risk for reflux given GI issues. All of wife's questions were answered during the session. Recommend strict NPO with alternate method for nutrition/hydration/meds. Frequent oral care with suctioning. Will continue to follow along for attempts at dysphagia treatment as patient is able to complete and as indicated by patient's overall goals. EDUCATION:  Recommendations discussed with Patient, Family, RNs, Pulmonary, GI, and Palliative Care    REHABILITATION POTENTIAL FOR STATED GOALS: Fair    PLAN    FREQUENCY/DURATION:   Continue to follow patient 3 times a week for duration of hospital stay to address above goals. Recommendations for next treatment session: Next treatment session will address dysphagia exercises and swallow stim techniques    CONTINUATION OF SKILLED SERVICES/MEDICAL NECESSITY:  Patient is expected to demonstrate progress in  swallow strength, swallow timeliness and swallow function in order to  improve swallow safety, work toward diet advancement and decrease aspiration risk. Patient continues to require skilled intervention due to severe oropharyngeal dysphagia. SUBJECTIVE   Patient sitting upright in bed following PT/OT session. Wet vocal upon SLP arrival with limited ability to bring up secretions. Oxygen Device: AirVo  Pain: Pain Scale 1: Numeric (0 - 10)  Pain Intensity 1: 0    History of Present Injury/Illness: Mr. Catherine Rodney  has a past medical history of Agent orange exposure (1960s), Arthritis, Asthma, Body mass index (BMI) of 25.0 to 25.9 in adult (10/28/2021), Chronic obstructive pulmonary disease ([facility] [address] Chronic pain, GERD (gastroesophageal reflux disease), Sarcoidosis (2001), and Sleep apnea. . He also  has a past surgical history that includes hx tonsillectomy (as a child); hx other surgical (2006); pr chest surgery procedure unlisted (2001); hx hernia repair (Left, 2004); hx lap cholecystectomy (2006); hx hernia repair (Right, 1990's); hx orthopaedic (Right, 2014); and hx knee replacement (Left, 2016). PRECAUTIONS/ALLERGIES: Lactose     Problem List:  (Impairments causing functional limitations):  dysphagia    Previous Dysphagia: YES esophageal dysmotility on barium swallow during previous admission performed 11/4 with results as follows: \"IMPRESSION: Mildly dilated esophagus without constricting or obstructing mass. Severe esophageal dysmotility throughout the intrathoracic esophagus with probable gastroesophageal reflux. Small sliding-type hiatal hernia is present. \"    Bedside swallow assessment performed during previous admission on 11/2 with results as follows: \"Patient presents with s/sx and complaints concerning for esophageal dysphagia. Throat clearing intermittently with both solids and liquids and reports sticking sensation that improves slightly when alternating solids/liquids. Reduced mastication efficiency after recent dental work, but functional with soft solids. Patient endorses sticking sensation, food coming back up, poor appetite, nausea/vomiting. Reports would lay down at night at home then vomiting significant amount of food. Recommend continue easy to chew diet and thin liquids. Will follow up x1, but likely will benefit from GI workup to rule out esophageal component. Reportedly was scheduled for outpatient EGD this Thursday but wife cancelled due to current hospitalization. Diet Prior to Evaluation: NPO    Orientation:  Person    OBJECTIVE   Dysphagia Re-assessment:   Patient required encouragement to cough in attempt to clear secretions. Minimal expectoration, but improved vocal quality. Voice is soft and required much encouragement to reach intelligible volume.  With sustained phonation, he is able to hold out \"ah\" less that 1 second due to impaired breath support. Patient unable to trigger dry swallow; however, minimal hyolaryngeal elevation was palpated which is an change from last evaluation. + coughing when bolus was presented and also occurred as it melted in oral cavity. Swallows attempted as evidenced by slight hyolaryngeal movement, but still not swallow triggered. + wet vocal quality also observed. Absent gag with deep suctioning. While respiratory sats were stable, breathing was visibly more labored. All additional trials deferred and results were discussed with patient/wife. Tool Used: Dysphagia Outcome and Severity Scale (JESSICA)    Score Comments   Normal Diet  [] 7 With no strategies or extra time needed   Functional Swallow  [] 6 May have mild oral or pharyngeal delay   Mild Dysphagia  [] 5 Which may require one diet consistency restricted    Mild-Moderate Dysphagia  [] 4 With 1-2 diet consistencies restricted   Moderate Dysphagia  [] 3 With 2 or more diet consistencies restricted   Moderate-Severe Dysphagia  [] 2 With partial PO strategies (trials with ST only)   Severe Dysphagia  [x] 1 With inability to tolerate any PO safely      Score:  Initial: 1 Most Recent: 1 (Date 11/19/21 )   Interpretation of Tool: The Dysphagia Outcome and Severity Scale (JESSICA) is a simple, easy-to-use, 7-point scale developed to systematically rate the functional severity of dysphagia based on objective assessment and make recommendations for diet level, independence level, and type of nutrition. Current Medications:   No current facility-administered medications on file prior to encounter. Current Outpatient Medications on File Prior to Encounter   Medication Sig Dispense Refill    melatonin 5 mg tablet Take 10 mg by mouth nightly.  ondansetron (ZOFRAN ODT) 4 mg disintegrating tablet Take 1 Tablet by mouth every eight (8) hours as needed for Nausea or Vomiting (diarrhea).  30 Tablet 0  rosuvastatin (CRESTOR) 20 mg tablet TAKE 1 TABLET NIGHTLY 90 Tablet 3    OTHER Handicap placard  Dx: J44.9 COPD 1 Each 0    azelastine (ASTELIN) 137 mcg (0.1 %) nasal spray 1 [place] by Both Nostrils route two (2) times a day. Use in each nostril as directed 3 Bottle 3    tamsulosin (Flomax) 0.4 mg capsule Take 1 Capsule by mouth daily. 90 Capsule 3    omeprazole (PRILOSEC) 40 mg capsule Take 1 Capsule by mouth daily. 90 Capsule 3    albuterol (PROVENTIL VENTOLIN) 2.5 mg /3 mL (0.083 %) nebu 3 mL by Nebulization route every six (6) hours as needed for Wheezing. 180 Each 3    tadalafiL, pulm. hypertension, (Adcirca) 20 mg tablet Take 2 Tablets by mouth daily. 60 Tablet 11    fluticasone-umeclidinium-vilanterol (TRELEGY ELLIPTA) 100-62.5-25 mcg inhaler Take 1 Puff by inhalation daily. 3 Inhaler 3    finasteride (Proscar) 5 mg tablet Take 1 Tab by mouth nightly. 90 Tab 3    ferrous sulfate (Iron) 325 mg (65 mg iron) tablet Take  by mouth Daily (before breakfast).  ascorbate calcium-bioflavonoid (DOROTHY-C WITH BIOFLAVONOIDS) 1,000-200 mg tab       predniSONE (DELTASONE) 10 mg tablet Take 10 mg by mouth daily. 90 Tab 3    cholecalciferol, vitamin D3, (VITAMIN D3) 2,000 unit tab Take  by mouth daily.  DISABLED PLACARD (DISABLED PLACARD) [facility] prescription to [facility] with completed form RG-007A.  Aspirin, Buffered 81 mg tab Take  by mouth daily.  multivitamin (ONE A DAY) tablet Take 1 Tab by mouth daily.  b complex vitamins tablet Take 1 Tab by mouth daily.  amLODIPine (NORVASC) 5 mg tablet Take 1 Tablet by mouth daily for 30 days. (Patient not taking: Reported on 11/10/2021) 30 Tablet 0    OXYGEN-AIR DELIVERY SYSTEMS by Does Not Apply route. 2 lpm QD (Patient not taking: Reported on 11/10/2021)      diclofenac (VOLTAREN) 1 % topical gel Apply 4 g to affected area four (4) times daily.  prn (Patient not taking: Reported on 11/10/2021)          INTERDISCIPLINARY COLLABORATION: RN, MDs, PC    After treatment position/precautions:  Upright in chair  Wife at bedside   Discussed with Pulmonary, GI, Palliative Care, and RNs    Total Treatment Duration:   Time In: 7315  Time Out: [address] CCC-SLP  Speech Language Pathologist  Acute Rehabilitation Services  Contact: Alexandrea

## 2021-11-19 NOTE — PROGRESS NOTES
Patient is becoming tachycardic with a HR in the low 100s. MAP remains > 65. Yesterday on nights, the patient maintained a HR in the 70s-upper 80s. Pt is afebrile and the patient's pain score improved after repositioning. An H&H has been ordered to see if that could be a cause of the tachycardia.

## 2021-11-19 NOTE — PROGRESS NOTES
Patient called requesting the status of this refill. Was advised the script was brought to the pharmacy.     Thank you  Herminio Graff  Patient Representative     Upon the 1900 assessment, the patient was noted to have some hematuria in his mcgee catheter. Dr. Feng Lee was notified, and she said that staff should reevaluate in the morning to determine if urology should be consulted.

## 2021-11-19 NOTE — CONSULTS
Infectious Disease Consult    Today's Date: 11/19/2021   Admit Date: 11/9/2021    Impression:   · Endotracheal aspirate growing C Albicans (11/15)  · Endotracheal aspirate growing MSSA (11/10); treated with IV Ancef  · Extubated (11/18); still on Levo  · Esophageal dysmotility  · Sacoidosis  · Malignant small cell cancer   · Axillary mass  · Pulmonary HTN  · COPD  · CAITLIN; CPAP  · Elevated Fungitell; 102    Plan:   · Screen for PJP   · Start IV Bactrim 15-20 mg/kg/day  · Start Eraxis 100 mg QD    Anti-infectives:   · Unasyn (11/9-11/15)  · Ancef (11/15-11/17)  · Vancomycin (11/9-11/10)    Subjective:   Date of Consultation:  November 19, 2021  Referring Physician: GLYNN Diaz    Patient is a 80 y.o. 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 He was admitted 11/9 after presenting to the ED with AMS and hypoxia following an aspiration episode during a speech evaluation at rehab. He was admitted to the ICU with bilateral infiltrates and leukocytosis, started on Unasyn and Vancomycin, and ventilated. Of note, the patient had been discharged the day prior after admission for headaches/delirium. Funtitell 102 and sputum grew MSSA and C albicans. ID is asked to evaluate patient for treatment recommendations. On Levo in unit now extubated and has agreed to no re intubation.   GI and Hematology seeing patient.        Patient Active Problem List   Diagnosis Code    Dyslipidemia E78.5    Sarcoidosis D86.9    Benign prostatic hyperplasia N40.0    Elevated prostate specific antigen (PSA) R97.20    Chronic GERD K21.9    Restrictive lung disease J98.4    Nocturnal hypoxemia G47.34    Pulmonary hypertension due to hypoxia I27.23    CAITLIN on CPAP G47.33, Z99.89    Primary insomnia F51.01    COPD with asthma ([facility] [address] J44.9    Osteoarthritis of spine M47.9    Open angle glaucoma suspect with borderline findings at low risk H40.019    Dry eye syndrome of bilateral lacrimal glands H04.123    Dry eyes H04.123    Lens replaced by other means Z96.1    Epiretinal membrane H35.379    Open angle with borderline findings, high risk, bilateral H40.023    Pulmonary hypertension (HCC) I27.20    Dysphagia R13.10    Acute metabolic encephalopathy I76.70    Body mass index (BMI) of 25.0 to 25.9 in adult Z68.25    Hyperglycemia, drug-induced R73.9, T50.905A    Axillary mass, left R22.32    Headache R51.9    COPD, moderate (HCC) J44.9    Aspiration pneumonia (HCC) J69.0    Acute on chronic respiratory failure with hypoxia (HCC) J96.21    Malignant small cell cancer (HCC) C80.1    Esophageal dysmotility K22.4     Past Medical History:   Diagnosis Date    Agent orange exposure 1960s    Arthritis     Asthma     Body mass index (BMI) of 25.0 to 25.9 in adult 10/28/2021    Chronic obstructive pulmonary disease (HCC)     Chronic pain     GERD (gastroesophageal reflux disease)     Sarcoidosis     Lung Biopsy was done in ; Liver Biopsy--Negative for sarcidosis     Sleep apnea       Family History   Problem Relation Age of Onset    Diabetes Mother     Hypertension Mother     Cancer Mother         Liver Cancer    Elevated Lipids Mother     Hypertension Father     Stroke Father     Cancer Sister     Headache Neg Hx       Social History     Tobacco Use    Smoking status: Former Smoker     Packs/day: 1.50     Years: 5.00     Pack years: 7.50     Types: Cigarettes     Quit date: 1968     Years since quittin.9    Smokeless tobacco: Never Used   Substance Use Topics    Alcohol use:  Yes     Alcohol/week: 0.0 standard drinks     Comment: socially     Past Surgical History:   Procedure Laterality Date    HX HERNIA REPAIR Left     Left inguinal hernia repair    HX HERNIA REPAIR Right     Right inguinal hernia    HX KNEE REPLACEMENT Left 2016    HX LAP CHOLECYSTECTOMY  2006    HX ORTHOPAEDIC Right 2014    ankle replacement @ [facility]    HX OTHER SURGICAL  2006    Liver biopsy--negative for sarcoidosis    HX TONSILLECTOMY  as a child    GA CHEST SURGERY PROCEDURE UNLISTED  2001    Lung bx for sarcoidosis      Prior to Admission medications    Medication Sig Start Date End Date Taking? Authorizing Provider   melatonin 5 mg tablet Take 10 mg by mouth nightly. Yes Provider, Historical   ondansetron (ZOFRAN ODT) 4 mg disintegrating tablet Take 1 Tablet by mouth every eight (8) hours as needed for Nausea or Vomiting (diarrhea). 10/1/21  Yes Rhona Newsome MD   rosuvastatin (CRESTOR) 20 mg tablet TAKE 1 TABLET NIGHTLY 9/13/21  Yes Rhona Newsome MD   OTHER Handicap placard  Dx: J44.9 COPD 8/2/21  Yes Shoaib Momin MD   azelastine (ASTELIN) 137 mcg (0.1 %) nasal spray 1 [place] by Both Nostrils route two (2) times a day. Use in each nostril as directed 7/27/21  Yes Christiano Garcia NP   tamsulosin (Flomax) 0.4 mg capsule Take 1 Capsule by mouth daily. 7/26/21  Yes Rhona Newsome MD   omeprazole (PRILOSEC) 40 mg capsule Take 1 Capsule by mouth daily. 7/26/21  Yes Rhona Newsome MD   albuterol (PROVENTIL VENTOLIN) 2.5 mg /3 mL (0.083 %) nebu 3 mL by Nebulization route every six (6) hours as needed for Wheezing. 7/8/21  Yes Shoaib Momin MD   tadalafiL, pulm. hypertension, (Adcirca) 20 mg tablet Take 2 Tablets by mouth daily. 7/8/21  Yes Shoaib Momin MD   fluticasone-umeclidinium-vilanterol (TRELEGY ELLIPTA) 100-62.5-25 mcg inhaler Take 1 Puff by inhalation daily. 6/17/21  Yes Shoaib Momin MD   finasteride (Proscar) 5 mg tablet Take 1 Tab by mouth nightly. 1/25/21  Yes Rhona Newsome MD   ferrous sulfate (Iron) 325 mg (65 mg iron) tablet Take  by mouth Daily (before breakfast). Yes Provider, Historical   ascorbate calcium-bioflavonoid (DOROTHY-C WITH BIOFLAVONOIDS) 1,000-200 mg tab    Yes Provider, Historical   predniSONE (DELTASONE) 10 mg tablet Take 10 mg by mouth daily.  9/19/19  Yes Yaz Penn MD   cholecalciferol, vitamin D3, (VITAMIN D3) 2,000 unit tab Take  by mouth daily. Yes Provider, Historical   DISABLED PLACARD (DISABLED PLACARD) DMV Supply prescription to [facility] with completed form RG-007A. 16  Yes Provider, Historical   Aspirin, Buffered 81 mg tab Take  by mouth daily. Yes Provider, Historical   multivitamin (ONE A DAY) tablet Take 1 Tab by mouth daily. Yes Provider, Historical   b complex vitamins tablet Take 1 Tab by mouth daily. Yes Provider, Historical   amLODIPine (NORVASC) 5 mg tablet Take 1 Tablet by mouth daily for 30 days. Patient not taking: Reported on 11/10/2021 11/8/21 12/8/21  Ana María Park, DO   OXYGEN-AIR DELIVERY SYSTEMS by Does Not Apply route. 2 lpm QD  Patient not taking: Reported on 11/10/2021    Provider, Historical   diclofenac (VOLTAREN) 1 % topical gel Apply 4 g to affected area four (4) times daily. prn  Patient not taking: Reported on 11/10/2021    Provider, Historical       Allergies   Allergen Reactions    Lactose Diarrhea        Review of Systems:  A comprehensive review of systems was negative except for that written in the History of Present Illness. Objective:     Visit Vitals  BP (!) 116/55 (BP 1 Location: Right lower arm, BP Patient Position: At rest;Lying left side)   Pulse 86   Temp 98.4 °F (36.9 °C)   Resp 28   Ht 5' 4\" (1.626 m)   Wt 66.5 kg (146 lb 9.6 oz)   SpO2 97%   BMI 25.16 kg/m²     Temp (24hrs), Av.3 °F (36.8 °C), Min:97.7 °F (36.5 °C), Max:98.9 °F (37.2 °C)       Lines:  Central Venous Catheter:       Physical Exam:    General:  Optifo, very ill appearing,  well noursished, well developed, appears stated age   Eyes:  Sclera anicteric. Pupils equally round and reactive to light. Mouth/Throat: Mucous membranes normal, oral pharynx clear   Neck: Supple   Lungs:   Course to auscultation bilaterally   CV:  Regular rate and rhythm,no murmur, click, rub or gallop   Abdomen:   Soft, non-tender.  bowel sounds normal. non-distended   Extremities: No cyanosis or edema   Skin: Skin color, texture, turgor normal. no acute rash or lesions   Lymph nodes: Cervical and supraclavicular normal   Musculoskeletal: No swelling or deformity   Lines/Devices:  Intact, no erythema, drainage or tenderness   Psych: Awake and nonverbal, normal mood affect given the setting             Data Review:     CBC:  Recent Labs     11/19/21 0355 11/18/21 2133 11/18/21 0332 11/17/21 0333 11/17/21 0333   WBC 23.1*  --  15.5*  --  15.3*   HGB 9.1* 9.8* 10.7*   < > 10.3*   HCT 28.4* 30.3* 33.1*   < > 31.6*     --  311  --  302    < > = values in this interval not displayed.        BMP:  Recent Labs     11/19/21 0355 11/19/21  0015 11/18/21 0332   CREA 0.72* 0.81 0.76*   BUN 31* 32* 35*    142 139   K 4.4 4.1 4.1    105 101   CO2 33* 33* 34*   AGAP 4* 4* 4*   * 121* 138*       LFTS:  Recent Labs     11/19/21 0015   TBILI 0.3   ALT 14   AP 55   TP 5.4*   ALB 1.8*       Microbiology:     All Micro Results     Procedure Component Value Units Date/Time    CULTURE, RESPIRATORY/SPUTUM/BRONCH Roosvelt Tenisha STAIN [[id number]]  (Abnormal)  (Susceptibility) Collected: 11/10/21 0001    Order Status: Completed Specimen: Sputum,ET Suction Updated: 11/17/21 0922     Special Requests: NO SPECIAL REQUESTS        GRAM STAIN 0 TO 33 WBCS PER OIF      0 TO 2 EPITHELIAL CELLS SEEN      MANY GRAM POSITIVE COCCI         FEW GRAM POSITIVE RODS         FEW YEAST         4+ MUCUS PRESENT        Culture result:       HEAVY STAPHYLOCOCCUS AUREUS                  HEAVY NORMAL RESPIRATORY AZRA          CULTURE, RESPIRATORY/SPUTUM/BRONCH Roosvelt Tenisha STAIN [[id number]]  (Abnormal) Collected: 11/15/21 0447    Order Status: Completed Specimen: Sputum from Endotracheal aspirate Updated: 11/17/21 0647     Special Requests: NO SPECIAL REQUESTS        GRAM STAIN 0 TO 10 WBCS PER OIF      0 TO 2 EPITHELIAL CELLS SEEN      MODERATE YEAST         4+ MUCUS PRESENT        Culture result: MODERATE ASHKAN ALBICANS       BLOOD CULTURE [[id number]] Collected: 11/09/21 1823    Order Status: Completed Specimen: Blood Updated: 11/14/21 0614     Special Requests: --        NO SPECIAL REQUESTS  RIGHT  Antecubital       Culture result: NO GROWTH 5 DAYS       BLOOD CULTURE [[id number]] Collected: 11/09/21 1835    Order Status: Completed Specimen: Blood Updated: 11/14/21 0614     Special Requests: --        NO SPECIAL REQUESTS  LEFT  HAND       Culture result: NO GROWTH 5 DAYS       MSSA/MRSA SC BY PCR, NASAL SWAB [[id number]]  (Abnormal) Collected: 11/10/21 0001    Order Status: Completed Specimen: Nasal swab Updated: 11/10/21 0204     Special Requests: NO SPECIAL REQUESTS        Culture result:       MRSA target DNA not detected, SA target DNA detected. A MRSA negative, SA positive test result does not preclude MRSA nasal colonization. CULTURE, BLOOD [[id number]]     Order Status: Canceled Specimen: Blood     CULTURE, BLOOD [[id number]]     Order Status: Canceled Specimen: Blood     COVID-19 RAPID TEST [[id number]] Collected: 11/09/21 2027    Order Status: Completed Specimen: Nasopharyngeal Updated: 11/09/21 2145     Specimen source NASAL        COVID-19 rapid test Not detected        Comment:      The specimen is NEGATIVE for SARS-CoV-2, the novel coronavirus associated with COVID-19. A negative result does not rule out COVID-19. This test has been authorized by the FDA under an Emergency Use Authorization (EUA) for use by authorized laboratories. Fact sheet for Healthcare Providers: ConventionUpdate.co.nz  Fact sheet for Patients: ConventionUpdate.co.nz       Methodology: Isothermal Nucleic Acid Amplification               Imaging:   IMPRESSION     1. Bilateral nodular airspace densities, similar to prior exam     2. Previous endotracheal tube is no longer seen.     Signed By: Darya Camacho NP     November 19, 2021

## 2021-11-19 NOTE — PROGRESS NOTES
ACUTE PHYSICAL THERAPY GOALS:  (Developed with and agreed upon by patient and/or caregiver.)  UPDATED 11/19/21  LTG:  (1.)Mr. Sanchez will move from supine to sit and sit to supine , scoot up and down and roll side to side in bed with MODERATE ASSIST within 7 treatment day(s). (2.)Mr. Sanchez will transfer from bed to chair and chair to bed with MODERATE ASSIST using the least restrictive device within 7 treatment day(s). (3.)Mr. Sanchez will ambulate with MODERATE ASSIST for 20 feet with the least restrictive device within 7 treatment day(s). (4.)Mr. Sanchez will participate in therapeutic activity/exercises x 24 minutes for increased activity tolerance within 7 treatment days. (5.)Mr. Sanchez will maintain static/dynamic sitting x 15 minutes with STAND BY ASSIST for improved balance within 7 treatment days.     ________________________________________________________________________________________________        ________________________________________________________________________________________________      PHYSICAL THERAPY ASSESSMENT: Re-evaluation and AM PT Treatment Day # 1      Ashley Jacobs is a 80 y.o. male   PRIMARY DIAGNOSIS: Acute on chronic respiratory failure with hypoxia ([address]  Acute on chronic respiratory failure with hypoxia ([address] [J96.21]       Reason for Referral:    ICD-10: Treatment Diagnosis: Generalized Muscle Weakness (M62.81)  Other lack of cordination (R27.8)  Difficulty in walking, Not elsewhere classified (R26.2)  Other abnormalities of gait and mobility (R26.89)  [place]PAT[place]: [place] / [place] / Product Type: Medicare /     ASSESSMENT:     REHAB RECOMMENDATIONS:   Recommendation to date pending progress:  Setting:   Short-term Rehab  Equipment:    To Be Determined     PRIOR LEVEL OF FUNCTION:  (Prior to Hospitalization) INITIAL/CURRENT LEVEL OF FUNCTION:  (Most Recently Demonstrated)   Bed Mobility:   Independent  Sit to Stand:   Independent  Transfers:   Independent  Gait/Mobility:   Independent Bed Mobility:   Maximal Assistance x 2  Sit to Stand:   Total Assistance x 2 unable to clear bottom  Transfers:   Total Assistance x 2  Gait/Mobility:   Not tested     ASSESSMENT:  Mr. Camlile Velasco was re-evaluated today and alert but difficult to understand upon arrival.  Per chart pt with prolific metastases including possible soft tissue and osseous structures (left femur) involvement. Pt presents with grossly decreased strength and AROM in B LEs. He required maxA x 2 for supine to sit with poor sitting balance. Pt has pushing behavior to L with R and EOB activities performed with OT to increase midline awareness. STS transfer attempted today with totalA x 2 but unable to clear bottom. Pt's goals have been updated and he could benefit from skilled PT to address above deficits if he is able to tolerate.           SUBJECTIVE:   Mr. Camille Velasco was difficult to understand today    SOCIAL HISTORY/LIVING ENVIRONMENT:   Home Environment: Private residence  One/Two Story Residence: Two story  Living Alone: No  Support Systems: Spouse/Significant Other  OBJECTIVE:     PAIN: VITAL SIGNS: LINES/DRAINS:   Pre Treatment: Pain Screen  Pain Scale 1: FLACC  Pain Intensity 1: 2  Post Treatment: 2   Continuous Pulse Oximetry, Chavez Catheter, IV and Rectal Tube  O2 Device: Heated, Hi flow nasal cannula     GROSS EVALUATION:   Within Functional Limits Abnormal/ Functional Abnormal/ Non-Functional (see comments) Not Tested Comments:   AROM [] [x] [] [] Generally decreased   PROM [] [] [] []    Strength [] [] [x] [] Grossly decreased B LE   Balance [] [] [x] [] Poor sitting   Posture [] [] [x] [] Forward head   Sensation [] [] [] []    Coordination [] [] [x] [] Grossly decreased   Tone [] [] [] []    Edema [] [] [] []    Activity Tolerance [] [] [x] [] Fatigued with sitting EOB    [] [] [] []      COGNITION/  PERCEPTION: Intact Impaired   (see comments) Comments:   Orientation [] [x] Unable to understand   Vision [] []    Hearing [] []    Command Following [] [x] intermittent   Safety Awareness [] [x] Decreased insight in to deficits    [] []      MOBILITY: I Mod I S SBA CGA Min Mod Max Total  NT x2 Comments:   Bed Mobility    Rolling [] [] [] [] [] [] [] [x] [] [] [x]    Supine to Sit [] [] [] [] [] [] [] [x] [] [] [x]    Scooting [] [] [] [] [] [] [] [x] [] [] [x]    Sit to Supine [] [] [] [] [] [] [] [x] [] [] [x]    Transfers    Sit to Stand [] [] [] [] [] [] [] [x] [] [] [x] Unable to clear bottom   Bed to Chair [] [] [] [] [] [] [] [] [] [x] []    Stand to Sit [] [] [] [] [] [] [] [] [] [x] []    I=Independent, Mod I=Modified Independent, S=Supervision, SBA=Standby Assistance, CGA=Contact Guard Assistance,   Min=Minimal Assistance, Mod=Moderate Assistance, Max=Maximal Assistance, Total=Total Assistance, NT=Not Tested  GAIT: I Mod I S SBA CGA Min Mod Max Total  NT x2 Comments:   Level of Assistance [] [] [] [] [] [] [] [] [] [x] []    Distance ____    DME None    Gait Quality N/A     Weightbearing Status N/A     I=Independent, Mod I=Modified Independent, S=Supervision, SBA=Standby Assistance, CGA=Contact Guard Assistance,   Min=Minimal Assistance, Mod=Moderate Assistance, Max=Maximal Assistance, Total=Total Assistance, NT=Not Tested    [address] [facility] [address] [address] AM-[facility] Form       How much difficulty does the patient currently have. .. Unable A Lot A Little None   1. Turning over in bed (including adjusting bedclothes, sheets and blankets)? [] 1   [x] 2   [] 3   [] 4   2. Sitting down on and standing up from a chair with arms ( e.g., wheelchair, bedside commode, etc.)   [x] 1   [] 2   [] 3   [] 4   3. Moving from lying on back to sitting on the side of the bed? [] 1   [x] 2   [] 3   [] 4   How much help from another person does the patient currently need. .. Total A Lot A Little None   4.   Moving to and from a bed to a chair (including a wheelchair)? [x] 1   [] 2   [] 3   [] 4   5. Need to walk in hospital room? [x] 1   [] 2   [] 3   [] 4   6. Climbing 3-5 steps with a railing? [x] 1   [] 2   [] 3   [] 4   © 2007, Trustees of [address] [place] [id number], under license to [employer]. All rights reserved     Score:  Initial: 8 Most Recent: 8(Date:11/19/21 )    Interpretation of Tool:  Represents activities that are increasingly more difficult (i.e. Bed mobility, Transfers, Gait). PLAN:   FREQUENCY/DURATION: PT Plan of Care: 3 times/week for duration of hospital stay or until stated goals are met, whichever comes first.    PROBLEM LIST:   (Skilled intervention is medically necessary to address:)  1. Decreased Activity Tolerance  2. Decreased AROM/PROM  3. Decreased Balance  4. Decreased Cognition  5. Decreased Coordination  6. Decreased Gait Ability  7. Decreased Strength  8. Decreased Transfer Abilities   INTERVENTIONS PLANNED:   (Benefits and precautions of physical therapy have been discussed with the patient.)  1. Therapeutic Activity  2. Therapeutic Exercise/HEP  3. Neuromuscular Re-education  4. Gait Training  5. Manual Therapy  6. Education     TREATMENT:     Re-eval     TREATMENT:   ($$ Therapeutic Activity: 23-37 mins    )  Co-Treatment PT/OT necessary due to patient's decreased overall endurance/tolerance levels, as well as need for high level skilled assistance to complete functional transfers/mobility and functional tasks  Therapeutic Activity (23 Minutes): Therapeutic activity included Rolling, Supine to Sit, Sit to Supine, Scooting and Sitting balance  to improve functional Mobility, Strength and Activity tolerance.     TREATMENT GRID:   Date:   Date:   Date:     Activity/Exercise Parameters Parameters Parameters                                                   AFTER TREATMENT POSITION/PRECAUTIONS:  Bed, Needs within reach, RN notified and Visitors at bedside    INTERDISCIPLINARY COLLABORATION:  RN/PCT, PT/PTA and OT/ACUÑA    TOTAL TREATMENT DURATION:  PT Patient Time In/Time Out  Time In: 2792  Time Out: Flash Allen, PT, DPT

## 2021-11-19 NOTE — PROGRESS NOTES
Formerly [facility] Critical Care Note[de-identified] 11/19/2021  Alfonso Sanchez  Admission Date: 11/9/2021     Length of Stay: 10 days    Background: Patient is a 80 y.o. male presented to the ER on 11/9/21 with mental status change and obtundation and hypoxia after an aspiration event during a swallowing evaluation and deteriorated as the day went on. He was intubated in the ER and transferred to the ICU. He was discharged 11/8/21 from the hospital where he was admitted for a few days for evaluation of a headache. 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 He was extubated 11/12. Pt was on BiPAP and had an episode of emesis. Pt was taken off of BiPAP and required intubation 11/14. He has been followed by Dr Sangita Mobley in our office. He has a history of sarcoidosis (mediastinal lymphadenopathy and calcification, VAT 2001) as well as mild pulmonary hypertension Group 1/Group 3, who has been followed at [facility] since Feb 2018. He was last seen by us in July 2021 and was stable. He has been on prednisone 10mg for sarcoidosis/Stage III COPD as well as Trelegy inhaler daily. He rarely requires albuterol. Baseline O2 needs: with inogen POC he is at 1-2 at rest, and 2lpm with exertion. He has a recent diagnosis of extensive stage small cell cancer after mass discovered in left axilla. He is now admitted with pneumonia, respiratory failure and weakness. Notable PMH:  has a past medical history of Agent orange exposure (1960s), Arthritis, Asthma, Body mass index (BMI) of 25.0 to 25.9 in adult (10/28/2021), Chronic obstructive pulmonary disease ([facility] [address] Chronic pain, GERD (gastroesophageal reflux disease), Sarcoidosis (2001), and Sleep apnea. 24 Hour events: he is extubated now and on Opti flow 60%. ST saw him and he has no gag reflex and unable to swallow. His wife is at bed side. TF is off now.  Patient voiced to his RN that he wants to be home    ROS: unable to obtain/negative except as listed elsewhere. Lines: (insertion date)   ETT: (11/9-11) (11/14-)  Chavez: (11/9)  NGT: (11/13)  Peripheral    Drips: current dose (range)  none    Pertinent Exam:         Blood pressure (!) 116/55, pulse 86, temperature 98.4 °F (36.9 °C), resp. rate 28, height 5' 4\" (1.626 m), weight 146 lb 9.6 oz (66.5 kg), SpO2 98 %. Intake/Output Summary (Last 24 hours) at 11/19/2021 0832  Last data filed at 11/19/2021 0529  Gross per 24 hour   Intake 907 ml   Output 1285 ml   Net -378 ml     Constitutional: sedated, on PSV 45%  EENMT:  Sclera clear, pupils equal, oral mucosa moist  Respiratory: very coarse breath sounds bilaterally,   Cardiovascular:  RRR  Gastrointestinal:  soft with no tenderness; positive bowel sounds present  Musculoskeletal:  warm with no cyanosis, soft  lower extremity edema  Skin:  no jaundice or ecchymosis  Neurologic:sedated  Psychiatric: sedated    CXR:    11/19/21          Recent Labs     11/19/21  0355 11/18/21  2133 11/18/21  0332 11/17/21  0333 11/17/21  0333   WBC 23.1*  --  15.5*  --  15.3*   HGB 9.1* 9.8* 10.7*   < > 10.3*   HCT 28.4* 30.3* 33.1*   < > 31.6*     --  311  --  302    < > = values in this interval not displayed. Recent Labs     11/19/21  0355 11/19/21  0015 11/18/21  0332 11/17/21  0333 11/17/21  0333    142 139   < > 136*   K 4.4 4.1 4.1   < > 3.9    105 101   < > 99   CO2 33* 33* 34*   < > 33*   * 121* 138*   < > 157*   BUN 31* 32* 35*   < > 30*   CREA 0.72* 0.81 0.76*   < > 0.82   MG 2.6* 2.6*  --   --  2.4   CA 9.3 9.2 9.5   < > 9.0   PHOS 3.2 2.8  --   --  2.9   ALB  --  1.8*  --   --   --    AST  --  32  --   --   --    ALT  --  14  --   --   --    AP  --  55  --   --   --     < > = values in this interval not displayed. No results for input(s): LAC, TROPHS, BNPNT, CRP in the last 72 hours.     No lab exists for component: ESR  Recent Labs     11/19/21  0516 11/18/21  2308 11/18/21  1758   [address] [place] [address]*     ECHO: Results from [facility] encounter on 10/28/21    ECHO ADULT COMPLETE    Interpretation Summary  · TV: Mild tricuspid valve regurgitation is present. Right Ventricular Arterial Pressure (RVSP) is 54 mmHg. · Image quality for this study was technically difficult. · Echo study was technically difficulty. · LV: Estimated LVEF is 55 - 60%. Normal cavity size, systolic function (ejection fraction normal) and diastolic function. Mild concentric hypertrophy. Wall motion: normal.  · LA: Moderately dilated left atrium. · RA: Mildly dilated right atrium. · AV: Mild aortic valve regurgitation is present. · MV: Mild mitral valve regurgitation is present. · PV: Mild pulmonic valve regurgitation is present. Ventilator Settings:  Ideal body weight: 59.2 kg (130 lb 8.2 oz)   Mode FIO2 Rate Tidal Volume Pressure PEEP     60 %       8 cm H20      ABG:  Recent Labs     11/18/21  1121 11/18/21  0446 11/17/21  0347   PHI 7.44 7.46* 7.50*   PCO2I 45.8* 45.1* 42.3   PO2I 69* 67* 81   HCO3I 31.3* 32.0* 32.5*     MICRO  Date Source Result   11/9 blood    11/9 blood    11/10 sputum    11/10 MSSA Heavy staph   11/15 sputum Moderate yeast     Assessment and Plan:  (Medical Decision Making)   Impression: 80 y.o. male with sarcoid, pulm HTN, extensive stage SCLC admitted with recurrent aspiration pneumonia and respiratory failure. NEURO:   Delirium:  Now extubated  CV:   Volume Status: fluid balance + 5 L since admission  PULM:   Acute on chronic hypoxemic/hypercapneic respiratory failure:  on Airvo 60%    Probable pneumonia : finished ancef for MSSA  RENAL:  PATRICIO: cr ok   Hypernatremia: resolved  GI:   Esophageal dysmotility:  ? Paraneoplastic. Will ask GI to assist in workup/ management as this has been a likely contributor to recurrent aspiration and respiratory failure.   Nutrition: holding TF   HEME:   Anemia: PPI bid  Thrombocytopenia:   Anticoagulation: lovenox 40 q day  ID:   Pneumonia:  consult ID regarding fungal result  ENDO:   DM: monitor glucose  Skin: no decub, turns, preventive care  Prophy: lovenox and pepcid    Spoke with patient. He agrees to NO REINTUBATION. Pt's wife, Triston Pinzon, [phone], was updated on pt's current condition at bed side. I discussed with her goals of care again and that our focus should be on what can improve his quality of life and decrease his suffering.  Will ask oncology to clarify their input regarding his care at this point    The patient is critically ill with respiratory failure, circulatory failure and requires high complexity decision making for assessment and support including frequent ventilator adjustment , frequent evaluation and titration of therapies , application of advanced monitoring technologies and extensive interpretation of multiple databases    Cumulative time devoted to patient care services by me for day of service -42 min     Oral Leslie MD

## 2021-11-19 NOTE — PROGRESS NOTES
Bedside and verbal shift change report received from  Thelma Smith, [address] [place] [address] (offgoing nurse). Report included the following information SBAR, Kardex, Intake/Output, MAR, Recent Results and Cardiac Rhythm NSR/Sinus tach.      Dual skin assessment completed at bedside: Scattered ecchymosis, wound R upper arm, wound R tibia, excoriation blake area (list pertinent skin assessment findings)    Dual verification of gtts completed (name of gtts verified): N/A

## 2021-11-19 NOTE — PROGRESS NOTES
Bedside and verbal shift change report received from  [facility] (offgoing nurse). Report included the following information SBAR, Kardex, Intake/Output, MAR, Recent Results and Cardiac Rhythm SR/ST. Dual skin assessment completed at bedside: disseminated ecchymoses.  Skin tear R tibia, R elbow,  Excoriation periarea (list pertinent skin assessment findings)    Dual verification of gtts completed (name of gtts verified): none

## 2021-11-19 NOTE — PROGRESS NOTES
[facility] Progress Note         Admit Date:  11/9/2021    Today's Date:  11/19/2021    CC:  Feeding difficulty    Subjective:     Patient remains in ICU. Extubated yesterday. Oncology has seen patient; will need to clinically improve to undergo chemotherapy as an outpatient. Speech evaluation today - they have recommended further clinical evaluation of neurological status. Patient more awake and alert today. Wife at bedside.      Medications:   Current Facility-Administered Medications   Medication Dose Route Frequency    glycopyrrolate (ROBINUL) injection 0.1 mg  0.1 mg IntraVENous TID PRN    albumin human 25% (BUMINATE) solution 12.5 g  12.5 g IntraVENous Q6H    [Held by provider] metoclopramide HCl (REGLAN) injection 10 mg  10 mg IntraVENous Q8H    methylPREDNISolone (PF) (SOLU-MEDROL) injection 20 mg  20 mg IntraVENous Q12H    [Held by provider] scopolamine (TRANSDERM-SCOP) 1 mg over 3 days 1 Patch  1 Patch TransDERmal Q72H    fentaNYL citrate (PF) injection 50 mcg  50 mcg IntraVENous Q1H PRN    pantoprazole (PROTONIX) 40 mg in 0.9% sodium chloride 10 mL injection  40 mg IntraVENous DAILY    NOREPINephrine (LEVOPHED) 4 mg in 5% dextrose 250 mL infusion  0.5-30 mcg/min IntraVENous TITRATE    chlorhexidine (PERIDEX) 0.12 % mouthwash 15 mL  15 mL Oral Q12H    insulin lispro (HUMALOG) injection   SubCUTAneous Q6H    fentaNYL in normal saline (pf) 25 mcg/mL infusion  0-200 mcg/hr IntraVENous TITRATE    LORazepam (ATIVAN) injection 0.5 mg  0.5 mg IntraVENous Q6H PRN    [Held by provider] amLODIPine (NORVASC) tablet 5 mg  5 mg Per NG tube DAILY    aspirin chewable tablet 81 mg  81 mg Per NG tube DAILY    acetaminophen (TYLENOL) solution 650 mg  650 mg Per NG tube Q4H PRN    docusate (COLACE) 50 mg/5 mL oral liquid 100 mg  100 mg Per NG tube BID    NUTRITIONAL SUPPORT ELECTROLYTE PRN ORDERS   Does Not Apply PRN    polyethylene glycol (MIRALAX) packet 17 g  17 g Per NG tube DAILY  sodium chloride (NS) flush 5-10 mL  5-10 mL IntraVENous Q8H    tadalafiL (pulm. hypertension) (ADCIRCA) tablet 40 mg- patient supplied (Patient Supplied)  40 mg Per G Tube DAILY    [Held by provider] rosuvastatin (CRESTOR) tablet 20 mg  20 mg Per NG tube QHS    sodium chloride (NS) flush 5-40 mL  5-40 mL IntraVENous Q8H    sodium chloride (NS) flush 5-40 mL  5-40 mL IntraVENous PRN    HYDROmorphone (DILAUDID) injection 0.5 mg  0.5 mg IntraVENous Q4H PRN    enoxaparin (LOVENOX) injection 40 mg  40 mg SubCUTAneous Q24H    naloxone (NARCAN) injection 0.4 mg  0.4 mg IntraVENous PRN       Review of Systems:  ROS was obtained, with pertinent positives as listed above. No chest pain or SOB. Diet:      Objective:   Vitals:  Visit Vitals  BP (!) 116/55 (BP 1 Location: Right lower arm, BP Patient Position: At rest;Lying left side)   Pulse 86   Temp 98.4 °F (36.9 °C)   Resp 28   Ht 5' 4\" (1.626 m)   Wt 66.5 kg (146 lb 9.6 oz)   SpO2 98%   BMI 25.16 kg/m²     Intake/Output:  No intake/output data recorded. 11/17 1901 - 11/19 0700  In: 927   Out: 3120 [Urine:2495; Drains:625]  Exam:  General appearance: alert, no distress  Lungs: clear but diminished to auscultation bilaterally anteriorly; now extubated  Heart: regular rate and rhythm  Abdomen: soft, non-tender.  Bowel sounds normal. No masses, no organomegaly      Data Review (Labs):    Recent Labs     11/19/21  0355 11/19/21  0015 11/18/21  2133 11/18/21  0332 11/17/21  0333 11/16/21  1619   WBC 23.1*  --   --  15.5* 15.3*  --    HGB 9.1*  --  9.8* 10.7* 10.3*  --    HCT 28.4*  --  30.3* 33.1* 31.6*  --      --   --  311 302  --    MCV 93.7  --   --  91.2 90.0  --     142  --  139 136*  --    K 4.4 4.1  --  4.1 3.9 3.6    105  --  101 99  --    CO2 33* 33*  --  34* 33*  --    BUN 31* 32*  --  35* 30*  --    CREA 0.72* 0.81  --  0.76* 0.82  --    CA 9.3 9.2  --  9.5 9.0  --    MG 2.6* 2.6*  --   --  2.4  --    * 121*  --  138* 157*  -- AP  --  55  --   --   --   --    AST  --  32  --   --   --   --    ALT  --  14  --   --   --   --    TBILI  --  0.3  --   --   --   --    ALB  --  1.8*  --   --   --   --    TP  --  5.4*  --   --   --   --      Barium swallow 11/4/21  IMPRESSION  Mildly dilated esophagus without constricting or obstructing mass. Severe esophageal dysmotility throughout the intrathoracic esophagus with  probable gastroesophageal reflux. Small sliding-type hiatal hernia is present.     EGD 11/5/21  Findings:   Esophagus- Schatzki ring.  Dilated with 15-18 mm balloon.  Mild heme resulted. Stomach- Normal.  Duodenum- Normal.   Therapies: Dilation   Specimens: None   Estimated Blood Loss: 0 cc         Complications:   None; patient tolerated the procedure well.         Attending Attestation:  I performed the procedure.    Impression:    Schatzki ring.  Dilated with 15-18 mm balloon.   Recommendations:  Repeat dilation prn  Will sign off for now; please call back if further issues  Will see as outpt in office in 1 mo      Electronically signed by Joann Nelson MD at 11/05/21 0924      PET Scan 11/8/21  IMPRESSION  1. Unfortunately, there are new apparent inflammatory changes involving the mid  to lower bilateral lungs which confound pulmonary changes. There is focal  appearing activity in the medial left lung likely representing the patient's  known tumor with abnormal soft tissue density seen at this level on the prior CT  study. Additional parenchymal activity is favored to be inflammatory.   2. Multiple enlarged left subpectoral and axillary masses consistent with the  patient's known haroon metastases. A mildly metabolic mesenteric celiac axis  lymph node is also seen in the abdomen. Highly suspicious hypermetabolic soft  tissue lesions are seen in the right paraspinal musculature and left gluteal  soft tissues concerning for soft tissue metastases.  Focal activity is seen in  the greater trochanter of the proximal left femur concerning for an osseous  metastasis. Lastly, multifocal activity projects over the superior right lobe of  the liver. No clear defined hepatic lesion is suggested on CT imaging. This  could represent misregistered activity from the appearing inflammatory changes  in the right lung base. This is actually favored. This can be further assessed  with a hepatic protocol CT or MRI if clinically indicated, however. Assessment:     Principal Problem:    Acute on chronic respiratory failure with hypoxia ([address] (11/9/2021)    Active Problems:    Acute metabolic encephalopathy (26/96/7946)      Aspiration pneumonia ([address] (11/9/2021)      Malignant small cell cancer (HCC) (11/9/2021)      Esophageal dysmotility (11/18/2021)    81 yo male with newly diagnosed small cell lung cancer with metastasis is seen today for concerns of aspiration. He was admitted 11/8 with pneumonia felt secondary to aspiration. He is now intubated, hoping for extubation today. Recent barium swallow this month noted mild esophageal dilation with subsequent EGD 11/5 with dilation of a Schatzki's ring. He has not been evaluated by speech therapy this admission secondary to intubation/mental decline. On exam today he is awake with wife at bedside. Wife reports they are awaiting discussion of recent PET scan to help determine goals of care. She understands the PEG placement will not be a life-saving measure and may not be indicated. Extubated 11/18. For speech evaluation today. Oncology consult 11/18 with chemotherapy discussed but deferred for now pending clinical improvement and ability to withstand treatment. Wife has asked to discuss hospice to understand all options. Plan:     1. Speech evaluation today  2. If felt he is not candidate for oral intake, we will plan PEG vs PEG-J if he and spouse wish to pursue aggressive therapy    Patient is seen and examined in collaboration with Dr. Unique Figueroa.   Assessment and plan as per Dr. Yeny Baez.   Rhoda Oscar NP

## 2021-11-19 NOTE — PROGRESS NOTES
Chart reviewed and pt discussed in am IDR. Extubated yesterday and pt does not want to be re-intubated. Palliative care and MD spoke with wife and pt as he has non-curable ca per notes. CM has had discussion with wife regarding hospice care and all information given when they are ready. Wife wants to see how pt will do at this point per notes. OT recommending LTCF if not hospice. CM following. LOS 10 days.

## 2021-11-20 NOTE — PROGRESS NOTES
Changes made per Dr. Quan Gill following ABG. Will continue to monitor. 11/20/21 1214   Oxygen Therapy   O2 Sat (%) 97 %   Pulse via Oximetry 84 beats per minute   O2 Device BIPAP   FIO2 (%) 65 %   Respiratory   Respiratory (WDL) X   Respiratory Pattern Tachypneic   Breath Sounds Bilateral Diminished   CPAP/BIPAP   Device Mode BIPAP; AVAP   Mask Type and Size Full face; Medium   Skin Condition intact   PIP Observed 21 cm H20   EPAP (cm H2O) 10 cm H2O   Inspiratory Time (sec) 1 seconds   Vt Spont (ml) 259 ml   Ve Observed (l/min) 7.8 l/min   Backup Rate 25   Total RR (Spontaneous) 32 breaths per minute   Leak (Estimated) 54 L/min   Pt's Home Machine No   Biomedical Check Performed Yes   Settings Verified Yes   Alarm Settings   High Pressure 35   Low Pressure 5   Apnea 20   Low Ve 3   High Rate 50   Low Rate 8   Pulmonary Toilet   Pulmonary Toilet H. O.B elevated

## 2021-11-20 NOTE — PROGRESS NOTES
Problem: Ventilator Management  Goal: *Adequate oxygenation and ventilation  Outcome: Resolved/Met  Goal: *Patient maintains clear airway/free of aspiration  Outcome: Resolved/Met     Problem: Patient Education: Go to Patient Education Activity  Goal: Patient/Family Education  Outcome: Resolved/Met

## 2021-11-20 NOTE — PROGRESS NOTES
[facility]   DAILY PROGRESS NOTE    Admit Date:  11/9/2021    CC:  Feeding difficulty    Problem List:  Principal Problem:    Acute on chronic respiratory failure with hypoxia ([facility] [address] (11/9/2021)    Active Problems:    Acute metabolic encephalopathy (49/22/3445)      Aspiration pneumonia ([address] (11/9/2021)      Malignant small cell cancer ([facility] [address] (11/9/2021)      Esophageal dysmotility (11/18/2021)      Mr. Molly Evans is a 79 yo male with newly diagnosed small cell lung cancer with metastasis is being followed for concerns of aspiration. Az Thao was admitted 11/8 with pneumonia felt secondary to aspiration. Az Thao is now intubated, hoping for extubation today.  Recent barium swallow this month noted mild esophageal dilation with subsequent EGD 11/5 with dilation of a Schatzki's ring. Az Thao has not been evaluated by speech therapy this admission secondary to intubation/mental decline.  On exam today he is awake with wife at bedside.  Wife reports they are awaiting discussion of recent PET scan to help determine goals of care.  She understands the PEG placement will not be a life-saving measure and may not be indicated.     Extubated 11/18. Oncology consult 11/18 with chemotherapy discussed but deferred for now pending clinical improvement and ability to withstand treatment. Patient failed SLP evaluation on 11/19. Patient with worsening respiratory status today and is now on CPAP. Patient is non-responsive. 1. Feeding Difficulties  2. Acute Respiratory Failure   3. Metastatic Small Cell Lung Cancer  4. AMS    - Patient's status is worsening. I discussed poor prognosis with the patient's wife. - Further care per ICU goldberg.  - At this time, he is not stable enough to consider a PEG tube  - Patient's wife is not sure that she would want him to have a PEG tube at this time. - We will sign off. Please call us with any further questions or concerns.   - If patient's wife decides on proceeding with PEG placement and patient is able to be more stabilized, we can consider PEG placement at that time. - If patient's wife proceeds with continued care, may need to consider TPN in the meantime. Pedro Heller MD   [facility]       Subjective:     Patient remains in the ICU, critically ill. He is on CPAP this AM and not responsive. I had a long talk with patient's wife at bedside.       Medications:   Current Facility-Administered Medications   Medication Dose Route Frequency Provider Last Rate Last Admin    sulfamethoxazole-trimethoprim (BACTRIM;SEPTRA) 200-40 mg/5 mL oral suspension 40 mL  40 mL Per G Tube Q8H Claudette Furbish, MD        glycopyrrolate (ROBINUL) injection 0.1 mg  0.1 mg IntraVENous TID PRN Kelvin Waters MD   0.1 mg at 11/20/21 0903    anidulafungin (ERAXIS) 100 mg in 0.9% sodium chloride 130 mL IVPB  100 mg IntraVENous Q24H Izabella Hagen NP        lip protectant (BLISTEX) ointment 1 Each  1 Each Topical PRN Kelvin Waters MD   1 Each at 11/19/21 1624    melatonin tablet 5 mg  5 mg Per NG tube QHS Marquis Ortez MD   5 mg at 11/19/21 2125    [Held by provider] metoclopramide HCl (REGLAN) injection 10 mg  10 mg IntraVENous Q8H Shoaib Momin MD   10 mg at 11/18/21 2112    methylPREDNISolone (PF) (SOLU-MEDROL) injection 20 mg  20 mg IntraVENous Q12H Shoaib Momin MD   20 mg at 11/20/21 0903    [Held by provider] scopolamine (TRANSDERM-SCOP) 1 mg over 3 days 1 Patch  1 Patch TransDERmal Q72H Shoaib Momin MD   1 Patch at 11/18/21 1757    fentaNYL citrate (PF) injection 50 mcg  50 mcg IntraVENous Q1H PRN Shoaib Momin MD   50 mcg at 11/19/21 2211    pantoprazole (PROTONIX) 40 mg in 0.9% sodium chloride 10 mL injection  40 mg IntraVENous DAILY Shoaib Momin MD   40 mg at 11/20/21 0903    NOREPINephrine (LEVOPHED) 4 mg in 5% dextrose 250 mL infusion  0.5-30 mcg/min IntraVENous TITRATE Randy Bell MD   Stopped at 11/17/21 3431    chlorhexidine (PERIDEX) 0.12 % mouthwash 15 mL  15 mL Oral Q12H Alena Souza MD   15 mL at 11/20/21 0903    insulin lispro (HUMALOG) injection   SubCUTAneous Q6H Mariama Peralta MD   2 Units at 11/19/21 2330    fentaNYL in normal saline (pf) 25 mcg/mL infusion  0-200 mcg/hr IntraVENous TITRATE Alena Souza MD   Stopped at 11/17/21 1212    LORazepam (ATIVAN) injection 0.5 mg  0.5 mg IntraVENous Q6H PRN Otilia Humphreys MD   0.5 mg at 11/19/21 2339    [Held by provider] amLODIPine (NORVASC) tablet 5 mg  5 mg Per NG tube DAILY Marquis Ortez MD   5 mg at 11/15/21 0947    aspirin chewable tablet 81 mg  81 mg Per NG tube DAILY Marquis Ortez MD   81 mg at 11/20/21 2545    acetaminophen (TYLENOL) solution 650 mg  650 mg Per NG tube Q4H PRN Marquis Ortez MD   650 mg at 11/18/21 2112    docusate (COLACE) 50 mg/5 mL oral liquid 100 mg  100 mg Per NG tube BID Marquis Ortez MD   100 mg at 11/19/21 1826    NUTRITIONAL SUPPORT ELECTROLYTE PRN ORDERS   Does Not Apply PRN Otilia Humphreys MD        polyethylene glycol [facility]) packet 17 g  17 g Per NG tube DAILY Otilia Humphreys MD   17 g at 11/19/21 0849    sodium chloride (NS) flush 5-10 mL  5-10 mL IntraVENous Q8H Libby Parrish MD   10 mL at 11/20/21 0539    tadalafiL (pulm. hypertension) (ADCIRCA) tablet 40 mg- patient supplied (Patient Supplied)  40 mg Per G Tube DAILY Libby Parrish MD   40 mg at 11/20/21 0904    [Held by provider] rosuvastatin (CRESTOR) tablet 20 mg  20 mg Per NG tube QHS Vangie Ortez MD   20 mg at 11/13/21 2305    sodium chloride (NS) flush 5-40 mL  5-40 mL IntraVENous Q8H Libby Parrish MD   10 mL at 11/20/21 0540    sodium chloride (NS) flush 5-40 mL  5-40 mL IntraVENous PRN Marquis Ortez MD        HYDROmorphone (DILAUDID) injection 0.5 mg  0.5 mg IntraVENous Q4H PRN Marquis Ortez MD   0.5 mg at 11/13/21 2321    enoxaparin (LOVENOX) injection 40 mg  40 mg SubCUTAneous Q24H Marquis Ortez, MD   40 mg at 11/20/21 0904    naloxone (NARCAN) injection 0.4 mg  0.4 mg IntraVENous PRN Alanna Sheikh MD           Review of Systems:  Unable to obtain from patient's given AMS. Objective:   Vitals:  Visit Vitals  BP (!) 148/62 (BP 1 Location: Right lower arm, BP Patient Position: At rest)   Pulse 97   Temp 98.1 °F (36.7 °C)   Resp (!) 32   Ht 5' 4\" (1.626 m)   Wt 62.3 kg (137 lb 4.8 oz)   SpO2 99%   BMI 23.57 kg/m²     Intake/Output:  No intake/output data recorded. 11/18 1901 - 11/20 0700  In: 1261.6 [I.V.:1191.6]  Out: 2820 [Urine:2085; Drains:150]  Exam:  General appearance: critically ill, lying in bed, not responsive  Lungs: labored, on CPAP  Heart: regular rate and rhythm  Abdomen: soft, non-tender.  Hypoactive BS  Neuro:  Not responsive    Data Review (Labs):    Recent Labs     11/20/21  0315 11/19/21  1145 11/19/21  0355 11/19/21  0015 11/18/21  2133 11/18/21  0332   WBC 21.7*  --  23.1*  --   --  15.5*   HGB 7.7* 8.9* 9.1*  --  9.8* 10.7*   HCT 24.5* 28.4* 28.4*  --  30.3* 33.1*     --  325  --   --  311   MCV 92.1  --  93.7  --   --  91.2     --  142 142  --  139   K 3.6  --  4.4 4.1  --  4.1   *  --  105 105  --  101   CO2 26  --  33* 33*  --  34*   BUN 18  --  31* 32*  --  35*   MG  --   --  2.6* 2.6*  --   --    PHOS  --   --  3.2 2.8  --   --    AST  --   --   --  32  --   --    ALT  --   --   --  14  --   --          Kaylen Purcell MD  [facility]

## 2021-11-20 NOTE — PROGRESS NOTES
Bedside shift change report given to Pinky Keller RN (oncoming nurse) by Jacquelin Isaacs RN (offgoing nurse). Report included the following information SBAR, Kardex, ED Summary, Procedure Summary, Intake/Output, MAR, Recent Results and Cardiac Rhythm SR.     Patient turned and repositioned

## 2021-11-20 NOTE — PROGRESS NOTES
Bedside and verbal shift change report received from  Rowena Pace, [address] [place] [address] (offgoing nurse). Report included the following information SBAR, Kardex, ED Summary, OR Summary, Procedure Summary, Intake/Output, MAR, Recent Results, Med Rec Status, Cardiac Rhythm NSR, Alarm Parameters , Pre Procedure Checklist, Procedure Verification and Quality Measures. Dual skin assessment completed at bedside: disseminated ecchymoses.  Skin tear R tibia, R elbow,  Excoriation periarea (list pertinent skin assessment findings)    Dual verification of gtts completed (name of gtts verified): NA

## 2021-11-20 NOTE — PROGRESS NOTES
[facility] Critical Care Note[de-identified] 11/20/2021  Alfonso Sanchez  Admission Date: 11/9/2021     Length of Stay: 11 days    Background: Patient is a 80 y.o. male presented to the ER on 11/9/21 with mental status change and obtundation and hypoxia after an aspiration event during a swallowing evaluation and deteriorated as the day went on. He was intubated in the ER and transferred to the ICU. He was discharged 11/8/21 from the hospital where he was admitted for a few days for evaluation of a headache. 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 He was extubated 11/12. Pt was on BiPAP and had an episode of emesis. Pt was taken off of BiPAP and required intubation 11/14. He has been followed by Dr Sangita Mobley in our office. He has a history of sarcoidosis (mediastinal lymphadenopathy and calcification, VAT 2001) as well as mild pulmonary hypertension Group 1/Group 3, who has been followed at [facility] since Feb 2018. He was last seen by us in July 2021 and was stable. He has been on prednisone 10mg for sarcoidosis/Stage III COPD as well as Trelegy inhaler daily. He rarely requires albuterol. Baseline O2 needs: with inogen POC he is at 1-2 at rest, and 2lpm with exertion. He has a recent diagnosis of extensive stage small cell cancer after mass discovered in left axilla. He is now admitted with pneumonia, respiratory failure and weakness. Notable PMH:  has a past medical history of Agent orange exposure (1960s), Arthritis, Asthma, Body mass index (BMI) of 25.0 to 25.9 in adult (10/28/2021), Chronic obstructive pulmonary disease ([facility] [address] Chronic pain, GERD (gastroesophageal reflux disease), Sarcoidosis (2001), and Sleep apnea. 24 Hour events: The patient was having more respiratory distress this morning and he was placed on CPAP at 10 cm with 100% FiO2. ST saw him and he has no gag reflex and unable to swallow. His wife is at bed side. He is tolerating tube feeding at this point. Patient received Ativan 0.5 mg IV last night and he is not following command this morning. ROS: unable to obtain/negative except as listed elsewhere. Lines: (insertion date)   ETT: (11/9-11) (11/14-)  Chavez: (11/9)  NGT: (11/13)  Peripheral    Drips: current dose (range)  none    Pertinent Exam:         Blood pressure 111/60, pulse 90, temperature 98 °F (36.7 °C), resp. rate (!) 31, height 5' 4\" (1.626 m), weight 137 lb 4.8 oz (62.3 kg), SpO2 99 %. Intake/Output Summary (Last 24 hours) at 11/20/2021 1129  Last data filed at 11/20/2021 1115  Gross per 24 hour   Intake 1191.56 ml   Output 2075 ml   Net -883.44 ml     Constitutional: sedated, on PSV 45%  EENMT:  Sclera clear, pupils equal, oral mucosa moist  Respiratory: very coarse breath sounds bilaterally,   Cardiovascular:  RRR  Gastrointestinal:  soft with no tenderness; positive bowel sounds present  Musculoskeletal:  warm with no cyanosis, soft  lower extremity edema  Skin:  no jaundice or ecchymosis  Neurologic:sedated  Psychiatric: sedated    CXR:    11/19/21          Recent Labs     11/20/21  0315 11/19/21  1145 11/19/21  0355 11/18/21  2133 11/18/21  0332   WBC 21.7*  --  23.1*  --  15.5*   HGB 7.7* 8.9* 9.1*   < > 10.7*   HCT 24.5* 28.4* 28.4*   < > 33.1*     --  325  --  311    < > = values in this interval not displayed. Recent Labs     11/20/21  0315 11/19/21  0355 11/19/21  0015    142 142   K 3.6 4.4 4.1   * 105 105   CO2 26 33* 33*   GLU 74 134* 121*   BUN 18 31* 32*   CREA 0.46* 0.72* 0.81   MG  --  2.6* 2.6*   CA 7.1* 9.3 9.2   PHOS  --  3.2 2.8   ALB  --   --  1.8*   AST  --   --  32   ALT  --   --  14   AP  --   --  55     No results for input(s): LAC, TROPHS, BNPNT, CRP in the last 72 hours.     No lab exists for component: ESR  Recent Labs     11/20/21  0538 11/19/21  2329 11/19/21  1825   GLUCPOC 104* 161* 138*     ECHO: Results from [facility] Encounter encounter on 10/28/21    ECHO ADULT COMPLETE    Interpretation Summary  · TV: Mild tricuspid valve regurgitation is present. Right Ventricular Arterial Pressure (RVSP) is 54 mmHg. · Image quality for this study was technically difficult. · Echo study was technically difficulty. · LV: Estimated LVEF is 55 - 60%. Normal cavity size, systolic function (ejection fraction normal) and diastolic function. Mild concentric hypertrophy. Wall motion: normal.  · LA: Moderately dilated left atrium. · RA: Mildly dilated right atrium. · AV: Mild aortic valve regurgitation is present. · MV: Mild mitral valve regurgitation is present. · PV: Mild pulmonic valve regurgitation is present. Ventilator Settings:  Ideal body weight: 59.2 kg (130 lb 8.2 oz)   Mode FIO2 Rate Tidal Volume Pressure PEEP     80 %       8 cm H20      ABG:  Recent Labs     11/18/21  1121 11/18/21  0446   PHI 7.44 7.46*   PCO2I 45.8* 45.1*   PO2I 69* 67*   HCO3I 31.3* 32.0*     MICRO  Date Source Result   11/9 blood    11/9 blood    11/10 sputum    11/10 MSSA Heavy staph   11/15 sputum Moderate yeast     Assessment and Plan:  (Medical Decision Making)   Impression: 80 y.o. male with sarcoid, pulm HTN, extensive stage SCLC admitted with recurrent aspiration pneumonia and respiratory failure. NEURO:   Delirium: Not following command  CV:   Volume Status: fluid balance + 4.2 L since admission  PULM:   Acute on chronic hypoxemic/hypercapneic respiratory failure:  on CPAP with FiO2 80 %. Will change to BiPAP ST mode and repeat blood gases to assess gas exchange    Probable pneumonia : finished ancef for MSSA. Infectious disease saw the patient and he is currently on Bactrim and Eraxis. RENAL:  PATRICIO: cr ok   Hypernatremia: resolved  GI:   Esophageal dysmotility:  ? Paraneoplastic. Will ask GI to assist in workup/ management as this has been a likely contributor to recurrent aspiration and respiratory failure.   Nutrition: Tolerating TF   HEME: Anemia: PPI bid  Thrombocytopenia:   Anticoagulation:  lovenox 40 q day  ID:   Pneumonia:    ENDO:   DM: monitor glucose  Skin: no decub, turns, preventive care  Prophy: lovenox and pepcid    Spoke with patient. He agrees to NO REINTUBATION. Pt's wife, Rusty Peraza, was updated on pt's current condition at bed side. We discussed overall changes since yesterday.     The patient is critically ill with respiratory failure, circulatory failure and requires high complexity decision making for assessment and support including frequent ventilator adjustment , frequent evaluation and titration of therapies , application of advanced monitoring technologies and extensive interpretation of multiple databases    Cumulative time devoted to patient care services by me for day of service -35 min     Naseem Delarosa MD

## 2021-11-20 NOTE — PROGRESS NOTES
Follow up visit with wife in then waiting area     had spent good amount of time with family last weekend    Listened as wife shared how this week has gone    Wife seems to understand the serious prognosis of patient    prayer

## 2021-11-20 NOTE — PROGRESS NOTES
Patient's oxygen saturation is 78%, patient is mouth-breathing and minimally responsive. RT at bedside. Non-rebreather mask with 15L O2 applied over airvo. Patient's O2 sat is still 78%. Dr. Jaswant Campuzano notified, order received to place patient on CPAP. MD to see patient.

## 2021-11-20 NOTE — PROGRESS NOTES
Patient placed on CPAP of 8 due to being on Airvo and NRB mask and SpO2 of 78%. RN spoke to Dr. Bryson Solano, who ordered CPAP. Patient placed on CPAP, SpO2 increased to 92%. RN and RT at bedside. Patient is non responsive, but is tolerating change well. Breath sounds are coarse and crackles. Wife remains at bedside. Explained the procedure to her. Will continue to monitor. 11/20/21 0841   Oxygen Therapy   O2 Sat (%) 92 %   Pulse via Oximetry 93 beats per minute   O2 Device CPAP mask   O2 Flow Rate (L/min) 100 l/min   Respiratory   Respiratory (WDL) X   Respiratory Pattern Accessory muscles; Tachypneic   Breath Sounds Bilateral Coarse; Crackles    CPAP/BIPAP   CPAP/BIPAP Start/Stop On   Device Mode CPAP   $$ CPAP Daily Yes   Mask Type and Size Full face;  Medium   Skin Condition intact   PIP Observed 10 cm H20   EPAP (cm H2O) 8 cm H2O   Vt Spont (ml) 177 ml   Ve Observed (l/min) 6.2 l/min   Total RR (Spontaneous) 35 breaths per minute   Leak (Estimated) 18 L/min   Pt's Home Machine No   Biomedical Check Performed Yes   Settings Verified Yes   Alarm Settings   High Pressure 35   Low Pressure 5   Apnea 20   Low Ve 3   High Rate 50   Low Rate 8

## 2021-11-21 NOTE — PROGRESS NOTES
Bedside shift change report given to NADIRA Jones (oncoming nurse) by Javon García RN (offgoing nurse). Report included the following information SBAR, Kardex, ED Summary, Procedure Summary, Intake/Output, MAR, Recent Results and Cardiac Rhythm NSR.     Patient turned and repositioned

## 2021-11-21 NOTE — PROGRESS NOTES
Bedside report received from [facility]. Pt is awake and alert. Pulling warming blanket off. Pt turned and repositioned. Wife at bedside.

## 2021-11-21 NOTE — PROGRESS NOTES
Pt's sat dropping to 80's. RT made aware. Pt appears to be working hard  At breathing. RT increased neela to [employer].

## 2021-11-21 NOTE — PROGRESS NOTES
Pt given morphine sulfate 2 mg iv for work of breathing prior to switching to [employer]. Tolerating tube feedings.

## 2021-11-21 NOTE — PROGRESS NOTES
Critical access hospital/[facility] Critical Care Note[de-identified] 11/21/2021  Sharif Sanchez  Admission Date: 11/9/2021     Length of Stay: 12 days    Background: Patient is a 80 y.o. male presented to the ER on 11/9/21 with mental status change and obtundation and hypoxia after an aspiration event during a swallowing evaluation and deteriorated as the day went on. He was intubated in the ER and transferred to the ICU. He was discharged 11/8/21 from the hospital where he was admitted for a few days for evaluation of a headache. 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 He was extubated 11/12. Pt was on BiPAP and had an episode of emesis. Pt was taken off of BiPAP and required intubation 11/14. He has been followed by Dr Brandon Finch in our office. He has a history of sarcoidosis (mediastinal lymphadenopathy and calcification, VAT 2001) as well as mild pulmonary hypertension Group 1/Group 3, who has been followed at [facility] since Feb 2018. He was last seen by us in July 2021 and was stable. He has been on prednisone 10mg for sarcoidosis/Stage III COPD as well as Trelegy inhaler daily. He rarely requires albuterol. Baseline O2 needs: with inogen POC he is at 1-2 at rest, and 2lpm with exertion. He has a recent diagnosis of extensive stage small cell cancer after mass discovered in left axilla. He is now admitted with pneumonia, respiratory failure and weakness. Notable PMH:  has a past medical history of Agent orange exposure (1960s), Arthritis, Asthma, Body mass index (BMI) of 25.0 to 25.9 in adult (10/28/2021), Chronic obstructive pulmonary disease ([facility] [address] Chronic pain, GERD (gastroesophageal reflux disease), Sarcoidosis (2001), and Sleep apnea. 24 Hour events: The patient was having respiratory distress and he was placed on AVAP with PEEP 10 cm with 55% FiO2. His wife is at bed side.   He is tolerating tube feeding at this point. Patient is  following command this morning. He is much more alert and awake. ROS: unable to obtain/negative except as listed elsewhere. Lines: (insertion date)   ETT: (11/9-11) (11/14-)  Chavez: (11/9)  NGT: (11/13)  Peripheral    Drips: current dose (range)  none    Pertinent Exam:         Blood pressure (!) 142/64, pulse 80, temperature 97.8 °F (36.6 °C), resp. rate (!) 31, height 5' 4\" (1.626 m), weight 137 lb 4.8 oz (62.3 kg), SpO2 100 %. Intake/Output Summary (Last 24 hours) at 11/21/2021 0800  Last data filed at 11/21/2021 0874  Gross per 24 hour   Intake 991 ml   Output 1775 ml   Net -784 ml     Constitutional:  He is on AVAPS mode with PEEP of 10 cm and FiO2 of 55%  EENMT:  Sclera clear, pupils equal, oral mucosa moist  Respiratory: very coarse breath sounds bilaterally, scattered wheezing  Cardiovascular:  RRR  Gastrointestinal:  soft with no tenderness; positive bowel sounds present  Musculoskeletal:  warm with no cyanosis, soft  lower extremity edema  Skin:  no jaundice or ecchymosis  Neurologic: Alert and awake  Psychiatric:  Awake and alert    CXR:    11/21/21    Persistent bilateral lung infiltrates, increased in the left midlung.     There is no significant pneumothorax.     There is no pleural effusion. The heart is unchanged.     No other significant interval changes.     IMPRESSION     1. Findings as described above. 11/19/21          Recent Labs     11/21/21  0401 11/20/21  0315 11/19/21  1145 11/19/21  0355 11/19/21  0355   WBC 23.4* 21.7*  --   --  23.1*   HGB 7.9* 7.7* 8.9*   < > 9.1*   HCT 24.7* 24.5* 28.4*   < > 28.4*    349  --   --  325    < > = values in this interval not displayed.      Recent Labs     11/21/21  0401 11/20/21  0315 11/19/21  0355 11/19/21  0015 11/19/21  0015    144 142   < > 142   K 5.0 3.6 4.4   < > 4.1   CL 99 114* 105   < > 105   CO2 35* 26 33*   < > 33*   * 74 134*   < > 121*   BUN 22 18 31*   < > 32*   CREA 0.71* 0.46* 0.72* < > 0.81   MG  --   --  2.6*  --  2.6*   CA 9.4 7.1* 9.3   < > 9.2   PHOS  --   --  3.2  --  2.8   ALB  --   --   --   --  1.8*   AST  --   --   --   --  32   ALT  --   --   --   --  14   AP  --   --   --   --  55    < > = values in this interval not displayed. No results for input(s): LAC, TROPHS, BNPNT, CRP in the last 72 hours. No lab exists for component: ESR  Recent Labs     11/21/21  0623 11/20/21  2324 11/20/21  1753   GLUCPOC 127* 114* 132*     ECHO: Results from [facility] encounter on 10/28/21    ECHO ADULT COMPLETE    Interpretation Summary  · TV: Mild tricuspid valve regurgitation is present. Right Ventricular Arterial Pressure (RVSP) is 54 mmHg. · Image quality for this study was technically difficult. · Echo study was technically difficulty. · LV: Estimated LVEF is 55 - 60%. Normal cavity size, systolic function (ejection fraction normal) and diastolic function. Mild concentric hypertrophy. Wall motion: normal.  · LA: Moderately dilated left atrium. · RA: Mildly dilated right atrium. · AV: Mild aortic valve regurgitation is present. · MV: Mild mitral valve regurgitation is present. · PV: Mild pulmonic valve regurgitation is present. Ventilator Settings:  Ideal body weight: 59.2 kg (130 lb 8.2 oz)   Mode FIO2 Rate Tidal Volume Pressure PEEP     55 %       8 cm H20      ABG:  Recent Labs     11/20/21  1502 11/20/21  1206 11/18/21  1121   PHI 7.33* 7.19* 7.44   PCO2I 64.5* 87.5* 45.8*   PO2I 113* 180* 69*   HCO3I 33.7* 33.7* 31.3*     MICRO  Date Source Result   11/9 blood    11/9 blood    11/10 sputum    11/10 MSSA Heavy staph   11/15 sputum Moderate yeast     Assessment and Plan:  (Medical Decision Making)   Impression: 80 y.o. male with sarcoid, pulm HTN, extensive stage SCLC admitted with recurrent aspiration pneumonia and respiratory failure.      NEURO:   Delirium: following command today  CV:   Volume Status: fluid balance + 3.8 L since admission  PULM:   Acute on chronic hypoxemic/hypercapneic respiratory failure:  on AVAPS with FiO2 55 %. His gas exchange has improved and will try him on OptiFlow in the daytime and use the AVAPS mode at nighttime and as needed. Probable pneumonia : finished ancef for MSSA. Infectious disease saw the patient and he is currently on Bactrim and Eraxis. RENAL:  PATRICIO: cr is 0.71  Hypernatremia:  Sodium is 137 and potassium is 5.0 which is likely related to Bactrim  GI:   Esophageal dysmotility:  ? Paraneoplastic. Will ask GI to assist in workup/ management as this has been a likely contributor to recurrent aspiration and respiratory failure. Nutrition: Tolerating TF   HEME:   Anemia: PPI bid  Thrombocytopenia:   Anticoagulation:  lovenox 40 q day  ID:   Pneumonia:    ENDO:   DM: monitor glucose  Skin: no decub, turns, preventive care  Prophy: lovenox and pepcid    Spoke with patient. He agrees to NO REINTUBATION. Pt's wife, Ira Bowman, was updated on pt's current condition at bed side.   We discussed overall changes since yesterday and enforce that the main goal of care to focus on his quality of life and respect his wishes    The patient is critically ill with respiratory failure, circulatory failure and requires high complexity decision making for assessment and support including frequent ventilator adjustment , frequent evaluation and titration of therapies , application of advanced monitoring technologies and extensive interpretation of multiple databases    Cumulative time devoted to patient care services by me for day of service -35 min     Selena Paul MD

## 2021-11-21 NOTE — PROGRESS NOTES
Bedside and verbal shift change report received from  [facility] (offgoing nurse). Report included the following information SBAR, Kardex, Procedure Summary, Intake/Output, MAR, Recent Results, Med Rec Status, Cardiac Rhythm NSR, Alarm Parameters , Pre Procedure Checklist, Procedure Verification and Quality Measures. Dual skin assessment completed at bedside: disseminated ecchymoses.  Skin tear R tibia, R elbow,  Excoriation periarea (list pertinent skin assessment findings)    Dual verification of gtts completed (name of gtts verified): N/A

## 2021-11-21 NOTE — PROGRESS NOTES
11/20/21 2046   Oxygen Therapy   O2 Sat (%) 100 %   Pulse via Oximetry 80 beats per minute   O2 Device BIPAP   FIO2 (%) 55 %   Respiratory   Respiratory (WDL) X   Respiratory Pattern Tachypneic   Breath Sounds Bilateral Diminished   CPAP/BIPAP   Device Mode AVAP   AVAP Set Resp Rate 25   AVAP Set VT (ml) 425   Mask Type and Size Full face; Medium   Skin Condition intact   PIP Observed 25 cm H20   EPAP (cm H2O) 10 cm H2O   Inspiratory Time (sec) 1 seconds   Vt Spont (ml) 384 ml   Ve Observed (l/min) 13.2 l/min   Backup Rate 25   Total RR (Spontaneous) 34 breaths per minute   Insp Rise Time (sec) 4   Leak (Estimated) 45 L/min   Pt's Home Machine No   Biomedical Check Performed Yes   Settings Verified Yes   Alarm Settings   High Pressure 35   Low Pressure 5   Apnea 20   Low Ve 3   High Rate 50   Low Rate 8   Pulmonary Toilet   Pulmonary Toilet Cough and deep breath; H. O.B elevated

## 2021-11-22 NOTE — PROGRESS NOTES
SPEECH PATHOLOGY NOTE:    Patient on Bipap this morning and not appropriate for dysphagia treatment. Will continue to follow along with plans to resume treatment as medical status improves. Palliative Care aware and in agreement with plan.      JOSE oTmas, CCC-SLP  Speech Language Pathologist  Acute Rehabilitation Services  Contact: Alexandrea

## 2021-11-22 NOTE — PROGRESS NOTES
Bedside and verbal shift change report received from  [facility] (offgoing nurse). Report included the following information SBAR, Kardex, Intake/Output, MAR, Recent Results, Med Rec Status and Cardiac Rhythm NSR. Dual skin assessment completed at bedside: excoriation on bottom, scratch to left calf (list pertinent skin assessment findings)    Dual verification of gtts completed (name of gtts verified): none running.

## 2021-11-22 NOTE — PROGRESS NOTES
Reviewed notes prior to visit     has been following this family closely    Family  has also provided constant support    Patient has his eyes open but not able to talk with mask on    Wife jordyn is present in the room    She is calm    She is thankful that her  is still alive    Spent time guiding her in her karol and also her emotions    Wife also asked \" if it was ok to pray for his healing\"    Assured wife that God sees our inner most feelings and our prayers verbalize our total dependency upon the Sharon Saco    This was comforting to her    Prayer

## 2021-11-22 NOTE — PROGRESS NOTES
PT Note:  Treatment attempted this AM but pt on BIPAP. Will hold and re-attempt pending schedule and pt status.   Thanks,  Manhoar Castro, PT, DPT

## 2021-11-22 NOTE — CONSULTS
Comprehensive Nutrition Assessment  Note retrieved from revision history 11/22/21 1131     Type and Reason for Visit: Reassess  Tube Feeding Management (pulmonary)     Nutrition Recommendations/Plan:   · Enteral Nutrition:   · Enteral Access: Nasogastric  · Formula: Renal (Nepro with Carbsteady)  · Delivery: Continuous feeding: Change to new goal rate of 45ml/hour. · Water flush 130 ml every 4 hours  · Modulars: none  · Enteral regimen at goal to provide:  · 1782 calories (100% estimated calorie needs), 80 grams protein (100% estimated protein needs) and 1500ml free fluid (~0.8ml/kcal) calculations based on 22 hour infusion. · Nutritional Supplement Therapy:   · Active electrolyte replacement per nutrition support protocols  · Replacement indicated:  · Implemented  phos replaced per discussion with Dr. Emily Garcia  · Labs:   · EN labs: BMP daily, Mg MWF and Phos MWF. · Bowel Regimen:  · Change daily Miralax to PRN  · Per flowsheets, pt has had 50ml of output in FMS with last increase in output of 25ml on 11/21. Consider removing FMS if output remains low.      Malnutrition Assessment:  Malnutrition Status: At risk for malnutrition (specify) (NPO, vent)     Nutrition Assessment:   Nutrition History: Pt known to nutrition department from recent admission. At that time, pt was on an easy to chew, lactose controlled diet with ensure enlive TID with meals. Nutrition Background: Pt admitted with hypoxia from rehab facility. Noted recent admission to [facility]. PMH notable for HTN and new lung cancer diagnosis (probable metastatic cancer). Daily Update:  TF infusing at 60ml/hr with free water flush of 50ml Q4H. Observed pt on BiPAP at time of visit with no visitors present. Noted pt was extubated 11/18. SLP eval on 11/19 recommending pt remain NPO. TF restarted on 11/20 at 20ml/hr. Noted briefly held 11/20 due to GVR of 250ml x1 per flowsheets.  Per GI note 11/20, pt is not stable enough to consider PEG tube placement and pt's wife is unsure she would want him to have a PEG tube at this time. Noted ongoing discussions with wife regarding goals of care.     Abdominal Status (last documented): Intact, Semi-soft abdomen with Active  bowel sounds. Last BM 11/21/21 (FMS in place since 11/15; per flowsheets, pt had new bag recorded on 11/18. Drainage level recorded at 400ml 11/19 and drainage level has been recorded at 450ml since 11/21. RD personally observed bag with 300ml output during time of visit. Discussed drainage levels with RN. FMS bag has not been replaced this shift.)  Pertinent Medications: Eraxis, Colace 2 times daily, SSI, Solumedrol, Reglan (held since 11/19), Protonix, Miralax daily (held 11/20 and 11/21, given today), Scopolamine patch held since 11/20, sodium phos, Bactrim, Lokelma (1 dose received)     Lab Results   Component Value Date/Time     Sodium 133 (L) 11/22/2021 03:41 AM     Potassium 5.5 (H) 11/22/2021 03:41 AM     Chloride 96 (L) 11/22/2021 03:41 AM     CO2 34 (H) 11/22/2021 03:41 AM     Anion gap 3 (L) 11/22/2021 03:41 AM     Glucose 121 (H) 11/22/2021 03:41 AM     BUN 27 (H) 11/22/2021 03:41 AM     Creatinine 0.67 (L) 11/22/2021 03:41 AM     Calcium 9.4 11/22/2021 03:41 AM     Albumin 1.8 (L) 11/19/2021 12:15 AM     Magnesium 2.4 11/22/2021 03:41 AM     Phosphorus 2.2 (L) 11/22/2021 03:41 AM      Labs notable for hyponatremia, hyperkalemia, and hypophosphatemia.     Nutrition Related Findings:   NFPE deferred d/t current medical condition. Vent. OGT in place since 11/9. Extubated 11/11. OGT removed. SLP consult pending. NPO per SLP. NGT to be replaced and TF resumed. TF held for potential intubation and emesis w/ BIPAP. NGT to LIS. Reintubated 11/15.  TF to resume 11/16. (11/18) TF held, pt extubated (11/19) SLP recommending NPO (11/20) Per GI, pt not stable enough to consider PEG, TF restarted via NGT @ 20ml/hr       Current Nutrition Therapies:  DIET NPO  ADULT TUBE FEEDING Nasogastric; Peptide Based; Delivery Method: Continuous; Continuous Initial Rate (mL/hr): 20; Continuous Advance Tube Feeding: Yes; Advancement Volume (mL/hr): 10; Advancement Frequency: Q 4 hours; Continuous Goal Rate (mL/hr): Ashley Mejia ..     Current Intake:   Average Meal Intake: NPO   Additional Caloric Sources:       Anthropometric Measures:  Height: 5' 4\" (162.6 cm)  Current Body Wt: 62.3 kg (137 lb 5.6 oz) (11/20), Weight source: Bed scale  BMI: 23.6, Normal weight (BMI 18.5-24. 9)  Admission Body Weight: 149 lb 14.6 oz (11/9, pt stated)  Ideal Body Weight (lbs) (Calculated): 130 lbs (59 kg), 104.6 %  Edema: No data recorded      Estimated Daily Nutrient Needs:  Energy (kcal/day): 9258-1609 (Kcal/kg (25-30), Weight Used: Current (61.7 kg))  Protein (g/day):  Weight Used: (Current (1.2-2))  Fluid (ml/day): 7739-6729 (1 ml/kcal (or per MD))     Nutrition Diagnosis:   · Inadequate oral intake related to impaired respiratory function, swallowing difficulty as evidenced by NPO or clear liquid status due to medical condition, nutrition support-enteral nutrition     Nutrition Interventions:   Food and/or Nutrient Delivery: Continue NPO, Modify tube feeding  Coordination of Nutrition Care: Continue to monitor while inpatient     Goals:   Previous Goal Met: Progressing toward goal(s)  Active Goal: Maintain estimated nutrition needs via EN     Nutrition Monitoring and Evaluation:   Food/Nutrient Intake Outcomes: Enteral nutrition intake/tolerance  Physical Signs/Symptoms Outcomes: Biochemical data, GI status, Fluid status or edema, Weight     Discharge Planning:     Too soon to determine     Electronically signed by Mana Soto MS, RDN, LD 11/22/2021 at 4:44 PM  Contact: [phone]

## 2021-11-22 NOTE — PROGRESS NOTES
Extensive conversation with patient's wife regarding patient's prognosis. She had expressed desire that pt be intubated to allow him to strengthen so he could get chemo for small cell lung cancer down the road. Pt now on BIPAP and marginal. Kristie Hawkins discussion that pt is very unlikely to improve even if he was intubated given underlying PAH, sarcoid, pneumonia, and debility. She was informed that pt would not be a candidate for chemo while intubated and lung cancer would likely progress making recovery unlikely. Also reviewed the complications associated with CPR and the pain potentially inflicted on her . She will consider this information. He remain a partial code with no CPR but she has not definitely indicated she would follow her 's wishes should deterioration occur. She would like to question him when he might be more lucid.      Yesi Siddiqui MD

## 2021-11-22 NOTE — PROGRESS NOTES
Pt continues in ICU. Currently on BIPAP 55%, on/off BIPAP. No gtts presently per [facility]. Palliative care following as of Friday. Poor prognosis per MD notes. CM following for any assist and d/c needs/POC when stable for transition. LOS 13 days.

## 2021-11-22 NOTE — PROGRESS NOTES
Infectious Disease Consult    Today's Date: 2021   Admit Date: 2021    Impression:   · Endotracheal aspirate growing C Albicans (11/15)  · Endotracheal aspirate MSSA (11/10); treated with IV Ancef  · Extubated () - now on bipap  · Esophageal dysmotility  · Sacoidosis  · Malignant small cell cancer   · Axillary mass  · Pulmonary HTN  · COPD  · CAITLIN; CPAP  · Elevated Fungitell; 102    Plan:   · Fu pjp PCR from BAL   · Cont bactrim empirically until then, already on steroids. 3 weeks duration making eot 21   · Start Eraxis 100 mg every day - will repeat fungitell - plan for 2 week duration eot , can switch to PO fluconazole if able to wean off bipap    Anti-infectives:   · Unasyn (-11/15)  · Ancef (11/15-)  · Vancomycin (-11/10)    anidulafungin  - current  Bactrim  - current    Subjective:   Not on levophed, wife at bedside, alert and conversant. WBC coming down. On 60% on bipap       Allergies   Allergen Reactions    Lactose Diarrhea        Review of Systems:  A comprehensive review of systems was negative except for that written in the History of Present Illness. Objective:     Visit Vitals  BP (!) 142/71 (BP 1 Location: Right lower arm, BP Patient Position: At rest)   Pulse 73   Temp 97.7 °F (36.5 °C)   Resp 20   Ht 5' 4\" (1.626 m)   Wt (P) 63 kg (139 lb)   SpO2 99%   BMI (P) 23.86 kg/m²     Temp (24hrs), Av.1 °F (36.7 °C), Min:97.7 °F (36.5 °C), Max:99 °F (37.2 °C)       Lines:  Central Venous Catheter:       Physical Exam:    General:  bipap, very ill appearing,  well noursished, well developed, appears stated age   Eyes:  Sclera anicteric. Pupils equally round and reactive to light. Mouth/Throat: Mucous membranes normal, oral pharynx clear       Lungs:   Wheezing LLL, otherwise clear   CV:  Regular rate and rhythm,no murmur, click, rub or gallop   Abdomen:   Soft, ttp RUQ.  bowel sounds normal. non-distended   Extremities: No cyanosis or edema   Skin: Skin color, texture, turgor normal. no acute rash or lesions       Musculoskeletal: No swelling or deformity   Lines/Devices:  Intact, no erythema, drainage or tenderness   Psych: Awake, normal mood affect given the setting             Data Review:     CBC:  Recent Labs     11/22/21  0341 11/21/21  0401 11/20/21  0315   WBC 19.5* 23.4* 21.7*   HGB 7.6* 7.9* 7.7*   HCT 23.7* 24.7* 24.5*    365 349       BMP:  Recent Labs     11/22/21  0341 11/21/21  0401 11/20/21  0315   CREA 0.67* 0.71* 0.46*   BUN 27* 22 18   * 137 144   K 5.5* 5.0 3.6   CL 96* 99 114*   CO2 34* 35* 26   AGAP 3* 3* 4*   * 149* 74       LFTS:  No results for input(s): TBILI, ALT, AP, TP, ALB in the last 72 hours.     No lab exists for component: SGOT    Microbiology:     All Micro Results     Procedure Component Value Units Date/Time    P.JIROVECI BY PCR [[id number]] Collected: 11/19/21 1625    Order Status: Completed Updated: 11/19/21 1724    CULTURE, RESPIRATORY/SPUTUM/BRONCH W Lanis Amy STAIN [[id number]]  (Abnormal)  (Susceptibility) Collected: 11/10/21 0001    Order Status: Completed Specimen: Sputum,ET Suction Updated: 11/17/21 0922     Special Requests: NO SPECIAL REQUESTS        GRAM STAIN 0 TO 33 WBCS PER OIF      0 TO 2 EPITHELIAL CELLS SEEN      MANY GRAM POSITIVE COCCI         FEW GRAM POSITIVE RODS         FEW YEAST         4+ MUCUS PRESENT        Culture result:       HEAVY STAPHYLOCOCCUS AUREUS                  HEAVY NORMAL RESPIRATORY AZRA          CULTURE, RESPIRATORY/SPUTUM/BRONCH W [address] [[id number]]  (Abnormal) Collected: 11/15/21 0447    Order Status: Completed Specimen: Sputum from Endotracheal aspirate Updated: 11/17/21 0647     Special Requests: NO SPECIAL REQUESTS        GRAM STAIN 0 TO 10 WBCS PER OIF      0 TO 2 EPITHELIAL CELLS SEEN      MODERATE YEAST         4+ MUCUS PRESENT        Culture result: MODERATE CANDIDA ALBICANS       BLOOD CULTURE [[id number]] Collected: 11/09/21 1823    Order Status: Completed Specimen: Blood Updated: 11/14/21 0614     Special Requests: --        NO SPECIAL REQUESTS  RIGHT  Antecubital       Culture result: NO GROWTH 5 DAYS       BLOOD CULTURE [[id number]] Collected: 11/09/21 1835    Order Status: Completed Specimen: Blood Updated: 11/14/21 0614     Special Requests: --        NO SPECIAL REQUESTS  LEFT  HAND       Culture result: NO GROWTH 5 DAYS       MSSA/MRSA SC BY PCR, NASAL SWAB [[id number]]  (Abnormal) Collected: 11/10/21 0001    Order Status: Completed Specimen: Nasal swab Updated: 11/10/21 0204     Special Requests: NO SPECIAL REQUESTS        Culture result:       MRSA target DNA not detected, SA target DNA detected. A MRSA negative, SA positive test result does not preclude MRSA nasal colonization. CULTURE, BLOOD [[id number]]     Order Status: Canceled Specimen: Blood     CULTURE, BLOOD [[id number]]     Order Status: Canceled Specimen: Blood     COVID-19 RAPID TEST [[id number]] Collected: 11/09/21 2027    Order Status: Completed Specimen: Nasopharyngeal Updated: 11/09/21 2145     Specimen source NASAL        COVID-19 rapid test Not detected        Comment:      The specimen is NEGATIVE for SARS-CoV-2, the novel coronavirus associated with COVID-19. A negative result does not rule out COVID-19. This test has been authorized by the FDA under an Emergency Use Authorization (EUA) for use by authorized laboratories. Fact sheet for Healthcare Providers: ConventionUpdate.co.nz  Fact sheet for Patients: ConventionUpdate.co.nz       Methodology: Isothermal Nucleic Acid Amplification               Imaging:   IMPRESSION     1. Bilateral nodular airspace densities, similar to prior exam     2. Previous endotracheal tube is no longer seen.     Signed By: Harjinder Steiner MD     November 22, 2021

## 2021-11-22 NOTE — PROGRESS NOTES
[facility] Critical Care Note[de-identified] 11/22/2021  Solitario Sanchez  Admission Date: 11/9/2021     Length of Stay: 13 days    Background: Patient is a 80 y.o. male presented to the ER on 11/9/21 with mental status change and obtundation and hypoxia after an aspiration event during a swallowing evaluation and deteriorated as the day went on. He was intubated in the ER and transferred to the ICU. He was discharged 11/8/21 from the hospital where he was admitted for a few days for evaluation of a headache. 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 He was extubated 11/12. Pt was on BiPAP and had an episode of emesis. Pt was taken off of BiPAP and required intubation 11/14. He has been followed by Dr Vicky Noriega in our office. He has a history of sarcoidosis (mediastinal lymphadenopathy and calcification, VAT 2001) as well as mild pulmonary hypertension Group 1/Group 3, who has been followed at [facility] since Feb 2018. He was last seen by us in July 2021 and was stable. He has been on prednisone 10mg for sarcoidosis/Stage III COPD as well as Trelegy inhaler daily. He rarely requires albuterol. Baseline O2 needs: with inogen POC he is at 1-2 at rest, and 2lpm with exertion. He has a recent diagnosis of extensive stage small cell cancer after mass discovered in left axilla. He is now admitted with pneumonia, respiratory failure and weakness. Notable PMH:  has a past medical history of Agent orange exposure (1960s), Arthritis, Asthma, Body mass index (BMI) of 25.0 to 25.9 in adult (10/28/2021), Chronic obstructive pulmonary disease ([facility] [address] Chronic pain, GERD (gastroesophageal reflux disease), Sarcoidosis (2001), and Sleep apnea. 24 Hour events: Tolerated Airvo briefly yesterday. On BIPAP overnight. Afebrile with adequate sats on 55% O2. K up to 5.5 and creatinine down to 0.67.  No reintubation established yesterday. ROS: unable to obtain/negative except as listed elsewhere. Lines: (insertion date)   ETT: (11/9-11) (11/14-)  Chavez: (11/9)  NGT: (11/13)  Peripheral    Drips: current dose (range)  none    Pertinent Exam:         Blood pressure (!) 142/71, pulse 73, temperature 97.7 °F (36.5 °C), resp. rate 20, height 5' 4\" (1.626 m), weight (P) 139 lb (63 kg), SpO2 99 %. Intake/Output Summary (Last 24 hours) at 11/22/2021 5626  Last data filed at 11/22/2021 0445  Gross per 24 hour   Intake 1374 ml   Output 1900 ml   Net -526 ml     Constitutional:  He is on AVAPS mode with PEEP of 10 cm and FiO2 of 55%  EENMT:  Sclera clear, pupils equal, oral mucosa moist  Respiratory: very coarse breath sounds bilaterally, scattered wheezing  Cardiovascular:  RRR  Gastrointestinal:  soft with no tenderness; positive bowel sounds present  Musculoskeletal:  warm with no cyanosis, soft  lower extremity edema  Skin:  no jaundice or ecchymosis  Neurologic: Alert and awake  Psychiatric:  Awake and alert    CXR:    11/21/21    Persistent bilateral lung infiltrates, increased in the left midlung.     There is no significant pneumothorax.     There is no pleural effusion. The heart is unchanged.     No other significant interval changes.     IMPRESSION     1. Findings as described above. 11/19/21          Recent Labs     11/22/21  0341 11/21/21  0401 11/20/21  0315   WBC 19.5* 23.4* 21.7*   HGB 7.6* 7.9* 7.7*   HCT 23.7* 24.7* 24.5*    365 349     Recent Labs     11/22/21  0341 11/21/21  0401 11/20/21  0315   * 137 144   K 5.5* 5.0 3.6   CL 96* 99 114*   CO2 34* 35* 26   * 149* 74   BUN 27* 22 18   CREA 0.67* 0.71* 0.46*   MG 2.4  --   --    CA 9.4 9.4 7.1*   PHOS 2.2*  --   --      No results for input(s): LAC, TROPHS, BNPNT, CRP in the last 72 hours.     No lab exists for component: ESR  Recent Labs     11/22/21  0515 11/21/21  2337 11/21/21  1712   GLUCPOC 122* 145* 148*     ECHO: Results from [facility] Encounter encounter on 10/28/21    ECHO ADULT COMPLETE    Interpretation Summary  · TV: Mild tricuspid valve regurgitation is present. Right Ventricular Arterial Pressure (RVSP) is 54 mmHg. · Image quality for this study was technically difficult. · Echo study was technically difficulty. · LV: Estimated LVEF is 55 - 60%. Normal cavity size, systolic function (ejection fraction normal) and diastolic function. Mild concentric hypertrophy. Wall motion: normal.  · LA: Moderately dilated left atrium. · RA: Mildly dilated right atrium. · AV: Mild aortic valve regurgitation is present. · MV: Mild mitral valve regurgitation is present. · PV: Mild pulmonic valve regurgitation is present. Ventilator Settings:  Ideal body weight: 59.2 kg (130 lb 8.2 oz)   Mode FIO2 Rate Tidal Volume Pressure PEEP     55 %       8 cm H20      ABG:  Recent Labs     11/20/21  1502 11/20/21  1206   PHI 7.33* 7.19*   PCO2I 64.5* 87.5*   PO2I 113* 180*   HCO3I 33.7* 33.7*     MICRO  Date Source Result   11/9 blood    11/9 blood    11/10 sputum    11/10 MSSA Heavy staph   11/15 sputum Moderate yeast     Assessment and Plan:  (Medical Decision Making)   Impression: 80 y.o. male with sarcoid, pulm HTN, extensive stage SCLC admitted with recurrent aspiration pneumonia and respiratory failure. NEURO:   Delirium: following command today  CV:   Volume Status: fluid balance + 3.4 L since admission  PULM:   Acute on chronic hypoxemic/hypercapneic respiratory failure:  on AVAPS with FiO2 55 % at bedtime. Now on Optiflow 55% with sat 98%. Can try to wean O2. Probable pneumonia : finished ancef for MSSA. Infectious disease saw the patient and he is currently on Bactrim and Eraxis. WBC down to 19.5  RENAL:  PATRICIO: cr is 0.67  Hypernatremia:  Sodium is 133 and potassium is 5.5 which is likely related to Bactrim. Lokelma x 1 today. GI:   Esophageal dysmotility:  ? Paraneoplastic.  GI signed off and feels PEG not able to be done due to unstable status. Nutrition: Tolerating TF   HEME:   Anemia: PPI bid. Hgb 7.6  Thrombocytopenia:   Anticoagulation:  lovenox 40 q day  ID:   Pneumonia:    ENDO:   DM: monitor glucose  Skin: no decub, turns, preventive care  Prophy: lovenox and pepcid      Pt's wife, Catia Dubois, was updated on pt's current condition at bed side. PC following. Currently DNI but CPR [id number] Ivonne Mina      The patient is critically ill with respiratory failure, circulatory failure and requires high complexity decision making for assessment and support including frequent ventilator adjustment , frequent evaluation and titration of therapies , application of advanced monitoring technologies and extensive interpretation of multiple databases    Cumulative time devoted to patient care services by me for day of service -28 min     Kacey Cedeño MD

## 2021-11-22 NOTE — PROGRESS NOTES
To see pt and wife at wife's request. Wanted to discuss code status. States she wants pt code status changed back to Full code. Explained that pt had discussed with MD's what he wanted and MD placed order according to patient's wishes. States she is HCPOA and does not think pt knew what he was deciding. Discussed that only MD could change this order and that she would need to have conversation with MD. Asking for CM to let MD be aware of her wishes. Jovanny Rodriguez RN has placed call to MD. Multiple discussion with wife and pt's poor prognosis per MD's and Palliative care. Dr. Aga Moore aware.

## 2021-11-23 NOTE — PROGRESS NOTES
Infectious Disease Consult    Today's Date: 2021   Admit Date: 2021    Impression:   · Endotracheal aspirate growing C Albicans (11/15)  · Endotracheal aspirate MSSA (11/10); treated with IV Ancef  · Extubated () - now on bipap  · Esophageal dysmotility  · Sacoidosis  · Malignant small cell cancer   · Axillary mass  · Pulmonary HTN  · COPD  · CAITLIN; CPAP  · Elevated Fungitell; 102 - on bactrim for PJP PNA emprically    Plan:   · Fu pjp PCR from BAL  - still pending. Ok to stop bactrim if neg  · Cont bactrim empirically until PJP results, already on steroids. 3 weeks duration making eot 21   · cont Eraxis 100 mg every day - will repeat fungitell - plan for 2 week duration eot , can switch to PO fluconazole if able to wean off bipap    ID will s/o. Please call with any questions. Comfort measures appropriate given lack of improvement and bipap dependence with underlying multiple issues    Anti-infectives:   · Unasyn (-11/15)  · Ancef (11/15-)  · Vancomycin (-11/10)    anidulafungin  - current  Bactrim  - current    Subjective:   Multiple friends at bedside. On bipap, not really respoding    Allergies   Allergen Reactions    Lactose Diarrhea        Review of Systems:  A comprehensive review of systems was negative except for that written in the History of Present Illness. Objective:     Visit Vitals  /66   Pulse 83   Temp 97.9 °F (36.6 °C)   Resp 24   Ht 5' 4\" (1.626 m)   Wt 61.7 kg (136 lb 1.6 oz)   SpO2 99%   BMI 23.36 kg/m²     Temp (24hrs), Av.7 °F (36.5 °C), Min:97.5 °F (36.4 °C), Max:97.9 °F (36.6 °C)       Lines:  Central Venous Catheter:       Physical Exam:    General:  bipap, very ill appearing,  well noursished, well developed, appears stated age   Eyes:  Sclera anicteric. Pupils equally round and reactive to light.    Mouth/Throat: Mucous membranes normal, oral pharynx clear       Lungs:   Wheezing LLL, otherwise clear   CV:  Regular rate and rhythm,no murmur, click, rub or gallop   Abdomen:   Soft, ttp RUQ. bowel sounds normal. non-distended   Extremities: No cyanosis or edema   Skin: Skin color, texture, turgor normal. no acute rash or lesions       Musculoskeletal: No swelling or deformity   Lines/Devices:  Intact, no erythema, drainage or tenderness   Psych: Awake, normal mood affect given the setting             Data Review:     CBC:  Recent Labs     11/23/21 0322 11/22/21 0341 11/21/21  0401   WBC 18.7* 19.5* 23.4*   HGB 7.7* 7.6* 7.9*   HCT 24.1* 23.7* 24.7*   * 421 365       BMP:  Recent Labs     11/23/21 0322 11/22/21 0341 11/21/21  0401   CREA 0.61* 0.67* 0.71*   BUN 27* 27* 22   * 133* 137   K 5.6* 5.5* 5.0   CL 91* 96* 99   CO2 33* 34* 35*   AGAP 4* 3* 3*   * 121* 149*       LFTS:  No results for input(s): TBILI, ALT, AP, TP, ALB in the last 72 hours.     No lab exists for component: SGOT    Microbiology:     All Micro Results     Procedure Component Value Units Date/Time    P.JIROVECI BY PCR [[id number]] Collected: 11/19/21 1625    Order Status: Completed Updated: 11/19/21 1724    CULTURE, RESPIRATORY/SPUTUM/BRONCH W Roxine Likes STAIN [[id number]]  (Abnormal)  (Susceptibility) Collected: 11/10/21 0001    Order Status: Completed Specimen: Sputum,ET Suction Updated: 11/17/21 0922     Special Requests: NO SPECIAL REQUESTS        GRAM STAIN 0 TO 33 WBCS PER OIF      0 TO 2 EPITHELIAL CELLS SEEN      MANY GRAM POSITIVE COCCI         FEW GRAM POSITIVE RODS         FEW YEAST         4+ MUCUS PRESENT        Culture result:       HEAVY STAPHYLOCOCCUS AUREUS                  HEAVY NORMAL RESPIRATORY AZRA          CULTURE, RESPIRATORY/SPUTUM/BRONCH Shaylee Ly STAIN [[id number]]  (Abnormal) Collected: 11/15/21 0447    Order Status: Completed Specimen: Sputum from Endotracheal aspirate Updated: 11/17/21 0647     Special Requests: NO SPECIAL REQUESTS        GRAM STAIN 0 TO 10 WBCS PER OIF      0 TO 2 EPITHELIAL CELLS SEEN      MODERATE YEAST 4+ MUCUS PRESENT        Culture result: MODERATE CANDIDA ALBICANS       BLOOD CULTURE [[id number]] Collected: 11/09/21 1823    Order Status: Completed Specimen: Blood Updated: 11/14/21 0614     Special Requests: --        NO SPECIAL REQUESTS  RIGHT  Antecubital       Culture result: NO GROWTH 5 DAYS       BLOOD CULTURE [[id number]] Collected: 11/09/21 1835    Order Status: Completed Specimen: Blood Updated: 11/14/21 0614     Special Requests: --        NO SPECIAL REQUESTS  LEFT  HAND       Culture result: NO GROWTH 5 DAYS       MSSA/MRSA SC BY PCR, NASAL SWAB [[id number]]  (Abnormal) Collected: 11/10/21 0001    Order Status: Completed Specimen: Nasal swab Updated: 11/10/21 0204     Special Requests: NO SPECIAL REQUESTS        Culture result:       MRSA target DNA not detected, SA target DNA detected. A MRSA negative, SA positive test result does not preclude MRSA nasal colonization. CULTURE, BLOOD [[id number]]     Order Status: Canceled Specimen: Blood     CULTURE, BLOOD [[id number]]     Order Status: Canceled Specimen: Blood     COVID-19 RAPID TEST [[id number]] Collected: 11/09/21 2027    Order Status: Completed Specimen: Nasopharyngeal Updated: 11/09/21 2145     Specimen source NASAL        COVID-19 rapid test Not detected        Comment:      The specimen is NEGATIVE for SARS-CoV-2, the novel coronavirus associated with COVID-19. A negative result does not rule out COVID-19. This test has been authorized by the FDA under an Emergency Use Authorization (EUA) for use by authorized laboratories. Fact sheet for Healthcare Providers: ConventionUpdate.co.nz  Fact sheet for Patients: ConventionUpdate.co.nz       Methodology: Isothermal Nucleic Acid Amplification               Imaging:   IMPRESSION     1. Bilateral nodular airspace densities, similar to prior exam     2. Previous endotracheal tube is no longer seen.     Signed By: Macie Vides MD November 23, 2021

## 2021-11-23 NOTE — PROGRESS NOTES
Problem: Ventilator Management  Goal: *Absence of infection signs and symptoms  Outcome: Progressing Towards Goal     Problem: Delirium Treatment  Goal: *Level of consciousness restored to baseline  Outcome: Progressing Towards Goal  Goal: *Level of environmental perceptions restored to baseline  Outcome: Progressing Towards Goal  Goal: *Sensory perception restored to baseline  Outcome: Progressing Towards Goal  Goal: *Emotional stability restored to baseline  Outcome: Progressing Towards Goal  Goal: *Functional assessment restored to baseline  Outcome: Progressing Towards Goal  Goal: *Absence of falls  Outcome: Progressing Towards Goal  Goal: *Will remain free of delirium, CAM Score negative  Outcome: Progressing Towards Goal  Goal: *Cognitive status will be restored to baseline  Outcome: Progressing Towards Goal  Goal: Interventions  Outcome: Progressing Towards Goal     Problem: Patient Education: Go to Patient Education Activity  Goal: Patient/Family Education  Outcome: Progressing Towards Goal     Problem: Falls - Risk of  Goal: *Absence of Falls  Description: Document Delio Flannery Fall Risk and appropriate interventions in the flowsheet.   Outcome: Progressing Towards Goal  Note: Fall Risk Interventions:  Mobility Interventions: Bed/chair exit alarm, Patient to call before getting OOB, PT Consult for mobility concerns, PT Consult for assist device competence, OT consult for ADLs    Mentation Interventions: Adequate sleep, hydration, pain control, Bed/chair exit alarm, More frequent rounding, Reorient patient, Toileting rounds    Medication Interventions: Evaluate medications/consider consulting pharmacy, Bed/chair exit alarm    Elimination Interventions: Bed/chair exit alarm, Call light in reach, Toileting schedule/hourly rounds    History of Falls Interventions: Bed/chair exit alarm, Door open when patient unattended         Problem: Patient Education: Go to Patient Education Activity  Goal: Patient/Family Education  Outcome: Progressing Towards Goal     Problem: Pressure Injury - Risk of  Goal: *Prevention of pressure injury  Description: Document Salvador Scale and appropriate interventions in the flowsheet. Outcome: Progressing Towards Goal  Note: Pressure Injury Interventions:  Sensory Interventions: Assess changes in LOC, Keep linens dry and wrinkle-free, Minimize linen layers, Pressure redistribution bed/mattress (bed type), Turn and reposition approx. every two hours (pillows and wedges if needed)    Moisture Interventions: Absorbent underpads, Assess need for specialty bed, Check for incontinence Q2 hours and as needed, Internal/External fecal devices, Internal/External urinary devices, Minimize layers    Activity Interventions: Assess need for specialty bed, Pressure redistribution bed/mattress(bed type)    Mobility Interventions: Assess need for specialty bed, HOB 30 degrees or less, Pressure redistribution bed/mattress (bed type), Turn and reposition approx.  every two hours(pillow and wedges)    Nutrition Interventions: Document food/fluid/supplement intake    Friction and Shear Interventions: Apply protective barrier, creams and emollients, Feet elevated on foot rest, Minimize layers, Lift sheet                Problem: Patient Education: Go to Patient Education Activity  Goal: Patient/Family Education  Outcome: Progressing Towards Goal     Problem: Patient Education: Go to Patient Education Activity  Goal: Patient/Family Education  Outcome: Progressing Towards Goal     Problem: Dysphagia (Adult)  Goal: *Speech Goal: (INSERT TEXT)  Outcome: Progressing Towards Goal     Problem: Patient Education: Go to Patient Education Activity  Goal: Patient/Family Education  Outcome: Progressing Towards Goal

## 2021-11-23 NOTE — PROGRESS NOTES
Palliative Care Progress Note    Patient: Rahel Feliciano MRN: [id number]  SSN: [id number]    YOB: 1940  Age: 80 y.o. Sex: male       Assessment/Plan:     Chief Complaint/Interval History: pt on bipap. lethargic      Principal Diagnosis:    · Dyspnea  R06.00    Additional Diagnoses:   · Frailty  R54  · Counseling, Encounter for Medical Advice  Z71.9  · Encounter for Palliative Care  Z51.5    Palliative Performance Scale (PPS)       Medical Decision Making:   Reviewed and summarized labs and imaging over past 24 hours. Pt is DNI. Spouse and son at bedside. Reviewed condition and concerns given current support required. Spouse states she is praying for miracles and understands how sick he is. She is wanting to do all she can to help pt potentially recover. Pt remains DNI and she understands these were his wishes from prior conversations. Will follow    Will discuss findings with members of the interdisciplinary team.      More than 50% of this 25 minute visit was spent counseling and coordination of care as outlined above. Subjective:     Comprehensive review of systems unable to complete due to mentation. Objective:     Visit Vitals  BP (!) 154/86   Pulse 91   Temp 97.9 °F (36.6 °C)   Resp 30   Ht 5' 4\" (1.626 m)   Wt 146 lb (66.2 kg)   SpO2 100%   BMI 25.06 kg/m²       Physical Exam:    General:  lethargic   Eyes:  Conjunctivae/corneas clear    Nose: Nares normal. Septum midline. Neck: Supple, symmetrical, trachea midline, no JVD   Lungs:   Coarse bilateral bs   Heart:  Regular rate and rhythm, no murmur    Abdomen:   Soft, non-tender, non-distended   Extremities: Normal, atraumatic, no cyanosis or edema   Skin: Skin color, texture, turgor normal. No rash or lesions.    Neurologic: Moves all extremities   Psych: lethargic     Signed By: Indigo Rice MD     November 23, 2021

## 2021-11-23 NOTE — PROGRESS NOTES
PT Note:  Treatment held today given pt is maxed out on BIPAP and has dyspneic breathing at rest.  Will re-attempt pending schedule and pt status.   Thanks,  Eva Cerna, PT, DPT

## 2021-11-23 NOTE — PROGRESS NOTES
Bedside and verbal shift change report received from  [facility] (offgoing nurse). Report included the following information SBAR, Kardex, Intake/Output, MAR, Recent Results, Med Rec Status and Cardiac Rhythm NSR. Dual skin assessment completed at bedside: disseminated ecchymoses.  Skin tear R tibia, R elbow,  Excoriation periarea (list pertinent skin assessment findings)    Dual verification of gtts completed (name of gtts verified): none to verify

## 2021-11-23 NOTE — PROGRESS NOTES
Bedside and verbal shift change report received from  [place] (offgoing nurse). Report included the following information SBAR, Kardex, ED Summary, Procedure Summary, Intake/Output, MAR, Recent Results, Cardiac Rhythm NSR, Alarm Parameters  and Quality Measures.      Dual skin assessment completed at bedside: redness on nasal bridge, ecchymosis, blistering R tibia and R elbow (list pertinent skin assessment findings)    Dual verification of gtts completed (name of gtts verified): n/a

## 2021-11-23 NOTE — PROGRESS NOTES
Comprehensive Nutrition Assessment     Type and Reason for Visit: Reassess  Tube Feeding Management (pulmonary)     Nutrition Recommendations/Plan:   · Enteral Nutrition:   · Enteral Access: Nasogastric  · Formula: Renal (Nepro with Carbsteady)  · Delivery: Continuous feeding: Change to new goal rate of 45ml/hour. · Water flush reduce to 30 ml every 4 hours--due to worsening hyponatremia  · Modulars: none  · Enteral regimen at goal to provide:  · 1782 calories (100% estimated calorie needs), 80 grams protein (100% estimated protein needs) and 900ml free fluid (~0.5ml/kcal) calculations based on 22 hour infusion. · Nutritional Supplement Therapy:   · Active electrolyte replacement per nutrition support protocols  · Replacement indicated:  · None   · Labs:   · EN labs: BMP daily, Mg MWF and Phos MWF. · Bowel Regimen:  · Per flowsheets, pt has had 50ml of output in FMS in the past 24 hrs. Consider removing FMS if output remains low.      Malnutrition Assessment:  Malnutrition Status: At risk for malnutrition (specify) (NPO, vent)     Nutrition Assessment:   Nutrition History: Pt known to nutrition department from recent admission. At that time, pt was on an easy to chew, lactose controlled diet with ensure enlive TID with meals. Nutrition Background: Pt admitted with hypoxia from rehab facility. Noted recent admission to [facility]. PMH notable for HTN and new lung cancer diagnosis (probable metastatic cancer). Daily Update:  TF infusing at 45ml/hr with free water flush of 130ml Q4H. Observed pt on BiPAP at time of visit with no visitors present.     Abdominal Status (last documented): Intact, Semi-soft abdomen with Active  bowel sounds. Last BM 11/21/21 (FMS in place since 11/15; RN reports 50ml output during her shift.  Observed FMS drainage level at 350ml.)  Pertinent Medications: Eraxis, Colace 2 times daily, SSI, Solumedrol, Reglan (held since 11/19), Protonix, Miralax PRN, Scopolamine patch [facility] since 11/20[facility]           Lab Results   Component Value Date/Time     Sodium 128 (L) 11/23/2021 03:22 AM     Potassium 5.6 (H) 11/23/2021 03:22 AM     Chloride 91 (L) 11/23/2021 03:22 AM     CO2 33 (H) 11/23/2021 03:22 AM     Anion gap 4 (L) 11/23/2021 03:22 AM     Glucose 130 (H) 11/23/2021 03:22 AM     BUN 27 (H) 11/23/2021 03:22 AM     Creatinine 0.61 (L) 11/23/2021 03:22 AM     Calcium 9.3 11/23/2021 03:22 AM     Albumin 1.8 (L) 11/19/2021 12:15 AM     Magnesium 2.4 11/22/2021 03:41 AM     Phosphorus 2.2 (L) 11/22/2021 03:41 AM      Labs notable for worsening hyponatremia (down 5 in the past 24hrs) and ongoing hyperkalemia with x2 doses received. Pt would benefit in further water flush restriction to prevent worsening hyponatremia.     Nutrition Related Findings:   NFPE deferred d/t current medical condition. Vent. OGT in place since 11/9. Extubated 11/11. OGT removed. SLP consult pending. NPO per SLP. NGT to be replaced and TF resumed. TF held for potential intubation and emesis w/ BIPAP. NGT to LIS. Reintubated 11/15. TF to resume 11/16. (11/18) TF held, pt extubated (11/19) SLP recommending NPO (11/20) Per GI, pt not stable enough to consider PEG, TF restarted via NGT @ 20ml/hr       Current Nutrition Therapies:  DIET NPO  ADULT TUBE FEEDING Nasogastric; Peptide Based; Delivery Method: Continuous; Continuous Initial Rate (mL/hr): 20; Continuous Advance Tube Feeding: Yes; Advancement Volume (mL/hr): 10; Advancement Frequency: Q 4 hours; Continuous Goal Rate (mL/hr): [place] Dozier ..     Current Intake:   Average Meal Intake: NPO   Additional Caloric Sources:       Anthropometric Measures:  Height: 5' 4\" (162.6 cm)  Current Body Wt: 62.3 kg (137 lb 5.6 oz) (11/20), Weight source: Bed scale  BMI: 23.6, Normal weight (BMI 18.5-24. 9)  Admission Body Weight: 149 lb 14.6 oz (11/9, pt stated)  Ideal Body Weight (lbs) (Calculated): 130 lbs (59 kg), 104.6 %  Edema: No data recorded      Estimated Daily Nutrient Needs:  Energy (kcal/day): 3179-6896 (Kcal/kg (25-30), Weight Used: Current (61.7 kg))  Protein (g/day):  Weight Used: (Current (1.2-2))  Fluid (ml/day): 0092-9082 (1 ml/kcal (or per MD))     Nutrition Diagnosis:   · Inadequate oral intake related to impaired respiratory function, swallowing difficulty as evidenced by NPO or clear liquid status due to medical condition, nutrition support-enteral nutrition     Nutrition Interventions:   Food and/or Nutrient Delivery: Continue NPO, Modify tube feeding  Coordination of Nutrition Care: Continue to monitor while inpatient     Goals:   Previous Goal Met: Progressing toward goal(s)  Active Goal: Maintain estimated nutrition needs via EN     Nutrition Monitoring and Evaluation:   Food/Nutrient Intake Outcomes: Enteral nutrition intake/tolerance  Physical Signs/Symptoms Outcomes: Biochemical data, GI status, Fluid status or edema, Weight     Discharge Planning:     Too soon to determine     Electronically signed by Sharad Ron MS, VITALYN, LD 11/22/2021 at 4:44 PM

## 2021-11-23 NOTE — PROGRESS NOTES
Problem: Delirium Treatment  Goal: *Level of consciousness restored to baseline  Outcome: Progressing Towards Goal  Goal: *Level of environmental perceptions restored to baseline  Outcome: Progressing Towards Goal  Goal: *Sensory perception restored to baseline  Outcome: Progressing Towards Goal  Goal: *Emotional stability restored to baseline  Outcome: Progressing Towards Goal  Goal: *Functional assessment restored to baseline  Outcome: Progressing Towards Goal  Goal: *Absence of falls  Outcome: Progressing Towards Goal  Goal: *Will remain free of delirium, CAM Score negative  Outcome: Progressing Towards Goal  Goal: *Cognitive status will be restored to baseline  Outcome: Progressing Towards Goal  Goal: Interventions  Outcome: Progressing Towards Goal     Problem: Falls - Risk of  Goal: *Absence of Falls  Description: Document Keri Fall Risk and appropriate interventions in the flowsheet.   Outcome: Progressing Towards Goal  Note: Fall Risk Interventions:  Mobility Interventions: Assess mobility with egress test, Bed/chair exit alarm, Communicate number of staff needed for ambulation/transfer, OT consult for ADLs, Patient to call before getting OOB, PT Consult for mobility concerns, Strengthening exercises (ROM-active/passive), Utilize walker, cane, or other assistive device    Mentation Interventions: Adequate sleep, hydration, pain control, Bed/chair exit alarm, Evaluate medications/consider consulting pharmacy, Door open when patient unattended, Family/sitter at bedside, Increase mobility, More frequent rounding, Room close to nurse's station, Reorient patient, Update white board    Medication Interventions: Assess postural VS orthostatic hypotension, Bed/chair exit alarm, Evaluate medications/consider consulting pharmacy, Patient to call before getting OOB, Teach patient to arise slowly, Utilize gait belt for transfers/ambulation    Elimination Interventions: Bed/chair exit alarm, Call light in reach, Toileting schedule/hourly rounds    History of Falls Interventions: Bed/chair exit alarm, Consult care management for discharge planning, Door open when patient unattended, Evaluate medications/consider consulting pharmacy, Investigate reason for fall, Room close to nurse's station, Utilize gait belt for transfer/ambulation, Assess for delayed presentation/identification of injury for 48 hrs (comment for end date), Vital signs minimum Q4HRs X 24 hrs (comment for end date)         Problem: Patient Education: Go to Patient Education Activity  Goal: Patient/Family Education  Outcome: Progressing Towards Goal     Problem: Pressure Injury - Risk of  Goal: *Prevention of pressure injury  Description: Document Salvador Scale and appropriate interventions in the flowsheet. Outcome: Progressing Towards Goal  Note: Pressure Injury Interventions:  Sensory Interventions: Assess changes in LOC, Assess need for specialty bed, Avoid rigorous massage over bony prominences, Check visual cues for pain, Discuss PT/OT consult with provider, Float heels, Keep linens dry and wrinkle-free, Maintain/enhance activity level, Minimize linen layers, Monitor skin under medical devices, Pad between skin to skin, Pressure redistribution bed/mattress (bed type), Turn and reposition approx.  every two hours (pillows and wedges if needed)    Moisture Interventions: Absorbent underpads, Apply protective barrier, creams and emollients, Assess need for specialty bed, Check for incontinence Q2 hours and as needed, Internal/External fecal devices, Internal/External urinary devices, Limit adult briefs, Maintain skin hydration (lotion/cream), Minimize layers, Moisture barrier    Activity Interventions: Assess need for specialty bed, Increase time out of bed, Pressure redistribution bed/mattress(bed type), PT/OT evaluation    Mobility Interventions: Assess need for specialty bed, Float heels, HOB 30 degrees or less, Pressure redistribution bed/mattress (bed type), Turn and reposition approx.  every two hours(pillow and wedges)    Nutrition Interventions: Document food/fluid/supplement intake, Discuss nutritional consult with provider, Offer support with meals,snacks and hydration    Friction and Shear Interventions: Apply protective barrier, creams and emollients, Feet elevated on foot rest, Foam dressings/transparent film/skin sealants, HOB 30 degrees or less, Minimize layers, Transfer aides:transfer board/Romaine lift/ceiling lift, Transferring/repositioning devices                Problem: Patient Education: Go to Patient Education Activity  Goal: Patient/Family Education  Outcome: Progressing Towards Goal     Problem: Patient Education: Go to Patient Education Activity  Goal: Patient/Family Education  Outcome: Progressing Towards Goal     Problem: Dysphagia (Adult)  Goal: *Speech Goal: (INSERT TEXT)  Outcome: Progressing Towards Goal     Problem: Patient Education: Go to Patient Education Activity  Goal: Patient/Family Education  Outcome: Progressing Towards Goal     Problem: Ventilator Management  Goal: *Absence of infection signs and symptoms  Outcome: Resolved/Met

## 2021-11-23 NOTE — PROGRESS NOTES
11/22/21 2039   Oxygen Therapy   O2 Sat (%) 100 %   Pulse via Oximetry 70 beats per minute   O2 Device BIPAP   FIO2 (%) 55 %   Respiratory   Respiratory (WDL) X   Respiratory Pattern Tachypneic   Breath Sounds Bilateral Coarse   CPAP/BIPAP   Device Mode AVAP   AVAP Set Resp Rate 20   AVAP Set VT (ml) 425   Mask Type and Size Full face   Skin Condition intact   PIP Observed 23 cm H20   EPAP (cm H2O) 10 cm H2O   Inspiratory Time (sec) 1 seconds   Vt Spont (ml) 439 ml   Ve Observed (l/min) 10.1 l/min   Backup Rate 20   Total RR (Spontaneous) 23 breaths per minute   Insp Rise Time (sec) 4   Leak (Estimated) 0 L/min   Pt's Home Machine No   Biomedical Check Performed Yes   Settings Verified Yes   Alarm Settings   High Pressure 35   Low Pressure 5   Apnea 20   Low Ve 3   High Rate 50   Low Rate 8   Pulmonary Toilet   Pulmonary Toilet Cough and deep breath; H. O.B elevated

## 2021-11-23 NOTE — PROGRESS NOTES
This is a follow-up visit to the patient, providing a spiritual presence, emotional support and prayer. The patient appears to be resting. His wife, Christopher Arauz, and their son, Meenu Wiseman were present. Christopher Arauz requested that I prayed for a miracle of healing for her .     Tristan Delaney, [address] [facility], [facility]

## 2021-11-23 NOTE — PROGRESS NOTES
[facility] Critical Care Note[de-identified] 11/23/2021  Chad Sanchez  Admission Date: 11/9/2021     Length of Stay: 14 days    Background: Patient is a 80 y.o. male presented to the ER on 11/9/21 with mental status change and obtundation and hypoxia after an aspiration event during a swallowing evaluation and deteriorated as the day went on. He was intubated in the ER and transferred to the ICU. He was discharged 11/8/21 from the hospital where he was admitted for a few days for evaluation of a headache. 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 He was extubated 11/12. Pt was on BiPAP and had an episode of emesis. Pt was taken off of BiPAP and required intubation 11/14. He has been followed by Dr Harmony Castillo in our office. He has a history of sarcoidosis (mediastinal lymphadenopathy and calcification, VAT 2001) as well as mild pulmonary hypertension Group 1/Group 3, who has been followed at [facility] since Feb 2018. He was last seen by us in July 2021 and was stable. He has been on prednisone 10mg for sarcoidosis/Stage III COPD as well as Trelegy inhaler daily. He rarely requires albuterol. Baseline O2 needs: with inogen POC he is at 1-2 at rest, and 2lpm with exertion. He has a recent diagnosis of extensive stage small cell cancer after mass discovered in left axilla. He is now admitted with pneumonia, respiratory failure and weakness. Notable PMH:  has a past medical history of Agent orange exposure (1960s), Arthritis, Asthma, Body mass index (BMI) of 25.0 to 25.9 in adult (10/28/2021), Chronic obstructive pulmonary disease ([facility] [address] Chronic pain, GERD (gastroesophageal reflux disease), Sarcoidosis (2001), and Sleep apnea. 24 Hour events:  Spent most of yesterday on BIPAP. Wife not accepting that pt really would not want to be reintubated. Remains afebrile with good sats on 55% BIPAP. BP up.  Opens eyes to voice but not interactive. ROS: unable to obtain/negative except as listed elsewhere. Lines: (insertion date)   ETT: (11/9-11) (11/14-)  Chavez: (11/9)  NGT: (11/13)  Peripheral    Drips: current dose (range)  none    Pertinent Exam:         Blood pressure (!) 180/89, pulse 84, temperature 97.5 °F (36.4 °C), resp. rate (!) 35, height 5' 4\" (1.626 m), weight 146 lb (66.2 kg), SpO2 100 %. Intake/Output Summary (Last 24 hours) at 11/23/2021 0738  Last data filed at 11/23/2021 0541  Gross per 24 hour   Intake 2515 ml   Output 2125 ml   Net 390 ml     Constitutional:  He is on AVAPS mode with PEEP of 10 cm and FiO2 of 55% and sat 100  EENMT:  Sclera clear, pupils equal, oral mucosa moist  Respiratory: very coarse breath sounds bilaterally; some accessory use off BIPAP  Cardiovascular:  RRR  Gastrointestinal:  soft with no tenderness; positive bowel sounds present  Musculoskeletal:  warm with no cyanosis, soft  lower extremity edema  Skin:  no jaundice or ecchymosis  Neurologic: eyes open but not interactive  Psychiatric:  calm    CXR:    11/23, 21:        11/19/21          Recent Labs     11/23/21 0322 11/22/21  0341 11/21/21  0401   WBC 18.7* 19.5* 23.4*   HGB 7.7* 7.6* 7.9*   HCT 24.1* 23.7* 24.7*   * 421 365     Recent Labs     11/23/21 0322 11/22/21  0341 11/21/21  0401   * 133* 137   K 5.6* 5.5* 5.0   CL 91* 96* 99   CO2 33* 34* 35*   * 121* 149*   BUN 27* 27* 22   CREA 0.61* 0.67* 0.71*   MG  --  2.4  --    CA 9.3 9.4 9.4   PHOS  --  2.2*  --      No results for input(s): LAC, TROPHS, BNPNT, CRP in the last 72 hours. No lab exists for component: ESR  Recent Labs     11/23/21  0536 11/22/21  2340 11/22/21  1749   [facility] 124* 109* 132*     ECHO: Results from [place] encounter on 10/28/21    ECHO ADULT COMPLETE    Interpretation Summary  · TV: Mild tricuspid valve regurgitation is present. Right Ventricular Arterial Pressure (RVSP) is 54 mmHg.   · Image quality for this study was technically difficult. · Echo study was technically difficulty. · LV: Estimated LVEF is 55 - 60%. Normal cavity size, systolic function (ejection fraction normal) and diastolic function. Mild concentric hypertrophy. Wall motion: normal.  · LA: Moderately dilated left atrium. · RA: Mildly dilated right atrium. · AV: Mild aortic valve regurgitation is present. · MV: Mild mitral valve regurgitation is present. · PV: Mild pulmonic valve regurgitation is present. Ventilator Settings:  Ideal body weight: 59.2 kg (130 lb 8.2 oz)   Mode FIO2 Rate Tidal Volume Pressure PEEP     55 %       8 cm H20      ABG:  Recent Labs     11/20/21  1502 11/20/21  1206   PHI 7.33* 7.19*   PCO2I 64.5* 87.5*   PO2I 113* 180*   HCO3I 33.7* 33.7*     MICRO  Date Source Result   11/9 blood    11/9 blood    11/10 sputum    11/10 MSSA Heavy staph   11/15 sputum Moderate yeast     Assessment and Plan:  (Medical Decision Making)   Impression: 80 y.o. male with sarcoid, pulm HTN, extensive stage SCLC admitted with recurrent aspiration pneumonia and respiratory failure. NEURO:   Delirium: not agitated but little interaction with me  CV:   Volume Status: fluid balance + 3.8 L since admission  HTN: resume amlodipine  PULM:   Acute on chronic hypoxemic/hypercapneic respiratory failure:  on BIPAP with FiO2 55 %. Tried on Optiflow this AM but more distress and audible rhonchi. Place back on BIPAP  Probable pneumonia : finished ancef for MSSA. Infectious disease saw the patient and he is currently on Bactrim and Eraxis. WBC down to 18.7  RENAL:  PATRICIO: cr is 0.67  Hypernatremia:  Sodium is 128 and potassium is 5.6 which is likely related to Bactrim. Lokelma x 1 today. Got yesterday as well  GI:   Esophageal dysmotility:  ? Paraneoplastic. GI signed off and feels PEG not able to be done due to unstable status. Nutrition: Tolerating TF   HEME:   Anemia: PPI bid.  Hgb 7.7  Thrombocytopenia: N/A  Anticoagulation:  lovenox 40 q day  ID:   Pneumonia:    ENDO:   DM: monitor glucose  Skin: no decub, turns, preventive care  Prophy: lovenox and pepcid    Long discussion with wife at bedside again. Son coming in and will discuss further. Given underlying disease processes, doubt intubation would achieve wife's desire for him to get stronger to be able to get chemo in the future.      The patient is critically ill with respiratory failure, circulatory failure and requires high complexity decision making for assessment and support including frequent ventilator adjustment , frequent evaluation and titration of therapies , application of advanced monitoring technologies and extensive interpretation of multiple databases    Cumulative time devoted to patient care services by me for day of service -31 min    Meenu Tapia MD

## 2021-11-23 NOTE — PROGRESS NOTES
Attempted to see pt for OT treatment session this PM. Pt maxed out on BiPAP with RN reporting that pt recently rested and presented with improvement in O2 sats. Will follow up and attempt to see pt as schedule permits and as pt is medically appropriate to be seen.     Thank you,     Lacy Chacko OTR/[place]

## 2021-11-23 NOTE — PROGRESS NOTES
To see pt wife, as pt remains ICU, BIPAP 90% per RN. BP low currently. Wife questioned now about hospice house. Does not think it would be worth trying to get him home with equipment needed. Explained biggest concern of being stable for transport, which currently does not appear to be stable for this. Verbalized understanding. She is starting to accept and understands that he did not want aggressive care. Encouraged wife to spend time speaking to pt and to try to enjoy these moments that they have left. Again verbalized understanding. States that Ryan Oliveira does so much for her and she can't imagine being without him\". Explained that MD would re-eval pt in am and if stable, we could do hospice consult if MD felt appropriate. Aware CM following for any assist. Discussed with Dr. Darren Robert.

## 2021-11-24 NOTE — PROGRESS NOTES
Spke with Dr Fern Perera via phone, reporting K 5.7, Na 129, and decreased responsiveness. Pt not withdrawing from pain or opening eyes as previously in the day. Dr. Fern Perera requested ABG and will place orders to treat K with Ca and insulin.

## 2021-11-24 NOTE — PROGRESS NOTES
During assessment pt began vomiting into BIPAP mask. Mask removed, tube feeds stopped. Pt suctioned with Shaka and additional help requested from other nursing staff and RT. Pt oxygenated with BVM without restoration of O2 sat. Dr Cyn Apple notified of pt condition by phone with request that he come to bedside.

## 2021-11-24 NOTE — PROGRESS NOTES
[address] [place] [address] received notice that Pt's Wife needed support. When [address] [place] [address] arrived, Pt had . Wife was awaiting arrival of her Son. Wife discussed next steps and processed several challenges she faces.  offered comforting spiritual presence, active listening, and spiritual resources. Wife expressed appropriate grief. Son arrived. [address] [place] [address] prayed a blessing for Spouse. Rev. Maico Rodriguez M.Div.

## 2021-11-24 NOTE — DEATH NOTE
Death Summary    [address] [facility]  Admission date:  11/9/2021  Discharge date:      Admitting Diagnosis:  Acute on chronic respiratory failure with hypoxia [facility]) [J96.21]  Discharge Diagnosis:    Problem List as of 11/24/2021 Date Reviewed: 10/28/2021          Codes Class Noted - Resolved    Axillary mass, left ICD-10-CM: R22.32  ICD-9-CM: 782.2  10/28/2021 - Present        RESOLVED: Nausea & vomiting ICD-10-CM: R11.2  ICD-9-CM: 787.01  10/28/2021 - 11/1/2021        Body mass index (BMI) of 25.0 to 25.9 in adult (Chronic) ICD-10-CM: Z68.25  ICD-9-CM: V85.21  10/28/2021 - Present        Esophageal dysmotility ICD-10-CM: K22.4  ICD-9-CM: 530.5  11/18/2021 - Present        Aspiration pneumonia ([facility].) ICD-10-CM: J69.0  ICD-9-CM: 507.0  11/9/2021 - Present        * (Principal) Acute on chronic respiratory failure with hypoxia (HCC) ICD-10-CM: J96.21  ICD-9-CM: 518.84, 799.02  11/9/2021 - Present        Malignant small cell cancer ([facility] ICD-10-CM: C80.1  ICD-9-CM: 199.1  11/9/2021 - Present        COPD, moderate (HCC) (Chronic) ICD-10-CM: J44.9  ICD-9-CM: 496  11/1/2021 - Present        Headache ICD-10-CM: R51.9  ICD-9-CM: 784.0  10/30/2021 - Present        Acute metabolic encephalopathy PATRICIA-12-NK: G93.41  ICD-9-CM: 348.31  10/28/2021 - Present        Hyperglycemia, drug-induced ICD-10-CM: R73.9, T50.905A  ICD-9-CM: 790.29, E947.9  10/28/2021 - Present        Dysphagia ICD-10-CM: R13.10  ICD-9-CM: 787.20  7/26/2021 - Present        Dry eye syndrome of bilateral lacrimal glands ICD-10-CM: X65.676  ICD-9-CM: 375.15  2/28/2020 - Present        Lens replaced by other means ICD-10-CM: Z96.1  ICD-9-CM: V43.1  2/28/2020 - Present        Open angle with borderline findings, high risk, bilateral ICD-10-CM: H40.023  ICD-9-CM: 365.05  2/28/2020 - Present        Dry eyes ICD-10-CM: T91.760  ICD-9-CM: 375.15  10/14/2019 - Present        Osteoarthritis of spine ICD-10-CM: M47.9  ICD-9-CM: 721.90  9/24/2018 - Present Pulmonary hypertension ([facility] ICD-10-CM: I27.20  ICD-9-CM: 416.8  1/10/2018 - Present        COPD with asthma ([facility] ICD-10-CM: J44.9  ICD-9-CM: 493.20  11/28/2017 - Present        Open angle glaucoma suspect with borderline findings at low risk ICD-10-CM: H40.019  ICD-9-CM: 365.01  10/3/2017 - Present        Primary insomnia ICD-10-CM: F51.01  ICD-9-CM: 307.42  3/14/2017 - Present        CAITLIN on CPAP (Chronic) ICD-10-CM: G47.33, Z99.89  ICD-9-CM: 327.23, V46.8  8/23/2016 - Present    Overview Addendum 7/26/2021  2:45 PM by Pamela Cordero MD     CPAP 13 cm    Formatting of this note might be different from the original.  Overview:   CPAP 13 cm             Nocturnal hypoxemia (Chronic) ICD-10-CM: G47.34  ICD-9-CM: 327.24  6/20/2016 - Present        Pulmonary hypertension due to hypoxia (Chronic) ICD-10-CM: V25.85  ICD-9-CM: 416.8, 799.02  6/20/2016 - Present    Overview Addendum 10/28/2021  3:11 PM by MD Dr. Nai Ladd  1.  6/6/2016 Echo- [id number]-45-50  2. Echo (11/23/20):  EF 55-60%. Mild LAE.  AVSC. Trace TR. Normal RV. Normal RA size. RVSP 35. Restrictive lung disease (Chronic) ICD-10-CM: J98.4  ICD-9-CM: 518.89  4/1/2015 - Present    Overview Signed 4/1/2015  9:30 AM by Edmundo Owens. Residual from Sarcoidosis             Chronic GERD ICD-10-CM: K21.9  ICD-9-CM: 530.81  2/24/2014 - Present        Epiretinal membrane ICD-10-CM: H35.379  ICD-9-CM: 362.56  1/14/2014 - Present        Elevated prostate specific antigen (PSA) (Chronic) ICD-10-CM: R97.20  ICD-9-CM: 790.93  11/4/2013 - Present        Dyslipidemia (Chronic) ICD-10-CM: E78.5  ICD-9-CM: 272.4  3/13/2006 - Present    Overview Signed 3/21/2019  8:31 AM by Lamond Shone, MD     Lexiscan Cardiolite (3/19/19):  EF 70%. Normal perfusion.               Sarcoidosis (Chronic) ICD-10-CM: D86.9  ICD-9-CM: 135  3/13/2006 - Present        Benign prostatic hyperplasia (Chronic) ICD-10-CM: N40.0  ICD-9-CM: 600.00 3/13/2006 - Present        RESOLVED: Bradycardia ICD-10-CM: R00.1  ICD-9-CM: 427.89  10/28/2021 - 11/1/2021        RESOLVED: Hyponatremia ICD-10-CM: E87.1  ICD-9-CM: 276.1  10/28/2021 - 11/1/2021        RESOLVED: Electrolyte or fluid disorder ICD-10-CM: E87.8  ICD-9-CM: 276.9  10/28/2021 - 11/1/2021        RESOLVED: Late-onset asthma (Chronic) ICD-10-CM: J45.909  ICD-9-CM: 493.10  3/14/2017 - 11/28/2017        RESOLVED: Mild intermittent asthma without complication (Chronic) BBF-08-IQ: J45.20  ICD-9-CM: 493.90  5/19/2016 - 11/28/2017        RESOLVED: Dyspnea on exertion (Chronic) ICD-10-CM: R06.00  ICD-9-CM: 786.09  5/3/2016 - 3/14/2017        RESOLVED: Nocturnal hypoxemia (Chronic) ICD-10-CM: R09.02  ICD-9-CM: 799.02  1/18/2013 - 8/19/2014        RESOLVED: Chronic rhinitis (Chronic) ICD-10-CM: J31.0  ICD-9-CM: 472.0  2/5/2010 - 3/14/2017        RESOLVED: Esophageal reflux (Chronic) ICD-10-CM: K21.9  ICD-9-CM: 530.81  3/13/2006 - 3/14/2017              Consultants:  Palliative, Gastroenterology, Oncology, Infectious Disease    Studies/Procedures:    Hospital course:  79yo M with PMH of sarcoidosis, COPD, and pulmonary hypertension who was recently diagnosed with Stage IV SCLC presented to [facility] on 11/9 with SOB. He recently underwent a swallow evaluation. Patient then had change in mental status and he was intubated for airway protection. He was started on unasyn and vancomycin for presumed aspiration pneumonia. Eventually de-escalated to ancef. He was extubated on 11/11. Was on BiPAP with worsening hypercapnea; intubated for second time on 11/15. [address] discussion on 11/21 led to partial code status without intubation. Patient remained on BiPAP with tube feeds through NG tube. Late evening on 11/23 patient had episode of emesis with respiratory distress. Required BMV to support oxygenation in 80s. Wife at bedside. Extensive discussion about GOC.   Ultimately, wife chose comfort measures. Final:  --Pronounced dead at 1219. --Total discharge greater than 30 minutes in duration.     Robyn Ramos MD

## 2021-11-24 NOTE — PROGRESS NOTES
Notified by RN about emesis and likely aspiration. Patient seen and examined. Wife at bedside. RT performing BMV with saturation in 80s. Extensive discussion with wife about GOC. Prior code status was partial code without intubation. I explained to wife that intubation would be required to address his medical condition if aggressive measures were desired. Wife ultimately chose comfort measures. Time of death 1219.     Onur Harrison

## 2021-11-24 NOTE — PROGRESS NOTES
Bedside and verbal shift change report received from  [address] [facility] (offgoing nurse). Report included the following information SBAR, Kardex, Intake/Output, MAR, Recent Results, Med Rec Status and Cardiac Rhythm NSR.      Dual skin assessment completed at bedside: redness on nasal bridge, ecchymosis, blistering R tibia and R elbow (list pertinent skin assessment findings)    Dual verification of gtts completed (name of gtts verified): levophed

## 2021-11-29 LAB — 1,3 BETA GLUCAN SER-MCNC: 45 PG/ML

## 2021-11-29 NOTE — PROGRESS NOTES
Physician Progress Note      Melanie Griffin  CSN #:                  [id number]  :                       1940  ADMIT DATE:       2021 6:12 PM  [address] [place] DATE:        2021 12:19 AM  RESPONDING  PROVIDER #:        Doug Manuel MD          QUERY TEXT:    Pt admitted with asp pneumonia. Pt noted to have leukocyosis, elevated Lactic Acid. . If possible, please document in the progress notes and discharge summary if you are evaluating and /or treating any of the following: The medical record reflects the following:  Risk Factors: Asp Pneumoinia, Lung ca w/mets, sarcoidosis  Clinical Indicators: WBC 19.2, 18.6   's on admit  Lactic Acid 2.6  Hypoxic RR 26/40  Treatment: 1L ns bolus, Unasyn IV , Vanco IV  Thanks, PARAM Ibarra  Options provided:  -- Sepsis, present on admission  -- Sepsis, not present on admission  -- Asp Pneumonia without Sepsis  -- Other - I will add my own diagnosis  -- Disagree - Not applicable / Not valid  -- Disagree - Clinically unable to determine / Unknown  -- Refer to Clinical Documentation Reviewer    PROVIDER RESPONSE TEXT:    This patient has sepsis which was present on admission.     Query created by: Padmaja Camarena on 2021 12:02 PM      Electronically signed by:  Doug Manuel MD 2021 4:28 PM

## 2021-12-02 LAB
ARTERIAL PATENCY WRIST A: ABNORMAL
ARTERIAL PATENCY WRIST A: POSITIVE
BASE EXCESS BLD CALC-SCNC: 11.9 MMOL/L
BASE EXCESS BLD CALC-SCNC: 14.8 MMOL/L
BDY SITE: ABNORMAL
BDY SITE: ABNORMAL
GAS FLOW.O2 O2 DELIVERY SYS: ABNORMAL L/MIN
GAS FLOW.O2 O2 DELIVERY SYS: ABNORMAL L/MIN
GAS FLOW.O2 SETTING OXYMISER: 30 L/M
GAS FLOW.O2 SETTING OXYMISER: 36 BPM
GAS FLOW.O2 SETTING OXYMISER: 37 BPM
GAS FLOW.O2 SETTING OXYMISER: 37 L/M
HCO3 BLD-SCNC: 39.6 MMOL/L (ref 22–26)
HCO3 BLD-SCNC: 42 MMOL/L (ref 22–26)
INSPIRATION.DURATION SETTING TIME VENT: 0.9 SEC
INSPIRATION.DURATION SETTING TIME VENT: 1 SEC
O2/TOTAL GAS SETTING VFR VENT: 50 %
O2/TOTAL GAS SETTING VFR VENT: 50 %
PCO2 BLD: 64.1 MMHG (ref 35–45)
PCO2 BLD: 82.2 MMHG (ref 35–45)
PH BLD: 7.32 [PH] (ref 7.35–7.45)
PH BLD: 7.4 [PH] (ref 7.35–7.45)
PIP ISTAT,IPIP: 18
PIP ISTAT,IPIP: 27
PO2 BLD: 71 MMHG (ref 80–100)
PO2 BLD: 75 MMHG (ref 80–100)
SAO2 % BLD: 92 % (ref 92–97)
SAO2 % BLD: 93 % (ref 92–97)
SERVICE CMNT-IMP: ABNORMAL
SERVICE CMNT-IMP: ABNORMAL
SPECIMEN TYPE: ABNORMAL
SPECIMEN TYPE: ABNORMAL

## 2022-08-20 NOTE — PROGRESS NOTES
Palliative Care Progress Note    Patient: Brian Rivera MRN: [id number]  SSN: [id number]    YOB: 1940  Age: 80 y.o. Sex: male       Assessment/Plan:     Chief Complaint/Interval History: pt on bipap. Alert with some confusion. Principal Diagnosis:    · Dyspnea  R06.00    Additional Diagnoses:   · Frailty  R54  · Counseling, Encounter for Medical Advice  Z71.9  · Encounter for Palliative Care  Z51.5    Palliative Performance Scale (PPS)       Medical Decision Making:   Reviewed and summarized labs and imaging over past 24 hours. Pt is DNI. Explained to spouse that pt expressed understanding of his decision at time of decision. We discussed prognosis and explained pt not likely to survive this hospitalization. Wife is reasonably upset and anxious regarding her spouses decline. I discussed code status as cpr would not save pt life given underlying conditions and DNI. She is not able to consider this decision at this time. Will follow    Will discuss findings with members of the interdisciplinary team.      More than 50% of this 25 minute visit was spent counseling and coordination of care as outlined above. Subjective:     Comprehensive review of systems unable to complete due to mentation. Objective:     Visit Vitals  BP (!) 142/71 (BP 1 Location: Right lower arm, BP Patient Position: At rest)   Pulse 73   Temp 97.7 °F (36.5 °C)   Resp 20   Ht 5' 4\" (1.626 m)   Wt (P) 139 lb (63 kg)   SpO2 99%   BMI (P) 23.86 kg/m²       Physical Exam:    General:  calm   Eyes:  Conjunctivae/corneas clear    Nose: Nares normal. Septum midline. Neck: Supple, symmetrical, trachea midline, no JVD   Lungs:   Coarse bilateral bs   Heart:  Regular rate and rhythm, no murmur    Abdomen:   Soft, non-tender, non-distended   Extremities: Normal, atraumatic, no cyanosis or edema   Skin: Skin color, texture, turgor normal. No rash or lesions.    Neurologic: Moves all extremities   Psych: Alert with some You are diagnosed with COVID-19.  I have discussed paxlovid with you as well as common side effects and risks/benefits.  You have agreed to take the medication.  It is important that you not take Viagra while on this medication and for 5 days after the completion of this medication.  Additionally, I suggest that you not take Xanax while on this medication as well.  Work note is provided.  You may take Tylenol and ibuprofen as needed for pain.  Return for worsening symptoms.   confusion.      Signed By: Alisha Purvis MD     November 22, 2021

## 2022-08-31 NOTE — PROGRESS NOTES
Problem: Falls - Risk of  Goal: *Absence of Falls  Description: Document Satinder Kelly Fall Risk and appropriate interventions in the flowsheet. Outcome: Progressing Towards Goal  Note: Fall Risk Interventions:  Mobility Interventions: Bed/chair exit alarm, OT consult for ADLs, Patient to call before getting OOB, PT Consult for mobility concerns    Mentation Interventions: More frequent rounding    Medication Interventions: Bed/chair exit alarm, Evaluate medications/consider consulting pharmacy, Patient to call before getting OOB    Elimination Interventions: Call light in reach, Patient to call for help with toileting needs, Stay With Me (per policy)    History of Falls Interventions: Bed/chair exit alarm, Consult care management for discharge planning, Door open when patient unattended         Problem: Patient Education: Go to Patient Education Activity  Goal: Patient/Family Education  Outcome: Progressing Towards Goal     Problem: Pressure Injury - Risk of  Goal: *Prevention of pressure injury  Description: Document Salvador Scale and appropriate interventions in the flowsheet.   Outcome: Progressing Towards Goal  Note: Pressure Injury Interventions:  Sensory Interventions: Maintain/enhance activity level, Minimize linen layers, Discuss PT/OT consult with provider    Moisture Interventions: Apply protective barrier, creams and emollients, Check for incontinence Q2 hours and as needed    Activity Interventions: Increase time out of bed, Pressure redistribution bed/mattress(bed type), PT/OT evaluation    Mobility Interventions: HOB 30 degrees or less, Pressure redistribution bed/mattress (bed type), PT/OT evaluation    Nutrition Interventions: Document food/fluid/supplement intake    Friction and Shear Interventions: Apply protective barrier, creams and emollients, Foam dressings/transparent film/skin sealants, HOB 30 degrees or less                Problem: Patient Education: Go to Patient Education Activity  Goal: Patient/Family Education  Outcome: Progressing Towards Goal     Problem: Patient Education: Go to Patient Education Activity  Goal: Patient/Family Education  Outcome: Progressing Towards Goal     Problem: Patient Education: Go to Patient Education Activity  Goal: Patient/Family Education  Outcome: Progressing Towards Goal Hydroquinone Pregnancy And Lactation Text: This medication has not been assigned a Pregnancy Risk Category but animal studies failed to show danger with the topical medication. It is unknown if the medication is excreted in breast milk.

## (undated) DEVICE — CONNECTOR TBNG OD5-7MM O2 END DISP

## (undated) DEVICE — KENDALL RADIOLUCENT FOAM MONITORING ELECTRODE RECTANGULAR SHAPE: Brand: KENDALL

## (undated) DEVICE — CANNULA NSL ORAL AD FOR CAPNOFLEX CO2 O2 AIRLFE

## (undated) DEVICE — ESOPHAGEAL/PYLORIC/COLONIC/BILIARY WIREGUIDED BALLOON DILATATION CATHETER: Brand: CRE™ PRO

## (undated) DEVICE — BLOCK BITE AD 60FR W/ VELC STRP ADDRESSES MOST PT AND